# Patient Record
Sex: MALE | Race: WHITE | NOT HISPANIC OR LATINO | Employment: FULL TIME | URBAN - METROPOLITAN AREA
[De-identification: names, ages, dates, MRNs, and addresses within clinical notes are randomized per-mention and may not be internally consistent; named-entity substitution may affect disease eponyms.]

---

## 2017-01-08 ENCOUNTER — OFFICE VISIT (OUTPATIENT)
Dept: URGENT CARE | Facility: URGENT CARE | Age: 52
End: 2017-01-08
Payer: COMMERCIAL

## 2017-01-08 VITALS
DIASTOLIC BLOOD PRESSURE: 70 MMHG | RESPIRATION RATE: 16 BRPM | HEART RATE: 99 BPM | OXYGEN SATURATION: 98 % | TEMPERATURE: 98.2 F | BODY MASS INDEX: 35.43 KG/M2 | WEIGHT: 240 LBS | SYSTOLIC BLOOD PRESSURE: 110 MMHG

## 2017-01-08 DIAGNOSIS — J20.9 ACUTE BRONCHITIS, UNSPECIFIED ORGANISM: Primary | ICD-10-CM

## 2017-01-08 DIAGNOSIS — R05.9 COUGH: ICD-10-CM

## 2017-01-08 PROCEDURE — 99213 OFFICE O/P EST LOW 20 MIN: CPT | Performed by: FAMILY MEDICINE

## 2017-01-08 RX ORDER — BENZONATATE 100 MG/1
200 CAPSULE ORAL 3 TIMES DAILY PRN
Qty: 42 CAPSULE | Refills: 3 | Status: SHIPPED | OUTPATIENT
Start: 2017-01-08 | End: 2017-05-26

## 2017-01-08 RX ORDER — CODEINE PHOSPHATE AND GUAIFENESIN 10; 100 MG/5ML; MG/5ML
1-2 SOLUTION ORAL EVERY 6 HOURS PRN
Qty: 120 ML | Refills: 0 | Status: SHIPPED | OUTPATIENT
Start: 2017-01-08 | End: 2017-08-25

## 2017-01-09 NOTE — NURSING NOTE
"Everton Linn is a 51 year old male.      Chief Complaint   Patient presents with     Urgent Care     Cough     pt is here for a cough and congestion that started on Friday - also a fever started today and cough is getting worse       Initial /70 mmHg  Pulse 99  Temp(Src) 98.2  F (36.8  C) (Oral)  Resp 16  Wt 240 lb (108.863 kg)  SpO2 98% Estimated body mass index is 35.43 kg/(m^2) as calculated from the following:    Height as of 10/28/16: 5' 9\" (1.753 m).    Weight as of this encounter: 240 lb (108.863 kg).    BP completed using cuff size: large    Questioned patient about current smoking habits.  Pt. has never smoked.      Medina Davies CMA        "

## 2017-01-09 NOTE — PROGRESS NOTES
SUBJECTIVE:   Everton Linn is a 51 year old male presenting with a chief complaint of worsening cough (productive of yellowish-green phlegm, cough attacks have been mild-moderate), chest congestion for the past three days and fever (up to 100.2 F) since last night.  There has been sharp in the midline chest with coughing. .  Course of illness is worsening. .      Current and Associated symptoms: none.   Treatment measures tried include Ibuprofen (last taken one hour ago).  .  Predisposing factors include Patient has a h/o Diabetes Mellitus Type 2.    Past medical history:    Diabetes Mellitus Type 2.     Current Outpatient Prescriptions   Medication Sig Dispense Refill     simvastatin (ZOCOR) 20 MG tablet Take 1 tablet (20 mg) by mouth At Bedtime 90 tablet 3     escitalopram (LEXAPRO) 20 MG tablet Take 1 tablet (20 mg) by mouth daily 90 tablet 1     glipiZIDE (GLIPIZIDE XL) 10 MG 24 hr tablet Take 2 tablets (20 mg) by mouth daily 180 tablet 0     metFORMIN (GLUCOPHAGE-XR) 500 MG 24 hr tablet Take 2 tablets (1,000 mg) by mouth 2 times daily (with meals) 360 tablet 1     oxyCODONE (ROXICODONE) 5 MG immediate release tablet Take 1-2 tablets (5-10 mg) by mouth every 3 hours as needed for moderate to severe pain 10 tablet 0     ALPRAZolam (XANAX) 0.25 MG tablet Take 1 tablet (0.25 mg) by mouth 3 times daily as needed for anxiety 5 tablet 0     ASPIRIN NOT PRESCRIBED, INTENTIONAL, Antiplatelet medication not prescribed intentionally due to Not indicated based on age       glucose blood VI test strips strip 3 Box by In Vitro route daily. contour 1 Box 12     Social History   Substance Use Topics     Smoking status: Never Smoker      Smokeless tobacco: Never Used     Alcohol Use: Yes      Comment: 2 -3 beers every few weeks       ROS:  Review of systems negative except as stated above.    OBJECTIVE  :/70 mmHg  Pulse 99  Temp(Src) 98.2  F (36.8  C) (Oral)  Resp 16  Wt 240 lb (108.863 kg)  SpO2 98%  GENERAL  APPEARANCE: healthy, alert and no acute respiratory distress. Patient has frequent coughing.   HENT: ear canals and TM's normal. mouth without ulcers, erythema or lesions  NECK: supple, nontender, no lymphadenopathy  RESP: lungs clear to auscultation - no rales, rhonchi or wheezes  CV: regular rates and rhythm, normal S1 S2, no murmur noted    ASSESSMENT:  Acute Bronchitis  Cough    PLAN:  Rx:  Cheratussin AC, and Tessalon Perles.   Humidifier  follow up with the primary care provider if not better in 7-10 days.   See orders in Epic    Jimmie Byrne MD

## 2017-01-12 ENCOUNTER — TELEPHONE (OUTPATIENT)
Dept: FAMILY MEDICINE | Facility: CLINIC | Age: 52
End: 2017-01-12

## 2017-01-12 NOTE — TELEPHONE ENCOUNTER
Panel Management Review      Patient has the following on his problem list:     Diabetes    ASA:     Last A1C  A1C      8.7   10/21/2016  A1C      7.9   5/9/2016  A1C      9.3   1/8/2016  A1C      9.0   7/17/2015  A1C     10.3   1/9/2015  A1C tested: Failed    Last LDL:    CHOL      181   10/21/2016  HDL       32   10/21/2016  LDL   Cannot estimate LDL when triglyceride exceeds 400 mg/dL   10/21/2016  LDL      107   10/21/2016  TRIG      424   10/21/2016  CHOLHDLRATIO      4.6   7/17/2015  NHDL      149   10/21/2016    Is the patient on a Statin? YES             Is the patient on Aspirin? NO    Medications     HMG CoA Reductase Inhibitors    simvastatin (ZOCOR) 20 MG tablet          Last three blood pressure readings:  BP Readings from Last 3 Encounters:   01/08/17 110/70   10/28/16 122/72   05/28/16 125/71       Date of last diabetes office visit: 10/28/16     Tobacco History:     History   Smoking status     Never Smoker    Smokeless tobacco     Never Used             Composite cancer screening  Chart review shows that this patient is due/due soon for the following None  Summary:    Patient is due/failing the following:   Diabetic visit and A1C    Action needed:   Patient needs office visit for diabetic visit and A1C.    Type of outreach:    Sent Yasmo message.    Questions for provider review:    None                                                                                                                                    Pippa Uriostegui MA

## 2017-01-19 ENCOUNTER — DOCUMENTATION ONLY (OUTPATIENT)
Dept: FAMILY MEDICINE | Facility: CLINIC | Age: 52
End: 2017-01-19

## 2017-01-19 NOTE — PROGRESS NOTES
Panel Management Review      Patient has the following on his problem list:     Diabetes    ASA:     Last A1C  A1C      8.7   10/21/2016  A1C      7.9   5/9/2016  A1C      9.3   1/8/2016  A1C      9.0   7/17/2015  A1C     10.3   1/9/2015  A1C tested: Failed    Last LDL:    CHOL      181   10/21/2016  HDL       32   10/21/2016  LDL   Cannot estimate LDL when triglyceride exceeds 400 mg/dL   10/21/2016  LDL      107   10/21/2016  TRIG      424   10/21/2016  CHOLHDLRATIO      4.6   7/17/2015  NHDL      149   10/21/2016    Is the patient on a Statin? YES             Is the patient on Aspirin? NO    Medications     HMG CoA Reductase Inhibitors    simvastatin (ZOCOR) 20 MG tablet          Last three blood pressure readings:  BP Readings from Last 3 Encounters:   01/08/17 110/70   10/28/16 122/72   05/28/16 125/71       Date of last diabetes office visit: 10/28/16     Tobacco History:     History   Smoking status     Never Smoker    Smokeless tobacco     Never Used             Composite cancer screening  Chart review shows that this patient is due/due soon for the following None  Summary:    Patient is due/failing the following:   Diabetic visit and A1C    Action needed:   Patient needs office visit for diabetic visit and A1C.    Type of outreach:    Phone, left message for patient to call back.     Questions for provider review:    None                                                                                                                                    Pippa Uriostegui MA

## 2017-01-26 ENCOUNTER — DOCUMENTATION ONLY (OUTPATIENT)
Dept: FAMILY MEDICINE | Facility: CLINIC | Age: 52
End: 2017-01-26

## 2017-01-26 NOTE — PROGRESS NOTES
Panel Management Review      Patient has the following on his problem list:     Diabetes    ASA:     Last A1C  A1C      8.7   10/21/2016  A1C      7.9   5/9/2016  A1C      9.3   1/8/2016  A1C      9.0   7/17/2015  A1C     10.3   1/9/2015  A1C tested: Failed    Last LDL:    CHOL      181   10/21/2016  HDL       32   10/21/2016  LDL   Cannot estimate LDL when triglyceride exceeds 400 mg/dL   10/21/2016  LDL      107   10/21/2016  TRIG      424   10/21/2016  CHOLHDLRATIO      4.6   7/17/2015  NHDL      149   10/21/2016    Is the patient on a Statin? YES             Is the patient on Aspirin? NO    Medications     HMG CoA Reductase Inhibitors    simvastatin (ZOCOR) 20 MG tablet          Last three blood pressure readings:  BP Readings from Last 3 Encounters:   01/08/17 110/70   10/28/16 122/72   05/28/16 125/71       Date of last diabetes office visit: 10/28/16     Tobacco History:     History   Smoking status     Never Smoker    Smokeless tobacco     Never Used             Composite cancer screening  Chart review shows that this patient is due/due soon for the following None  Summary:    Patient is due/failing the following:   Diabetic visit and A1C    Action needed:   Patient needs office visit for diabetic visit.    Type of outreach:    Sent letter.    Questions for provider review:    None                                                                                                                                    Pippa Uriostegui MA       Chart closed.

## 2017-01-26 NOTE — Clinical Note
January 26, 2017      Everton Linn  [id number]  [facility] [address]        Dear Juan J,    According to our records, you are due for a diabetic follow up visit.    Please contact the clinic at [phone] at your earliest convenience to schedule this appointment.     Thank you for choosing [facility] as your preferred healthcare.     Sincerely,     Lindsay Arroyo, CNP, RN

## 2017-02-21 DIAGNOSIS — E11.628 TYPE 2 DIABETES MELLITUS WITH OTHER SKIN COMPLICATIONS (H): ICD-10-CM

## 2017-02-21 NOTE — TELEPHONE ENCOUNTER
glipiZIDE (GLIPIZIDE XL) 10 MG         Last Written Prescription Date: 11/22/16  Last Fill Quantity: 180, # refills: 0  Last Office Visit with G, P or [facility] prescribing provider:  10/28/16        BP Readings from Last 3 Encounters:   01/08/17 110/70   10/28/16 122/72   05/28/16 125/71     Lab Results   Component Value Date    MICROL 10 10/21/2016     No results found for: MICROALBUMIN  Creatinine   Date Value Ref Range Status   05/28/2016 0.98 0.66 - 1.25 mg/dL Final   ]  GFR Estimate   Date Value Ref Range Status   05/28/2016 81 >60 mL/min/1.7m2 Final     Comment:     Non  GFR Calc   05/27/2016 >90  Non  GFR Calc   >60 mL/min/1.7m2 Final   05/09/2016 81 >60 mL/min/1.7m2 Final     Comment:     Non  GFR Calc     GFR Estimate If Black   Date Value Ref Range Status   05/28/2016 >90   GFR Calc   >60 mL/min/1.7m2 Final   05/27/2016 >90   GFR Calc   >60 mL/min/1.7m2 Final   05/09/2016 >90   GFR Calc   >60 mL/min/1.7m2 Final     Lab Results   Component Value Date    CHOL 181 10/21/2016     Lab Results   Component Value Date    HDL 32 10/21/2016     Lab Results   Component Value Date    LDL  10/21/2016     Cannot estimate LDL when triglyceride exceeds 400 mg/dL     10/21/2016     Lab Results   Component Value Date    TRIG 424 10/21/2016     Lab Results   Component Value Date    CHOLHDLRATIO 4.6 07/17/2015     Lab Results   Component Value Date    AST 17 05/28/2016     Lab Results   Component Value Date    ALT 28 05/28/2016     Lab Results   Component Value Date    A1C 8.7 10/21/2016    A1C 7.9 05/09/2016    A1C 9.3 01/08/2016    A1C 9.0 07/17/2015    A1C 10.3 01/09/2015     Potassium   Date Value Ref Range Status   05/28/2016 3.7 3.4 - 5.3 mmol/L Final

## 2017-02-22 RX ORDER — GLIPIZIDE 10 MG/1
20 TABLET, FILM COATED, EXTENDED RELEASE ORAL DAILY
Qty: 180 TABLET | Refills: 0 | Status: SHIPPED | OUTPATIENT
Start: 2017-02-22 | End: 2017-05-22

## 2017-02-22 NOTE — TELEPHONE ENCOUNTER
Prescription approved per [facility] Refill Protocol.  Due for 6 month diabetes check in 4/17.  Oapl Rodas, RN  Triage Nurse

## 2017-05-04 ENCOUNTER — DOCUMENTATION ONLY (OUTPATIENT)
Dept: LAB | Facility: CLINIC | Age: 52
End: 2017-05-04

## 2017-05-04 DIAGNOSIS — E11.9 TYPE 2 DIABETES MELLITUS WITHOUT COMPLICATION (H): ICD-10-CM

## 2017-05-04 DIAGNOSIS — E78.5 HYPERLIPIDEMIA LDL GOAL <100: Primary | ICD-10-CM

## 2017-05-04 NOTE — PROGRESS NOTES
This patient is coming to lab May19th.  orders have been placed for you to associate and sign. Please future any further labs you may want, thanks [facility] lab staff.

## 2017-05-11 DIAGNOSIS — E11.628 TYPE 2 DIABETES MELLITUS WITH OTHER SKIN COMPLICATIONS (H): ICD-10-CM

## 2017-05-11 RX ORDER — METFORMIN HCL 500 MG
TABLET, EXTENDED RELEASE 24 HR ORAL
Qty: 120 TABLET | Refills: 0 | Status: SHIPPED | OUTPATIENT
Start: 2017-05-11 | End: 2017-06-09

## 2017-05-11 NOTE — TELEPHONE ENCOUNTER
metFORMIN (GLUCOPHAGE-XR) 500 MG         Last Written Prescription Date: 11/11/16  Last Fill Quantity: 360, # refills: 1  Last Office Visit with [facility], [facility] or  [facility] prescribing provider:  10/28/16   Next 5 appointments (look out 90 days)     May 19, 2017  9:00 AM CDT   [facility]  [facility]    [address] [address] [facility] [address] [phone]            May 26, 2017  4:20 PM CDT   MyChart Long with VALDEZ Cowart CNP   [facility]    [address] [address] [phone]                   BP Readings from Last 3 Encounters:   01/08/17 110/70   10/28/16 122/72   05/28/16 125/71     Lab Results   Component Value Date    MICROL 10 10/21/2016     Lab Results   Component Value Date    UMALCR 4.67 10/21/2016     Creatinine   Date Value Ref Range Status   05/28/2016 0.98 0.66 - 1.25 mg/dL Final   ]  GFR Estimate   Date Value Ref Range Status   05/28/2016 81 >60 mL/min/1.7m2 Final     Comment:     Non  GFR Calc   05/27/2016 >90  Non  GFR Calc   >60 mL/min/1.7m2 Final   05/09/2016 81 >60 mL/min/1.7m2 Final     Comment:     Non  GFR Calc     GFR Estimate If Black   Date Value Ref Range Status   05/28/2016 >90   GFR Calc   >60 mL/min/1.7m2 Final   05/27/2016 >90   GFR Calc   >60 mL/min/1.7m2 Final   05/09/2016 >90   GFR Calc   >60 mL/min/1.7m2 Final     Lab Results   Component Value Date    CHOL 181 10/21/2016     Lab Results   Component Value Date    HDL 32 10/21/2016     Lab Results   Component Value Date    LDL  10/21/2016     Cannot estimate LDL when triglyceride exceeds 400 mg/dL     10/21/2016     Lab Results   Component Value Date    TRIG 424 10/21/2016     Lab Results   Component Value Date    CHOLHDLRATIO 4.6 07/17/2015     Lab Results   Component Value Date    AST 17  05/28/2016     Lab Results   Component Value Date    ALT 28 05/28/2016     Lab Results   Component Value Date    A1C 8.7 10/21/2016    A1C 7.9 05/09/2016    A1C 9.3 01/08/2016    A1C 9.0 07/17/2015    A1C 10.3 01/09/2015     Potassium   Date Value Ref Range Status   05/28/2016 3.7 3.4 - 5.3 mmol/L Final

## 2017-05-11 NOTE — TELEPHONE ENCOUNTER
Medication is being filled for 1 time refill only due to:  Patient needs to be seen because due for diabetic visit.. Fasting labs due.    Patient has appt . 5/26 with pcp.    Doreen Villegas RN

## 2017-05-19 DIAGNOSIS — E78.5 HYPERLIPIDEMIA LDL GOAL <100: ICD-10-CM

## 2017-05-19 DIAGNOSIS — E11.9 TYPE 2 DIABETES MELLITUS WITHOUT COMPLICATION (H): ICD-10-CM

## 2017-05-19 LAB
CHOLEST SERPL-MCNC: 179 MG/DL
CREAT SERPL-MCNC: 0.91 MG/DL (ref 0.66–1.25)
GFR SERPL CREATININE-BSD FRML MDRD: 88 ML/MIN/1.7M2
HBA1C MFR BLD: 9.4 % (ref 4.3–6)
HDLC SERPL-MCNC: 33 MG/DL
LDLC SERPL CALC-MCNC: ABNORMAL MG/DL
LDLC SERPL DIRECT ASSAY-MCNC: 94 MG/DL
NONHDLC SERPL-MCNC: 146 MG/DL
TRIGL SERPL-MCNC: 556 MG/DL
TSH SERPL DL<=0.005 MIU/L-ACNC: 2.09 MU/L (ref 0.4–4)

## 2017-05-19 PROCEDURE — 83036 HEMOGLOBIN GLYCOSYLATED A1C: CPT | Performed by: NURSE PRACTITIONER

## 2017-05-19 PROCEDURE — 80061 LIPID PANEL: CPT | Performed by: NURSE PRACTITIONER

## 2017-05-19 PROCEDURE — 84443 ASSAY THYROID STIM HORMONE: CPT | Performed by: NURSE PRACTITIONER

## 2017-05-19 PROCEDURE — 36415 COLL VENOUS BLD VENIPUNCTURE: CPT | Performed by: NURSE PRACTITIONER

## 2017-05-19 PROCEDURE — 83721 ASSAY OF BLOOD LIPOPROTEIN: CPT | Mod: 59 | Performed by: NURSE PRACTITIONER

## 2017-05-19 PROCEDURE — 82565 ASSAY OF CREATININE: CPT | Performed by: NURSE PRACTITIONER

## 2017-05-22 DIAGNOSIS — E11.628 TYPE 2 DIABETES MELLITUS WITH OTHER SKIN COMPLICATIONS (H): ICD-10-CM

## 2017-05-22 NOTE — TELEPHONE ENCOUNTER
glipiZIDE (GLIPIZIDE XL) 10 MG         Last Written Prescription Date: 2/22/17  Last Fill Quantity: 180, # refills: 0  Last Office Visit with G, [facility] or [facility] prescribing provider:  10/28/16   Next 5 appointments (look out 90 days)     May 26, 2017  4:20 PM CDT   Stephanie Cavanaugh with VALDEZ Cowart CNP   [facility]    [address] [facility] [address] [phone]                   BP Readings from Last 3 Encounters:   01/08/17 110/70   10/28/16 122/72   05/28/16 125/71     Lab Results   Component Value Date    MICROL 10 10/21/2016     Lab Results   Component Value Date    UMALCR 4.67 10/21/2016     Creatinine   Date Value Ref Range Status   05/19/2017 0.91 0.66 - 1.25 mg/dL Final   ]  GFR Estimate   Date Value Ref Range Status   05/19/2017 88 >60 mL/min/1.7m2 Final     Comment:     Non  GFR Calc   05/28/2016 81 >60 mL/min/1.7m2 Final     Comment:     Non  GFR Calc   05/27/2016 >90  Non  GFR Calc   >60 mL/min/1.7m2 Final     GFR Estimate If Black   Date Value Ref Range Status   05/19/2017 >90   GFR Calc   >60 mL/min/1.7m2 Final   05/28/2016 >90   GFR Calc   >60 mL/min/1.7m2 Final   05/27/2016 >90   GFR Calc   >60 mL/min/1.7m2 Final     Lab Results   Component Value Date    CHOL 179 05/19/2017     Lab Results   Component Value Date    HDL 33 05/19/2017     Lab Results   Component Value Date    LDL  05/19/2017     Cannot estimate LDL when triglyceride exceeds 400 mg/dL    LDL 94 05/19/2017     Lab Results   Component Value Date    TRIG 556 05/19/2017     Lab Results   Component Value Date    CHOLHDLRATIO 4.6 07/17/2015     Lab Results   Component Value Date    AST 17 05/28/2016     Lab Results   Component Value Date    ALT 28 05/28/2016     Lab Results   Component Value Date    A1C 9.4 05/19/2017    A1C 8.7 10/21/2016    A1C 7.9 05/09/2016    A1C 9.3  01/08/2016    A1C 9.0 07/17/2015     Potassium   Date Value Ref Range Status   05/28/2016 3.7 3.4 - 5.3 mmol/L Final

## 2017-05-23 RX ORDER — GLIPIZIDE 10 MG/1
TABLET, FILM COATED, EXTENDED RELEASE ORAL
Qty: 60 TABLET | Refills: 0 | Status: SHIPPED | OUTPATIENT
Start: 2017-05-23 | End: 2017-06-30

## 2017-05-23 NOTE — TELEPHONE ENCOUNTER
Medication is being filled for 1 time refill only due to:  Patient needs to be seen because due for diabetic office visit..     Doreen Villegas RN

## 2017-05-26 ENCOUNTER — OFFICE VISIT (OUTPATIENT)
Dept: FAMILY MEDICINE | Facility: CLINIC | Age: 52
End: 2017-05-26
Payer: COMMERCIAL

## 2017-05-26 VITALS
HEIGHT: 69 IN | SYSTOLIC BLOOD PRESSURE: 112 MMHG | OXYGEN SATURATION: 95 % | HEART RATE: 95 BPM | RESPIRATION RATE: 16 BRPM | TEMPERATURE: 98.8 F | WEIGHT: 245.9 LBS | DIASTOLIC BLOOD PRESSURE: 62 MMHG | BODY MASS INDEX: 36.42 KG/M2

## 2017-05-26 DIAGNOSIS — E78.5 HYPERLIPIDEMIA LDL GOAL <100: ICD-10-CM

## 2017-05-26 DIAGNOSIS — Z12.11 ENCOUNTER FOR SCREENING COLONOSCOPY: ICD-10-CM

## 2017-05-26 DIAGNOSIS — F33.2 SEVERE EPISODE OF RECURRENT MAJOR DEPRESSIVE DISORDER, WITHOUT PSYCHOTIC FEATURES (H): Primary | ICD-10-CM

## 2017-05-26 DIAGNOSIS — E11.9 TYPE 2 DIABETES MELLITUS WITHOUT COMPLICATION, WITHOUT LONG-TERM CURRENT USE OF INSULIN (H): ICD-10-CM

## 2017-05-26 DIAGNOSIS — H00.14 CHALAZION LEFT UPPER EYELID: ICD-10-CM

## 2017-05-26 PROCEDURE — 99214 OFFICE O/P EST MOD 30 MIN: CPT | Performed by: NURSE PRACTITIONER

## 2017-05-26 RX ORDER — SIMVASTATIN 40 MG
40 TABLET ORAL AT BEDTIME
Qty: 90 TABLET | Refills: 3 | Status: SHIPPED | OUTPATIENT
Start: 2017-05-26 | End: 2017-08-25

## 2017-05-26 ASSESSMENT — PATIENT HEALTH QUESTIONNAIRE - PHQ9: 5. POOR APPETITE OR OVEREATING: SEVERAL DAYS

## 2017-05-26 ASSESSMENT — ANXIETY QUESTIONNAIRES
5. BEING SO RESTLESS THAT IT IS HARD TO SIT STILL: NOT AT ALL
6. BECOMING EASILY ANNOYED OR IRRITABLE: SEVERAL DAYS
7. FEELING AFRAID AS IF SOMETHING AWFUL MIGHT HAPPEN: NOT AT ALL
3. WORRYING TOO MUCH ABOUT DIFFERENT THINGS: SEVERAL DAYS
GAD7 TOTAL SCORE: 4
2. NOT BEING ABLE TO STOP OR CONTROL WORRYING: NOT AT ALL
1. FEELING NERVOUS, ANXIOUS, OR ON EDGE: SEVERAL DAYS
IF YOU CHECKED OFF ANY PROBLEMS ON THIS QUESTIONNAIRE, HOW DIFFICULT HAVE THESE PROBLEMS MADE IT FOR YOU TO DO YOUR WORK, TAKE CARE OF THINGS AT HOME, OR GET ALONG WITH OTHER PEOPLE: SOMEWHAT DIFFICULT

## 2017-05-26 NOTE — PATIENT INSTRUCTIONS
I will contact the psychiatrist and get back in touch with you next week.       Follow with an opthomologist. in the next few weeks to have her examine the lump on your eye.    Make necessary changes in your diet in order to attempt to lower triglyceride levels into the recommended range.  Additionally, begin increased dosage of simvastatin (increase form 20 MG to 40 MG).    Making these dietary changes will also help to lower your glucose levels, helping to level out your A1c readings.    Schedule a colonoscopy in 4-5 months.    Follow with a therapist in the next few weeks

## 2017-05-26 NOTE — PROGRESS NOTES
SUBJECTIVE:                                                    Everton Linn is a 52 year old male who presents to clinic today for the following health issues:    Diabetes Follow-up      Patient is checking blood sugars: not at all    Diabetic concerns: None     Symptoms of hypoglycemia (low blood sugar): none     Paresthesias (numbness or burning in feet) or sores: No     Date of last diabetic eye exam: July 2016  Hemoglobin A1C (%)   Date Value   05/19/2017 9.4 (H)   10/21/2016 8.7 (H)   05/09/2016 7.9 (H)   01/08/2016 9.3 (H)   07/17/2015 9.0 (H)   01/09/2015 10.3 (H)     Creatinine (mg/dL)   Date Value   05/19/2017 0.91   05/28/2016 0.98   05/27/2016 0.88   05/09/2016 0.98   01/09/2015 0.83   08/17/2013 0.88     Glucose (mg/dL)   Date Value   05/28/2016 109 (H)   05/27/2016 220 (H)   01/09/2015 272 (H)   08/17/2013 198 (H)   12/01/2009 249 (H)   01/11/2008 144 (H)     Potassium (mmol/L)   Date Value   05/28/2016 3.7   05/27/2016 3.5   01/09/2015 4.1   08/17/2013 4.3   12/01/2009 4.4   01/11/2008 4.3     Glucose levels have not been optimally maintained.  The pt acknowledges his sugars have been high, but attributes this to the stressors in his life and his anxiety.  Pt claims he always takes his meds and could make changes in his eating habits to help lower his glucose levels.     Hyperlipidemia Follow-Up      Rate your low fat/cholesterol diet?: good    Taking statin?  Yes, no muscle aches from statin    Other lipid medications/supplements?:  none  Recent Labs   Lab Test  05/19/17   0900  10/21/16   0827   07/17/15   0813  01/09/15   0807   CHOL  179  181   --   143  208*   HDL  33*  32*   --   31*  39*   LDL  Cannot estimate LDL when triglyceride exceeds 400 mg/dL  94  Cannot estimate LDL when triglyceride exceeds 400 mg/dL  107*   < >  67  110   TRIG  556*  424*   --   223*  293*   CHOLHDLRATIO   --    --    --   4.6  5.3*    < > = values in this interval not displayed.      Labs reviewed.  Pt was  "informed of the need to change his diet in order to lower triglycerides to a healthy level.  When questioned, he admits to not maintaining a healthy diet because of all of the stress in his life.    Depression and Anxiety Follow-Up    Status since last visit: No change    Other associated symptoms: Pt states that he has been having some low times, and also is having nightmares.     Complicating factors:     Significant life event: Yes - getting , and son is graduating next week.      Current substance abuse: None    PHQ-9 SCORE 5/13/2016 10/28/2016 5/26/2017   Total Score - - -   Total Score MyChart - - -   Total Score 3 13 11     CHINMAY-7 SCORE 5/13/2016 10/28/2016 5/26/2017   Total Score - - -   Total Score 2 8 4     Pt has a history of anxiety and depression, and is currently taking escitalopram.  He says it has helped a bit, but most days are just \"okay\" and there are a lot of down moments.  He also claims to have very vivid dreams in which he often kicks or punches out, occasionally waking himself up after hitting something.  He is currently going through a divorce, and feels sad throughout the week.  He will follow with a psychiatrist and see if a medication change is necessary.  He will also start Wellbutrin, but will have to monitor his drinking as he drinks over 10 beers once a month.     PHQ-9  English    PHQ-9   Any Language   GAD7       Amount of exercise or physical activity: 2-3 days/week for an average of 30-45 minutes    Problems taking medications regularly: No    Medication side effects: None other than nightmares, but not sure if that is a side effect or not.     Diet: regular (no restrictions)      Lump on Eye:  Pt reports a lump on his eye that has been there over the past few months.  He will follow with an eye doctor in the future.     Sleep Apnea:  Pt uses a CPAP machine for sleep, but is still waking up feeling tired every morning.  He claims he does not sleep enough.  Additionally, " once a week he says he will punch or kick out in his sleep and will often wake himself up because he hits his nightstand.  This has only been occurring for the last month.  Pt will consider following with a sleep clinic.      Problem list and histories reviewed & adjusted, as indicated.  Additional history: as documented    Labs reviewed in EPIC    Reviewed and updated as needed this visit by clinical staff  Tobacco  Allergies  Meds  Med Hx  Surg Hx  Fam Hx  Soc Hx      Reviewed and updated as needed this visit by Provider       Patient Active Problem List   Diagnosis     Rosacea     Dermatophytosis of nail     Anxiety state     Inguinal hernia     HYPERLIPIDEMIA LDL GOAL <100     Ventral hernia     SANTOS (obstructive sleep apnea)     Type 2 diabetes mellitus without complication (H)     Recurrent major depression-severe (H)     Biliary colic     Past Surgical History:   Procedure Laterality Date     ARTHROSCOPY KNEE RT/LT  2007    left knee     ARTHROSCOPY KNEE RT/LT  2005    right     LAPAROSCOPIC CHOLECYSTECTOMY WITH CHOLANGIOGRAMS N/A 5/28/2016    Procedure: LAPAROSCOPIC CHOLECYSTECTOMY WITH CHOLANGIOGRAMS;  Surgeon: Jerry Bello MD;  Location:  OR       Social History   Substance Use Topics     Smoking status: Never Smoker     Smokeless tobacco: Never Used     Alcohol use Yes      Comment: 2 -3 beers every few weeks     Family History   Problem Relation Age of Onset     C.A.D. Mother      CHF     DIABETES Mother      Hypertension Father      CANCER Father      Multiple Myloma     Musculoskeletal Disorder Paternal Grandmother      Paige Gehrig's Disease           REVIEW OF SYSTEMS:  C: NEGATIVE for fever, chills, or change in weight  I: Positive for a lump on the left eyelid; NEGATIVE for worrisome rashes, moles or lesions  R: Hx of Obstructive Sleep Apnea - use of CPAP; NEGATIVE for significant cough or SOB  CV: NEGATIVE for chest pain, palpitations or peripheral edema  N: NEGATIVE for weakness,  "dizziness or paresthesias  E: Hx of type II Diabetes Mellitus - use of Metformin and Glucotrol; Hx of Hyperlipidemia - use of Simvastatin;NEGATIVE for temperature intolerance, or skin/hair changes  P: Hx of depression and anxiety - use of Escitalopram; NEGATIVE for changes in mood or affect    This document serves as a record of the services and decisions personally performed and made by Lindsay Arroyo NP. It was created on her behalf by Tonya Shelley, a trained medical scribe. The creation of this document is based on the provider's statements to the medical scribe.  Tonya Shelley, March 24, 2017, 5:12 PM    OBJECTIVE:                                                    /62 (BP Location: Right arm, Patient Position: Chair, Cuff Size: Adult Large)  Pulse 95  Temp 98.8  F (37.1  C) (Oral)  Resp 16  Ht 5' 9\" (1.753 m)  Wt 245 lb 14.4 oz (111.5 kg)  SpO2 95%  BMI 36.31 kg/m2  Body mass index is 36.31 kg/(m^2).    EXAM:  GENERAL: Patient appears healthy, alert and not distressed.  RESP: Lungs are clear to auscultation - no rales, rhonchi or wheezes present  CV: Regular rate and rhythm, normal S1 S2 heart sounds, no ectopy, no peripheral edema present, peripheral pulses normal, no carotid bruit.  SKIN: Chalazion on left upper eyelid; No suspicious rashes, lesions, or moles  NEURO: Patient exhibits normal strength and tone, normal speech and mentation  PSYCH: Speech and grooming are within normal limits, affect is sad; does not appear as sad as he has in previous visits.    Diagnostic Test Results:  No results found for this or any previous visit (from the past 24 hour(s)).      ASSESSMENT/PLAN:                                                    A/P:    ICD-10-CM    1. Severe episode of recurrent major depressive disorder, without psychotic features (H) F33.2    2. Chalazion left upper eyelid H00.14 OPHTHALMOLOGY ADULT REFERRAL   3. Encounter for screening colonoscopy Z12.11 GASTROENTEROLOGY ADULT REF " PROCEDURE ONLY   4. Hyperlipidemia LDL goal <100 E78.5 simvastatin (ZOCOR) 40 MG tablet   5. Type 2 diabetes mellitus without complication, without long-term current use of insulin (H) E11.9      Discussed management of depression and diabetes.  Would like to consult with psychiatry given his nighttime sleep movements that he attributes to his SSRI    Did also discuss use of wellbutrin as an add on medication for depression management.  However he admits to drinking 10 drinks at a session at least every other weekend.  No history of seizure disorder but with binge drinking pattern wellbutrin may not be a good choice for this pateint.    Agrees to see therapist again.    Discussed triglycerides--will increase statin and pt aware of need to reduce ETOH and limit carbs.      Total visit time 30; over one half spent discussing current issues.      Patient Instructions   I will contact the psychiatrist and get back in touch with you next week.       Follow with an opthomologist. in the next few weeks to have her examine the lump on your eye.    Make necessary changes in your diet in order to attempt to lower triglyceride levels into the recommended range.  Additionally, begin increased dosage of simvastatin (increase form 20 MG to 40 MG).    Making these dietary changes will also help to lower your glucose levels, helping to level out your A1c readings.    Schedule a colonoscopy in 4-5 months.    Follow with a therapist in the next few weeks      The information in this document, created by a medical scribe for me, accurately reflects the services I personally performed and the decisions made by me. I have reviewed and approved this document for accuracy.  Lindsay Arroyo, May 26, 2017, 5:40 PM    VALDEZ Cowart [facility]

## 2017-05-26 NOTE — NURSING NOTE
"Chief Complaint   Patient presents with     Diabetes     Lipids     Depression     Anxiety       Initial /62 (BP Location: Right arm, Patient Position: Chair, Cuff Size: Adult Large)  Pulse 95  Temp 98.8  F (37.1  C) (Oral)  Resp 16  Ht 5' 9\" (1.753 m)  Wt 245 lb 14.4 oz (111.5 kg)  SpO2 95%  BMI 36.31 kg/m2 Estimated body mass index is 36.31 kg/(m^2) as calculated from the following:    Height as of this encounter: 5' 9\" (1.753 m).    Weight as of this encounter: 245 lb 14.4 oz (111.5 kg).  Medication Reconciliation: complete   Pippa Uriostegui MA       "

## 2017-05-26 NOTE — MR AVS SNAPSHOT
After Visit Summary   5/26/2017    Everton Linn    MRN: [id number]           Patient Information     Date Of Birth          1965        Visit Information        Provider Department      5/26/2017 4:20 PM Lindsay Arroyo APRN [facility]        Today's Diagnoses     Chalazion left upper eyelid    -  1    Encounter for screening colonoscopy        Hyperlipidemia LDL goal <100          Care Instructions    I will contact the psychiatrist and get back in touch with you next week.       Follow with an opthomologist. in the next few weeks to have her examine the lump on your eye.    Make necessary changes in your diet in order to attempt to lower triglyceride levels into the recommended range.  Additionally, begin increased dosage of simvastatin (increase form 20 MG to 40 MG).    Making these dietary changes will also help to lower your glucose levels, helping to level out your A1c readings.    Schedule a colonoscopy in 4-5 months.    Follow with a therapist in the next few weeks          Follow-ups after your visit        Additional Services     GASTROENTEROLOGY ADULT REF PROCEDURE ONLY       Last Lab Result: Creatinine (mg/dL)       Date                     Value                 05/19/2017               0.91             ----------  Body mass index is 36.31 kg/(m^2).      Patient will be contacted to schedule procedure.     Please be aware that coverage of these services is subject to the terms and limitations of your health insurance plan.  Call member services at your health plan with any benefit or coverage questions.  Any procedures must be performed at a [facility] facility OR coordinated by your clinic's referral office.    Please bring the following with you to your appointment:    (1) Any X-Rays, CTs or MRIs which have been performed.  Contact the facility where they were done to arrange for  prior to your scheduled appointment.    (2) List of current medications    (3) This referral request   (4) Any documents/labs given to you for this referral            OPHTHALMOLOGY ADULT REFERRAL       Your provider has referred you to: JONNIE: [facility]  [phone]  [email or web address]    Please be aware that coverage of these services is subject to the terms and limitations of your health insurance plan.  Call member services at your health plan with any benefit or coverage questions.      Please bring the following with you to your appointment:    (1) Any X-Rays, CTs or MRIs which have been performed.  Contact the facility where they were done to arrange for  prior to your scheduled appointment.    (2) List of current medications  (3) This referral request   (4) Any documents/labs given to you for this referral                  Who to contact     If you have questions or need follow up information about today's clinic visit or your schedule please contact [facility] directly at [phone].  Normal or non-critical lab and imaging results will be communicated to you by [employer], letter or phone within 4 business days after the clinic has received the results. If you do not hear from us within 7 days, please contact the clinic through [employer] or phone. If you have a critical or abnormal lab result, we will notify you by phone as soon as possible.  Submit refill requests through [employer] or call your pharmacy and they will forward the refill request to us. Please allow 3 business days for your refill to be completed.          Additional Information About Your Visit        [employer] Information     [employer] gives you secure access to your electronic health record. If you see a primary care provider, you can also send messages to your care team and make appointments. If you have questions, please call your primary care clinic.  If you do not have a primary care provider, please call [phone] and they will assist  "you.        Care EveryWhere ID     This is your Care EveryWhere ID. This could be used by other organizations to access your [facility] medical records  [id number]        Your Vitals Were     Pulse Temperature Respirations Height Pulse Oximetry BMI (Body Mass Index)    95 98.8  F (37.1  C) (Oral) 16 5' 9\" (1.753 m) 95% 36.31 kg/m2       Blood Pressure from Last 3 Encounters:   05/26/17 112/62   01/08/17 110/70   10/28/16 122/72    Weight from Last 3 Encounters:   05/26/17 245 lb 14.4 oz (111.5 kg)   01/08/17 240 lb (108.9 kg)   10/28/16 247 lb 8 oz (112.3 kg)              We Performed the Following     GASTROENTEROLOGY ADULT REF PROCEDURE ONLY     OPHTHALMOLOGY ADULT REFERRAL          Today's Medication Changes          These changes are accurate as of: 5/26/17  5:38 PM.  If you have any questions, ask your nurse or doctor.               These medicines have changed or have updated prescriptions.        Dose/Directions    simvastatin 40 MG tablet   Commonly known as:  ZOCOR   This may have changed:    - medication strength  - how much to take   Used for:  Hyperlipidemia LDL goal <100   Changed by:  Lindsay Arroyo APRN CNP        Dose:  40 mg   Take 1 tablet (40 mg) by mouth At Bedtime   Quantity:  90 tablet   Refills:  3            Where to get your medicines      These medications were sent to [facility]/pharmacy #[id number] - [facility] [address] [facility]  [address] [facility] [address]     Phone:  [phone]     simvastatin 40 MG tablet                Primary Care Provider Office Phone # Fax #    VALDEZ Cowart [phone]       [facility] [address]  [facility] [address]        Thank you!     Thank you for choosing [facility]  for your care. Our goal is always to provide you with excellent care. Hearing back from our patients is one way we can continue to improve our services. Please take a few minutes to complete the written survey that you " may receive in the mail after your visit with us. Thank you!             Your Updated Medication List - Protect others around you: Learn how to safely use, store and throw away your medicines at www.disposemymeds.org.          This list is accurate as of: 5/26/17  5:38 PM.  Always use your most recent med list.                   Brand Name Dispense Instructions for use    ALPRAZolam 0.25 MG tablet    XANAX    5 tablet    Take 1 tablet (0.25 mg) by mouth 3 times daily as needed for anxiety       ASPIRIN NOT PRESCRIBED    INTENTIONAL     Antiplatelet medication not prescribed intentionally due to Not indicated based on age       blood glucose monitoring test strip    no brand specified    1 Box    3 Box by In Vitro route daily. contour       escitalopram 20 MG tablet    LEXAPRO    90 tablet    Take 1 tablet (20 mg) by mouth daily       glipiZIDE 10 MG 24 hr tablet    GLUCOTROL XL    60 tablet    TAKE 2 TABLETS (20 MG) BY MOUTH DAILY       guaiFENesin-codeine 100-10 MG/5ML Soln solution    ROBITUSSIN AC    120 mL    Take 5-10 mLs by mouth every 6 hours as needed for cough       metFORMIN 500 MG 24 hr tablet    GLUCOPHAGE-XR    120 tablet    TAKE 2 TABLETS (1,000 MG) BY MOUTH 2 TIMES DAILY (WITH MEALS)       oxyCODONE 5 MG IR tablet    ROXICODONE    10 tablet    Take 1-2 tablets (5-10 mg) by mouth every 3 hours as needed for moderate to severe pain       simvastatin 40 MG tablet    ZOCOR    90 tablet    Take 1 tablet (40 mg) by mouth At Bedtime

## 2017-05-27 ASSESSMENT — PATIENT HEALTH QUESTIONNAIRE - PHQ9: SUM OF ALL RESPONSES TO PHQ QUESTIONS 1-9: 11

## 2017-05-27 ASSESSMENT — ANXIETY QUESTIONNAIRES: GAD7 TOTAL SCORE: 4

## 2017-05-31 ENCOUNTER — TELEPHONE (OUTPATIENT)
Dept: FAMILY MEDICINE | Facility: CLINIC | Age: 52
End: 2017-05-31

## 2017-06-01 NOTE — TELEPHONE ENCOUNTER
Please call to see if Dr Ramirez (psychiatry) might be able to review Everton's chart and provide some advice on a possible med change for this patient.  The patient is not well controlled on his current meds and is having some restlessness in bed that is disturbing his sleep.  Finds he thrashes his arms around in bed and at times injures arms.  Attributes this to his SSRI.  Was worse on fluoxetine, and is better on escitalopram.      Does Dr. Ramirez have suggestion for next med change to improve depression control as well as mitigate his nightime sleep disturbance.  We are also happy to have the patient make an appointment if Dr Ramirez thinks this would be more helpful.      I am also happy to schedule a call with Dr Ramirez early next week if that would be helpful for a telephone consult.    Thanks so much for help facilitating this connection.    Ashley

## 2017-06-01 NOTE — TELEPHONE ENCOUNTER
Informed Dr. Ramirez's office of request to review patient's chart for medication recommendations. Chart routed to Dr. Ramirez to review.    Doreen Villegas RN

## 2017-06-01 NOTE — TELEPHONE ENCOUNTER
SSRIs can cause akathisia or RLS; would first try reducing dose of Lexapro, then maybe augment with Wellbutrin   Theoretically, Zoloft would have less chance of doing this (boosts dopamine a bit)     Can try gabapentin or Inderal for RLS, or Klonopin, most of dose HS, rather than Xanax     I can see him; just make MH referral

## 2017-06-09 DIAGNOSIS — E11.628 TYPE 2 DIABETES MELLITUS WITH OTHER SKIN COMPLICATIONS (H): ICD-10-CM

## 2017-06-09 NOTE — TELEPHONE ENCOUNTER
metFORMIN (GLUCOPHAGE-XR) 500 MG         Last Written Prescription Date: 5/11/17  Last Fill Quantity: 120, # refills: 0  Last Office Visit with [facility], [facility] or [facility] prescribing provider:  5/26/17        BP Readings from Last 3 Encounters:   05/26/17 112/62   01/08/17 110/70   10/28/16 122/72     Lab Results   Component Value Date    MICROL 10 10/21/2016     Lab Results   Component Value Date    UMALCR 4.67 10/21/2016     Creatinine   Date Value Ref Range Status   05/19/2017 0.91 0.66 - 1.25 mg/dL Final   ]  GFR Estimate   Date Value Ref Range Status   05/19/2017 88 >60 mL/min/1.7m2 Final     Comment:     Non  GFR Calc   05/28/2016 81 >60 mL/min/1.7m2 Final     Comment:     Non  GFR Calc   05/27/2016 >90  Non  GFR Calc   >60 mL/min/1.7m2 Final     GFR Estimate If Black   Date Value Ref Range Status   05/19/2017 >90   GFR Calc   >60 mL/min/1.7m2 Final   05/28/2016 >90   GFR Calc   >60 mL/min/1.7m2 Final   05/27/2016 >90   GFR Calc   >60 mL/min/1.7m2 Final     Lab Results   Component Value Date    CHOL 179 05/19/2017     Lab Results   Component Value Date    HDL 33 05/19/2017     Lab Results   Component Value Date    LDL  05/19/2017     Cannot estimate LDL when triglyceride exceeds 400 mg/dL    LDL 94 05/19/2017     Lab Results   Component Value Date    TRIG 556 05/19/2017     Lab Results   Component Value Date    CHOLHDLRATIO 4.6 07/17/2015     Lab Results   Component Value Date    AST 17 05/28/2016     Lab Results   Component Value Date    ALT 28 05/28/2016     Lab Results   Component Value Date    A1C 9.4 05/19/2017    A1C 8.7 10/21/2016    A1C 7.9 05/09/2016    A1C 9.3 01/08/2016    A1C 9.0 07/17/2015     Potassium   Date Value Ref Range Status   05/28/2016 3.7 3.4 - 5.3 mmol/L Final

## 2017-06-10 DIAGNOSIS — F32.4 MAJOR DEPRESSIVE DISORDER WITH SINGLE EPISODE, IN PARTIAL REMISSION (H): ICD-10-CM

## 2017-06-10 DIAGNOSIS — F41.1 ANXIETY STATE: ICD-10-CM

## 2017-06-12 NOTE — TELEPHONE ENCOUNTER
escitalopram (LEXAPRO) 20 MG     Last Written Prescription Date: 12/2/16  Last Fill Quantity: 90, # refills: 1  Last Office Visit with G primary care provider:  5/26/17        Last PHQ-9 score on record=   PHQ-9 SCORE 5/26/2017   Total Score MyChart -   Total Score 11

## 2017-06-14 RX ORDER — METFORMIN HCL 500 MG
1000 TABLET, EXTENDED RELEASE 24 HR ORAL 2 TIMES DAILY WITH MEALS
Qty: 120 TABLET | Refills: 2 | Status: SHIPPED | OUTPATIENT
Start: 2017-06-14 | End: 2017-08-25

## 2017-06-14 NOTE — TELEPHONE ENCOUNTER
Medication is being filled for 1 time refill only due to:  due for A1C in August.   This lab is ordered. Note sent to pharmacy.   Opal Rodas, RN  Triage Nurse

## 2017-06-14 NOTE — TELEPHONE ENCOUNTER
Routing refill request to provider for review/approval because:  PHQ 9 >4, please sign if ok. Not sure if he has followed up with MH, do you want to see him again?  Opal Rodas, RN  Triage Nurse

## 2017-06-23 ENCOUNTER — MYC MEDICAL ADVICE (OUTPATIENT)
Dept: FAMILY MEDICINE | Facility: CLINIC | Age: 52
End: 2017-06-23

## 2017-06-23 RX ORDER — ESCITALOPRAM OXALATE 20 MG/1
TABLET ORAL
Qty: 90 TABLET | Refills: 1 | Status: SHIPPED | OUTPATIENT
Start: 2017-06-23 | End: 2017-08-25

## 2017-06-30 ENCOUNTER — VIRTUAL VISIT (OUTPATIENT)
Dept: FAMILY MEDICINE | Facility: CLINIC | Age: 52
End: 2017-06-30
Payer: COMMERCIAL

## 2017-06-30 DIAGNOSIS — F33.2 SEVERE EPISODE OF RECURRENT MAJOR DEPRESSIVE DISORDER, WITHOUT PSYCHOTIC FEATURES (H): Primary | ICD-10-CM

## 2017-06-30 DIAGNOSIS — E11.628 TYPE 2 DIABETES MELLITUS WITH OTHER SKIN COMPLICATIONS (H): ICD-10-CM

## 2017-06-30 DIAGNOSIS — E11.9 TYPE 2 DIABETES MELLITUS WITHOUT COMPLICATION, WITHOUT LONG-TERM CURRENT USE OF INSULIN (H): ICD-10-CM

## 2017-06-30 PROCEDURE — 98966 PH1 ASSMT&MGMT NQHP 5-10: CPT | Performed by: NURSE PRACTITIONER

## 2017-06-30 RX ORDER — ESCITALOPRAM OXALATE 10 MG/1
10 TABLET ORAL DAILY
Qty: 30 TABLET | Refills: 1 | Status: SHIPPED | OUTPATIENT
Start: 2017-06-30 | End: 2017-08-25

## 2017-06-30 RX ORDER — BUPROPION HYDROCHLORIDE 150 MG/1
150 TABLET ORAL EVERY MORNING
Qty: 30 TABLET | Refills: 1 | Status: SHIPPED | OUTPATIENT
Start: 2017-06-30 | End: 2017-08-25

## 2017-06-30 ASSESSMENT — ANXIETY QUESTIONNAIRES
5. BEING SO RESTLESS THAT IT IS HARD TO SIT STILL: NOT AT ALL
IF YOU CHECKED OFF ANY PROBLEMS ON THIS QUESTIONNAIRE, HOW DIFFICULT HAVE THESE PROBLEMS MADE IT FOR YOU TO DO YOUR WORK, TAKE CARE OF THINGS AT HOME, OR GET ALONG WITH OTHER PEOPLE: NOT DIFFICULT AT ALL
6. BECOMING EASILY ANNOYED OR IRRITABLE: SEVERAL DAYS
7. FEELING AFRAID AS IF SOMETHING AWFUL MIGHT HAPPEN: NOT AT ALL
GAD7 TOTAL SCORE: 3
3. WORRYING TOO MUCH ABOUT DIFFERENT THINGS: SEVERAL DAYS
2. NOT BEING ABLE TO STOP OR CONTROL WORRYING: NOT AT ALL
1. FEELING NERVOUS, ANXIOUS, OR ON EDGE: NOT AT ALL

## 2017-06-30 ASSESSMENT — PATIENT HEALTH QUESTIONNAIRE - PHQ9: 5. POOR APPETITE OR OVEREATING: SEVERAL DAYS

## 2017-06-30 NOTE — MR AVS SNAPSHOT
After Visit Summary   6/30/2017    Everton Linn    MRN: [id number]           Patient Information     Date Of Birth          1965        Visit Information        Provider Department      6/30/2017 4:20 PM Lindsay Arroyo APRN CNP [facility]        Today's Diagnoses     Severe episode of recurrent major depressive disorder, without psychotic features (H)    -  1    Type 2 diabetes mellitus without complication, without long-term current use of insulin (H)           Follow-ups after your visit        Future tests that were ordered for you today     Open Future Orders        Priority Expected Expires Ordered    **A1C FUTURE anytime Routine 6/30/2017 6/30/2018 6/30/2017            Who to contact     If you have questions or need follow up information about today's clinic visit or your schedule please contact [facility] directly at [phone].  Normal or non-critical lab and imaging results will be communicated to you by [employer], letter or phone within 4 business days after the clinic has received the results. If you do not hear from us within 7 days, please contact the clinic through [employer] or phone. If you have a critical or abnormal lab result, we will notify you by phone as soon as possible.  Submit refill requests through [employer] or call your pharmacy and they will forward the refill request to us. Please allow 3 business days for your refill to be completed.          Additional Information About Your Visit        MyChart Information     [employer] gives you secure access to your electronic health record. If you see a primary care provider, you can also send messages to your care team and make appointments. If you have questions, please call your primary care clinic.  If you do not have a primary care provider, please call [phone] and they will assist you.        Care EveryWhere ID     This is your Care EveryWhere ID. This could be used by other  organizations to access your [facility] medical records  [id number]         Blood Pressure from Last 3 Encounters:   05/26/17 112/62   01/08/17 110/70   10/28/16 122/72    Weight from Last 3 Encounters:   05/26/17 245 lb 14.4 oz (111.5 kg)   01/08/17 240 lb (108.9 kg)   10/28/16 247 lb 8 oz (112.3 kg)                 Today's Medication Changes          These changes are accurate as of: 6/30/17  5:06 PM.  If you have any questions, ask your nurse or doctor.               Start taking these medicines.        Dose/Directions    buPROPion 150 MG 24 hr tablet   Commonly known as:  WELLBUTRIN XL   Used for:  Severe episode of recurrent major depressive disorder, without psychotic features (H)   Started by:  Lindsay Arroyo APRN CNP        Dose:  150 mg   Take 1 tablet (150 mg) by mouth every morning   Quantity:  30 tablet   Refills:  1         These medicines have changed or have updated prescriptions.        Dose/Directions    * escitalopram 20 MG tablet   Commonly known as:  LEXAPRO   This may have changed:  Another medication with the same name was added. Make sure you understand how and when to take each.   Used for:  Anxiety state, Major depressive disorder with single episode, in partial remission (H)   Changed by:  Lindsay Arroyo APRN CNP        TAKE 1 TABLET (20 MG) BY MOUTH DAILY   Quantity:  90 tablet   Refills:  1       * escitalopram 10 MG tablet   Commonly known as:  LEXAPRO   This may have changed:  You were already taking a medication with the same name, and this prescription was added. Make sure you understand how and when to take each.   Used for:  Severe episode of recurrent major depressive disorder, without psychotic features (H)   Changed by:  Lindsay Arroyo APRN CNP        Dose:  10 mg   Take 1 tablet (10 mg) by mouth daily   Quantity:  30 tablet   Refills:  1       * Notice:  This list has 2 medication(s) that are the same as other medications prescribed for you. Read the directions  carefully, and ask your doctor or other care provider to review them with you.         Where to get your medicines      These medications were sent to [facility]/pharmacy #[id number] - [place], [place] - [address] [place]  [address] [address] [address]     Phone:  [phone]     buPROPion 150 MG 24 hr tablet    escitalopram 10 MG tablet                Primary Care Provider Office Phone # Fax #    Lindsay Arroyo, APRN [phone]       [facility] [address]  [facility] [address]        Equal Access to Services     [facility]: Hadii aad ku hadasho Soomaali, waaxda luqadaha, qaybta kaalmada adeegyada, waxay idiin hayaan adestacey godwin . So [facility] [phone].    ATENCIÓN: Si habla español, tiene a brandon disposición servicios gratuitos de asistencia lingüística. [facility] [phone].    We comply with applicable federal civil rights laws and [place] laws. We do not discriminate on the basis of race, color, national origin, age, disability sex, sexual orientation or gender identity.            Thank you!     Thank you for choosing [facility]  for your care. Our goal is always to provide you with excellent care. Hearing back from our patients is one way we can continue to improve our services. Please take a few minutes to complete the written survey that you may receive in the mail after your visit with us. Thank you!             Your Updated Medication List - Protect others around you: Learn how to safely use, store and throw away your medicines at www.disposemymeds.org.          This list is accurate as of: 6/30/17  5:06 PM.  Always use your most recent med list.                   Brand Name Dispense Instructions for use Diagnosis    ALPRAZolam 0.25 MG tablet    XANAX    5 tablet    Take 1 tablet (0.25 mg) by mouth 3 times daily as needed for anxiety    Panic attack       ASPIRIN NOT PRESCRIBED    INTENTIONAL     Antiplatelet medication not prescribed  intentionally due to Not indicated based on age    Type 2 diabetes, HbA1c goal < 7% (H)       blood glucose monitoring test strip    no brand specified    1 Box    3 Box by In Vitro route daily. contour    Type II or unspecified type diabetes mellitus without mention of complication, uncontrolled       buPROPion 150 MG 24 hr tablet    WELLBUTRIN XL    30 tablet    Take 1 tablet (150 mg) by mouth every morning    Severe episode of recurrent major depressive disorder, without psychotic features (H)       * escitalopram 20 MG tablet    LEXAPRO    90 tablet    TAKE 1 TABLET (20 MG) BY MOUTH DAILY    Anxiety state, Major depressive disorder with single episode, in partial remission (H)       * escitalopram 10 MG tablet    LEXAPRO    30 tablet    Take 1 tablet (10 mg) by mouth daily    Severe episode of recurrent major depressive disorder, without psychotic features (H)       glipiZIDE 10 MG 24 hr tablet    GLUCOTROL XL    60 tablet    TAKE 2 TABLETS (20 MG) BY MOUTH DAILY    Type 2 diabetes mellitus with other skin complications (H)       guaiFENesin-codeine 100-10 MG/5ML Soln solution    ROBITUSSIN AC    120 mL    Take 5-10 mLs by mouth every 6 hours as needed for cough    Cough       metFORMIN 500 MG 24 hr tablet    GLUCOPHAGE-XR    120 tablet    Take 2 tablets (1,000 mg) by mouth 2 times daily (with meals) Due for follow up and labs in August. Please call to schedule this.    Type 2 diabetes mellitus with other skin complications (H)       oxyCODONE 5 MG IR tablet    ROXICODONE    10 tablet    Take 1-2 tablets (5-10 mg) by mouth every 3 hours as needed for moderate to severe pain    Calculus of gallbladder without cholecystitis without obstruction       simvastatin 40 MG tablet    ZOCOR    90 tablet    Take 1 tablet (40 mg) by mouth At Bedtime    Hyperlipidemia LDL goal <100       * Notice:  This list has 2 medication(s) that are the same as other medications prescribed for you. Read the directions carefully, and ask  your doctor or other care provider to review them with you.

## 2017-06-30 NOTE — PROGRESS NOTES
S:  Everton Linn is a 52 year old male who presents for depression follow up  Better since divorce on the 5th    Lexapro

## 2017-06-30 NOTE — TELEPHONE ENCOUNTER
Medication Detail      Disp Refills Start End GIBRAN   glipiZIDE (GLUCOTROL XL) 10 MG 24 hr tablet 60 tablet 0 5/23/2017  No   Sig: TAKE 2 TABLETS (20 MG) BY MOUTH DAILY       Last Office Visit with G, P or [facility] prescribing provider:  5/26/17   Next 5 appointments (look out 90 days)     Jun 30, 2017  4:20 PM CDT   [facility]phone [facility] VALDEZ [facility]art CNP   [facility]    [address] [facility] [address] [phone]                   BP Readings from Last 3 Encounters:   05/26/17 112/62   01/08/17 110/70   10/28/16 122/72     Lab Results   Component Value Date    MICROL 10 10/21/2016     Lab Results   Component Value Date    UMALCR 4.67 10/21/2016     Creatinine   Date Value Ref Range Status   05/19/2017 0.91 0.66 - 1.25 mg/dL Final   ]  GFR Estimate   Date Value Ref Range Status   05/19/2017 88 >60 mL/min/1.7m2 Final     Comment:     Non  GFR Calc   05/28/2016 81 >60 mL/min/1.7m2 Final     Comment:     Non  GFR Calc   05/27/2016 >90  Non  GFR Calc   >60 mL/min/1.7m2 Final     GFR Estimate If Black   Date Value Ref Range Status   05/19/2017 >90   GFR Calc   >60 mL/min/1.7m2 Final   05/28/2016 >90   GFR Calc   >60 mL/min/1.7m2 Final   05/27/2016 >90   GFR Calc   >60 mL/min/1.7m2 Final     Lab Results   Component Value Date    CHOL 179 05/19/2017     Lab Results   Component Value Date    HDL 33 05/19/2017     Lab Results   Component Value Date    LDL  05/19/2017     Cannot estimate LDL when triglyceride exceeds 400 mg/dL    LDL 94 05/19/2017     Lab Results   Component Value Date    TRIG 556 05/19/2017     Lab Results   Component Value Date    CHOLHDLRATIO 4.6 07/17/2015     Lab Results   Component Value Date    AST 17 05/28/2016     Lab Results   Component Value Date    ALT 28 05/28/2016     Lab Results   Component Value Date    A1C 9.4 05/19/2017     A1C 8.7 10/21/2016    A1C 7.9 05/09/2016    A1C 9.3 01/08/2016    A1C 9.0 07/17/2015     Potassium   Date Value Ref Range Status   05/28/2016 3.7 3.4 - 5.3 mmol/L Final

## 2017-06-30 NOTE — PROGRESS NOTES
"Everton Linn is a 52 year old male who is being evaluated via a telephone visit.      The patient has been notified of following (by JUDY Uriostegui MA      \"We have found that certain health care needs can be provided without the need for a physical exam.  This service lets us provide the care you need with a short phone conversation.  If a prescription is necessary we can send it directly to your pharmacy.  If lab work is needed we can place an order for that and you can then stop by our lab to have the test done at a later time.    This telephone visit will be conducted via 3 way call with the you (the patient) , the physician/provider, and a me all on the line at the same time.  This allows your physician/provider to have the phone conversation with you while I will be taking notes for your medical record.  We will have full access to your [facility] medical record during this entire phone call.    Since this is like an office visit,  will bill your insurance company for this service.  Please check with your medical insurance if this type of telephone/virtual is covered . You may be responsible for the cost of this service if insurance coverage is denied.  The typical cost is $30 (10min), $59(11-20min) and $85 (21-30min)     If during the course of the call the physician/provider feels a telephone visit is not appropriate, you will not be charged for this service\"    Consent has been obtained for this service by 2 care team members: yes. See the scanned image in the medical record.      ===================================================    I have reviewed the note as documented above.  This accurately captures the substance of my conversation with the patient,    Additional provider notes:  SUBJECTIVE:                                                    Everton Linn is a 52 year old male who presents to clinic today for the following health issues:    Diabetes Follow-up      Patient is checking " blood sugars: not at all    Diabetic concerns: None     Symptoms of hypoglycemia (low blood sugar): none     Paresthesias (numbness or burning in feet) or sores: No     Date of last diabetic eye exam: Last July, has an appt scheduled for this July.     Would like to repeat A1c in a month and consider med changes.    Hyperlipidemia Follow-Up      Rate your low fat/cholesterol diet?: fair    Taking statin?  Yes, no muscle aches from statin    Other lipid medications/supplements?:  none    Depression and Anxiety Follow-Up    Status since last visit: Improved--feels his mood is improved since divorce final on June 5.  Has restarted counseling.    Other associated symptoms:None    Complicating factors:     Significant life event: Yes-  Divorce was finalized      Current substance abuse: None    Discussed med changes per Dr Ramirez's recommendations.  Recommended reduce lexapro to 10 and add wellbutrin to reduce nighttime akisthesia symptoms.  Discuss moderate ETOH intake as well.  Pt agrees.    PHQ-9 SCORE 10/28/2016 5/26/2017 6/30/2017   Total Score - - -   Total Score MyChart - - -   Total Score 13 11 9     CHINMAY-7 SCORE 10/28/2016 5/26/2017 6/30/2017   Total Score - - -   Total Score 8 4 3       PHQ-9  English  PHQ-9   Any Language  GAD7    Amount of exercise or physical activity: 2-3 days/week for an average of 30-45 minutes    Problems taking medications regularly: No    Medication side effects: none    Diet: regular (no restrictions)    O:  There were no vitals taken for this visit.  Phone visit.  Normal rate tone and content of speech.  Verbalizes understanding of instructions.    A/P:    ICD-10-CM    1. Severe episode of recurrent major depressive disorder, without psychotic features (H) F33.2 escitalopram (LEXAPRO) 10 MG tablet     buPROPion (WELLBUTRIN XL) 150 MG 24 hr tablet      [place] DNP, FNP-BC  Follow up in one month for recheck    Phone time 10 min

## 2017-07-01 ASSESSMENT — PATIENT HEALTH QUESTIONNAIRE - PHQ9: SUM OF ALL RESPONSES TO PHQ QUESTIONS 1-9: 9

## 2017-07-01 ASSESSMENT — ANXIETY QUESTIONNAIRES: GAD7 TOTAL SCORE: 3

## 2017-07-03 RX ORDER — GLIPIZIDE 10 MG/1
TABLET, FILM COATED, EXTENDED RELEASE ORAL
Qty: 60 TABLET | Refills: 0 | Status: SHIPPED | OUTPATIENT
Start: 2017-07-03 | End: 2017-08-26

## 2017-07-03 NOTE — TELEPHONE ENCOUNTER
Medication is being filled for 1 time refill only due to:  Patient needs labs Due for A1C 8/2017. Last 9.4 - required every 3 months for nurse only protocol. Future order already placed.    Ludmila Carvalho, MSN, RN-BC  Care Coordinator  [facility]

## 2017-07-13 ENCOUNTER — TELEPHONE (OUTPATIENT)
Dept: FAMILY MEDICINE | Facility: CLINIC | Age: 52
End: 2017-07-13

## 2017-07-13 NOTE — TELEPHONE ENCOUNTER
Panel Management Review      Patient has the following on his problem list:     Diabetes    ASA:     Last A1C  Lab Results   Component Value Date    A1C 9.4 05/19/2017    A1C 8.7 10/21/2016    A1C 7.9 05/09/2016    A1C 9.3 01/08/2016    A1C 9.0 07/17/2015     A1C tested: FAILED    Last LDL:    Lab Results   Component Value Date    CHOL 179 05/19/2017     Lab Results   Component Value Date    HDL 33 05/19/2017     Lab Results   Component Value Date    LDL  05/19/2017     Cannot estimate LDL when triglyceride exceeds 400 mg/dL    LDL 94 05/19/2017     Lab Results   Component Value Date    TRIG 556 05/19/2017     Lab Results   Component Value Date    CHOLHDLRATIO 4.6 07/17/2015     Lab Results   Component Value Date    NHDL 146 05/19/2017       Is the patient on a Statin? YES             Is the patient on Aspirin? NO    Medications     HMG CoA Reductase Inhibitors    simvastatin (ZOCOR) 40 MG tablet          Last three blood pressure readings:  BP Readings from Last 3 Encounters:   05/26/17 112/62   01/08/17 110/70   10/28/16 122/72       Date of last diabetes office visit: 5/19/17     Tobacco History:     History   Smoking Status     Never Smoker   Smokeless Tobacco     Never Used           Composite cancer screening  Chart review shows that this patient is due/due soon for the following None  Summary:    Patient is due/failing the following:   A1C    Action needed:   Patient needs office visit for diabetic visit.    Type of outreach:    Sent [employer] message.    Questions for provider review:    None                                                                                                                                    Pippa Uriostegui MA

## 2017-07-28 ENCOUNTER — TRANSFERRED RECORDS (OUTPATIENT)
Dept: HEALTH INFORMATION MANAGEMENT | Facility: CLINIC | Age: 52
End: 2017-07-28

## 2017-08-21 DIAGNOSIS — E11.9 TYPE 2 DIABETES MELLITUS WITHOUT COMPLICATION, WITHOUT LONG-TERM CURRENT USE OF INSULIN (H): ICD-10-CM

## 2017-08-21 LAB — HBA1C MFR BLD: 9.1 % (ref 4.3–6)

## 2017-08-21 PROCEDURE — 83036 HEMOGLOBIN GLYCOSYLATED A1C: CPT | Performed by: NURSE PRACTITIONER

## 2017-08-21 PROCEDURE — 36415 COLL VENOUS BLD VENIPUNCTURE: CPT | Performed by: NURSE PRACTITIONER

## 2017-08-25 ENCOUNTER — OFFICE VISIT (OUTPATIENT)
Dept: FAMILY MEDICINE | Facility: CLINIC | Age: 52
End: 2017-08-25
Payer: COMMERCIAL

## 2017-08-25 VITALS
DIASTOLIC BLOOD PRESSURE: 76 MMHG | BODY MASS INDEX: 35.59 KG/M2 | SYSTOLIC BLOOD PRESSURE: 122 MMHG | WEIGHT: 241 LBS | TEMPERATURE: 99.2 F | OXYGEN SATURATION: 97 % | HEART RATE: 89 BPM

## 2017-08-25 DIAGNOSIS — F33.2 SEVERE EPISODE OF RECURRENT MAJOR DEPRESSIVE DISORDER, WITHOUT PSYCHOTIC FEATURES (H): ICD-10-CM

## 2017-08-25 DIAGNOSIS — E78.5 HYPERLIPIDEMIA LDL GOAL <100: ICD-10-CM

## 2017-08-25 DIAGNOSIS — Z12.11 SCREEN FOR COLON CANCER: ICD-10-CM

## 2017-08-25 DIAGNOSIS — Z23 NEED FOR PROPHYLACTIC VACCINATION WITH TETANUS-DIPHTHERIA (TD): ICD-10-CM

## 2017-08-25 DIAGNOSIS — Z23 NEED FOR PROPHYLACTIC VACCINATION AND INOCULATION AGAINST INFLUENZA: ICD-10-CM

## 2017-08-25 DIAGNOSIS — E11.628 TYPE 2 DIABETES MELLITUS WITH OTHER SKIN COMPLICATIONS (H): ICD-10-CM

## 2017-08-25 DIAGNOSIS — M54.2 CERVICALGIA: ICD-10-CM

## 2017-08-25 DIAGNOSIS — Z13.89 SCREENING FOR DIABETIC PERIPHERAL NEUROPATHY: ICD-10-CM

## 2017-08-25 DIAGNOSIS — E11.9 TYPE 2 DIABETES MELLITUS WITHOUT COMPLICATION, WITHOUT LONG-TERM CURRENT USE OF INSULIN (H): ICD-10-CM

## 2017-08-25 DIAGNOSIS — Z23 NEED FOR TDAP VACCINATION: Primary | ICD-10-CM

## 2017-08-25 PROCEDURE — 90471 IMMUNIZATION ADMIN: CPT | Performed by: NURSE PRACTITIONER

## 2017-08-25 PROCEDURE — 99207 C FOOT EXAM  NO CHARGE: CPT | Performed by: NURSE PRACTITIONER

## 2017-08-25 PROCEDURE — 99214 OFFICE O/P EST MOD 30 MIN: CPT | Mod: 25 | Performed by: NURSE PRACTITIONER

## 2017-08-25 PROCEDURE — 90715 TDAP VACCINE 7 YRS/> IM: CPT | Performed by: NURSE PRACTITIONER

## 2017-08-25 RX ORDER — BUPROPION HYDROCHLORIDE 150 MG/1
TABLET ORAL
Qty: 30 TABLET | Refills: 1 | OUTPATIENT
Start: 2017-08-25

## 2017-08-25 RX ORDER — ESCITALOPRAM OXALATE 10 MG/1
TABLET ORAL
Qty: 30 TABLET | Refills: 1 | OUTPATIENT
Start: 2017-08-25

## 2017-08-25 RX ORDER — SIMVASTATIN 40 MG
40 TABLET ORAL AT BEDTIME
Qty: 90 TABLET | Refills: 3 | Status: SHIPPED | OUTPATIENT
Start: 2017-08-25 | End: 2018-08-15

## 2017-08-25 RX ORDER — BUPROPION HYDROCHLORIDE 150 MG/1
150 TABLET ORAL EVERY MORNING
Qty: 30 TABLET | Refills: 1 | Status: SHIPPED | OUTPATIENT
Start: 2017-08-25 | End: 2017-10-20

## 2017-08-25 RX ORDER — METFORMIN HCL 500 MG
1000 TABLET, EXTENDED RELEASE 24 HR ORAL 2 TIMES DAILY WITH MEALS
Qty: 120 TABLET | Refills: 2 | Status: SHIPPED | OUTPATIENT
Start: 2017-08-25 | End: 2017-11-25

## 2017-08-25 RX ORDER — ESCITALOPRAM OXALATE 10 MG/1
10 TABLET ORAL DAILY
Qty: 30 TABLET | Refills: 1 | Status: SHIPPED | OUTPATIENT
Start: 2017-08-25 | End: 2017-10-20

## 2017-08-25 NOTE — NURSING NOTE
"Chief Complaint   Patient presents with     Diabetes     Otalgia     mild temperature, left ear has been bugging him       Initial /76  Pulse 89  Temp 99.2  F (37.3  C) (Oral)  Wt 245 lb (111.1 kg)  SpO2 97%  BMI 36.18 kg/m2 Estimated body mass index is 36.18 kg/(m^2) as calculated from the following:    Height as of 5/26/17: 5' 9\" (1.753 m).    Weight as of this encounter: 245 lb (111.1 kg).  Medication Reconciliation: complete   Mely Mcadams CMA (AAMA)      "

## 2017-08-25 NOTE — MR AVS SNAPSHOT
After Visit Summary   8/25/2017    Everton Linn    MRN: [id number]           Patient Information     Date Of Birth          1965        Visit Information        Provider Department      8/25/2017 4:20 PM Lindsay Arroyo APRN [facility]        Today's Diagnoses     Need for Tdap vaccination    -  1    Screen for colon cancer        Screening for diabetic peripheral neuropathy        Need for prophylactic vaccination and inoculation against influenza        Need for prophylactic vaccination with tetanus-diphtheria (TD)        Type 2 diabetes mellitus without complication, without long-term current use of insulin (H)        Cervicalgia        Hyperlipidemia LDL goal <100        Type 2 diabetes mellitus with other skin complications (H)        Severe episode of recurrent major depressive disorder, without psychotic features (H)          Care Instructions    Consider speaking to your therapist about whether you would like to increase Wellbutrin dose in the future and let me know    We will add Januvia 100 mg daily to current medications-Metformin and Glipizide     Call to schedule appointment at [facility] [phone]    Follow up in 2 months, we will do Foot Exam at this time           Follow-ups after your visit        Additional Services     [facility] PT, HAND, AND CHIROPRACTIC REFERRAL       **This order will print in the [facility] Scheduling Office**    Physical Therapy, Hand Therapy and Chiropractic Care are available through:    *[facility]  *[facility]  *[facility]    Call one number to schedule at any of the above locations: [phone].    Your provider has referred you to: [facility]    Indication/Reason for Referral: neck pain  Onset of Illness: ongoing and post cervical surgery  Therapy Orders: Evaluate and Treat  Special Programs: None and as indicated      Thai Andre       Additional Comments for the Therapist or Chiropractor:     Please be aware that coverage of these services is subject to the terms and limitations of your health insurance plan.  Call member services at your health plan with any benefit or coverage questions.      Please bring the following to your appointment:    *Your personal calendar for scheduling future appointments  *Comfortable clothing                  Follow-up notes from your care team     Return in about 2 months (around 10/25/2017) for Routine Visit.      Who to contact     If you have questions or need follow up information about today's clinic visit or your schedule please contact [facility] directly at [phone].  Normal or non-critical lab and imaging results will be communicated to you by iSTARhart, letter or phone within 4 business days after the clinic has received the results. If you do not hear from us within 7 days, please contact the clinic through [facility] or phone. If you have a critical or abnormal lab result, we will notify you by phone as soon as possible.  Submit refill requests through [employer] or call your pharmacy and they will forward the refill request to us. Please allow 3 business days for your refill to be completed.          Additional Information About Your Visit        MyChart Information     [employer] gives you secure access to your electronic health record. If you see a primary care provider, you can also send messages to your care team and make appointments. If you have questions, please call your primary care clinic.  If you do not have a primary care provider, please call [phone] and they will assist you.        Care EveryWhere ID     This is your Care EveryWhere ID. This could be used by other organizations to access your [facility] medical records  [id number]        Your Vitals Were     Pulse Temperature Pulse Oximetry BMI (Body Mass Index)          89 99.2  F (37.3  C) (Oral) 97% 35.59 kg/m2          Blood Pressure from Last 3 Encounters:   08/25/17 122/76   05/26/17 112/62   01/08/17 110/70    Weight from Last 3 Encounters:   08/25/17 241 lb (109.3 kg)   05/26/17 245 lb 14.4 oz (111.5 kg)   01/08/17 240 lb (108.9 kg)              We Performed the Following     FOOT EXAM  NO CHARGE [62433.114]     [facility] PT, HAND, AND CHIROPRACTIC REFERRAL     TDAP VACCINE (ADACEL)          Today's Medication Changes          These changes are accurate as of: 8/25/17  5:14 PM.  If you have any questions, ask your nurse or doctor.               Start taking these medicines.        Dose/Directions    sitagliptin 100 MG tablet   Commonly known as:  JANUVIA   Used for:  Type 2 diabetes mellitus without complication, without long-term current use of insulin (H)   Started by:  Lindsay Arroyo APRN CNP        Dose:  100 mg   Take 1 tablet (100 mg) by mouth daily   Quantity:  30 tablet   Refills:  3         These medicines have changed or have updated prescriptions.        Dose/Directions    escitalopram 10 MG tablet   Commonly known as:  LEXAPRO   This may have changed:  Another medication with the same name was removed. Continue taking this medication, and follow the directions you see here.   Used for:  Severe episode of recurrent major depressive disorder, without psychotic features (H)   Changed by:  Lindsay Arroyo APRN CNP        Dose:  10 mg   Take 1 tablet (10 mg) by mouth daily   Quantity:  30 tablet   Refills:  1            Where to get your medicines      These medications were sent to [facility]/pharmacy #[id number] - [place], [place] - [address] [address]  [address] [address]     Phone:  [phone]     buPROPion 150 MG 24 hr tablet    escitalopram 10 MG tablet    metFORMIN 500 MG 24 hr tablet    simvastatin 40 MG tablet    sitagliptin 100 MG tablet                Primary Care Provider Office Phone # Fax #    VALDEZ Cowart [phone]       [facility] [address]  [facility] [address]        Equal Access to Services     [facility] : Hadii derian ku hadagatao Soadeleali, waaxda luqadaha, qaybta kaalmada danialzeferinodouglas, marlyn poptomasairais dixon. So [facility] [phone].    ATENCIÓN: Si habla español, tiene a brandon disposición servicios gratuitos de asistencia lingüística. Hollieame al [phone].    We comply with applicable federal civil rights laws and [place] laws. We do not discriminate on the basis of race, color, national origin, age, disability sex, sexual orientation or gender identity.            Thank you!     Thank you for choosing [facility]  for your care. Our goal is always to provide you with excellent care. Hearing back from our patients is one way we can continue to improve our services. Please take a few minutes to complete the written survey that you may receive in the mail after your visit with us. Thank you!             Your Updated Medication List - Protect others around you: Learn how to safely use, store and throw away your medicines at www.disposemymeds.org.          This list is accurate as of: 8/25/17  5:14 PM.  Always use your most recent med list.                   Brand Name Dispense Instructions for use Diagnosis    ASPIRIN NOT PRESCRIBED    INTENTIONAL     Antiplatelet medication not prescribed intentionally due to Not indicated based on age    Type 2 diabetes, HbA1c goal < 7% (H)       blood glucose monitoring test strip    no brand specified    1 Box    3 Box by In Vitro route daily. contour    Type II or unspecified type diabetes mellitus without mention of complication, uncontrolled       buPROPion 150 MG 24 hr tablet    WELLBUTRIN XL    30 tablet    Take 1 tablet (150 mg) by mouth every morning    Severe episode of recurrent major depressive disorder, without psychotic features (H)       escitalopram 10 MG tablet    LEXAPRO    30 tablet    Take 1 tablet (10 mg) by mouth daily    Severe episode of recurrent major  depressive disorder, without psychotic features (H)       glipiZIDE 10 MG 24 hr tablet    GLUCOTROL XL    60 tablet    TAKE 2 TABLETS (20 MG) BY MOUTH DAILY    Type 2 diabetes mellitus with other skin complications (H)       metFORMIN 500 MG 24 hr tablet    GLUCOPHAGE-XR    120 tablet    Take 2 tablets (1,000 mg) by mouth 2 times daily (with meals) Due for follow up and labs in August. Please call to schedule this.    Type 2 diabetes mellitus with other skin complications (H)       simvastatin 40 MG tablet    ZOCOR    90 tablet    Take 1 tablet (40 mg) by mouth At Bedtime    Hyperlipidemia LDL goal <100       sitagliptin 100 MG tablet    JANUVIA    30 tablet    Take 1 tablet (100 mg) by mouth daily    Type 2 diabetes mellitus without complication, without long-term current use of insulin (H)

## 2017-08-25 NOTE — PROGRESS NOTES
SUBJECTIVE:   Everton Linn is a 52 year old male who presents to clinic today for the following health issues:    Diabetes Follow-up      Patient is checking blood sugars: not at all    Diabetic concerns: None     Symptoms of hypoglycemia (low blood sugar): none     Paresthesias (numbness or burning in feet) or sores: No     Date of last diabetic eye exam: Last week    Currently taking metformin 1000 mg BID, glipizide 20 mg qd     Patient relates he works more during the fall, and thus has less time to cook healthy meals. He mentions that he could eat better. He does not feel that he would ever be able to inject himself.     Lab Results   Component Value Date    A1C 9.1 08/21/2017    A1C 9.4 05/19/2017    A1C 8.7 10/21/2016    A1C 7.9 05/09/2016    A1C 9.3 01/08/2016     Wt Readings from Last 3 Encounters:   08/25/17 241 lb (109.3 kg)   05/26/17 245 lb 14.4 oz (111.5 kg)   01/08/17 240 lb (108.9 kg)       Hyperlipidemia Follow-Up      Rate your low fat/cholesterol diet?: not monitoring fat    Taking statin?  Yes, no muscle aches from statin    Other lipid medications/supplements?:  None    Currently taking simvastatin 40 mg qd       Recent Labs   Lab Test  05/19/17   0900  10/21/16   0827   07/17/15   0813  01/09/15   0807   CHOL  179  181   --   143  208*   HDL  33*  32*   --   31*  39*   LDL  Cannot estimate LDL when triglyceride exceeds 400 mg/dL  94  Cannot estimate LDL when triglyceride exceeds 400 mg/dL  107*   < >  67  110   TRIG  556*  424*   --   223*  293*   CHOLHDLRATIO   --    --    --   4.6  5.3*    < > = values in this interval not displayed.       Depression and Anxiety Follow-Up    Status since last visit: Improved    Other associated symptoms:None    Complicating factors:     Significant life event: Yes-  Son just started college this past weekend, divorce finalized recently      Current substance abuse: None    Patient relates he has been seeing therapist regularly and taking Wellbutrin  150 mg and escitalopram 10 mg daily. He feels he has slightly improved, and has days were he is able to tell that it is not a bad day. He notes previously he felt that he had flat affect. He continues to have vivid dreams, however has not been kicking our punching out lately. Patient reports his PHQ 9 score was 10 yesterday at his therapists office.     PHQ-9 SCORE 10/28/2016 5/26/2017 6/30/2017   Total Score - - -   Total Score MyChart - - -   Total Score 13 11 9     CHINMAY-7 SCORE 10/28/2016 5/26/2017 6/30/2017   Total Score - - -   Total Score 8 4 3     PHQ-9  English  PHQ-9   Any Language  GAD7        Amount of exercise or physical activity: 2-3 days/week for an average of 45-60 minutes    Problems taking medications regularly: No    Medication side effects: none    Diet: low salt and diabetic      Neck pain  Everton reports chronic neck pain. He has associated numbness in right hand. He notes the pain limits his ability to exercise regularly. He would like to get neck pain figured out as he would like to be able to exercise more to improve depressive symptoms further.     Problem list and histories reviewed & adjusted, as indicated.  Additional history: as documented    Labs reviewed in EPIC    Reviewed and updated as needed this visit by clinical staffTobacco  Allergies  Meds  Problems  Med Hx  Surg Hx  Fam Hx  Soc Hx        Reviewed and updated as needed this visit by Provider  Allergies  Meds  Problems             Patient Active Problem List   Diagnosis     Rosacea     Dermatophytosis of nail     Anxiety state     Inguinal hernia     HYPERLIPIDEMIA LDL GOAL <100     Ventral hernia     SANTOS (obstructive sleep apnea)     Type 2 diabetes mellitus without complication (H)     Recurrent major depression-severe (H)     Biliary colic     Past Surgical History:   Procedure Laterality Date     ARTHROSCOPY KNEE RT/LT  2007    left knee     ARTHROSCOPY KNEE RT/LT  2005    right     LAPAROSCOPIC CHOLECYSTECTOMY  WITH CHOLANGIOGRAMS N/A 5/28/2016    Procedure: LAPAROSCOPIC CHOLECYSTECTOMY WITH CHOLANGIOGRAMS;  Surgeon: Jerry Bello MD;  Location:  OR       Social History   Substance Use Topics     Smoking status: Never Smoker     Smokeless tobacco: Never Used     Alcohol use Yes      Comment: 2 -3 beers every few weeks     Family History   Problem Relation Age of Onset     C.A.D. Mother      CHF     DIABETES Mother      Hypertension Father      CANCER Father      Multiple Myloma     Musculoskeletal Disorder Paternal Grandmother      Paige Gehrig's Disease           ROS:  C: NEGATIVE for fever, chills, change in weight  E/M: NEGATIVE for ear, mouth and throat problems  R: NEGATIVE for significant cough or SOB  CV: NEGATIVE for chest pain, palpitations or peripheral edema  MUSCULOSKELETAL: POSITIVE for neck pain NEGATIVE for significant arthralgias or myalgia  NEURO: POSITIVE for numbness in right hand NEGATIVE for weakness, dizziness or paresthesias  ENDOCRINE: Hx of hyperlipidemia - use of simvastatin, hx of diabetes - use of metformin, glipizide, Januvia(new) NEGATIVE for temperature intolerance, skin/hair changes  PSYCHIATRIC: Hx of depression and anxiety - use of bupropion and escitalopram NEGATIVE for changes in mood or affect    This document serves as a record of the services and decisions personally performed and made by LACY Cowart CNP. It was created on her behalf by Kasandra Jones, a trained medical scribe. The creation of this document is based on the provider's statements to the medical scribe.  Kasandra Jones 4:59 PM August 25, 2017    OBJECTIVE:                                                    /76  Pulse 89  Temp 99.2  F (37.3  C) (Oral)  Wt 241 lb (109.3 kg)  SpO2 97%  BMI 35.59 kg/m2  Body mass index is 35.59 kg/(m^2).  GENERAL: healthy, alert and no distress  RESP: lungs clear to auscultation - no rales, rhonchi or wheezes  CV: regular rate and rhythm, normal S1 S2,  no S3 or S4, no murmur, click or rub, no peripheral edema and peripheral pulses strong  MS: no gross musculoskeletal defects noted, no edema  NEURO: Normal strength and tone, mentation intact and speech normal  PSYCH: mentation appears normal, affect normal/bright    Diagnostic Test Results:  none      ASSESSMENT/PLAN:                                                        ICD-10-CM    1. Need for Tdap vaccination Z23 TDAP VACCINE (ADACEL)   2. Screen for colon cancer Z12.11    3. Screening for diabetic peripheral neuropathy Z13.89 FOOT EXAM  NO CHARGE [54714.114]   4. Need for prophylactic vaccination and inoculation against influenza Z23    5. Need for prophylactic vaccination with tetanus-diphtheria (TD) Z23    6. Type 2 diabetes mellitus without complication, without long-term current use of insulin (H) E11.9 sitagliptin (JANUVIA) 100 MG tablet   7. Cervicalgia M54.2 [facility], HAND, AND CHIROPRACTIC REFERRAL   8. Hyperlipidemia LDL goal <100 E78.5 simvastatin (ZOCOR) 40 MG tablet   9. Type 2 diabetes mellitus with other skin complications (H) E11.628 metFORMIN (GLUCOPHAGE-XR) 500 MG 24 hr tablet   10. Severe episode of recurrent major depressive disorder, without psychotic features (H) F33.2 escitalopram (LEXAPRO) 10 MG tablet     buPROPion (WELLBUTRIN XL) 150 MG 24 hr tablet       Patient Instructions   Consider speaking to your therapist about whether you would like to increase Wellbutrin dose in the future and let me know    We will add Januvia 100 mg daily to current medications-Metformin and Glipizide     Call to schedule appointment at [facility] [phone]    Follow up in 2 months, we will do Foot Exam at this time       The information in this document, created by the medical scribe for me, accurately reflects the services I personally performed and the decisions made by me. I have reviewed and approved this document for accuracy prior to leaving the patient care area.  August 25,  2017 3:10 PM    VALDEZ Cowart [facility]

## 2017-08-25 NOTE — PATIENT INSTRUCTIONS
Consider speaking to your therapist about whether you would like to increase Wellbutrin dose in the future and let me know    We will add Januvia 100 mg daily to current medications-Metformin and Glipizide     Call to schedule appointment at [facility] [phone]    Follow up in 2 months, we will do Foot Exam at this time

## 2017-08-26 DIAGNOSIS — E11.628 TYPE 2 DIABETES MELLITUS WITH OTHER SKIN COMPLICATIONS (H): ICD-10-CM

## 2017-08-27 NOTE — TELEPHONE ENCOUNTER
glipiZIDE (GLUCOTROL XL) 10 MG 24 hr tablet         Last Written Prescription Date: 7/3/2017  Last Fill Quantity: 60, # refills: 0  Last Office Visit with G, P or [facility] prescribing provider:  8/25/2017        BP Readings from Last 3 Encounters:   08/25/17 122/76   05/26/17 112/62   01/08/17 110/70     Lab Results   Component Value Date    MICROL 10 10/21/2016     Lab Results   Component Value Date    UMALCR 4.67 10/21/2016     Creatinine   Date Value Ref Range Status   05/19/2017 0.91 0.66 - 1.25 mg/dL Final   ]  GFR Estimate   Date Value Ref Range Status   05/19/2017 88 >60 mL/min/1.7m2 Final     Comment:     Non  GFR Calc   05/28/2016 81 >60 mL/min/1.7m2 Final     Comment:     Non  GFR Calc   05/27/2016 >90  Non  GFR Calc   >60 mL/min/1.7m2 Final     GFR Estimate If Black   Date Value Ref Range Status   05/19/2017 >90   GFR Calc   >60 mL/min/1.7m2 Final   05/28/2016 >90   GFR Calc   >60 mL/min/1.7m2 Final   05/27/2016 >90   GFR Calc   >60 mL/min/1.7m2 Final     Lab Results   Component Value Date    CHOL 179 05/19/2017     Lab Results   Component Value Date    HDL 33 05/19/2017     Lab Results   Component Value Date    LDL  05/19/2017     Cannot estimate LDL when triglyceride exceeds 400 mg/dL    LDL 94 05/19/2017     Lab Results   Component Value Date    TRIG 556 05/19/2017     Lab Results   Component Value Date    CHOLHDLRATIO 4.6 07/17/2015     Lab Results   Component Value Date    AST 17 05/28/2016     Lab Results   Component Value Date    ALT 28 05/28/2016     Lab Results   Component Value Date    A1C 9.1 08/21/2017    A1C 9.4 05/19/2017    A1C 8.7 10/21/2016    A1C 7.9 05/09/2016    A1C 9.3 01/08/2016     Potassium   Date Value Ref Range Status   05/28/2016 3.7 3.4 - 5.3 mmol/L Final

## 2017-08-28 RX ORDER — GLIPIZIDE 10 MG/1
TABLET, FILM COATED, EXTENDED RELEASE ORAL
Qty: 60 TABLET | Refills: 3 | Status: SHIPPED | OUTPATIENT
Start: 2017-08-28 | End: 2017-12-16

## 2017-08-28 NOTE — TELEPHONE ENCOUNTER
Patient is due for a follow up lab appointment in 2 months, refills of other medications  Done last week. These are refilled today.  Opal Rodas RN  Triage Nurse

## 2017-09-06 ENCOUNTER — TELEPHONE (OUTPATIENT)
Dept: FAMILY MEDICINE | Facility: CLINIC | Age: 52
End: 2017-09-06

## 2017-09-06 NOTE — TELEPHONE ENCOUNTER
Received a call from Violetta of [facility] [phone].  She said they have not received a referral for him to be seen for his neck pain.  Will forward to clarify with provider as it talks about [facility] in the referral and then talks about [facility] later.  Where do you want him to be seen?      She was also going to have him check with his insurance also.    Will forward to Ashley Arroyo.

## 2017-09-13 ENCOUNTER — TRANSFERRED RECORDS (OUTPATIENT)
Dept: HEALTH INFORMATION MANAGEMENT | Facility: CLINIC | Age: 52
End: 2017-09-13

## 2017-09-13 ENCOUNTER — TELEPHONE (OUTPATIENT)
Dept: FAMILY MEDICINE | Facility: CLINIC | Age: 52
End: 2017-09-13

## 2017-09-13 NOTE — TELEPHONE ENCOUNTER
Please advise where you would like the patient to be seen.  [facility] vs [facility].      Claudia BACON    [facility]

## 2017-09-13 NOTE — TELEPHONE ENCOUNTER
Third attempt. Not Scheduled at [facility]. Patient checking schedule and will call us back when they are ready to schedule. Gave the patient [facility]  phone number.

## 2017-09-15 ENCOUNTER — MYC MEDICAL ADVICE (OUTPATIENT)
Dept: FAMILY MEDICINE | Facility: CLINIC | Age: 52
End: 2017-09-15

## 2017-09-15 NOTE — TELEPHONE ENCOUNTER
Sounds good.  Can you change his referral than?  His referral from his office visit is for [facility].    Claudia

## 2017-09-15 NOTE — TELEPHONE ENCOUNTER
The referral actually says P[facility].  We do not have a PDR referral specifically at [facility].  Can you please facilitate this for the patient.

## 2017-09-18 NOTE — TELEPHONE ENCOUNTER
Pharmacy is not open yet this morning.   Checked with Dr. Maloney, and patient should bring  or  Not  medication to the police station in [facility]. They have a drop off area   In the front of the building.      Opal Rodas, AYAAN  Triage Nurse

## 2017-10-11 ENCOUNTER — TRANSFERRED RECORDS (OUTPATIENT)
Dept: HEALTH INFORMATION MANAGEMENT | Facility: CLINIC | Age: 52
End: 2017-10-11

## 2017-10-20 DIAGNOSIS — F33.2 SEVERE EPISODE OF RECURRENT MAJOR DEPRESSIVE DISORDER, WITHOUT PSYCHOTIC FEATURES (H): ICD-10-CM

## 2017-10-23 RX ORDER — BUPROPION HYDROCHLORIDE 150 MG/1
TABLET ORAL
Qty: 30 TABLET | Refills: 1 | Status: SHIPPED | OUTPATIENT
Start: 2017-10-23 | End: 2017-12-16

## 2017-10-23 RX ORDER — ESCITALOPRAM OXALATE 10 MG/1
TABLET ORAL
Qty: 30 TABLET | Refills: 1 | Status: SHIPPED | OUTPATIENT
Start: 2017-10-23 | End: 2017-12-01

## 2017-10-23 NOTE — TELEPHONE ENCOUNTER
Prescription approved per [facility] Ref[facility].    Has follow up scheduled 12/1/17 with PCP. He did report at last OV his PHQ9 had been 10 at therapist the day before and this was reviewed with provider.

## 2017-11-03 ENCOUNTER — DOCUMENTATION ONLY (OUTPATIENT)
Dept: LAB | Facility: CLINIC | Age: 52
End: 2017-11-03

## 2017-11-03 DIAGNOSIS — E11.9 TYPE 2 DIABETES MELLITUS WITHOUT COMPLICATION, WITHOUT LONG-TERM CURRENT USE OF INSULIN (H): Primary | ICD-10-CM

## 2017-11-03 NOTE — PROGRESS NOTES
This patient is coming to lab November 20th.  orders have been placed for you to associate and sign. Please future any further labs you may want, thanks [facility] lab staff.

## 2017-11-20 ENCOUNTER — TELEPHONE (OUTPATIENT)
Dept: FAMILY MEDICINE | Facility: CLINIC | Age: 52
End: 2017-11-20

## 2017-11-20 DIAGNOSIS — E11.9 TYPE 2 DIABETES MELLITUS WITHOUT COMPLICATION, WITHOUT LONG-TERM CURRENT USE OF INSULIN (H): ICD-10-CM

## 2017-11-20 LAB — HBA1C MFR BLD: 9.1 % (ref 4.3–6)

## 2017-11-20 PROCEDURE — 82043 UR ALBUMIN QUANTITATIVE: CPT | Performed by: NURSE PRACTITIONER

## 2017-11-20 PROCEDURE — 36415 COLL VENOUS BLD VENIPUNCTURE: CPT | Performed by: NURSE PRACTITIONER

## 2017-11-20 PROCEDURE — 83036 HEMOGLOBIN GLYCOSYLATED A1C: CPT | Performed by: NURSE PRACTITIONER

## 2017-11-20 NOTE — TELEPHONE ENCOUNTER
"11/20/2017      Patient Communication Preferences indicate  Do not contact  and/or communication by \"Phone\" is not preferred. Call not required per Outreach team.          Outreach ,  Marifer Ames    "

## 2017-11-21 LAB
CREAT UR-MCNC: 390 MG/DL
MICROALBUMIN UR-MCNC: 18 MG/L
MICROALBUMIN/CREAT UR: 4.56 MG/G CR (ref 0–17)

## 2017-11-25 DIAGNOSIS — E11.9 TYPE 2 DIABETES MELLITUS WITHOUT COMPLICATION (H): Primary | ICD-10-CM

## 2017-11-28 RX ORDER — METFORMIN HCL 500 MG
TABLET, EXTENDED RELEASE 24 HR ORAL
Qty: 120 TABLET | Refills: 2 | Status: SHIPPED | OUTPATIENT
Start: 2017-11-28 | End: 2018-02-27

## 2017-11-28 NOTE — TELEPHONE ENCOUNTER
Requested Prescriptions   Pending Prescriptions Disp Refills     metFORMIN (GLUCOPHAGE-XR) 500 MG 24 hr tablet [Pharmacy Med Name: METFORMIN  MG TABLET] 120 tablet 2     Sig: TAKE 2 TABS BY MOUTH TWICE DAILY WITH MEALS. DUE FOR FOLLOW UP/LABS IN AUG. PLEASE CALL TO SCHEDULE    Biguanide Agents Passed    11/27/2017  9:16 AM       Passed - Patient's BP is less than 140/90    BP Readings from Last 3 Encounters:   08/25/17 122/76   05/26/17 112/62   01/08/17 110/70                Passed - Patient has documented LDL within the past 12 mos.    Recent Labs   Lab Test  05/19/17 0900   LDL  Cannot estimate LDL when triglyceride exceeds 400 mg/dL  94            Passed - Patient has had a Microalbumin in the past 12 mos.    Recent Labs   Lab Test  11/20/17 0902   MICROL  18   UMALCR  4.56            Passed - Patient is age 10 or older       Passed - Patient has documented A1c within the specified period of time.    Recent Labs   Lab Test  11/20/17 0902   A1C  9.1*            Passed - Patient's CR is NOT>1.4 OR Patient's EGFR is NOT<45 within past 12 mos.    Recent Labs   Lab Test  05/19/17 0900   GFRESTIMATED  88   GFRESTBLACK  >90   GFR Calc         Recent Labs   Lab Test  05/19/17 0900   CR  0.91            Passed - Patient does NOT have a diagnosis of CHF.       Passed - Recent (6 mos) or future visit with authorizing provider's specialty    Patient had office visit in the last 6 months or has a visit in the next 30 days with authorizing provider.  See chart review.             Prescription approved per [facility] Refill Protocol.    Tran Jordan RN -- [facility]

## 2017-12-01 ENCOUNTER — OFFICE VISIT (OUTPATIENT)
Dept: FAMILY MEDICINE | Facility: CLINIC | Age: 52
End: 2017-12-01
Payer: COMMERCIAL

## 2017-12-01 VITALS
HEIGHT: 69 IN | OXYGEN SATURATION: 96 % | WEIGHT: 246.3 LBS | HEART RATE: 97 BPM | RESPIRATION RATE: 16 BRPM | TEMPERATURE: 98.7 F | SYSTOLIC BLOOD PRESSURE: 117 MMHG | BODY MASS INDEX: 36.48 KG/M2 | DIASTOLIC BLOOD PRESSURE: 78 MMHG

## 2017-12-01 DIAGNOSIS — F33.2 SEVERE EPISODE OF RECURRENT MAJOR DEPRESSIVE DISORDER, WITHOUT PSYCHOTIC FEATURES (H): ICD-10-CM

## 2017-12-01 DIAGNOSIS — E11.9 TYPE 2 DIABETES MELLITUS WITHOUT COMPLICATION, WITHOUT LONG-TERM CURRENT USE OF INSULIN (H): Primary | ICD-10-CM

## 2017-12-01 PROCEDURE — 99214 OFFICE O/P EST MOD 30 MIN: CPT | Performed by: NURSE PRACTITIONER

## 2017-12-01 RX ORDER — ESCITALOPRAM OXALATE 10 MG/1
20 TABLET ORAL DAILY
Qty: 60 TABLET | Refills: 3 | Status: SHIPPED | OUTPATIENT
Start: 2017-12-01 | End: 2018-02-16 | Stop reason: DRUGHIGH

## 2017-12-01 ASSESSMENT — ANXIETY QUESTIONNAIRES
IF YOU CHECKED OFF ANY PROBLEMS ON THIS QUESTIONNAIRE, HOW DIFFICULT HAVE THESE PROBLEMS MADE IT FOR YOU TO DO YOUR WORK, TAKE CARE OF THINGS AT HOME, OR GET ALONG WITH OTHER PEOPLE: NOT DIFFICULT AT ALL
GAD7 TOTAL SCORE: 5
1. FEELING NERVOUS, ANXIOUS, OR ON EDGE: SEVERAL DAYS
7. FEELING AFRAID AS IF SOMETHING AWFUL MIGHT HAPPEN: SEVERAL DAYS
3. WORRYING TOO MUCH ABOUT DIFFERENT THINGS: NOT AT ALL
2. NOT BEING ABLE TO STOP OR CONTROL WORRYING: NOT AT ALL
6. BECOMING EASILY ANNOYED OR IRRITABLE: SEVERAL DAYS
5. BEING SO RESTLESS THAT IT IS HARD TO SIT STILL: NOT AT ALL

## 2017-12-01 ASSESSMENT — PATIENT HEALTH QUESTIONNAIRE - PHQ9
SUM OF ALL RESPONSES TO PHQ QUESTIONS 1-9: 11
5. POOR APPETITE OR OVEREATING: MORE THAN HALF THE DAYS

## 2017-12-01 NOTE — PATIENT INSTRUCTIONS
Increase your Lexapro prescription to 2 10 mg tabs daily.     Return to see me the second week of January.

## 2017-12-01 NOTE — MR AVS SNAPSHOT
After Visit Summary   12/1/2017    Everton Linn    MRN: [id number]           Patient Information     Date Of Birth          1965        Visit Information        Provider Department      12/1/2017 4:20 PM Lindsay Arroyo APRN CNP [facility]        Today's Diagnoses     Recurrent major depression-severe (H)    -  1    Severe episode of recurrent major depressive disorder, without psychotic features (H)          Care Instructions    Increase your Lexapro prescription to 2 10 mg tabs daily.     Return to see me the second week of January.               Follow-ups after your visit        Follow-up notes from your care team     Return in about 6 weeks (around 1/12/2018).      Who to contact     If you have questions or need follow up information about today's clinic visit or your schedule please contact [facility] directly at [phone].  Normal or non-critical lab and imaging results will be communicated to you by [employer], letter or phone within 4 business days after the clinic has received the results. If you do not hear from us within 7 days, please contact the clinic through [employer] or phone. If you have a critical or abnormal lab result, we will notify you by phone as soon as possible.  Submit refill requests through [email or web address] or call your pharmacy and they will forward the refill request to us. Please allow 3 business days for your refill to be completed.          Additional Information About Your Visit        MyChart Information     [email or web address] gives you secure access to your electronic health record. If you see a primary care provider, you can also send messages to your care team and make appointments. If you have questions, please call your primary care clinic.  If you do not have a primary care provider, please call [phone] and they will assist you.        Care EveryWhere ID     This is your Care EveryWhere ID. This could be used by other organizations to  "access your [place] medical records  [id number]        Your Vitals Were     Pulse Temperature Respirations Height Pulse Oximetry BMI (Body Mass Index)    97 98.7  F (37.1  C) (Oral) 16 5' 9\" (1.753 m) 96% 36.37 kg/m2       Blood Pressure from Last 3 Encounters:   12/01/17 117/78   08/25/17 122/76   05/26/17 112/62    Weight from Last 3 Encounters:   12/01/17 246 lb 4.8 oz (111.7 kg)   08/25/17 241 lb (109.3 kg)   05/26/17 245 lb 14.4 oz (111.5 kg)              We Performed the Following     DEPRESSION ACTION PLAN (DAP)          Today's Medication Changes          These changes are accurate as of: 12/1/17  5:04 PM.  If you have any questions, ask your nurse or doctor.               These medicines have changed or have updated prescriptions.        Dose/Directions    escitalopram 10 MG tablet   Commonly known as:  LEXAPRO   This may have changed:  See the new instructions.   Used for:  Severe episode of recurrent major depressive disorder, without psychotic features (H)   Changed by:  Lindsay Arroyo APRN CNP        Dose:  20 mg   Take 2 tablets (20 mg) by mouth daily   Quantity:  60 tablet   Refills:  3            Where to get your medicines      These medications were sent to [facility]/pharmacy #[id number] - [facility] [address] [address] [facility]  [address] [facility] [address]     Phone:  [phone]     escitalopram 10 MG tablet                Primary Care Provider Office Phone # Fax #    VALDEZ Cowart [phone]       [facility] [address] [facility] [address]        Equal Access to Services     [facility] JOE AH: Hadii aad ku hadashjo Soshola, waaxda luqadaha, qaybta kaalmada adestaceyyadouglas, marlyn dixon. So [facility] [phone].    ATENCIÓN: Si habla español, tiene a brandon disposición servicios gratuitos de asistencia lingüística. Saulo al [phone].    We comply with applicable federal civil rights laws and [place] laws. We do not discriminate " on the basis of race, color, national origin, age, disability, sex, sexual orientation, or gender identity.            Thank you!     Thank you for choosing [facility]  for your care. Our goal is always to provide you with excellent care. Hearing back from our patients is one way we can continue to improve our services. Please take a few minutes to complete the written survey that you may receive in the mail after your visit with us. Thank you!             Your Updated Medication List - Protect others around you: Learn how to safely use, store and throw away your medicines at www.disposemymeds.org.          This list is accurate as of: 12/1/17  5:04 PM.  Always use your most recent med list.                   Brand Name Dispense Instructions for use Diagnosis    ASPIRIN NOT PRESCRIBED    INTENTIONAL     Antiplatelet medication not prescribed intentionally due to Not indicated based on age    Type 2 diabetes, HbA1c goal < 7% (H)       blood glucose monitoring test strip    no brand specified    1 Box    3 Box by In Vitro route daily. contour    Type II or unspecified type diabetes mellitus without mention of complication, uncontrolled       buPROPion 150 MG 24 hr tablet    WELLBUTRIN XL    30 tablet    TAKE 1 TABLET (150 MG) BY MOUTH EVERY MORNING    Severe episode of recurrent major depressive disorder, without psychotic features (H)       escitalopram 10 MG tablet    LEXAPRO    60 tablet    Take 2 tablets (20 mg) by mouth daily    Severe episode of recurrent major depressive disorder, without psychotic features (H)       glipiZIDE 10 MG 24 hr tablet    GLUCOTROL XL    60 tablet    TAKE 2 TABLETS (20 MG) BY MOUTH DAILY    Type 2 diabetes mellitus with other skin complications       metFORMIN 500 MG 24 hr tablet    GLUCOPHAGE-XR    120 tablet    TAKE 2 TABS BY MOUTH TWICE DAILY WITH MEALS. DUE FOR FOLLOW UP/LABS IN AUG. PLEASE CALL TO SCHEDULE    Type 2 diabetes mellitus without complication (H)        simvastatin 40 MG tablet    ZOCOR    90 tablet    Take 1 tablet (40 mg) by mouth At Bedtime    Hyperlipidemia LDL goal <100       sitagliptin 100 MG tablet    JANUVIA    30 tablet    Take 1 tablet (100 mg) by mouth daily    Type 2 diabetes mellitus without complication, without long-term current use of insulin (H)

## 2017-12-01 NOTE — NURSING NOTE
"Chief Complaint   Patient presents with     Diabetes     Lipids     Depression     Anxiety       Initial /78 (BP Location: Right arm, Patient Position: Chair, Cuff Size: Adult Large)  Pulse 97  Temp 98.7  F (37.1  C) (Oral)  Resp 16  Ht 5' 9\" (1.753 m)  Wt 246 lb 4.8 oz (111.7 kg)  SpO2 96%  BMI 36.37 kg/m2 Estimated body mass index is 36.37 kg/(m^2) as calculated from the following:    Height as of this encounter: 5' 9\" (1.753 m).    Weight as of this encounter: 246 lb 4.8 oz (111.7 kg).  Medication Reconciliation: complete   Pippa Uriostegui MA       "

## 2017-12-01 NOTE — PROGRESS NOTES
SUBJECTIVE:   Everton Linn is a 52 year old male who presents to clinic today for the following health issues:    Diabetes Follow-up      Patient is checking blood sugars: not at all    Diabetic concerns: None     Symptoms of hypoglycemia (low blood sugar): none     Paresthesias (numbness or burning in feet) or sores: No     Date of last diabetic eye exam: June/July of 2017    Patient states he is very concerned about his numbers. Reports he has not been focused on his diabetes lately because he has been coping with loss of marriage, son going to college, PT for neck, and stressors at work. He admits to using 4-6 cans per day of Pepsi as his crutch.     Reports he is having great difficulty remembering if he has taken his medication or not. States he has been using weekly pill boxes, but is still missing several times per week.     Juan J says he does not want to add a medicine to his current regimen because he knows that decreasing his Pepsi intake and taking his medications will help. With work slowing down and his ability to exercise again after improvements in neck pain, he is hopeful that he can turn things around.    Lab Results   Component Value Date    A1C 9.1 11/20/2017    A1C 9.1 08/21/2017    A1C 9.4 05/19/2017    A1C 8.7 10/21/2016    A1C 7.9 05/09/2016     Hyperlipidemia Follow-Up      Rate your low fat/cholesterol diet?: not monitoring fat    Taking statin?  Yes, no muscle aches from statin    Other lipid medications/supplements?:  none    Depression and Anxiety Follow-Up    Status since last visit: No change    Other associated symptoms:None    Complicating factors:     Significant life event: No     Current substance abuse: None    Juan J states he is concerned about the holidays being difficult as he is coping with the loss of his marriage and his son in college. Has been practicing mindfulness to help with depression.     PHQ-9 Score and MyChart F/U Questions 5/26/2017 6/30/2017 12/1/2017   Total  Score 11 9 11   Q9: Suicide Ideation Not at all Not at all Not at all   F/U: Thoughts of suicide or self harm? - - -   F/U: Plan or intention for self harm? - - -   F/U: Acted on thoughts? - - -   F/U: Safety concerns for self or others? - - -     CHINMAY-7 SCORE 5/26/2017 6/30/2017 12/1/2017   Total Score - - -   Total Score 4 3 5     PHQ-9  English  PHQ-9   Any Language  GAD7  Suicide Assessment Five-step Evaluation and Treatment (SAFE-T)      Amount of exercise or physical activity: 2-3 days/week for an average of 45-60 minutes    Problems taking medications regularly: No    Medication side effects: none  Diet: low salt and diabetic    Tobacco  Allergies  Meds  Problems  Med Hx  Surg Hx  Fam Hx  Soc Hx        Allergies  Problems       Patient Active Problem List   Diagnosis     Rosacea     Dermatophytosis of nail     Anxiety state     Inguinal hernia     HYPERLIPIDEMIA LDL GOAL <100     Ventral hernia     SANTOS (obstructive sleep apnea)     Type 2 diabetes mellitus without complication (H)     Recurrent major depression-severe (H)     Biliary colic     Cervical stenosis of spine     Past Surgical History:   Procedure Laterality Date     ARTHROSCOPY KNEE RT/LT  2007    left knee     ARTHROSCOPY KNEE RT/LT  2005    right     LAPAROSCOPIC CHOLECYSTECTOMY WITH CHOLANGIOGRAMS N/A 5/28/2016    Procedure: LAPAROSCOPIC CHOLECYSTECTOMY WITH CHOLANGIOGRAMS;  Surgeon: Jerry Bello MD;  Location:  OR       Social History   Substance Use Topics     Smoking status: Never Smoker     Smokeless tobacco: Never Used     Alcohol use Yes      Comment: 2 -3 beers every few weeks     Family History   Problem Relation Age of Onset     C.A.D. Mother      CHF     DIABETES Mother      Hypertension Father      CANCER Father      Multiple Myloma     Musculoskeletal Disorder Paternal Grandmother      Paige Gehrig's Disease         Pt states he got his flu shot at work.     ROS:  C: NEGATIVE for fever, chills, change in  "weight  E/M: NEGATIVE for ear, mouth and throat problems  R: Hx of sleep apnea; NEGATIVE for significant cough or SOB  CV: NEGATIVE for chest pain, palpitations or peripheral edema  MUSCULOSKELETAL: NEGATIVE for significant arthralgias or myalgia  NEURO: NEGATIVE for weakness, dizziness or paresthesias  ENDOCRINE: Hx of hyperlipidemia - use of simvastatin; Hx of diabetes - use of metformin, glipizide, Januvia; NEGATIVE for temperature intolerance, skin/hair changes  PSYCHIATRIC: Hx of depression and anxiety - use of Lexapro and Wellbutrin; NEGATIVE for changes in mood or affect    This document serves as a record of the services and decisions personally performed and made by VALDEZ Cowart CNP. It was created on his/her behalf by Amina Odell, a trained medical scribe. The creation of this document is based the provider's statements to the medical scribe.  Scribe Amina Odell 4:46 PM, December 1, 2017    OBJECTIVE:                                                    /78 (BP Location: Right arm, Patient Position: Chair, Cuff Size: Adult Large)  Pulse 97  Temp 98.7  F (37.1  C) (Oral)  Resp 16  Ht 5' 9\" (1.753 m)  Wt 246 lb 4.8 oz (111.7 kg)  SpO2 96%  BMI 36.37 kg/m2  Body mass index is 36.37 kg/(m^2).  GENERAL: healthy, alert and no distress  NEURO: Normal strength and tone, mentation intact and speech normal  PSYCH: mentation appears normal, affect normal/bright    Diagnostic Test Results:  No results found for this or any previous visit (from the past 24 hour(s)).     ASSESSMENT/PLAN:                                                    A/P:    ICD-10-CM    1. Type 2 diabetes mellitus without complication, without long-term current use of insulin (H) E11.9    2. Severe episode of recurrent major depressive disorder, without psychotic features (H) F33.2 escitalopram (LEXAPRO) 10 MG tablet       Patient admits to not taking current medications, and feels he has a method to remember pills " currently.  Is currently taking lexapro 10 mg daily and will increase to 20 mg lexapro daily.  Short term follow up and pt understands med change is A1c is not in control    Patient Instructions   Increase your Lexapro prescription to 2 10 mg tabs daily.     Return to see me the second week of January.       The information in this document, created by the medical scribe for me, accurately reflects the services I personally performed and the decisions made by me. I have reviewed and approved this document for accuracy prior to leaving the patient care area.  5:05 PM, 12/01/17    VALDEZ Cowart [facility]

## 2017-12-01 NOTE — LETTER
My Depression Action Plan  Name: Everton Linn   Date of Birth 1965  Date: 12/1/2017    My doctor: Lindsay Arroyo   My clinic: [address] [address] [phone]          GREEN    ZONE   Good Control    What it looks like:     Things are going generally well. You have normal up s and down s. You may even feel depressed from time to time, but bad moods usually last less than a day.   What you need to do:  1. Continue to care for yourself (see self care plan)  2. Check your depression survival kit and update it as needed  3. Follow your physician s recommendations including any medication.  4. Do not stop taking medication unless you consult with your physician first.           YELLOW         ZONE Getting Worse    What it looks like:     Depression is starting to interfere with your life.     It may be hard to get out of bed; you may be starting to isolate yourself from others.    Symptoms of depression are starting to last most all day and this has happened for several days.     You may have suicidal thoughts but they are not constant.   What you need to do:     1. Call your care team, your response to treatment will improve if you keep your care team informed of your progress. Yellow periods are signs an adjustment may need to be made.     2. Continue your self-care, even if you have to fake it!    3. Talk to someone in your support network    4. Open up your depression survival kit           RED    ZONE Medical Alert - Get Help    What it looks like:     Depression is seriously interfering with your life.     You may experience these or other symptoms: You can t get out of bed most days, can t work or engage in other necessary activities, you have trouble taking care of basic hygiene, or basic responsibilities, thoughts of suicide or death that will not go away, self-injurious behavior.     What you need to do:  1. Call your care team and  request a same-day appointment. If they are not available (weekends or after hours) call your local crisis line, emergency room or 911.      Electronically signed by: Pippa Uriostegui, December 1, 2017    Depression Self Care Plan / Survival Kit    Self-Care for Depression  Here s the deal. Your body and mind are really not as separate as most people think.  What you do and think affects how you feel and how you feel influences what you do and think. This means if you do things that people who feel good do, it will help you feel better.  Sometimes this is all it takes.  There is also a place for medication and therapy depending on how severe your depression is, so be sure to consult with your medical provider and/ or Behavioral Health Consultant if your symptoms are worsening or not improving.     In order to better manage my stress, I will:    Exercise  Get some form of exercise, every day. This will help reduce pain and release endorphins, the  feel good  chemicals in your brain. This is almost as good as taking antidepressants!  This is not the same as joining a gym and then never going! (they count on that by the way ) It can be as simple as just going for a walk or doing some gardening, anything that will get you moving.      Hygiene   Maintain good hygiene (Get out of bed in the morning, Make your bed, Brush your teeth, Take a shower, and Get dressed like you were going to work, even if you are unemployed).  If your clothes don't fit try to get ones that do.    Diet  I will strive to eat foods that are good for me, drink plenty of water, and avoid excessive sugar, caffeine, alcohol, and other mood-altering substances.  Some foods that are helpful in depression are: complex carbohydrates, B vitamins, flaxseed, fish or fish oil, fresh fruits and vegetables.    Psychotherapy  I agree to participate in Individual Therapy (if recommended).    Medication  If prescribed medications, I agree to take them.  Missing  doses can result in serious side effects.  I understand that drinking alcohol, or other illicit drug use, may cause potential side effects.  I will not stop my medication abruptly without first discussing it with my provider.    Staying Connected With Others  I will stay in touch with my friends, family members, and my primary care provider/team.    Use your imagination  Be creative.  We all have a creative side; it doesn t matter if it s oil painting, sand castles, or mud pies! This will also kick up the endorphins.    Witness Beauty  (AKA stop and smell the roses) Take a look outside, even in mid-winter. Notice colors, textures. Watch the squirrels and birds.     Service to others  Be of service to others.  There is always someone else in need.  By helping others we can  get out of ourselves  and remember the really important things.  This also provides opportunities for practicing all the other parts of the program.    Humor  Laugh and be silly!  Adjust your TV habits for less news and crime-drama and more comedy.    Control your stress  Try breathing deep, massage therapy, biofeedback, and meditation. Find time to relax each day.     My support system    Clinic Contact:  Phone number:    Contact 1:  Phone number:    Contact 2:  Phone number:    Yazidi/:  Phone number:    Therapist:  Phone number:    Local crisis center:    Phone number:    Other community support:  Phone number:

## 2017-12-02 ASSESSMENT — ANXIETY QUESTIONNAIRES: GAD7 TOTAL SCORE: 5

## 2017-12-16 DIAGNOSIS — E11.9 TYPE 2 DIABETES MELLITUS WITHOUT COMPLICATION, WITHOUT LONG-TERM CURRENT USE OF INSULIN (H): ICD-10-CM

## 2017-12-16 DIAGNOSIS — F33.2 SEVERE EPISODE OF RECURRENT MAJOR DEPRESSIVE DISORDER, WITHOUT PSYCHOTIC FEATURES (H): ICD-10-CM

## 2017-12-18 NOTE — TELEPHONE ENCOUNTER
Requested Prescriptions   Pending Prescriptions Disp Refills     glipiZIDE (GLUCOTROL XL) 10 MG 24 hr tablet [Pharmacy Med Name: GLIPIZIDE ER 10 MG TABLET]  Last Written Prescription Date:  8/28/17  Last Fill Quantity: 60,  # refills: 3   Last Office Visit with [facility], [facility] or [facility] prescribing provider:  12/1/17   Future Office Visit:      60 tablet 3     Sig: TAKE 2 TABLETS (20 MG) BY MOUTH DAILY    Sulfonylurea Agents Passed    12/16/2017  1:27 AM       Passed - Patient's BP is less than 140/90    BP Readings from Last 3 Encounters:   12/01/17 117/78   08/25/17 122/76   05/26/17 112/62                Passed - Patient has documented LDL within the past 12 mos.    Recent Labs   Lab Test  05/19/17   0900   LDL  Cannot estimate LDL when triglyceride exceeds 400 mg/dL  94            Passed - Patient has had a Microalbumin in the past 12 mos.    Recent Labs   Lab Test  11/20/17   0902   MICROL  18   UMALCR  4.56            Passed - Patient has documented A1c within the specified period of time.    Recent Labs   Lab Test  11/20/17   0902   A1C  9.1*            Passed - Patient is age 18 or older       Passed - Patient has a recent creatinine (normal) within the past 12 mos.    Recent Labs   Lab Test  05/19/17   0900   CR  0.91            Passed - Patient has had an appointment with authorizing provider within the past 6 mos. or  within next 30 days    Patient had office visit in the last 6 months or has a visit in the next 30 days with authorizing provider.  See chart review.                   JANUVIA 100 MG tablet [Pharmacy Med Name: JANUVIA 100 MG TABLET]  Last Written Prescription Date:  8/25/17  Last Fill Quantity: 30,  # refills: 3   Last Office Visit with [facility] or [facility] prescribing provider:  12/1/17   Future Office Visit:      30 tablet 3     Sig: TAKE 1 TABLET (100 MG) BY MOUTH DAILY    DPP4 Inhibitors Protocol Passed    12/16/2017  1:27 AM       Passed - Blood pressure less than 140/90 in past 6 months     BP Readings from Last 3 Encounters:   12/01/17 117/78   08/25/17 122/76   05/26/17 112/62                Passed - LDL on file in past 12 months    Recent Labs   Lab Test  05/19/17   0900   LDL  Cannot estimate LDL when triglyceride exceeds 400 mg/dL  94            Passed - Microalbumin on file in past 12 months    Recent Labs   Lab Test  11/20/17   0902   MICROL  18   UMALCR  4.56            Passed - HgbA1C in past 3 or 6 months    Recent Labs   Lab Test  11/20/17   0902   A1C  9.1*            Passed - Patient is age 18 or older       Passed - Normal serum creatinine in past 12 months    Recent Labs   Lab Test  05/19/17   0900   CR  0.91            Passed - Recent visit with authorizing provider's specialty in past 6 months    IV to PO - Antibiotics     None                          escitalopram (LEXAPRO) 10 MG tablet [Pharmacy Med Name: ESCITALOPRAM 10 MG TABLET]  Last Written Prescription Date:  12/1/17  Last Fill Quantity: 60,  # refills: 3   Last Office Visit with [facility], [facility] or [facility] prescribing provider:  12/1/17   Future Office Visit:      30 tablet 1     Sig: TAKE 1 TABLET (10 MG) BY MOUTH DAILY    SSRIs Protocol Failed    12/16/2017  1:27 AM       Failed - PHQ-9 score less than 5 in past 6 months    Please review PHQ-9 score.   PHQ-9 SCORE 5/26/2017 6/30/2017 12/1/2017   Total Score - - -   Total Score MyChart - - -   Total Score 11 9 11     CHINMAY-7 SCORE 5/26/2017 6/30/2017 12/1/2017   Total Score - - -   Total Score 4 3 5              Failed - Medication is NOT Bupropion    If the medication is Bupropion (Wellbutrin), and the patient is taking for smoking cessation; OK to refill.         Passed - Patient is age 18 or older       Passed - Recent (6 mo) or future visit with authorizing provider's specialty    Patient had office visit in the last 6 months or has a visit in the next 30 days with authorizing provider.  See chart review.                 buPROPion (WELLBUTRIN XL) 150 MG 24 hr tablet [Pharmacy Med  Name: BUPROPION HCL  MG TABLET]  Last Written Prescription Date:  10/23/17  Last Fill Quantity: 30,  # refills: 1   Last Office Visit with [facility] or [facility] prescribing provider:  12/1/17   Future Office Visit:      30 tablet 1     Sig: TAKE 1 TABLET (150 MG) BY MOUTH EVERY MORNING    SSRIs Protocol Failed    12/16/2017  1:27 AM       Failed - PHQ-9 score less than 5 in past 6 months    Please review PHQ-9 score.   PHQ-9 SCORE 5/26/2017 6/30/2017 12/1/2017   Total Score - - -   Total Score MyChart - - -   Total Score 11 9 11     CHINMAY-7 SCORE 5/26/2017 6/30/2017 12/1/2017   Total Score - - -   Total Score 4 3 5              Failed - Medication is NOT Bupropion    If the medication is Bupropion (Wellbutrin), and the patient is taking for smoking cessation; OK to refill.         Passed - Patient is age 18 or older       Passed - Recent (6 mo) or future visit with authorizing provider's specialty    Patient had office visit in the last 6 months or has a visit in the next 30 days with authorizing provider.  See chart review.

## 2017-12-20 RX ORDER — GLIPIZIDE 10 MG/1
TABLET, FILM COATED, EXTENDED RELEASE ORAL
Qty: 60 TABLET | Refills: 0 | Status: SHIPPED | OUTPATIENT
Start: 2017-12-20 | End: 2018-01-16

## 2017-12-20 RX ORDER — SITAGLIPTIN 100 MG/1
TABLET, FILM COATED ORAL
Qty: 30 TABLET | Refills: 0 | Status: SHIPPED | OUTPATIENT
Start: 2017-12-20 | End: 2018-01-16

## 2017-12-20 RX ORDER — ESCITALOPRAM OXALATE 10 MG/1
TABLET ORAL
Qty: 30 TABLET | Refills: 0 | OUTPATIENT
Start: 2017-12-20

## 2017-12-20 RX ORDER — BUPROPION HYDROCHLORIDE 150 MG/1
TABLET ORAL
Qty: 30 TABLET | Refills: 0 | Status: SHIPPED | OUTPATIENT
Start: 2017-12-20 | End: 2018-01-16

## 2017-12-20 NOTE — TELEPHONE ENCOUNTER
Patient seen 12/1, is going to follow up mid January.  Refilled for one month. Please call him to schedule follow up medication  Check with Ashley.   Opal Rodas, RN  Triage Nurse

## 2017-12-21 ENCOUNTER — TRANSFERRED RECORDS (OUTPATIENT)
Dept: HEALTH INFORMATION MANAGEMENT | Facility: CLINIC | Age: 52
End: 2017-12-21

## 2018-01-16 DIAGNOSIS — F33.2 SEVERE EPISODE OF RECURRENT MAJOR DEPRESSIVE DISORDER, WITHOUT PSYCHOTIC FEATURES (H): ICD-10-CM

## 2018-01-16 DIAGNOSIS — E11.9 TYPE 2 DIABETES MELLITUS WITHOUT COMPLICATION, WITHOUT LONG-TERM CURRENT USE OF INSULIN (H): ICD-10-CM

## 2018-01-16 NOTE — TELEPHONE ENCOUNTER
Requested Prescriptions   Pending Prescriptions Disp Refills     JANUVIA 100 MG tablet [Pharmacy Med Name: JANUVIA 100 MG TABLET]  Last Written Prescription Date:  12/20/17  Last Fill Quantity: 30,  # refills: 0   Last Office Visit with [facility], [facility] or [facility] prescribing provider:  12/1/2017     Future Office Visit:      30 tablet 0     Sig: TAKE 1 TABLET (100 MG) BY MOUTH DAILY    DPP4 Inhibitors Protocol Passed    1/16/2018 11:53 AM       Passed - Blood pressure less than 140/90 in past 6 months    BP Readings from Last 3 Encounters:   12/01/17 117/78   08/25/17 122/76   05/26/17 112/62          Passed - LDL on file in past 12 months    Recent Labs   Lab Test  05/19/17   0900   LDL  Cannot estimate LDL when triglyceride exceeds 400 mg/dL  94          Passed - Microalbumin on file in past 12 months    Recent Labs   Lab Test  11/20/17   0902   MICROL  18   UMALCR  4.56          Passed - HgbA1C in past 3 or 6 months    Recent Labs   Lab Test  11/20/17   0902   A1C  9.1*          Passed - Patient is age 18 or older       Passed - Normal serum creatinine in past 12 months    Recent Labs   Lab Test  05/19/17   0900   CR  0.91          Passed - Recent visit with authorizing provider's specialty in past 6 months    IV to PO - Antibiotics     None                      glipiZIDE (GLUCOTROL XL) 10 MG 24 hr tablet [Pharmacy Med Name: GLIPIZIDE ER 10 MG TABLET]  Last Written Prescription Date:  12/20/17  Last Fill Quantity: 60,  # refills: 0   Last Office Visit with [facility], [facility] or [facility] prescribing provider:  12/1/2017     Future Office Visit:      60 tablet 0     Sig: TAKE 2 TABLETS (20 MG) BY MOUTH DAILY    Sulfonylurea Agents Passed    1/16/2018 11:53 AM       Passed - Patient's BP is less than 140/90    BP Readings from Last 3 Encounters:   12/01/17 117/78   08/25/17 122/76   05/26/17 112/62          Passed - Patient has documented LDL within the past 12 mos.    Recent Labs   Lab Test  05/19/17   0900   LDL  Cannot estimate  "LDL when triglyceride exceeds 400 mg/dL  94          Passed - Patient has had a Microalbumin in the past 12 mos.    Recent Labs   Lab Test  11/20/17   0902   MICROL  18   UMALCR  4.56          Passed - Patient has documented A1c within the specified period of time.    Recent Labs   Lab Test  11/20/17   0902   A1C  9.1*          Passed - Patient is age 18 or older       Passed - Patient has a recent creatinine (normal) within the past 12 mos.    Recent Labs   Lab Test  05/19/17   0900   CR  0.91          Passed - Patient has had an appointment with authorizing provider within the past 6 mos. or  within next 30 days    Patient had office visit in the last 6 months or has a visit in the next 30 days with authorizing provider.  See \"Patient Info\" tab in inbasket, or \"Choose Columns\" in Meds & Orders section of the refill encounter.                 buPROPion (WELLBUTRIN XL) 150 MG 24 hr tablet [Pharmacy Med Name: BUPROPION HCL  MG TABLET]  Last Written Prescription Date:  12/20/17  Last Fill Quantity: 30,  # refills: 0   Last Office Visit with [facility] or [facility] prescribing provider:  12/1/2017     Future Office Visit:      30 tablet 0     Sig: TAKE 1 TABLET (150 MG) BY MOUTH EVERY MORNING    SSRIs Protocol Failed    1/16/2018 11:53 AM       Failed - PHQ-9 score less than 5 in past 6 months    The PHQ-9 criteria is meant to fail. It requires a PHQ-9 score review  PHQ-9 SCORE 5/26/2017 6/30/2017 12/1/2017   Total Score - - -   Total Score MyChart - - -   Total Score 11 9 11     CHINMAY-7 SCORE 5/26/2017 6/30/2017 12/1/2017   Total Score - - -   Total Score 4 3 5              Passed - Medication is Bupropion    If the medication is Bupropion (Wellbutrin), and the patient is taking for smoking cessation; OK to refill.         Passed - Patient is age 18 or older       Passed - Recent (6 mo) or future visit with authorizing provider's specialty    Patient had office visit in the last 6 months or has a visit in the next " "30 days with authorizing provider.  See \"Patient Info\" tab in inbasket, or \"Choose Columns\" in Meds & Orders section of the refill encounter.             "

## 2018-01-18 RX ORDER — BUPROPION HYDROCHLORIDE 150 MG/1
TABLET ORAL
Qty: 30 TABLET | Refills: 0 | Status: SHIPPED | OUTPATIENT
Start: 2018-01-18 | End: 2018-02-14

## 2018-01-18 RX ORDER — SITAGLIPTIN 100 MG/1
TABLET, FILM COATED ORAL
Qty: 30 TABLET | Refills: 0 | Status: SHIPPED | OUTPATIENT
Start: 2018-01-18 | End: 2018-02-14

## 2018-01-18 RX ORDER — GLIPIZIDE 10 MG/1
TABLET, FILM COATED, EXTENDED RELEASE ORAL
Qty: 60 TABLET | Refills: 0 | Status: SHIPPED | OUTPATIENT
Start: 2018-01-18 | End: 2018-02-14

## 2018-01-18 NOTE — TELEPHONE ENCOUNTER
Patient is due for an appointment this month.   Refilled these x 1, please call him to schedule with Renate Rodas RN  Triage Nurse

## 2018-01-26 ENCOUNTER — RADIANT APPOINTMENT (OUTPATIENT)
Dept: GENERAL RADIOLOGY | Facility: CLINIC | Age: 53
End: 2018-01-26
Attending: PHYSICIAN ASSISTANT
Payer: COMMERCIAL

## 2018-01-26 ENCOUNTER — OFFICE VISIT (OUTPATIENT)
Dept: URGENT CARE | Facility: URGENT CARE | Age: 53
End: 2018-01-26
Payer: COMMERCIAL

## 2018-01-26 VITALS
BODY MASS INDEX: 36.33 KG/M2 | OXYGEN SATURATION: 98 % | SYSTOLIC BLOOD PRESSURE: 108 MMHG | DIASTOLIC BLOOD PRESSURE: 62 MMHG | HEART RATE: 87 BPM | TEMPERATURE: 98.6 F | WEIGHT: 246 LBS

## 2018-01-26 DIAGNOSIS — M54.6 ACUTE LEFT-SIDED THORACIC BACK PAIN: ICD-10-CM

## 2018-01-26 DIAGNOSIS — M54.6 ACUTE LEFT-SIDED THORACIC BACK PAIN: Primary | ICD-10-CM

## 2018-01-26 PROCEDURE — 99213 OFFICE O/P EST LOW 20 MIN: CPT | Performed by: PHYSICIAN ASSISTANT

## 2018-01-26 PROCEDURE — 72100 X-RAY EXAM L-S SPINE 2/3 VWS: CPT

## 2018-01-26 NOTE — MR AVS SNAPSHOT
After Visit Summary   1/26/2018    Everton Linn    MRN: [id number]           Patient Information     Date Of Birth          1965        Visit Information        Provider Department      1/26/2018 6:40 PM Claudia Bell PA-C [facility]        Today's Diagnoses     Acute left-sided thoracic back pain    -  1       Follow-ups after your visit        Who to contact     If you have questions or need follow up information about today's clinic visit or your schedule please contact [facility] directly at [phone].  Normal or non-critical lab and imaging results will be communicated to you by [employer], letter or phone within 4 business days after the clinic has received the results. If you do not hear from us within 7 days, please contact the clinic through [employer] or phone. If you have a critical or abnormal lab result, we will notify you by phone as soon as possible.  Submit refill requests through [employer] or call your pharmacy and they will forward the refill request to us. Please allow 3 business days for your refill to be completed.          Additional Information About Your Visit        MyChart Information     [employer] gives you secure access to your electronic health record. If you see a primary care provider, you can also send messages to your care team and make appointments. If you have questions, please call your primary care clinic.  If you do not have a primary care provider, please call [phone] and they will assist you.        Care EveryWhere ID     This is your Care EveryWhere ID. This could be used by other organizations to access your [place] medical records  [id number]        Your Vitals Were     Pulse Temperature Pulse Oximetry BMI (Body Mass Index)          87 98.6  F (37  C) (Oral) 98% 36.33 kg/m2         Blood Pressure from Last 3 Encounters:   01/26/18 108/62   12/01/17 117/78   08/25/17 122/76    Weight from Last 3 Encounters:    01/26/18 246 lb (111.6 kg)   12/01/17 246 lb 4.8 oz (111.7 kg)   08/25/17 241 lb (109.3 kg)              Today, you had the following     No orders found for display       Primary Care Provider Office Phone # Fax VALDEZ Gandara [phone]       [facility] [address] [facility] [address]        Equal Access to Services     MITALI DIAZ : Hadii aad ku hadasho Soomaali, waaxda luqadaha, qaybta kaalmada adeegyada, waxay idiin hayaan adeeg kharash la'aan . So [facility] [phone].    ATENCIÓN: Si habla español, tiene a brandon disposición servicios gratuitos de asistencia lingüística. Llame al [phone].    We comply with applicable federal civil rights laws and [place] laws. We do not discriminate on the basis of race, color, national origin, age, disability, sex, sexual orientation, or gender identity.            Thank you!     Thank you for choosing [facility]  for your care. Our goal is always to provide you with excellent care. Hearing back from our patients is one way we can continue to improve our services. Please take a few minutes to complete the written survey that you may receive in the mail after your visit with us. Thank you!             Your Updated Medication List - Protect others around you: Learn how to safely use, store and throw away your medicines at www.disposemymeds.org.          This list is accurate as of 1/26/18  8:47 PM.  Always use your most recent med list.                   Brand Name Dispense Instructions for use Diagnosis    ASPIRIN NOT PRESCRIBED    INTENTIONAL     Antiplatelet medication not prescribed intentionally due to Not indicated based on age    Type 2 diabetes, HbA1c goal < 7% (H)       blood glucose monitoring test strip    no brand specified    1 Box    3 Box by In Vitro route daily. contour    Type II or unspecified type diabetes mellitus without mention of complication, uncontrolled       buPROPion 150 MG 24  hr tablet    WELLBUTRIN XL    30 tablet    TAKE 1 TABLET (150 MG) BY MOUTH EVERY MORNING    Severe episode of recurrent major depressive disorder, without psychotic features (H)       escitalopram 10 MG tablet    LEXAPRO    60 tablet    Take 2 tablets (20 mg) by mouth daily    Severe episode of recurrent major depressive disorder, without psychotic features (H)       glipiZIDE 10 MG 24 hr tablet    GLUCOTROL XL    60 tablet    TAKE 2 TABLETS (20 MG) BY MOUTH DAILY    Type 2 diabetes mellitus without complication, without long-term current use of insulin (H)       JANUVIA 100 MG tablet   Generic drug:  sitagliptin     30 tablet    TAKE 1 TABLET (100 MG) BY MOUTH DAILY    Type 2 diabetes mellitus without complication, without long-term current use of insulin (H)       metFORMIN 500 MG 24 hr tablet    GLUCOPHAGE-XR    120 tablet    TAKE 2 TABS BY MOUTH TWICE DAILY WITH MEALS. DUE FOR FOLLOW UP/LABS IN AUG. PLEASE CALL TO SCHEDULE    Type 2 diabetes mellitus without complication (H)       simvastatin 40 MG tablet    ZOCOR    90 tablet    Take 1 tablet (40 mg) by mouth At Bedtime    Hyperlipidemia LDL goal <100

## 2018-01-27 NOTE — NURSING NOTE
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    Weight as of this encounter: 246 lb (111.6 kg).  Medication Reconciliation: complete    "

## 2018-01-27 NOTE — PROGRESS NOTES
SUBJECTIVE  HPI: Everton Linn is a 52 year old male who presents for evaluation of back pain  Symptoms began 3 day(s) ago, have been onset acute and are stable.  Pain is located in the left side mid back region, with radiation to does not radiate. 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   Personal hx of back pain is recurrent self limited episodes of low back pain in the past.  Pain is exacerbated by: bending.  Pain is relieved by: none  ASSOCIATED sx include: denies fecal incontinence, lower extremity numbness, tingling, urinary incontinence and radicular lower extremity symptoms.  Red flag symptoms: negative for, fever, chills.    No past medical history on file.  Current Outpatient Prescriptions   Medication Sig Dispense Refill     JANUVIA 100 MG tablet TAKE 1 TABLET (100 MG) BY MOUTH DAILY 30 tablet 0     glipiZIDE (GLUCOTROL XL) 10 MG 24 hr tablet TAKE 2 TABLETS (20 MG) BY MOUTH DAILY 60 tablet 0     buPROPion (WELLBUTRIN XL) 150 MG 24 hr tablet TAKE 1 TABLET (150 MG) BY MOUTH EVERY MORNING 30 tablet 0     escitalopram (LEXAPRO) 10 MG tablet Take 2 tablets (20 mg) by mouth daily 60 tablet 3     metFORMIN (GLUCOPHAGE-XR) 500 MG 24 hr tablet TAKE 2 TABS BY MOUTH TWICE DAILY WITH MEALS. DUE FOR FOLLOW UP/LABS IN AUG. PLEASE CALL TO SCHEDULE 120 tablet 2     simvastatin (ZOCOR) 40 MG tablet Take 1 tablet (40 mg) by mouth At Bedtime 90 tablet 3     glucose blood VI test strips strip 3 Box by In Vitro route daily. contour 1 Box 12     ASPIRIN NOT PRESCRIBED, INTENTIONAL, Antiplatelet medication not prescribed intentionally due to Not indicated based on age       Social History   Substance Use Topics     Smoking status: Never Smoker     Smokeless tobacco: Never Used     Alcohol use Yes      Comment: 2 -3 beers every few weeks       ROS:  Review of systems negative except as stated above.    OBJECTIVE:  /62  (Cuff Size: Adult Large)  Pulse 87  Temp 98.6  F (37  C) (Oral)  Wt 246 lb (111.6 kg)  SpO2 98%  BMI 36.33 kg/m2  Back examination: Back symmetric, no curvature. ROM normal. No CVA tenderness.  [unfilled] leg raise test: negative  GENERAL APPEARANCE: healthy, alert and no distress  RESP: lungs clear to auscultation - no rales, rhonchi or wheezes  CV: regular rates and rhythm, normal S1 S2, no murmur noted  ABDOMEN:  soft, nontender, no HSM or masses and bowel sounds normal  NEURO: Normal strength and tone with no weakness or sensory deficit noted, reflexes normal   SKIN: no suspicious lesions or rashes    ASSESSMENT/IMPRESSION/PLAN:  1. Acute left-sided thoracic back pain  Tylenol and IBU as needed.         1.  Continue stretching and strengthening exercises.       2.  Continue prn heat or ice application.    Claudia Bell PA-C

## 2018-02-09 DIAGNOSIS — E11.9 TYPE 2 DIABETES MELLITUS WITHOUT COMPLICATION, WITHOUT LONG-TERM CURRENT USE OF INSULIN (H): ICD-10-CM

## 2018-02-09 LAB
ALBUMIN SERPL-MCNC: 4 G/DL (ref 3.4–5)
ALP SERPL-CCNC: 57 U/L (ref 40–150)
ALT SERPL W P-5'-P-CCNC: 58 U/L (ref 0–70)
ANION GAP SERPL CALCULATED.3IONS-SCNC: 9 MMOL/L (ref 3–14)
AST SERPL W P-5'-P-CCNC: 43 U/L (ref 0–45)
BILIRUB SERPL-MCNC: 0.6 MG/DL (ref 0.2–1.3)
BUN SERPL-MCNC: 10 MG/DL (ref 7–30)
CALCIUM SERPL-MCNC: 9.2 MG/DL (ref 8.5–10.1)
CHLORIDE SERPL-SCNC: 98 MMOL/L (ref 94–109)
CHOLEST SERPL-MCNC: 153 MG/DL
CO2 SERPL-SCNC: 28 MMOL/L (ref 20–32)
CREAT SERPL-MCNC: 0.94 MG/DL (ref 0.66–1.25)
GFR SERPL CREATININE-BSD FRML MDRD: 84 ML/MIN/1.7M2
GLUCOSE SERPL-MCNC: 236 MG/DL (ref 70–99)
HBA1C MFR BLD: 9.1 % (ref 4.3–6)
HDLC SERPL-MCNC: 34 MG/DL
LDLC SERPL CALC-MCNC: 55 MG/DL
NONHDLC SERPL-MCNC: 119 MG/DL
POTASSIUM SERPL-SCNC: 4.2 MMOL/L (ref 3.4–5.3)
PROT SERPL-MCNC: 7.4 G/DL (ref 6.8–8.8)
SODIUM SERPL-SCNC: 135 MMOL/L (ref 133–144)
TRIGL SERPL-MCNC: 321 MG/DL

## 2018-02-09 PROCEDURE — 83036 HEMOGLOBIN GLYCOSYLATED A1C: CPT | Performed by: NURSE PRACTITIONER

## 2018-02-09 PROCEDURE — 36415 COLL VENOUS BLD VENIPUNCTURE: CPT | Performed by: NURSE PRACTITIONER

## 2018-02-09 PROCEDURE — 80053 COMPREHEN METABOLIC PANEL: CPT | Performed by: NURSE PRACTITIONER

## 2018-02-09 PROCEDURE — 80061 LIPID PANEL: CPT | Performed by: NURSE PRACTITIONER

## 2018-02-12 ENCOUNTER — TELEPHONE (OUTPATIENT)
Dept: FAMILY MEDICINE | Facility: CLINIC | Age: 53
End: 2018-02-12

## 2018-02-12 NOTE — TELEPHONE ENCOUNTER
Panel Management Review      Patient has the following on his problem list:     Diabetes    ASA: Passed    Last A1C  Lab Results   Component Value Date    A1C 9.1 02/09/2018    A1C 9.1 11/20/2017    A1C 9.1 08/21/2017    A1C 9.4 05/19/2017    A1C 8.7 10/21/2016     A1C tested: FAILED    Last LDL:    Lab Results   Component Value Date    CHOL 153 02/09/2018     Lab Results   Component Value Date    HDL 34 02/09/2018     Lab Results   Component Value Date    LDL 55 02/09/2018     Lab Results   Component Value Date    TRIG 321 02/09/2018     Lab Results   Component Value Date    CHOLHDLRATIO 4.6 07/17/2015     Lab Results   Component Value Date    NHDL 119 02/09/2018       Is the patient on a Statin? YES             Is the patient on Aspirin? NO    Medications     HMG CoA Reductase Inhibitors    simvastatin (ZOCOR) 40 MG tablet          Last three blood pressure readings:  BP Readings from Last 3 Encounters:   01/26/18 108/62   12/01/17 117/78   08/25/17 122/76       Date of last diabetes office visit: 12/01/2017     Tobacco History:     History   Smoking Status     Never Smoker   Smokeless Tobacco     Never Used           Composite cancer screening  Chart review shows that this patient is due/due soon for the following Colonoscopy  Summary:    Patient is due/failing the following:   A1C and COLONOSCOPY    Action needed:   Patient needs office visit for diabetes follow up.    Type of outreach:    Patient has apt on 2/16/2018    Questions for provider review:    None                                                                                                                                    Gertrude Tenorio CMA (AAMA)       Chart routed to Close encounter .

## 2018-02-14 DIAGNOSIS — E11.9 TYPE 2 DIABETES MELLITUS WITHOUT COMPLICATION, WITHOUT LONG-TERM CURRENT USE OF INSULIN (H): ICD-10-CM

## 2018-02-14 DIAGNOSIS — F33.2 SEVERE EPISODE OF RECURRENT MAJOR DEPRESSIVE DISORDER, WITHOUT PSYCHOTIC FEATURES (H): ICD-10-CM

## 2018-02-14 NOTE — TELEPHONE ENCOUNTER
"Requested Prescriptions   Pending Prescriptions Disp Refills     JANUVIA 100 MG tablet [Pharmacy Med Name: JANUVIA 100 MG TABLET]  Last Written Prescription Date:  1/18/18  Last Fill Quantity: 30 TABLET,  # refills: 0   Last office visit: 12/1/2017 with prescribing provider:  12/1/17   Future Office Visit:   Next 5 appointments (look out 90 days)     Feb 16, 2018  4:20 PM CST   MyChart Long with VALDEZ Cowart CNP   [facility]    [address] [address] [phone]                  30 tablet 0     Sig: TAKE 1 TABLET (100 MG) BY MOUTH DAILY    DPP4 Inhibitors Protocol Passed    2/14/2018  2:15 AM       Passed - Blood pressure less than 140/90 in past 6 months    BP Readings from Last 3 Encounters:   01/26/18 108/62   12/01/17 117/78   08/25/17 122/76                Passed - LDL on file in past 12 months    Recent Labs   Lab Test  02/09/18   0834   LDL  55            Passed - Microalbumin on file in past 12 months    Recent Labs   Lab Test  11/20/17   0902   MICROL  18   UMALCR  4.56            Passed - HgbA1C in past 3 or 6 months    Recent Labs   Lab Test  02/09/18   0834   A1C  9.1*            Passed - Patient is age 18 or older       Passed - Normal serum creatinine in past 12 months    Recent Labs   Lab Test  02/09/18   0834   CR  0.94            Passed - Recent visit with authorizing provider's specialty in past 6 months    Patient had office visit in the last 6 months or has a visit in the next 30 days with authorizing provider.  See \"Patient Info\" tab in inbasket, or \"Choose Columns\" in Meds & Orders section of the refill encounter.            buPROPion (WELLBUTRIN XL) 150 MG 24 hr tablet [Pharmacy Med Name: BUPROPION HCL  MG TABLET]  PATIENT IS DUE FOR AN OFFICE VISIT.  Last Written Prescription Date:  1/18/18  Last Fill Quantity: 30 TABLET,  # refills: 0   Last office visit: 12/1/2017 with prescribing provider:  12/1/17 " "  Future Office Visit:   Next 5 appointments (look out 90 days)     Feb 16, 2018  4:20 PM CST   [facility]Ckarment [facility] with VALDEZ Cowart CNP   [facility]    [address] [address] [phone]                  30 tablet 0     Sig: TAKE 1 TABLET (150 MG) BY MOUTH EVERY MORNING    SSRIs Protocol Failed    2/14/2018  2:15 AM       Failed - PHQ-9 score less than 5 in past 6 months    The PHQ-9 criteria is meant to fail. It requires a PHQ-9 score review  PHQ-9 SCORE 5/26/2017 6/30/2017 12/1/2017   Total Score - - -   Total Score MyChart - - -   Total Score 11 9 11     CHINMAY-7 SCORE 5/26/2017 6/30/2017 12/1/2017   Total Score - - -   Total Score 4 3 5                Passed - Medication is Bupropion    If the medication is Bupropion (Wellbutrin), and the patient is taking for smoking cessation; OK to refill.         Passed - Patient is age 18 or older       Passed - Recent (6 mo) or future visit with authorizing provider's specialty    Patient had office visit in the last 6 months or has a visit in the next 30 days with authorizing provider.  See \"Patient Info\" tab in inbasket, or \"Choose Columns\" in Meds & Orders section of the refill encounter.            glipiZIDE (GLUCOTROL XL) 10 MG 24 hr tablet [Pharmacy Med Name: GLIPIZIDE ER 10 MG TABLET]  Last Written Prescription Date:  1/18/18  Last Fill Quantity: 60 TABLET,  # refills: 0   Last office visit: 12/1/2017 with prescribing provider:  12/1/17   Future Office Visit:   Next 5 appointments (look out 90 days)     Feb 16, 2018  4:20 PM CST   MyChart Long with VALDEZ Cowart CNP   [facility]    [address] [address] [phone]                  60 tablet 0     Sig: TAKE 2 TABLETS (20 MG) BY MOUTH DAILY    Sulfonylurea Agents Passed    2/14/2018  2:15 AM       Passed - Blood pressure less than 140/90 in past 6 months    BP " "Readings from Last 3 Encounters:   01/26/18 108/62   12/01/17 117/78   08/25/17 122/76                Passed - Patient has documented LDL within the past 12 mos.    Recent Labs   Lab Test  02/09/18   0834   LDL  55            Passed - Patient has had a Microalbumin in the past 12 mos.    Recent Labs   Lab Test  11/20/17   0902   MICROL  18   UMALCR  4.56            Passed - Patient has documented A1c within the specified period of time.    Recent Labs   Lab Test  02/09/18   0834   A1C  9.1*            Passed - Patient is age 18 or older       Passed - Patient has a recent creatinine (normal) within the past 12 mos.    Recent Labs   Lab Test  02/09/18   0834   CR  0.94            Passed - Patient has had an appointment with authorizing provider within the past 6 mos. or  within next 30 days    Patient had office visit in the last 6 months or has a visit in the next 30 days with authorizing provider.  See \"Patient Info\" tab in inbasket, or \"Choose Columns\" in Meds & Orders section of the refill encounter.              "

## 2018-02-15 NOTE — TELEPHONE ENCOUNTER
Routing refill request to provider for review/approval because:  Patient has an appointment tomorrow, please refill at his visit.  Opal Rodas RN  Triage Nurse

## 2018-02-16 ENCOUNTER — OFFICE VISIT (OUTPATIENT)
Dept: FAMILY MEDICINE | Facility: CLINIC | Age: 53
End: 2018-02-16
Payer: COMMERCIAL

## 2018-02-16 VITALS
BODY MASS INDEX: 36.36 KG/M2 | OXYGEN SATURATION: 97 % | WEIGHT: 245.5 LBS | DIASTOLIC BLOOD PRESSURE: 70 MMHG | HEART RATE: 90 BPM | HEIGHT: 69 IN | SYSTOLIC BLOOD PRESSURE: 102 MMHG | TEMPERATURE: 98.1 F

## 2018-02-16 DIAGNOSIS — Z12.11 SCREEN FOR COLON CANCER: Primary | ICD-10-CM

## 2018-02-16 DIAGNOSIS — E11.9 TYPE 2 DIABETES MELLITUS WITHOUT COMPLICATION, WITHOUT LONG-TERM CURRENT USE OF INSULIN (H): ICD-10-CM

## 2018-02-16 DIAGNOSIS — E66.01 MORBID OBESITY (H): ICD-10-CM

## 2018-02-16 DIAGNOSIS — F33.2 SEVERE EPISODE OF RECURRENT MAJOR DEPRESSIVE DISORDER, WITHOUT PSYCHOTIC FEATURES (H): ICD-10-CM

## 2018-02-16 PROCEDURE — 99213 OFFICE O/P EST LOW 20 MIN: CPT | Performed by: NURSE PRACTITIONER

## 2018-02-16 RX ORDER — ESCITALOPRAM OXALATE 20 MG/1
20 TABLET ORAL DAILY
Qty: 90 TABLET | Refills: 1 | Status: SHIPPED | OUTPATIENT
Start: 2018-02-16 | End: 2018-09-14

## 2018-02-16 RX ORDER — ESCITALOPRAM OXALATE 20 MG/1
TABLET ORAL
COMMUNITY
Start: 2018-02-14 | End: 2018-02-16

## 2018-02-16 RX ORDER — BUPROPION HYDROCHLORIDE 150 MG/1
TABLET ORAL
Qty: 90 TABLET | Refills: 1 | Status: SHIPPED | OUTPATIENT
Start: 2018-02-16 | End: 2018-07-27 | Stop reason: DRUGHIGH

## 2018-02-16 RX ORDER — GLIPIZIDE 10 MG/1
TABLET, FILM COATED, EXTENDED RELEASE ORAL
Qty: 60 TABLET | Refills: 0 | Status: SHIPPED | OUTPATIENT
Start: 2018-02-16 | End: 2018-02-16

## 2018-02-16 RX ORDER — BUPROPION HYDROCHLORIDE 150 MG/1
TABLET ORAL
Qty: 30 TABLET | Refills: 0 | Status: SHIPPED | OUTPATIENT
Start: 2018-02-16 | End: 2018-02-16

## 2018-02-16 RX ORDER — SITAGLIPTIN 100 MG/1
TABLET, FILM COATED ORAL
Qty: 30 TABLET | Refills: 0 | Status: SHIPPED | OUTPATIENT
Start: 2018-02-16 | End: 2018-02-16

## 2018-02-16 RX ORDER — GLIPIZIDE 10 MG/1
TABLET, FILM COATED, EXTENDED RELEASE ORAL
Qty: 90 TABLET | Refills: 3 | Status: SHIPPED | OUTPATIENT
Start: 2018-02-16 | End: 2018-09-14

## 2018-02-16 NOTE — NURSING NOTE
"Chief Complaint   Patient presents with     Diabetes       Initial /70  Pulse 90  Temp 98.1  F (36.7  C) (Oral)  Ht 5' 9\" (1.753 m)  Wt 245 lb 8 oz (111.4 kg)  SpO2 97%  BMI 36.25 kg/m2 Estimated body mass index is 36.25 kg/(m^2) as calculated from the following:    Height as of this encounter: 5' 9\" (1.753 m).    Weight as of this encounter: 245 lb 8 oz (111.4 kg).  Medication Reconciliation: complete   Sara MONIQUE M.A.      "

## 2018-02-16 NOTE — PROGRESS NOTES
SUBJECTIVE:   Everton Linn is a 52 year old male who presents to clinic today for the following health issues:    Diabetes Follow-up    Patient is checking blood sugars: not at all    Diabetic concerns: None     Symptoms of hypoglycemia (low blood sugar): none     Paresthesias (numbness or burning in feet) or sores: No     Date of last diabetic eye exam: 7/2017    BP Readings from Last 2 Encounters:   02/16/18 102/70   01/26/18 108/62     Hemoglobin A1C (%)   Date Value   02/09/2018 9.1 (H)   11/20/2017 9.1 (H)     LDL Cholesterol Calculated (mg/dL)   Date Value   02/09/2018 55   05/19/2017     Cannot estimate LDL when triglyceride exceeds 400 mg/dL     LDL Cholesterol Direct (mg/dL)   Date Value   05/19/2017 94       Amount of exercise or physical activity: 4-5 days/week for an average of 30-45 minutes    Problems taking medications regularly: No    Medication side effects: none    Diet: regular (no restrictions)    Patient states his biggest problem at this time is a bad diet and consumption of soda.     Depression:   Juan J reports his depressive symptoms have much improved. Has improved his exercise and sleep habits and is meeting with a therapist. Of note, Juan J says he has been fighting depression since his mid-20's.     Tobacco  Allergies  Meds  Problems  Med Hx  Surg Hx  Fam Hx  Soc Hx        Allergies  Meds  Problems       Patient Active Problem List   Diagnosis     Rosacea     Dermatophytosis of nail     Anxiety state     Inguinal hernia     HYPERLIPIDEMIA LDL GOAL <100     Ventral hernia     SANTOS (obstructive sleep apnea)     Type 2 diabetes mellitus without complication, without long-term current use of insulin (H)     Recurrent major depression-severe (H)     Biliary colic     Cervical stenosis of spine     Morbid obesity (H)     Past Surgical History:   Procedure Laterality Date     ARTHROSCOPY KNEE RT/LT  2007    left knee     ARTHROSCOPY KNEE RT/LT  2005    right     LAPAROSCOPIC  "CHOLECYSTECTOMY WITH CHOLANGIOGRAMS N/A 5/28/2016    Procedure: LAPAROSCOPIC CHOLECYSTECTOMY WITH CHOLANGIOGRAMS;  Surgeon: Jerry Bello MD;  Location:  OR       Social History   Substance Use Topics     Smoking status: Never Smoker     Smokeless tobacco: Never Used     Alcohol use Yes      Comment: 2 -3 beers every few weeks     Family History   Problem Relation Age of Onset     C.A.D. Mother      CHF     DIABETES Mother      Hypertension Father      CANCER Father      Multiple Myloma     Musculoskeletal Disorder Paternal Grandmother      Paige Gehrig's Disease           ROS:  CONSTITUTIONAL: NEGATIVE for fever, chills, change in weight  RESP: NEGATIVE for significant cough or SOB  CV: NEGATIVE for chest pain, palpitations or peripheral edema  ENDOCRINE: NEGATIVE for temperature intolerance, skin/hair changes    This document serves as a record of the services and decisions personally performed and made by VALDEZ Cowart CNP. It was created on his/her behalf by Amina Odell, a trained medical scribe. The creation of this document is based the provider's statements to the medical scribe.  Scribe Amina Odell 4:44 PM, February 16, 2018    OBJECTIVE:                                                    /70  Pulse 90  Temp 98.1  F (36.7  C) (Oral)  Ht 5' 9\" (1.753 m)  Wt 245 lb 8 oz (111.4 kg)  SpO2 97%  BMI 36.25 kg/m2  Body mass index is 36.25 kg/(m^2).  GENERAL: healthy, alert and no distress  RESP: lungs clear to auscultation - no rales, rhonchi or wheezes  CV: regular rate and rhythm, normal S1 S2, no S3 or S4, no murmur, click or rub, no peripheral edema and peripheral pulses strong    Diagnostic Test Results:  none      ASSESSMENT/PLAN:                                                    A/P:    ICD-10-CM    1. Screen for colon cancer Z12.11 GASTROENTEROLOGY ADULT REF PROCEDURE ONLY [facility] [phone]   2. Type 2 diabetes mellitus without complication, without " long-term current use of insulin (H) E11.9 sitagliptin (JANUVIA) 100 MG tablet     glipiZIDE (GLUCOTROL XL) 10 MG 24 hr tablet   3. Severe episode of recurrent major depressive disorder, without psychotic features (H) F33.2 escitalopram (LEXAPRO) 20 MG tablet     buPROPion (WELLBUTRIN XL) 150 MG 24 hr tablet   4. Morbid obesity (H) E66.01      Depression well controlled at present.    Diabetes control continues to be suboptimized.  Will refer to [facility] pharmacy to discuss broad range of options for next agent to add.  Pt agrees.  Patient has begun to exercise again and depression under control.  It is a good time for moving forward with new treatment.     Patient Instructions   Meet with pharmacist to discuss medication regimen for diabetes.       The information in this document, created by the medical scribe for me, accurately reflects the services I personally performed and the decisions made by me. I have reviewed and approved this document for accuracy prior to leaving the patient care area.  5:06 PM, 02/16/18    VALDEZ Cowart [facility]

## 2018-02-18 PROBLEM — E66.01 MORBID OBESITY (H): Status: ACTIVE | Noted: 2018-02-18

## 2018-02-27 DIAGNOSIS — E11.9 TYPE 2 DIABETES MELLITUS WITHOUT COMPLICATION (H): ICD-10-CM

## 2018-02-27 NOTE — TELEPHONE ENCOUNTER
"Requested Prescriptions   Pending Prescriptions Disp Refills     metFORMIN (GLUCOPHAGE-XR) 500 MG 24 hr tablet [Pharmacy Med Name: METFORMIN  MG TABLET]  Last Written Prescription Date:  11/28/17  Last Fill Quantity: 120,  # refills: 2   Last office visit: 2/16/2018 with prescribing provider:  2/16/2018     Future Office Visit:   Next 5 appointments (look out 90 days)     Mar 20, 2018  3:30 PM CDT   Office Visit with Gertrude [facility]    [id number] [address] [phone]                  120 tablet 2     Sig: TAKE 2 TABS BY MOUTH TWICE DAILY WITH MEALS. DUE FOR FOLLOW UP/LABS IN AUG. PLEASE CALL TO SCHEDULE    Biguanide Agents Passed    2/27/2018  2:08 AM       Passed - Blood pressure less than 140/90 in past 6 months    BP Readings from Last 3 Encounters:   02/16/18 102/70   01/26/18 108/62   12/01/17 117/78                Passed - Patient has documented LDL within the past 12 mos.    Recent Labs   Lab Test  02/09/18   0834   LDL  55            Passed - Patient has had a Microalbumin in the past 12 mos.    Recent Labs   Lab Test  11/20/17   0902   MICROL  18   UMALCR  4.56            Passed - Patient is age 10 or older       Passed - Patient has documented A1c within the specified period of time.    Recent Labs   Lab Test  02/09/18   0834   A1C  9.1*            Passed - Patient's CR is NOT>1.4 OR Patient's EGFR is NOT<45 within past 12 mos.    Recent Labs   Lab Test  02/09/18   0834   GFRESTIMATED  84   GFRESTBLACK  >90       Recent Labs   Lab Test  02/09/18   0834   CR  0.94            Passed - Patient does NOT have a diagnosis of CHF.       Passed - Recent (6 mos) or future visit with authorizing provider's specialty    Patient had office visit in the last 6 months or has a visit in the next 30 days with authorizing provider.  See \"Patient Info\" tab in inbasket, or \"Choose Columns\" in Meds & Orders section of " the refill encounter.

## 2018-02-28 RX ORDER — METFORMIN HCL 500 MG
TABLET, EXTENDED RELEASE 24 HR ORAL
Qty: 120 TABLET | Refills: 2 | Status: SHIPPED | OUTPATIENT
Start: 2018-02-28 | End: 2018-03-20

## 2018-02-28 NOTE — TELEPHONE ENCOUNTER
Prescription approved per [facility] Refill Protocol.  Opal Rodas, RN  Triage Nurse

## 2018-03-20 ENCOUNTER — OFFICE VISIT (OUTPATIENT)
Dept: PHARMACY | Facility: CLINIC | Age: 53
End: 2018-03-20
Payer: COMMERCIAL

## 2018-03-20 VITALS
HEART RATE: 84 BPM | WEIGHT: 246.6 LBS | BODY MASS INDEX: 36.42 KG/M2 | SYSTOLIC BLOOD PRESSURE: 113 MMHG | DIASTOLIC BLOOD PRESSURE: 70 MMHG

## 2018-03-20 DIAGNOSIS — E78.5 HYPERLIPIDEMIA LDL GOAL <100: ICD-10-CM

## 2018-03-20 DIAGNOSIS — F33.2 SEVERE EPISODE OF RECURRENT MAJOR DEPRESSIVE DISORDER, WITHOUT PSYCHOTIC FEATURES (H): ICD-10-CM

## 2018-03-20 DIAGNOSIS — E11.9 TYPE 2 DIABETES MELLITUS WITHOUT COMPLICATION, WITHOUT LONG-TERM CURRENT USE OF INSULIN (H): Primary | ICD-10-CM

## 2018-03-20 PROCEDURE — 99607 MTMS BY PHARM ADDL 15 MIN: CPT | Performed by: PHARMACIST

## 2018-03-20 PROCEDURE — 99605 MTMS BY PHARM NP 15 MIN: CPT | Performed by: PHARMACIST

## 2018-03-20 RX ORDER — METFORMIN HCL 500 MG
TABLET, EXTENDED RELEASE 24 HR ORAL
Qty: 90 TABLET | Refills: 2
Start: 2018-03-20 | End: 2018-08-14 | Stop reason: DRUGHIGH

## 2018-03-20 NOTE — PROGRESS NOTES
SUBJECTIVE/OBJECTIVE:                           Everton Linn is a 52 year old male coming in for an initial visit for Medication Therapy Management.  He was referred to me from Lindsay Arroyo CNP.     Chief Complaint: Diabetes.  Personal Healthcare Goals: He does not like to take his blood sugars, he would like to change off regular pepsi    Allergies/ADRs: Reviewed in Epic  Tobacco: No tobacco use  Alcohol: 1-3 beverages / 2 week  Caffeine: 4 sodas regular/day  Activity: anytime Fitness, treadmill 1-6 days per week depending on motivation  Sports writter  PMH: Reviewed in Epic    Medication Adherence/Access:  Patient uses pill box(es).  Per patient, misses medication 1 times per week.   Medication barriers: none.   The patient fills medications at [place]: NO, fills medications at [facility] but willing to fill today at .    Diabetes:  Pt currently taking metformin ER 1000mg BID-AM breakfast and evening, Januvia 100mg QD, glipizide 2 tablets XL QD. Pt is experiencing the following side effects:  Diarrhea frequency is 2 times a day.  SMBG: rarely.    Symptoms of low blood sugar? none, shaky, sweaty. Frequency of hypoglycemia? never.  Recent symptoms of high blood sugar? none  Eye exam: up to date  Foot exam: up to date  Microalbumin is < 30 mg/g. Pt is not taking an ACEi/ARB.  Aspirin: Not taking  Diet/Exercise: See above for Activity.  He reports drinking 4, 12 ounces of pepsi per day,  He would like to work his way off soda    Wt Readings from Last 4 Encounters:   02/16/18 245 lb 8 oz (111.4 kg)   01/26/18 246 lb (111.6 kg)   12/01/17 246 lb 4.8 oz (111.7 kg)   08/25/17 241 lb (109.3 kg)       Hyperlipidemia: Current therapy includes simvastatin 40mg once daily.  Pt reports no significant myalgias or other side effects.   The 10-year ASCVD risk score (Erika INOCENCIO Jr, et al., 2013) is: 4.6%    Values used to calculate the score:      Age: 52 years      Sex: Male      Is Non- : No       Diabetic: Yes      Tobacco smoker: No      Systolic Blood Pressure: 102 mmHg      Is BP treated: No      HDL Cholesterol: 34 mg/dL      Total Cholesterol: 153 mg/dL      Depression:  Current medications include: Escitalopram 20mg once daily and Bupropion 150mg ER once daily. Patient reports the following stressors: none and seperation/ from significant other.  S/E:  Reports acting out in his sleep, stopped Prozac due to dreams with movement.  No other side effects reported.  Per provider note he meets with a therapist and symptoms have been improved.  Sleep apnea history dx 2005, no reports of movement related events when sleeping then  PHQ-9 SCORE 5/26/2017 6/30/2017 12/1/2017   Total Score - - -   Total Score MyChart - - -   Total Score 11 9 11     Current labs include:  BP Readings from Last 3 Encounters:   02/16/18 102/70   01/26/18 108/62   12/01/17 117/78     Today's Vitals: /70  Pulse 84  Wt 246 lb 9.6 oz (111.9 kg)  BMI 36.42 kg/m2  Lab Results   Component Value Date    A1C 9.1 02/09/2018   .  Lab Results   Component Value Date    CHOL 153 02/09/2018     Lab Results   Component Value Date    TRIG 321 02/09/2018     Lab Results   Component Value Date    HDL 34 02/09/2018     Lab Results   Component Value Date    LDL 55 02/09/2018       Liver Function Studies -   Recent Labs   Lab Test  02/09/18   0834   PROTTOTAL  7.4   ALBUMIN  4.0   BILITOTAL  0.6   ALKPHOS  57   AST  43   ALT  58       Lab Results   Component Value Date    UCRR 390 11/20/2017    MICROL 18 11/20/2017    UMALCR 4.56 11/20/2017       Last Basic Metabolic Panel:  Lab Results   Component Value Date     02/09/2018      Lab Results   Component Value Date    POTASSIUM 4.2 02/09/2018     Lab Results   Component Value Date    CHLORIDE 98 02/09/2018     Lab Results   Component Value Date    BUN 10 02/09/2018     Lab Results   Component Value Date    CR 0.94 02/09/2018     GFR Estimate   Date Value Ref Range Status   02/09/2018  84 >60 mL/min/1.7m2 Final     Comment:     Non  GFR Calc   05/19/2017 88 >60 mL/min/1.7m2 Final     Comment:     Non  GFR Calc   05/28/2016 81 >60 mL/min/1.7m2 Final     Comment:     Non  GFR Calc     GFR Estimate If Black   Date Value Ref Range Status   02/09/2018 >90 >60 mL/min/1.7m2 Final     Comment:      GFR Calc   05/19/2017 >90   GFR Calc   >60 mL/min/1.7m2 Final   05/28/2016 >90   GFR Calc   >60 mL/min/1.7m2 Final     TSH   Date Value Ref Range Status   05/19/2017 2.09 0.40 - 4.00 mU/L Final   ]    Most Recent Immunizations   Administered Date(s) Administered     Influenza (H1N1) 12/01/2009     Influenza (IIV3) PF 10/18/2013     Influenza (intradermal) 09/28/2012     Influenza Vaccine IM 3yrs+ 4 Valent IIV4 10/21/2016     Pneumococcal 23 valent 09/28/2012     TDAP Vaccine (Adacel) 08/25/2017       ASSESSMENT:                             Current medications were reviewed today.     Medication Adherence: good, no issues identified    Diabetes: Needs Improvement. Patient is not meeting A1c goal of < 7%. Pt would benefit from DPP-4 inhibitor (Januvia) :  discontinue  GLP-1 agonist (Trulicity) :  Start at dose : 0.75mg once weekly  Metformin: decrease dose to 1500mg once daily to see if helps diarrhea  Increased exercise  Weight loss recommended.  Patient is not willing to check blood sugars.  We discussed multiple options for diabetes, SGLT-2, pioglitazone, insulin, patient preferred the once weekly Trulicity where he does not see the needle.  We reviewed storage, side effects and administration and treatment of hypoglycemia.  Since discontinuing Januvia will continue glipizide.  He would benefit from discontinuing soda due to large number of carbohydrates he is consuming.  Aspirin therapy is indicated in this patient at dose of 81mg daily. Pt is not taking aspirin..  ADA 2018 guidelines:  Aspirin therapy (  mg/day)  may be considered as a primary prevention  strategy in thosewith type 1  or type 2 diabetes who are at increased  cardiovascular risk. This  includes most men and women with  diabetes aged >/=50 years who have  at least one additional major risk  factor (family history of premature  atherosclerotic cardiovascular disease,  hypertension, dyslipidemia,  smoking, or albuminuria) and are  not at increased risk of bleeding.    Hyperlipidemia: Stable. Pt is on moderate intensity statin which is indicated based on 2013 ACC/AHA guidelines for lipid management.      Depression:  Stable.  Reviewed Kanchan-comp and did not see movement related side effects at night listed with escitalopram or bupropion, pt would benefit from continuing to work with provider on this.    PLAN:                            1)  Reduce metformin to 1000mg in the morning and 500mg at night.  2)  Start aspirin 81mg once daily  3)  Change pepsi to pepsi zero  4)  Start Trulicity 0.75mg once weekly, covered at  pharmacy at $0 copay  5)  Stop Januvia    Next MTM/pharmacist visit: as needed to help with medications, otherwise 3 months with provider for HA1C check, pt did not want follow-up before then    I spent 50 minutes with this patient today, including time to see if medication is covered through his insurance/pharmacy. All changes were made via collaborative practice agreement with Lindsay Arroyo. A copy of the visit note was provided to the patient's primary care provider.    The patient was given a summary of these recommendations as an after visit summary.     Gertrude Carey, PharmD [facility]  Medication Therapy Management Practitioner   #[phone]

## 2018-03-20 NOTE — PATIENT INSTRUCTIONS
Recommendations from today's MTM visit:                                                    MTM (medication therapy management) is a service provided by a clinical pharmacist designed to help you get the most of out of your medicines.      Diabetes:    1)  Reduce metformin to 1000mg in the morning and 500mg at night.  2)  Start aspirin 81mg once daily  3)  Change pepsi to pepsi zero  4)  Start Trulicity 0.75mg once weekly  5) Stop Januvia    Next MTM/pharmacist visit: 3 months    To schedule another MTM appointment, please call the clinic directly or you may call the MTM scheduling line at [phone] or toll-free at [phone].     My Clinical Pharmacist's contact information:                                                      It was a pleasure seeing you today!  Please feel free to contact me with any questions or concerns you have.      Gertrude Carey, PharmD [facility]  Medication Therapy Management Practitioner   #[phone]    You may receive a survey about the MTM services you received.  I would appreciate your feedback to help me serve you better in the future. Please fill it out and return it when you can. Your comments will be anonymous.      My healthcare goals:                                                      Diabetes Goals:    Home Monitoring of Blood Sugars:Fasting  mg/dL and 2 hours after a meal less than 180 mg/dL.    Hemoglobin A1C: Less than 7%. Yours is   Lab Results   Component Value Date    A1C 9.1 02/09/2018   .    Blood Pressure: Less than 130/80mmHg. Yours is /70  Pulse 84  Wt 246 lb 9.6 oz (111.9 kg)  BMI 36.42 kg/m2.    Cholesterol: You are taking simvastatin to help decrease the risk of heart disease.    Things you can do to help lower your blood sugars:    Diet: 3 meals and 1-2 snacks per day with 60 grams of carbohydrates per meal and 15-30 grams of carbohydrates per snack. Try to fill your plate at least half-full with vegetables, fill one-quarter full with lean  meats or protein, and also make sure you get at least some carbohydrate with every meal.    Exercise: 30 minutes per day of anything that will increase your heart rate and make you break a sweat! Gardening, walking, cleaning the house, changing the oil in your car, etc. If you feel like 30 minutes per day is too much, start small. Even lifting canned foods or working your arms with a resistance band in front of the TV can help.          Step-by-Step:  Treating Low Blood Sugar (Hypoglycemia)    Date Last Reviewed: 12/1/2016 2000-2017 [employer]. [address] [place] [id number]. All rights reserved. This information is not intended as a substitute for professional medical care. Always follow your healthcare professional's instructions.

## 2018-03-20 NOTE — MR AVS SNAPSHOT
After Visit Summary   3/20/2018    Everton Linn    MRN: [id number]           Patient Information     Date Of Birth          1965        Visit Information        Provider Department      3/20/2018 3:30 PM Gertrude Carey, [facility] MTM        Today's Diagnoses     Type 2 diabetes mellitus without complication, without long-term current use of insulin (H)    -  1      Care Instructions    Recommendations from today's MTM visit:                                                    MTM (medication therapy management) is a service provided by a clinical pharmacist designed to help you get the most of out of your medicines.      Diabetes:    1)  Reduce metformin to 1000mg in the morning and 500mg at night.  2)  Start aspirin 81mg once daily  3)  Change pepsi to pepsi zero  4)  Start Trulicity 0.75mg once weekly    Next MTM/pharmacist visit: 3 months    To schedule another MTM appointment, please call the clinic directly or you may call the MTM scheduling line at [phone] or toll-free at [phone].     My Clinical Pharmacist's contact information:                                                      It was a pleasure seeing you today!  Please feel free to contact me with any questions or concerns you have.      Gertrude Carey, PharmD [facility]  Medication Therapy Management Practitioner   #[phone]    You may receive a survey about the MTM services you received.  I would appreciate your feedback to help me serve you better in the future. Please fill it out and return it when you can. Your comments will be anonymous.      My healthcare goals:                                                      Diabetes Goals:    Home Monitoring of Blood Sugars:Fasting  mg/dL and 2 hours after a meal less than 180 mg/dL.    Hemoglobin A1C: Less than 7%. Yours is   Lab Results   Component Value Date    A1C 9.1 02/09/2018   .    Blood Pressure: Less than 130/80mmHg. Yours is BP  113/70  Pulse 84  Wt 246 lb 9.6 oz (111.9 kg)  BMI 36.42 kg/m2.    Cholesterol: You are taking simvastatin to help decrease the risk of heart disease.    Things you can do to help lower your blood sugars:    Diet: 3 meals and 1-2 snacks per day with 60 grams of carbohydrates per meal and 15-30 grams of carbohydrates per snack. Try to fill your plate at least half-full with vegetables, fill one-quarter full with lean meats or protein, and also make sure you get at least some carbohydrate with every meal.    Exercise: 30 minutes per day of anything that will increase your heart rate and make you break a sweat! Gardening, walking, cleaning the house, changing the oil in your car, etc. If you feel like 30 minutes per day is too much, start small. Even lifting canned foods or working your arms with a resistance band in front of the TV can help.          Step-by-Step:  Treating Low Blood Sugar (Hypoglycemia)    Date Last Reviewed: 12/1/2016 2000-2017 [employer]. [address] [place] [address]. All rights reserved. This information is not intended as a substitute for professional medical care. Always follow your healthcare professional's instructions.                Follow-ups after your visit        Who to contact     If you have questions or need follow up information about today's clinic visit or your schedule please contact [facility] directly at [phone].  Normal or non-critical lab and imaging results will be communicated to you by MyChart, letter or phone within 4 business days after the clinic has received the results. If you do not hear from us within 7 days, please contact the clinic through MyChart or phone. If you have a critical or abnormal lab result, we will notify you by phone as soon as possible.  Submit refill requests through [employer] or call your pharmacy and they will forward the refill request to us. Please allow 3 business days for your refill to  be completed.          Additional Information About Your Visit        [employer] Information     [employer] gives you secure access to your electronic health record. If you see a primary care provider, you can also send messages to your care team and make appointments. If you have questions, please call your primary care clinic.  If you do not have a primary care provider, please call [phone] and they will assist you.        Care EveryWhere ID     This is your Care EveryWhere ID. This could be used by other organizations to access your [place] medical records  [id number]        Your Vitals Were     Pulse BMI (Body Mass Index)                84 36.42 kg/m2           Blood Pressure from Last 3 Encounters:   03/20/18 113/70   02/16/18 102/70   01/26/18 108/62    Weight from Last 3 Encounters:   03/20/18 246 lb 9.6 oz (111.9 kg)   02/16/18 245 lb 8 oz (111.4 kg)   01/26/18 246 lb (111.6 kg)              Today, you had the following     No orders found for display         Today's Medication Changes          These changes are accurate as of 3/20/18  4:23 PM.  If you have any questions, ask your nurse or doctor.               Start taking these medicines.        Dose/Directions    dulaglutide 0.75 MG/0.5ML pen   Commonly known as:  TRULICITY   Used for:  Type 2 diabetes mellitus without complication, without long-term current use of insulin (H)        Dose:  0.75 mg   Inject 0.75 mg Subcutaneous every 7 days   Quantity:  2 mL   Refills:  2         These medicines have changed or have updated prescriptions.        Dose/Directions    metFORMIN 500 MG 24 hr tablet   Commonly known as:  GLUCOPHAGE-XR   This may have changed:  See the new instructions.   Used for:  Type 2 diabetes mellitus without complication, without long-term current use of insulin (H)        Take 2 tablets in the morning and 1 tablet in the evening   Quantity:  90 tablet   Refills:  2            Where to get your medicines      These medications were sent  to [facility] [place], [place] - [address]  [address] [facility] [address]     Phone:  [phone]     dulaglutide 0.75 MG/0.5ML pen         Some of these will need a paper prescription and others can be bought over the counter.  Ask your nurse if you have questions.     You don't need a prescription for these medications     metFORMIN 500 MG 24 hr tablet                Primary Care Provider Office Phone # Fax #    Lindsay Arroyo, APRN [phone]       [facility] [address]  [facility] [address]        Equal Access to Services     MITALI DIAZ : Hadii aad ku hadasho Soomaali, waaxda luqadaha, qaybta kaalmada adeegyada, waxay yudyin kain angelito godwin . So [facility] [phone].    ATENCIÓN: Si habla español, tiene a brandon disposición servicios gratuitos de asistencia lingüística. Llame al [phone].    We comply with applicable federal civil rights laws and [place] laws. We do not discriminate on the basis of race, color, national origin, age, disability, sex, sexual orientation, or gender identity.            Thank you!     Thank you for choosing [facility]  for your care. Our goal is always to provide you with excellent care. Hearing back from our patients is one way we can continue to improve our services. Please take a few minutes to complete the written survey that you may receive in the mail after your visit with us. Thank you!             Your Updated Medication List - Protect others around you: Learn how to safely use, store and throw away your medicines at www.disposemymeds.org.          This list is accurate as of 3/20/18  4:23 PM.  Always use your most recent med list.                   Brand Name Dispense Instructions for use Diagnosis    ASPIRIN NOT PRESCRIBED    INTENTIONAL     Antiplatelet medication not prescribed intentionally due to Not indicated based on age    Type 2 diabetes, HbA1c goal < 7% (H)        blood glucose monitoring test strip    no brand specified    1 Box    3 Box by In Vitro route daily. contour    Type II or unspecified type diabetes mellitus without mention of complication, uncontrolled       buPROPion 150 MG 24 hr tablet    WELLBUTRIN XL    90 tablet    TAKE 1 TABLET (150 MG) BY MOUTH EVERY MORNING    Severe episode of recurrent major depressive disorder, without psychotic features (H)       dulaglutide 0.75 MG/0.5ML pen    TRULICITY    2 mL    Inject 0.75 mg Subcutaneous every 7 days    Type 2 diabetes mellitus without complication, without long-term current use of insulin (H)       escitalopram 20 MG tablet    LEXAPRO    90 tablet    Take 1 tablet (20 mg) by mouth daily    Severe episode of recurrent major depressive disorder, without psychotic features (H)       glipiZIDE 10 MG 24 hr tablet    GLUCOTROL XL    90 tablet    TAKE 2 TABLETS (20 MG) BY MOUTH DAILY    Type 2 diabetes mellitus without complication, without long-term current use of insulin (H)       metFORMIN 500 MG 24 hr tablet    GLUCOPHAGE-XR    90 tablet    Take 2 tablets in the morning and 1 tablet in the evening    Type 2 diabetes mellitus without complication, without long-term current use of insulin (H)       simvastatin 40 MG tablet    ZOCOR    90 tablet    Take 1 tablet (40 mg) by mouth At Bedtime    Hyperlipidemia LDL goal <100       sitagliptin 100 MG tablet    JANUVIA    90 tablet    TAKE 1 TABLET (100 MG) BY MOUTH DAILY    Type 2 diabetes mellitus without complication, without long-term current use of insulin (H)

## 2018-05-15 ENCOUNTER — TELEPHONE (OUTPATIENT)
Dept: FAMILY MEDICINE | Facility: CLINIC | Age: 53
End: 2018-05-15

## 2018-05-15 NOTE — TELEPHONE ENCOUNTER
Type of outreach:  Sent [employer] message.  Health Maintenance Due   Topic Date Due     HIV SCREEN (SYSTEM ASSIGNED)  05/06/1985     COLON CANCER SCREEN (SYSTEM ASSIGNED)  05/06/2017     A1C Q3 MO  05/09/2018     PHQ-9 Q6 MONTHS  06/01/2018     Sara MONIQUE M.A.

## 2018-05-22 ENCOUNTER — TRANSFERRED RECORDS (OUTPATIENT)
Dept: HEALTH INFORMATION MANAGEMENT | Facility: CLINIC | Age: 53
End: 2018-05-22

## 2018-05-29 DIAGNOSIS — E11.9 TYPE 2 DIABETES MELLITUS WITHOUT COMPLICATION (H): ICD-10-CM

## 2018-05-29 NOTE — TELEPHONE ENCOUNTER
"Requested Prescriptions   Pending Prescriptions Disp Refills     metFORMIN (GLUCOPHAGE-XR) 500 MG 24 hr tablet [Pharmacy Med Name: METFORMIN  MG TABLET]  Last Written Prescription Date:  3/20/18  Last Fill Quantity: 90,  # refills: 2   Last office visit: 2/16/2018 with prescribing provider:  2/16/2018     Future Office Visit:     120 tablet 2     Sig: TAKE 2 TABS BY MOUTH TWICE DAILY WITH MEALS. DUE FOR FOLLOW UP/LABS IN AUG. PLEASE CALL TO SCHEDULE    Biguanide Agents Failed    5/29/2018  1:53 AM       Failed - Patient has documented A1c within the specified period of time.    If HgbA1C is 8 or greater, it needs to be on file within the past 3 months.  If less than 8, must be on file within the past 6 months.     Recent Labs   Lab Test  02/09/18   0834   A1C  9.1*            Passed - Blood pressure less than 140/90 in past 6 months    BP Readings from Last 3 Encounters:   03/20/18 113/70   02/16/18 102/70   01/26/18 108/62                Passed - Patient has documented LDL within the past 12 mos.    Recent Labs   Lab Test  02/09/18   0834   LDL  55            Passed - Patient has had a Microalbumin in the past 12 mos.    Recent Labs   Lab Test  11/20/17   0902   MICROL  18   UMALCR  4.56            Passed - Patient is age 10 or older       Passed - Patient's CR is NOT>1.4 OR Patient's EGFR is NOT<45 within past 12 mos.    Recent Labs   Lab Test  02/09/18   0834   GFRESTIMATED  84   GFRESTBLACK  >90       Recent Labs   Lab Test  02/09/18   0834   CR  0.94            Passed - Patient does NOT have a diagnosis of CHF.       Passed - Recent (6 mo) or future (30 days) visit within the authorizing provider's specialty    Patient had office visit in the last 6 months or has a visit in the next 30 days with authorizing provider or within the authorizing provider's specialty.  See \"Patient Info\" tab in inbasket, or \"Choose Columns\" in Meds & Orders section of the refill encounter.              "

## 2018-06-04 RX ORDER — METFORMIN HCL 500 MG
TABLET, EXTENDED RELEASE 24 HR ORAL
Qty: 120 TABLET | Refills: 0 | Status: SHIPPED | OUTPATIENT
Start: 2018-06-04 | End: 2018-07-02

## 2018-06-04 NOTE — TELEPHONE ENCOUNTER
Prescription approved per [facility] [place] Refill Protocol.    Pam Jarquin RN   [facility]

## 2018-07-02 DIAGNOSIS — E11.9 TYPE 2 DIABETES MELLITUS WITHOUT COMPLICATION (H): ICD-10-CM

## 2018-07-02 NOTE — TELEPHONE ENCOUNTER
"Requested Prescriptions   Pending Prescriptions Disp Refills     metFORMIN (GLUCOPHAGE-XR) 500 MG 24 hr tablet [Pharmacy Med Name: METFORMIN  MG TABLET]  Last Written Prescription Date:  6/4/18  Last Fill Quantity: 120,  # refills: 0   Last office visit: 2/16/2018 with prescribing provider:  2/16/2018     Future Office Visit:     120 tablet 0     Sig: TAKE 2 TABS BY MOUTH TWICE DAILY WITH MEALS. DUE FOR FOLLOW UP/LABS IN AUG. PLEASE CALL TO SCHEDULE    Biguanide Agents Failed    7/2/2018  1:22 AM       Failed - Patient has documented A1c within the specified period of time.    If HgbA1C is 8 or greater, it needs to be on file within the past 3 months.  If less than 8, must be on file within the past 6 months.     Recent Labs   Lab Test  02/09/18   0834   A1C  9.1*            Passed - Blood pressure less than 140/90 in past 6 months    BP Readings from Last 3 Encounters:   03/20/18 113/70   02/16/18 102/70   01/26/18 108/62                Passed - Patient has documented LDL within the past 12 mos.    Recent Labs   Lab Test  02/09/18   0834   LDL  55            Passed - Patient has had a Microalbumin in the past 12 mos.    Recent Labs   Lab Test  11/20/17   0902   MICROL  18   UMALCR  4.56            Passed - Patient is age 10 or older       Passed - Patient's CR is NOT>1.4 OR Patient's EGFR is NOT<45 within past 12 mos.    Recent Labs   Lab Test  02/09/18   0834   GFRESTIMATED  84   GFRESTBLACK  >90       Recent Labs   Lab Test  02/09/18   0834   CR  0.94            Passed - Patient does NOT have a diagnosis of CHF.       Passed - Recent (6 mo) or future (30 days) visit within the authorizing provider's specialty    Patient had office visit in the last 6 months or has a visit in the next 30 days with authorizing provider or within the authorizing provider's specialty.  See \"Patient Info\" tab in inbasket, or \"Choose Columns\" in Meds & Orders section of the refill encounter.              "

## 2018-07-05 NOTE — TELEPHONE ENCOUNTER
Left message to call back [phone]   3/20/18 MTM - F/U with PCP in 3 mo.   Offer appointment and forward to RN for refill through visit.  Hardeep Ryan RN

## 2018-07-07 DIAGNOSIS — E11.9 TYPE 2 DIABETES MELLITUS WITHOUT COMPLICATION, WITHOUT LONG-TERM CURRENT USE OF INSULIN (H): ICD-10-CM

## 2018-07-07 NOTE — TELEPHONE ENCOUNTER
"Requested Prescriptions   Pending Prescriptions Disp Refills     TRULICITY 0.75 MG/0.5ML pen [Pharmacy Med Name: TRULICITY 0.75 MG/0.5 ML PEN]  Last Written Prescription Date:  6/12/18  Last Fill Quantity: 2mL,  # refills: 0   Last office visit: 2/16/2018 with prescribing provider:  6/12/18   Future Office Visit:      0     Sig: INJECT 0.75 MG SUBCUTANEOUSLY EVERY 7 DAYS.    GLP-1 Agonists Protocol Failed    7/7/2018  1:15 AM       Failed - HgbA1C in past 3 or 6 months    If HgbA1C is 8 or greater, it needs to be on file within the past 3 months.  If less than 8, must be on file within the past 6 months.     Recent Labs   Lab Test  02/09/18   0834   A1C  9.1*            Passed - Blood pressure less than 140/90 in past 6 months    BP Readings from Last 3 Encounters:   03/20/18 113/70   02/16/18 102/70   01/26/18 108/62                Passed - LDL on file in past 12 months    Recent Labs   Lab Test  02/09/18   0834   LDL  55            Passed - Microalbumin on file in past 12 months    Recent Labs   Lab Test  11/20/17   0902   MICROL  18   UMALCR  4.56            Passed - Patient is age 18 or older       Passed - Normal serum creatinine on file in past 12 months    Recent Labs   Lab Test  02/09/18   0834   CR  0.94            Passed - Recent (6 mo) or future (30 days) visit within the authorizing provider's specialty    Patient had office visit in the last 6 months or has a visit in the next 30 days with authorizing provider.  See \"Patient Info\" tab in inbasket, or \"Choose Columns\" in Meds & Orders section of the refill encounter.              "

## 2018-07-09 ENCOUNTER — DOCUMENTATION ONLY (OUTPATIENT)
Dept: LAB | Facility: CLINIC | Age: 53
End: 2018-07-09

## 2018-07-09 DIAGNOSIS — E11.9 TYPE 2 DIABETES MELLITUS WITHOUT COMPLICATION, WITHOUT LONG-TERM CURRENT USE OF INSULIN (H): Primary | ICD-10-CM

## 2018-07-09 NOTE — PROGRESS NOTES
This patient is coming to lab Friday July 13th. HM orders have been placed for you to associate and sign. Please future any further labs you may want, thanks [place] lab staff.

## 2018-07-10 RX ORDER — METFORMIN HCL 500 MG
TABLET, EXTENDED RELEASE 24 HR ORAL
Qty: 120 TABLET | Refills: 0 | Status: SHIPPED | OUTPATIENT
Start: 2018-07-10 | End: 2018-07-27 | Stop reason: DRUGHIGH

## 2018-07-10 NOTE — TELEPHONE ENCOUNTER
Patient has appointments set up. Prescription refilled x 1 per [facility] Refill Protocol.    Next 5 appointments (look out 90 days)     Jul 20, 2018  8:30 AM CDT   [facility]  [facility]    [address] [address] [phone]            Jul 27, 2018  1:00 PM CDT   [facility] with VALDEZ Cowart CNP   [facility]    [address] [address] [id number] [phone]

## 2018-07-11 ENCOUNTER — TELEPHONE (OUTPATIENT)
Dept: FAMILY MEDICINE | Facility: CLINIC | Age: 53
End: 2018-07-11

## 2018-07-11 NOTE — TELEPHONE ENCOUNTER
Type of outreachPatient aware. He is having labs drawn 7/13/18 and will follow up with [facility].Patient aware. He is having labs drawn 7/13/18 and will follow up with [facility].  Health Maintenance Due   Topic Date Due     HIV SCREEN (SYSTEM ASSIGNED)  05/06/1985     A1C Q3 MO  05/09/2018     EYE EXAM Q1 YEAR  07/28/2018     Sara MONIQUE M.A.

## 2018-07-20 DIAGNOSIS — E11.9 TYPE 2 DIABETES MELLITUS WITHOUT COMPLICATION, WITHOUT LONG-TERM CURRENT USE OF INSULIN (H): ICD-10-CM

## 2018-07-20 LAB
ALBUMIN SERPL-MCNC: 3.9 G/DL (ref 3.4–5)
ALP SERPL-CCNC: 60 U/L (ref 40–150)
ALT SERPL W P-5'-P-CCNC: 53 U/L (ref 0–70)
ANION GAP SERPL CALCULATED.3IONS-SCNC: 8 MMOL/L (ref 3–14)
AST SERPL W P-5'-P-CCNC: 34 U/L (ref 0–45)
BILIRUB SERPL-MCNC: 0.7 MG/DL (ref 0.2–1.3)
BUN SERPL-MCNC: 9 MG/DL (ref 7–30)
CALCIUM SERPL-MCNC: 8.9 MG/DL (ref 8.5–10.1)
CHLORIDE SERPL-SCNC: 98 MMOL/L (ref 94–109)
CO2 SERPL-SCNC: 28 MMOL/L (ref 20–32)
CREAT SERPL-MCNC: 0.96 MG/DL (ref 0.66–1.25)
CREAT UR-MCNC: 274 MG/DL
GFR SERPL CREATININE-BSD FRML MDRD: 83 ML/MIN/1.7M2
GLUCOSE SERPL-MCNC: 244 MG/DL (ref 70–99)
MICROALBUMIN UR-MCNC: 9 MG/L
MICROALBUMIN/CREAT UR: 3.34 MG/G CR (ref 0–17)
POTASSIUM SERPL-SCNC: 4.4 MMOL/L (ref 3.4–5.3)
PROT SERPL-MCNC: 7.5 G/DL (ref 6.8–8.8)
SODIUM SERPL-SCNC: 134 MMOL/L (ref 133–144)
TSH SERPL DL<=0.005 MIU/L-ACNC: 2.11 MU/L (ref 0.4–4)

## 2018-07-20 PROCEDURE — 82043 UR ALBUMIN QUANTITATIVE: CPT | Performed by: NURSE PRACTITIONER

## 2018-07-20 PROCEDURE — 84443 ASSAY THYROID STIM HORMONE: CPT | Performed by: NURSE PRACTITIONER

## 2018-07-20 PROCEDURE — 36415 COLL VENOUS BLD VENIPUNCTURE: CPT | Performed by: NURSE PRACTITIONER

## 2018-07-20 PROCEDURE — 80053 COMPREHEN METABOLIC PANEL: CPT | Performed by: NURSE PRACTITIONER

## 2018-07-20 RX ORDER — DULAGLUTIDE 0.75 MG/.5ML
INJECTION, SOLUTION SUBCUTANEOUS
Qty: 0.5 ML | Refills: 3 | Status: SHIPPED | OUTPATIENT
Start: 2018-07-20 | End: 2018-08-14 | Stop reason: ALTCHOICE

## 2018-07-27 ENCOUNTER — OFFICE VISIT (OUTPATIENT)
Dept: FAMILY MEDICINE | Facility: CLINIC | Age: 53
End: 2018-07-27
Payer: COMMERCIAL

## 2018-07-27 VITALS
TEMPERATURE: 98.6 F | BODY MASS INDEX: 35.57 KG/M2 | WEIGHT: 240.9 LBS | SYSTOLIC BLOOD PRESSURE: 102 MMHG | HEART RATE: 86 BPM | DIASTOLIC BLOOD PRESSURE: 62 MMHG | OXYGEN SATURATION: 96 %

## 2018-07-27 DIAGNOSIS — F33.2 SEVERE EPISODE OF RECURRENT MAJOR DEPRESSIVE DISORDER, WITHOUT PSYCHOTIC FEATURES (H): ICD-10-CM

## 2018-07-27 DIAGNOSIS — E11.9 TYPE 2 DIABETES MELLITUS WITHOUT COMPLICATION, WITHOUT LONG-TERM CURRENT USE OF INSULIN (H): Primary | ICD-10-CM

## 2018-07-27 LAB — HBA1C MFR BLD: 9.3 % (ref 0–5.6)

## 2018-07-27 PROCEDURE — 36415 COLL VENOUS BLD VENIPUNCTURE: CPT | Performed by: NURSE PRACTITIONER

## 2018-07-27 PROCEDURE — 99214 OFFICE O/P EST MOD 30 MIN: CPT | Performed by: NURSE PRACTITIONER

## 2018-07-27 PROCEDURE — 83036 HEMOGLOBIN GLYCOSYLATED A1C: CPT | Performed by: NURSE PRACTITIONER

## 2018-07-27 RX ORDER — BUPROPION HYDROCHLORIDE 300 MG/1
300 TABLET ORAL EVERY MORNING
Qty: 30 TABLET | Refills: 3 | Status: SHIPPED | OUTPATIENT
Start: 2018-07-27 | End: 2018-09-14

## 2018-07-27 ASSESSMENT — PATIENT HEALTH QUESTIONNAIRE - PHQ9: 5. POOR APPETITE OR OVEREATING: SEVERAL DAYS

## 2018-07-27 ASSESSMENT — ANXIETY QUESTIONNAIRES
1. FEELING NERVOUS, ANXIOUS, OR ON EDGE: NOT AT ALL
5. BEING SO RESTLESS THAT IT IS HARD TO SIT STILL: SEVERAL DAYS
2. NOT BEING ABLE TO STOP OR CONTROL WORRYING: NOT AT ALL
IF YOU CHECKED OFF ANY PROBLEMS ON THIS QUESTIONNAIRE, HOW DIFFICULT HAVE THESE PROBLEMS MADE IT FOR YOU TO DO YOUR WORK, TAKE CARE OF THINGS AT HOME, OR GET ALONG WITH OTHER PEOPLE: SOMEWHAT DIFFICULT
7. FEELING AFRAID AS IF SOMETHING AWFUL MIGHT HAPPEN: SEVERAL DAYS
GAD7 TOTAL SCORE: 5
6. BECOMING EASILY ANNOYED OR IRRITABLE: SEVERAL DAYS
3. WORRYING TOO MUCH ABOUT DIFFERENT THINGS: SEVERAL DAYS

## 2018-07-27 NOTE — MR AVS SNAPSHOT
After Visit Summary   7/27/2018    Everton Linn    MRN: [id number]           Patient Information     Date Of Birth          1965        Visit Information        Provider Department      7/27/2018 1:00 PM Lindsay Arroyo APRN CNP [facility]        Today's Diagnoses     Severe episode of recurrent major depressive disorder, without psychotic features (H)    -  1    Type 2 diabetes mellitus without complication, without long-term current use of insulin (H)           Follow-ups after your visit        Follow-up notes from your care team     Return in about 3 weeks (around 8/17/2018) for Routine Visit.      Your next 10 appointments already scheduled     Aug 14, 2018  4:00 PM CDT   Office Visit with Gertrude Carey [facility] ([facility])    [address] [address] [phone]           Bring a current list of meds and any records pertaining to this visit. For Physicals, please bring immunization records and any forms needing to be filled out. Please arrive 10 minutes early to complete paperwork.            Sep 07, 2018  4:20 PM CDT   Telephone Visit with VALDEZ Cowart CNP   [facility] ([facility])    [address] [address] [phone]           Note: this is not an onsite visit; there is no need to come to the facility.              Who to contact     If you have questions or need follow up information about today's clinic visit or your schedule please contact [facility] directly at [phone].  Normal or non-critical lab and imaging results will be communicated to you by MyChart, letter or phone within 4 business days after the clinic has received the results. If you do not hear from us within 7 days, please contact the clinic through MyChart or phone. If you have a critical or abnormal lab result, we will notify you by  phone as soon as possible.  Submit refill requests through [employer] or call your pharmacy and they will forward the refill request to us. Please allow 3 business days for your refill to be completed.          Additional Information About Your Visit        [employer] Information     [employer] gives you secure access to your electronic health record. If you see a primary care provider, you can also send messages to your care team and make appointments. If you have questions, please call your primary care clinic.  If you do not have a primary care provider, please call [phone] and they will assist you.        Care EveryWhere ID     This is your Care EveryWhere ID. This could be used by other organizations to access your [facility] medical records  [id number]        Your Vitals Were     Pulse Temperature Pulse Oximetry BMI (Body Mass Index)          86 98.6  F (37  C) (Oral) 96% 35.57 kg/m2         Blood Pressure from Last 3 Encounters:   07/27/18 102/62   03/20/18 113/70   02/16/18 102/70    Weight from Last 3 Encounters:   07/27/18 240 lb 14.4 oz (109.3 kg)   03/20/18 246 lb 9.6 oz (111.9 kg)   02/16/18 245 lb 8 oz (111.4 kg)              We Performed the Following     Hemoglobin A1c          Today's Medication Changes          These changes are accurate as of 7/27/18  1:48 PM.  If you have any questions, ask your nurse or doctor.               These medicines have changed or have updated prescriptions.        Dose/Directions    buPROPion 300 MG 24 hr tablet   Commonly known as:  WELLBUTRIN XL   This may have changed:    - medication strength  - how much to take  - how to take this  - when to take this  - additional instructions   Used for:  Severe episode of recurrent major depressive disorder, without psychotic features (H)   Changed by:  Lindsay Arroyo APRN CNP        Dose:  300 mg   Take 1 tablet (300 mg) by mouth every morning   Quantity:  30 tablet   Refills:  3       metFORMIN 500 MG 24 hr tablet   Commonly  known as:  GLUCOPHAGE-XR   This may have changed:  Another medication with the same name was removed. Continue taking this medication, and follow the directions you see here.   Used for:  Type 2 diabetes mellitus without complication, without long-term current use of insulin (H)   Changed by:  Lindsay Arroyo APRN CNP        Take 2 tablets in the morning and 1 tablet in the evening   Quantity:  90 tablet   Refills:  2            Where to get your medicines      These medications were sent to [facility]/pharmacy #[id number] - [facility] [address]  [address] [address] [address]     Phone:  [phone]     buPROPion 300 MG 24 hr tablet                Primary Care Provider Office Phone # Fax #    VALDEZ Cowart [phone] [address] [address] [address]        Equal Access to Services     [facility]: Hadii aad ku hadasho Soomaali, waaxda luqadaha, qaybta kaalmada angelitoyadouglas, marlyn godwin . So [facility] [phone].    ATENCIÓN: Si habla español, tiene a brandon disposición servicios gratuitos de asistencia lingüística. [facility] [phone].    We comply with applicable federal civil rights laws and [place] laws. We do not discriminate on the basis of race, color, national origin, age, disability, sex, sexual orientation, or gender identity.            Thank you!     Thank you for choosing [facility]  for your care. Our goal is always to provide you with excellent care. Hearing back from our patients is one way we can continue to improve our services. Please take a few minutes to complete the written survey that you may receive in the mail after your visit with us. Thank you!             Your Updated Medication List - Protect others around you: Learn how to safely use, store and throw away your medicines at www.disposemymeds.org.          This list is accurate as of 7/27/18  1:48 PM.  Always use your most recent med list.                    Brand Name Dispense Instructions for use Diagnosis    aspirin 81 MG tablet      Take 1 tablet (81 mg) by mouth daily    Type 2 diabetes mellitus without complication, without long-term current use of insulin (H)       blood glucose monitoring test strip    no brand specified    1 Box    3 Box by In Vitro route daily. contour    Type II or unspecified type diabetes mellitus without mention of complication, uncontrolled       buPROPion 300 MG 24 hr tablet    WELLBUTRIN XL    30 tablet    Take 1 tablet (300 mg) by mouth every morning    Severe episode of recurrent major depressive disorder, without psychotic features (H)       escitalopram 20 MG tablet    LEXAPRO    90 tablet    Take 1 tablet (20 mg) by mouth daily    Severe episode of recurrent major depressive disorder, without psychotic features (H)       glipiZIDE 10 MG 24 hr tablet    GLUCOTROL XL    90 tablet    TAKE 2 TABLETS (20 MG) BY MOUTH DAILY    Type 2 diabetes mellitus without complication, without long-term current use of insulin (H)       metFORMIN 500 MG 24 hr tablet    GLUCOPHAGE-XR    90 tablet    Take 2 tablets in the morning and 1 tablet in the evening    Type 2 diabetes mellitus without complication, without long-term current use of insulin (H)       simvastatin 40 MG tablet    ZOCOR    90 tablet    Take 1 tablet (40 mg) by mouth At Bedtime    Hyperlipidemia LDL goal <100       TRULICITY 0.75 MG/0.5ML pen   Generic drug:  dulaglutide     0.5 mL    INJECT 0.75 MG SUBCUTANEOUSLY EVERY 7 DAYS.    Type 2 diabetes mellitus without complication, without long-term current use of insulin (H)

## 2018-07-27 NOTE — PROGRESS NOTES
SUBJECTIVE:   Everton Linn is a 53 year old male who presents to clinic today for the following health issues:      Diabetes Follow-up      Patient is checking blood sugars: not at all    Diabetic concerns: None     Symptoms of hypoglycemia (low blood sugar): none     Paresthesias (numbness or burning in feet) or sores: No     Date of last diabetic eye exam: Appt in August    Diabetes Management Resources    Hyperlipidemia Follow-Up      Rate your low fat/cholesterol diet?: fair    Taking statin?  Yes, no muscle aches from statin    Other lipid medications/supplements?:  none      Hemoglobin A1C (%)   Date Value   07/27/2018 9.3 (H)   02/09/2018 9.1 (H)     LDL Cholesterol Calculated (mg/dL)   Date Value   02/09/2018 55   05/19/2017     Cannot estimate LDL when triglyceride exceeds 400 mg/dL     LDL Cholesterol Direct (mg/dL)   Date Value   05/19/2017 94     Depression Followup    Status since last visit: Stable     See PHQ-9 for current symptoms.  Other associated symptoms: None    Complicating factors:   Significant life event:  Yes-     Current substance abuse:  None  Anxiety or Panic symptoms:  No    CHINMAY-7 SCORE 6/30/2017 12/1/2017 7/27/2018   Total Score - - -   Total Score 3 5 5         PHQ-9 SCORE 12/1/2017 6/21/2018 7/27/2018   Total Score - - -   Total Score MyChart - 6 (Mild depression) -   Total Score 11 6 10     PHQ-9  English  PHQ-9   Any Language  Suicide Assessment Five-step Evaluation and Treatment (SAFE-T)    Amount of exercise or physical activity: 2-3 days/week for an average of 45-60 minutes    Problems taking medications regularly: No    Medication side effects: none    Diet: regular (no restrictions)        Patient Active Problem List   Diagnosis     Rosacea     Dermatophytosis of nail     Anxiety state     Inguinal hernia     HYPERLIPIDEMIA LDL GOAL <100     Ventral hernia     SANTOS (obstructive sleep apnea)     Type 2 diabetes mellitus without complication, without long-term current use  of insulin (H)     Recurrent major depression-severe (H)     Biliary colic     Cervical stenosis of spine     Morbid obesity (H)     Past Surgical History:   Procedure Laterality Date     ARTHROSCOPY KNEE RT/LT  2007    left knee     ARTHROSCOPY KNEE RT/LT  2005    right     LAPAROSCOPIC CHOLECYSTECTOMY WITH CHOLANGIOGRAMS N/A 5/28/2016    Procedure: LAPAROSCOPIC CHOLECYSTECTOMY WITH CHOLANGIOGRAMS;  Surgeon: Jerry Bello MD;  Location:  OR       Social History   Substance Use Topics     Smoking status: Never Smoker     Smokeless tobacco: Never Used     Alcohol use Yes      Comment: 2 -3 beers every few weeks     Family History   Problem Relation Age of Onset     C.A.D. Mother      CHF     Diabetes Mother      Hypertension Father      Cancer Father      Multiple Myloma     Musculoskeletal Disorder Paternal Grandmother      Paige Gehrig's Disease           Reviewed and updated as needed this visit by clinical staff  Tobacco  Allergies  Meds  Med Hx  Surg Hx  Fam Hx  Soc Hx      Reviewed and updated as needed this visit by Provider         ROS:  CONSTITUTIONAL: NEGATIVE for fever, chills, change in weight  ENT/MOUTH: NEGATIVE for ear, mouth and throat problems  RESP: NEGATIVE for significant cough or SOB  CV: NEGATIVE for chest pain, palpitations or peripheral edema    OBJECTIVE:     /62  Pulse 86  Temp 98.6  F (37  C) (Oral)  Wt 240 lb 14.4 oz (109.3 kg)  SpO2 96%  BMI 35.57 kg/m2  Body mass index is 35.57 kg/(m^2).  GENERAL: healthy, alert and no distress  RESP: lungs clear to auscultation - no rales, rhonchi or wheezes  CV: regular rate and rhythm, normal S1 S2, no S3 or S4, no murmur, click or rub, no peripheral edema and peripheral pulses strong  PSYCH: mentation appears normal, affect normal/bright    Wt Readings from Last 4 Encounters:   07/27/18 240 lb 14.4 oz (109.3 kg)   03/20/18 246 lb 9.6 oz (111.9 kg)   02/16/18 245 lb 8 oz (111.4 kg)   01/26/18 246 lb (111.6 kg)          Diagnostic Test Results:  Results for orders placed or performed in visit on 07/27/18   Hemoglobin A1c   Result Value Ref Range    Hemoglobin A1C 9.3 (H) 0 - 5.6 %       ASSESSMENT/PLAN:     A/P:    ICD-10-CM    1. Type 2 diabetes mellitus without complication, without long-term current use of insulin (H) E11.9 Hemoglobin A1c   2. Severe episode of recurrent major depressive disorder, without psychotic features (H) F33.2 buPROPion (WELLBUTRIN XL) 300 MG 24 hr tablet     Depression poor control.  Increase dose of wellbutrin.  Discussed possibility of seizure side effects.  Advised on ETOH intake and pt agrees.  Follow closely.  Discussed options should he worsen.  Patient to meet with [facility] pharmacy for further titration of diabetes meds.    Visit time 30 min over half spent discussing these issues.    VALDEZ Cowart [facility]

## 2018-07-28 ASSESSMENT — ANXIETY QUESTIONNAIRES: GAD7 TOTAL SCORE: 5

## 2018-07-28 ASSESSMENT — PATIENT HEALTH QUESTIONNAIRE - PHQ9: SUM OF ALL RESPONSES TO PHQ QUESTIONS 1-9: 10

## 2018-08-08 DIAGNOSIS — E11.9 TYPE 2 DIABETES MELLITUS WITHOUT COMPLICATION (H): ICD-10-CM

## 2018-08-09 NOTE — TELEPHONE ENCOUNTER
Requested Prescriptions   Pending Prescriptions Disp Refills     metFORMIN (GLUCOPHAGE-XR) 500 MG 24 hr tablet [Pharmacy Med Name: METFORMIN  MG TABLET]  Last Written Prescription Date:  3/20/18  Last Fill Quantity: 90,  # refills: 2   Last office visit: 7/27/2018 with prescribing provider:  Lindsay Arroyo APRN CNP   Future Office Visit:   Next 5 appointments (look out 90 days)     Aug 14, 2018  4:00 PM CDT   Office Visit with Gertrude Carey [facility] ([facility])    [address] [address]   [phone]            Sep 07, 2018  4:20 PM CDT   Telephone Visit with VALDEZ Cowart CNP   [facility] ([facility])    [address] [id number] [phone]                  120 tablet 0     Sig: TAKE 2 TABS BY MOUTH TWICE DAILY WITH MEALS. DUE FOR FOLLOW UP/LABS IN AUG. PLEASE CALL TO SCHEDULE    Biguanide Agents Passed    8/8/2018 12:20 PM       Passed - Blood pressure less than 140/90 in past 6 months    BP Readings from Last 3 Encounters:   07/27/18 102/62   03/20/18 113/70   02/16/18 102/70                Passed - Patient has documented LDL within the past 12 mos.    Recent Labs   Lab Test  02/09/18   0834   LDL  55            Passed - Patient has had a Microalbumin in the past 12 mos.    Recent Labs   Lab Test  07/20/18   0827   MICROL  9   UMALCR  3.34            Passed - Patient is age 10 or older       Passed - Patient has documented A1c within the specified period of time.    If HgbA1C is 8 or greater, it needs to be on file within the past 3 months.  If less than 8, must be on file within the past 6 months.     Recent Labs   Lab Test  07/27/18   1254   A1C  9.3*            Passed - Patient's CR is NOT>1.4 OR Patient's EGFR is NOT<45 within past 12 mos.    Recent Labs   Lab Test  07/20/18   0828   GFRESTIMATED  83   GFRESTBLACK  >90       Recent Labs   Lab Test  07/20/18    "0828   CR  0.96            Passed - Patient does NOT have a diagnosis of CHF.       Passed - Recent (6 mo) or future (30 days) visit within the authorizing provider's specialty    Patient had office visit in the last 6 months or has a visit in the next 30 days with authorizing provider or within the authorizing provider's specialty.  See \"Patient Info\" tab in inbasket, or \"Choose Columns\" in Meds & Orders section of the refill encounter.              "

## 2018-08-10 RX ORDER — METFORMIN HCL 500 MG
TABLET, EXTENDED RELEASE 24 HR ORAL
Qty: 120 TABLET | Refills: 0 | Status: SHIPPED | OUTPATIENT
Start: 2018-08-10 | End: 2018-09-14

## 2018-08-10 NOTE — TELEPHONE ENCOUNTER
Medication is being filled for 1 time refill only due to:  appts already scheduled   Mimi Fair RN

## 2018-08-14 ENCOUNTER — OFFICE VISIT (OUTPATIENT)
Dept: PHARMACY | Facility: CLINIC | Age: 53
End: 2018-08-14
Payer: COMMERCIAL

## 2018-08-14 VITALS
DIASTOLIC BLOOD PRESSURE: 74 MMHG | SYSTOLIC BLOOD PRESSURE: 110 MMHG | WEIGHT: 243.2 LBS | BODY MASS INDEX: 35.91 KG/M2 | HEART RATE: 86 BPM

## 2018-08-14 DIAGNOSIS — F33.2 SEVERE EPISODE OF RECURRENT MAJOR DEPRESSIVE DISORDER, WITHOUT PSYCHOTIC FEATURES (H): ICD-10-CM

## 2018-08-14 DIAGNOSIS — E11.9 TYPE 2 DIABETES MELLITUS WITHOUT COMPLICATION, WITHOUT LONG-TERM CURRENT USE OF INSULIN (H): Primary | ICD-10-CM

## 2018-08-14 PROCEDURE — 99607 MTMS BY PHARM ADDL 15 MIN: CPT | Performed by: PHARMACIST

## 2018-08-14 PROCEDURE — 99606 MTMS BY PHARM EST 15 MIN: CPT | Performed by: PHARMACIST

## 2018-08-14 RX ORDER — BLOOD-GLUCOSE METER
1 EACH MISCELLANEOUS 3 TIMES DAILY PRN
Qty: 1 KIT | Refills: 0 | Status: SHIPPED | OUTPATIENT
Start: 2018-08-14 | End: 2022-12-04

## 2018-08-14 NOTE — Clinical Note
Wanted to get Juan J checking blood sugars again before starting insulin, discussed trial of Ozempic which has improved efficacy over Trulicity first.  Hope he can decrease his soda.  Gertrude

## 2018-08-14 NOTE — MR AVS SNAPSHOT
After Visit Summary   8/14/2018    Everton Linn    MRN: [id number]           Patient Information     Date Of Birth          1965        Visit Information        Provider Department      8/14/2018 4:00 PM Gertrude Carey, [facility] MTM        Today's Diagnoses     Type 2 diabetes mellitus without complication, without long-term current use of insulin (H)    -  1      Care Instructions    Recommendations from today's MTM visit:                                                      1. Finish Trulicity and start Ozempic 0.5mg once weekly.    2.  Start testing blood sugars in the morning and 2 hours after largest meals several times a week    3.  Great job on working out!!! Keep it up!  Good job on weight loss!    Wt Readings from Last 4 Encounters:   08/14/18 243 lb 3.2 oz (110.3 kg)   07/27/18 240 lb 14.4 oz (109.3 kg)   03/20/18 246 lb 9.6 oz (111.9 kg)   02/16/18 245 lb 8 oz (111.4 kg)     Next MTM/pharmacist visit: **    To schedule another MTM appointment, please call the clinic directly or you may call the MTM scheduling line at [phone] or toll-free at [phone].     My Clinical Pharmacist's contact information:                                                      It was a pleasure seeing you today!  Please feel free to contact me with any questions or concerns you have.      Gertrude Carey, PharmD [facility]  Medication Therapy Management Practitioner   #[phone]    You may receive a survey about the MTM services you received.  I would appreciate your feedback to help me serve you better in the future. Please fill it out and return it when you can. Your comments will be anonymous.      My healthcare goals:                                                      Diabetes Goals:    Home Monitoring of Blood Sugars:Fasting  mg/dL and 2 hours after a meal less than 150 mg/dL.    Hemoglobin A1C: Less than 7%. Yours is   Lab Results   Component Value Date     A1C 9.3 07/27/2018   .    Blood Pressure: Less than 130/80mmHg. Yours is /74  Pulse 86  Wt 243 lb 3.2 oz (110.3 kg)  BMI 35.91 kg/m2.    Cholesterol: You are taking simvastatin to help decrease the risk of heart disease.    Things you can do to help lower your blood sugars:    Diet: 3 meals and 1-2 snacks per day with 60 grams of carbohydrates per meal and 15-30 grams of carbohydrates per snack. Try to fill your plate at least half-full with vegetables, fill one-quarter full with lean meats or protein, and also make sure you get at least some carbohydrate with every meal.    Exercise: 30 minutes per day of anything that will increase your heart rate and make you break a sweat! Gardening, walking, cleaning the house, changing the oil in your car, etc. If you feel like 30 minutes per day is too much, start small. Even lifting canned foods or working your arms with a resistance band in front of the TV can help.                  Follow-ups after your visit        Your next 10 appointments already scheduled     Sep 07, 2018  4:20 PM CDT   Telephone Visit with VALDEZ Cowart [facility] ([facility])    [address] [address] [phone]           Note: this is not an onsite visit; there is no need to come to the facility.            Sep 20, 2018  2:00 PM CDT   SHORT with Gertrude Carey [facility] ([facility])    [address]  [address]  [facility] [address] [phone]              Who to contact     If you have questions or need follow up information about today's clinic visit or your schedule please contact [facility] directly at [phone].  Normal or non-critical lab and imaging results will be communicated to you by MyChart, letter or phone within 4 business days after the clinic has received the results. If you do not hear from us within 7 days, please  contact the clinic through [employer] or phone. If you have a critical or abnormal lab result, we will notify you by phone as soon as possible.  Submit refill requests through [employer] or call your pharmacy and they will forward the refill request to us. Please allow 3 business days for your refill to be completed.          Additional Information About Your Visit        [employer] Information     [employer] gives you secure access to your electronic health record. If you see a primary care provider, you can also send messages to your care team and make appointments. If you have questions, please call your primary care clinic.  If you do not have a primary care provider, please call [phone] and they will assist you.        Care EveryWhere ID     This is your Care EveryWhere ID. This could be used by other organizations to access your [place] medical records  [id number]        Your Vitals Were     Pulse BMI (Body Mass Index)                86 35.91 kg/m2           Blood Pressure from Last 3 Encounters:   08/14/18 110/74   07/27/18 102/62   03/20/18 113/70    Weight from Last 3 Encounters:   08/14/18 243 lb 3.2 oz (110.3 kg)   07/27/18 240 lb 14.4 oz (109.3 kg)   03/20/18 246 lb 9.6 oz (111.9 kg)              Today, you had the following     No orders found for display         Today's Medication Changes          These changes are accurate as of 8/14/18  4:33 PM.  If you have any questions, ask your nurse or doctor.               Start taking these medicines.        Dose/Directions    blood glucose monitoring lancets   Used for:  Type 2 diabetes mellitus without complication, without long-term current use of insulin (H)        Use to test blood sugar 2-3 times daily or as directed. May substitute per insurance coverage.   Quantity:  100 each   Refills:  11       ONETOUCH VERIO FLEX SYSTEM w/Device Kit   Used for:  Type 2 diabetes mellitus without complication, without long-term current use of insulin (H)        Dose:  1  each   1 each 3 times daily as needed May substitute per insurance coverage.   Quantity:  1 kit   Refills:  0       Semaglutide 0.25 or 0.5 MG/DOSE Sopn   Used for:  Type 2 diabetes mellitus without complication, without long-term current use of insulin (H)        Dose:  0.5 mg   Inject 0.5 mg Subcutaneous every 7 days   Quantity:  1.5 mL   Refills:  1         These medicines have changed or have updated prescriptions.        Dose/Directions    * blood glucose monitoring test strip   Commonly known as:  no brand specified   This may have changed:  Another medication with the same name was added. Make sure you understand how and when to take each.   Used for:  Type II or unspecified type diabetes mellitus without mention of complication, uncontrolled        Dose:  3 Box   3 Box by In Vitro route daily. contour   Quantity:  1 Box   Refills:  12       * blood glucose monitoring test strip   Commonly known as:  ONETOUCH VERIO IQ   This may have changed:  You were already taking a medication with the same name, and this prescription was added. Make sure you understand how and when to take each.   Used for:  Type 2 diabetes mellitus without complication, without long-term current use of insulin (H)        Use to test blood sugar 2-3 times daily or as directed.May substitute per insurance coverage.   Quantity:  100 each   Refills:  11       metFORMIN 500 MG 24 hr tablet   Commonly known as:  GLUCOPHAGE-XR   This may have changed:  Another medication with the same name was removed. Continue taking this medication, and follow the directions you see here.   Used for:  Type 2 diabetes mellitus without complication (H)        TAKE 2 TABS BY MOUTH TWICE DAILY WITH MEALS. DUE FOR FOLLOW UP/LABS IN AUG. PLEASE CALL TO SCHEDULE   Quantity:  120 tablet   Refills:  0       * Notice:  This list has 2 medication(s) that are the same as other medications prescribed for you. Read the directions carefully, and ask your doctor or other  care provider to review them with you.      Stop taking these medicines if you haven't already. Please contact your care team if you have questions.     TRULICITY 0.75 MG/0.5ML pen   Generic drug:  dulaglutide                Where to get your medicines      These medications were sent to [facility]/pharmacy #[id number] - [place], [place] - [address]  [address], [facility] [address]     Phone:  [phone]     blood glucose monitoring lancets    blood glucose monitoring test strip    ONETOUCH VERIO FLEX SYSTEM w/Device Kit    Semaglutide 0.25 or 0.5 MG/DOSE Sopn                Primary Care Provider Office Phone # Fax #    Lindsay Arroyo, APRN [phone] [address] [id number]        Equal Access to Services     MITALI DIAZ : Hadii derian valderrama hadasho Soomaali, waaxda luqadaha, qaybta kaalmada adeegyada, marlyn jimenesin derrell godwin . So [facility] [phone].    ATENCIÓN: Si habla español, tiene a brandon disposición servicios gratuitos de asistencia lingüística. Llame al [phone].    We comply with applicable federal civil rights laws and [place] laws. We do not discriminate on the basis of race, color, national origin, age, disability, sex, sexual orientation, or gender identity.            Thank you!     Thank you for choosing [facility]  for your care. Our goal is always to provide you with excellent care. Hearing back from our patients is one way we can continue to improve our services. Please take a few minutes to complete the written survey that you may receive in the mail after your visit with us. Thank you!             Your Updated Medication List - Protect others around you: Learn how to safely use, store and throw away your medicines at www.disposemymeds.org.          This list is accurate as of 8/14/18  4:33 PM.  Always use your most recent med list.                   Brand Name Dispense Instructions for use Diagnosis    aspirin 81 MG  tablet      Take 1 tablet (81 mg) by mouth daily    Type 2 diabetes mellitus without complication, without long-term current use of insulin (H)       blood glucose monitoring lancets     100 each    Use to test blood sugar 2-3 times daily or as directed. May substitute per insurance coverage.    Type 2 diabetes mellitus without complication, without long-term current use of insulin (H)       * blood glucose monitoring test strip    no brand specified    1 Box    3 Box by In Vitro route daily. contour    Type II or unspecified type diabetes mellitus without mention of complication, uncontrolled       * blood glucose monitoring test strip    ONETOUCH VERIO IQ    100 each    Use to test blood sugar 2-3 times daily or as directed.May substitute per insurance coverage.    Type 2 diabetes mellitus without complication, without long-term current use of insulin (H)       buPROPion 300 MG 24 hr tablet    WELLBUTRIN XL    30 tablet    Take 1 tablet (300 mg) by mouth every morning    Severe episode of recurrent major depressive disorder, without psychotic features (H)       escitalopram 20 MG tablet    LEXAPRO    90 tablet    Take 1 tablet (20 mg) by mouth daily    Severe episode of recurrent major depressive disorder, without psychotic features (H)       glipiZIDE 10 MG 24 hr tablet    GLUCOTROL XL    90 tablet    TAKE 2 TABLETS (20 MG) BY MOUTH DAILY    Type 2 diabetes mellitus without complication, without long-term current use of insulin (H)       metFORMIN 500 MG 24 hr tablet    GLUCOPHAGE-XR    120 tablet    TAKE 2 TABS BY MOUTH TWICE DAILY WITH MEALS. DUE FOR FOLLOW UP/LABS IN AUG. PLEASE CALL TO SCHEDULE    Type 2 diabetes mellitus without complication (H)       ONETOUCH VERIO FLEX SYSTEM w/Device Kit     1 kit    1 each 3 times daily as needed May substitute per insurance coverage.    Type 2 diabetes mellitus without complication, without long-term current use of insulin (H)       Semaglutide 0.25 or 0.5 MG/DOSE Sopn      1.5 mL    Inject 0.5 mg Subcutaneous every 7 days    Type 2 diabetes mellitus without complication, without long-term current use of insulin (H)       simvastatin 40 MG tablet    ZOCOR    90 tablet    Take 1 tablet (40 mg) by mouth At Bedtime    Hyperlipidemia LDL goal <100       * Notice:  This list has 2 medication(s) that are the same as other medications prescribed for you. Read the directions carefully, and ask your doctor or other care provider to review them with you.

## 2018-08-14 NOTE — PATIENT INSTRUCTIONS
Recommendations from today's MTM visit:                                                      1. Finish Trulicity and start Ozempic 0.5mg once weekly.    2.  Start testing blood sugars in the morning and 2 hours after largest meals several times a week    3.  Great job on working out!!! Keep it up!  Good job on weight loss!    Wt Readings from Last 4 Encounters:   08/14/18 243 lb 3.2 oz (110.3 kg)   07/27/18 240 lb 14.4 oz (109.3 kg)   03/20/18 246 lb 9.6 oz (111.9 kg)   02/16/18 245 lb 8 oz (111.4 kg)     Next MTM/pharmacist visit: 5 weeks    To schedule another MTM appointment, please call the clinic directly or you may call the MTM scheduling line at [phone] or toll-free at [phone].     My Clinical Pharmacist's contact information:                                                      It was a pleasure seeing you today!  Please feel free to contact me with any questions or concerns you have.      Gertrude Carey, PharmD [facility]  Medication Therapy Management Practitioner   #[phone]    You may receive a survey about the MTM services you received.  I would appreciate your feedback to help me serve you better in the future. Please fill it out and return it when you can. Your comments will be anonymous.      My healthcare goals:                                                      Diabetes Goals:    Home Monitoring of Blood Sugars:Fasting  mg/dL and 2 hours after a meal less than 150 mg/dL.    Hemoglobin A1C: Less than 7%. Yours is   Lab Results   Component Value Date    A1C 9.3 07/27/2018   .    Blood Pressure: Less than 130/80mmHg. Yours is /74  Pulse 86  Wt 243 lb 3.2 oz (110.3 kg)  BMI 35.91 kg/m2.    Cholesterol: You are taking simvastatin to help decrease the risk of heart disease.    Things you can do to help lower your blood sugars:    Diet: 3 meals and 1-2 snacks per day with 60 grams of carbohydrates per meal and 15-30 grams of carbohydrates per snack. Try to fill your plate at  least half-full with vegetables, fill one-quarter full with lean meats or protein, and also make sure you get at least some carbohydrate with every meal.    Exercise: 30 minutes per day of anything that will increase your heart rate and make you break a sweat! Gardening, walking, cleaning the house, changing the oil in your car, etc. If you feel like 30 minutes per day is too much, start small. Even lifting canned foods or working your arms with a resistance band in front of the TV can help.

## 2018-08-15 DIAGNOSIS — E78.5 HYPERLIPIDEMIA LDL GOAL <100: ICD-10-CM

## 2018-08-15 RX ORDER — SIMVASTATIN 40 MG
TABLET ORAL
Qty: 90 TABLET | Refills: 1 | Status: SHIPPED | OUTPATIENT
Start: 2018-08-15 | End: 2019-02-01

## 2018-08-15 NOTE — TELEPHONE ENCOUNTER
"Requested Prescriptions   Pending Prescriptions Disp Refills     simvastatin (ZOCOR) 40 MG tablet [Pharmacy Med Name: SIMVASTATIN 40 MG TABLET] 90 tablet 3    Last Written Prescription Date:  8/25/17  Last Fill Quantity: 90,  # refills: 3   Last office visit: 7/27/2018 with prescribing provider:  YES   Future Office Visit:   Next 5 appointments (look out 90 days)     Sep 07, 2018  4:20 PM CDT   Telephone Visit with VALDEZ Cowart CNP   [facility]    [address] [address] [phone]            Sep 20, 2018  2:00 PM CDT   SHORT with Gertrude [facility] ([facility])    [address]  [address]  [facility] [address] [phone]                  Sig: TAKE 1 TABLET (40 MG) BY MOUTH AT BEDTIME    Statins Protocol Passed    8/15/2018  1:28 AM       Passed - LDL on file in past 12 months    Recent Labs   Lab Test  02/09/18   0834   LDL  55            Passed - No abnormal creatine kinase in past 12 months    Recent Labs   Lab Test  08/17/13   0555   CKT  207               Passed - Recent (12 mo) or future (30 days) visit within the authorizing provider's specialty    Patient had office visit in the last 12 months or has a visit in the next 30 days with authorizing provider or within the authorizing provider's specialty.  See \"Patient Info\" tab in inbasket, or \"Choose Columns\" in Meds & Orders section of the refill encounter.           Passed - Patient is age 18 or older          "

## 2018-08-15 NOTE — TELEPHONE ENCOUNTER
Prescription approved per [facility], [facility] or [facility] refill protocol.  Rola Daniels RN - Triage  [facility]

## 2018-08-17 ENCOUNTER — TRANSFERRED RECORDS (OUTPATIENT)
Dept: HEALTH INFORMATION MANAGEMENT | Facility: CLINIC | Age: 53
End: 2018-08-17

## 2018-09-13 DIAGNOSIS — E11.9 TYPE 2 DIABETES MELLITUS WITHOUT COMPLICATION, WITHOUT LONG-TERM CURRENT USE OF INSULIN (H): ICD-10-CM

## 2018-09-13 DIAGNOSIS — F33.2 SEVERE EPISODE OF RECURRENT MAJOR DEPRESSIVE DISORDER, WITHOUT PSYCHOTIC FEATURES (H): ICD-10-CM

## 2018-09-13 RX ORDER — GLIPIZIDE 10 MG/1
TABLET, FILM COATED, EXTENDED RELEASE ORAL
Qty: 90 TABLET | Refills: 3 | Status: CANCELLED | OUTPATIENT
Start: 2018-09-13

## 2018-09-13 RX ORDER — ESCITALOPRAM OXALATE 20 MG/1
TABLET ORAL
Qty: 90 TABLET | Refills: 1 | Status: CANCELLED | OUTPATIENT
Start: 2018-09-13

## 2018-09-13 NOTE — TELEPHONE ENCOUNTER
"Requested Prescriptions   Pending Prescriptions Disp Refills     escitalopram (LEXAPRO) 20 MG tablet [Pharmacy Med Name: ESCITALOPRAM 20 MG TABLET]  Last Written Prescription Date:  2/16/18  Last Fill Quantity: 90,  # refills: 1   Last office visit: 7/27/2018 with prescribing provider:  Lindsay Arroyo APRN CNP   Future Office Visit:   Next 5 appointments (look out 90 days)     Sep 14, 2018  1:00 PM CDT   Telephone Visit with VALDEZ Cowart CNP   [facility] ([facility])    [id number] [place] [address] [phone]            Sep 20, 2018  2:00 PM CDT   SHORT with Gertrude Carey [facility] ([facility]    [address]  [address]  [facility] [address] [phone]                  90 tablet 1     Sig: TAKE 1 TABLET (20 MG) BY MOUTH DAILY    SSRIs Protocol Failed    9/13/2018  1:24 AM       Failed - PHQ-9 score less than 5 in past 6 months    Please review last PHQ-9 score.   PHQ-9 SCORE 12/1/2017 6/21/2018 7/27/2018   Total Score - - -   Total Score MyChart - 6 (Mild depression) -   Total Score 11 6 10     CHINMAY-7 SCORE 6/30/2017 12/1/2017 7/27/2018   Total Score - - -   Total Score 3 5 5                Passed - Medication is Bupropion    If the medication is Bupropion (Wellbutrin), and the patient is taking for smoking cessation; OK to refill.         Passed - Patient is age 18 or older       Passed - Recent (6 mo) or future (30 days) visit within the authorizing provider's specialty    Patient had office visit in the last 6 months or has a visit in the next 30 days with authorizing provider or within the authorizing provider's specialty.  See \"Patient Info\" tab in inbasket, or \"Choose Columns\" in Meds & Orders section of the refill encounter.            glipiZIDE (GLUCOTROL XL) 10 MG 24 hr tablet [Pharmacy Med Name: GLIPIZIDE ER 10 MG TABLET]  Last Written Prescription Date:  2/16/18  Last Fill " "Quantity: 90,  # refills: 3   Last office visit: 7/27/2018 with prescribing provider:  Lindsay Arroyo APRN CNP   Future Office Visit:   Next 5 appointments (look out 90 days)     Sep 14, 2018  1:00 PM CDT   Telephone Visit with VALDEZ Cowart CNP   [facility] ([facility])    [address] [address] [phone]            Sep 20, 2018  2:00 PM CDT   SHORT with Gertrude Carey, [facility] ([facility])    [address]  [address]  [facility] [address] [phone]                  90 tablet 3     Sig: TAKE 2 TABLETS (20 MG) BY MOUTH DAILY    Sulfonylurea Agents Passed    9/13/2018  1:24 AM       Passed - Blood pressure less than 140/90 in past 6 months    BP Readings from Last 3 Encounters:   08/14/18 110/74   07/27/18 102/62   03/20/18 113/70                Passed - Patient has documented LDL within the past 12 mos.    Recent Labs   Lab Test  02/09/18   0834   LDL  55            Passed - Patient has had a Microalbumin in the past 12 mos.    Recent Labs   Lab Test  07/20/18   0827   MICROL  9   UMALCR  3.34            Passed - Patient has documented A1c within the specified period of time.    If HgbA1C is 8 or greater, it needs to be on file within the past 3 months.  If less than 8, must be on file within the past 6 months.     Recent Labs   Lab Test  07/27/18   1254   A1C  9.3*            Passed - Patient is age 18 or older       Passed - Patient has a recent creatinine (normal) within the past 12 mos.    Recent Labs   Lab Test  07/20/18   0828   CR  0.96            Passed - Recent (6 mo) or future (30 days) visit within the authorizing provider's specialty    Patient had office visit in the last 6 months or has a visit in the next 30 days with authorizing provider or within the authorizing provider's specialty.  See \"Patient Info\" tab in inbasket, or \"Choose Columns\" in Meds & Orders section " "of the refill encounter.            buPROPion (WELLBUTRIN XL) 150 MG 24 hr tablet [Pharmacy Med Name: BUPROPION HCL  MG TABLET]  Last Written Prescription Date:  7/27/18  Last Fill Quantity: 30,  # refills: 3   Last office visit: 7/27/2018 with prescribing provider:  Lindsay Arroyo APRN CNP   Future Office Visit:   Next 5 appointments (look out 90 days)     Sep 14, 2018  1:00 PM CDT   Telephone Visit with VALDEZ Cowart CNP   [facility] ([facility])    [address] [place] [address] [phone]            Sep 20, 2018  2:00 PM CDT   SHORT with Gertrude Carey [facility] ([facility])    [address]  [address]  [facility] [address] [phone]                  90 tablet 1     Sig: TAKE 1 TABLET (150 MG) BY MOUTH EVERY MORNING    SSRIs Protocol Failed    9/13/2018  1:24 AM       Failed - PHQ-9 score less than 5 in past 6 months    Please review last PHQ-9 score.   PHQ-9 SCORE 12/1/2017 6/21/2018 7/27/2018   Total Score - - -   Total Score MyChart - 6 (Mild depression) -   Total Score 11 6 10     CHINMAY-7 SCORE 6/30/2017 12/1/2017 7/27/2018   Total Score - - -   Total Score 3 5 5                Passed - Medication is Bupropion    If the medication is Bupropion (Wellbutrin), and the patient is taking for smoking cessation; OK to refill.         Passed - Patient is age 18 or older       Passed - Recent (6 mo) or future (30 days) visit within the authorizing provider's specialty    Patient had office visit in the last 6 months or has a visit in the next 30 days with authorizing provider or within the authorizing provider's specialty.  See \"Patient Info\" tab in inbasket, or \"Choose Columns\" in Meds & Orders section of the refill encounter.              "

## 2018-09-14 ENCOUNTER — VIRTUAL VISIT (OUTPATIENT)
Dept: FAMILY MEDICINE | Facility: CLINIC | Age: 53
End: 2018-09-14
Payer: COMMERCIAL

## 2018-09-14 DIAGNOSIS — E11.9 TYPE 2 DIABETES MELLITUS WITHOUT COMPLICATION, WITHOUT LONG-TERM CURRENT USE OF INSULIN (H): ICD-10-CM

## 2018-09-14 DIAGNOSIS — F33.2 SEVERE EPISODE OF RECURRENT MAJOR DEPRESSIVE DISORDER, WITHOUT PSYCHOTIC FEATURES (H): ICD-10-CM

## 2018-09-14 DIAGNOSIS — F33.42 RECURRENT MAJOR DEPRESSION IN COMPLETE REMISSION (H): ICD-10-CM

## 2018-09-14 PROCEDURE — 98966 PH1 ASSMT&MGMT NQHP 5-10: CPT | Performed by: NURSE PRACTITIONER

## 2018-09-14 RX ORDER — GLIPIZIDE 10 MG/1
TABLET, FILM COATED, EXTENDED RELEASE ORAL
Qty: 90 TABLET | Refills: 3 | Status: SHIPPED | OUTPATIENT
Start: 2018-09-14 | End: 2019-02-01

## 2018-09-14 NOTE — PROGRESS NOTES
S:  Everton Linn is a 53 year old male who presents for phone visit.  Needs refills on meds.      Concerns:    Depression--    Increased depression meds:  Went from 150 mg to 300 mg Wellbutrin.  Some dizzyness since starting on the 300 mg dose.  Some spinning with position changes.  However, not sustained vertigo and no other symptoms.  Is feeling much better on this dose of medication and no sleep issues.  No other side effects.      Feels like the increased dose of wellbutrin is helping mood.    CHINMAY-7 SCORE 6/30/2017 12/1/2017 7/27/2018   Total Score - - -   Total Score 3 5 5         PHQ-9 SCORE 6/21/2018 7/27/2018 9/14/2018   Total Score - - -   Total Score MyChart 6 (Mild depression) - 2 (Minimal depression)   Total Score 6 10 2     Also:  Diabetes--    Next Diabetes visit will be on Tuesday with [facility] pharmacy.  Feels control is improving.  Wants to continue current meds.        A/P:    ICD-10-CM    1. Type 2 diabetes mellitus without complication, without long-term current use of insulin (H) E11.9 glipiZIDE (GLUCOTROL XL) 10 MG 24 hr tablet     metFORMIN (GLUCOPHAGE-XR) 500 MG 24 hr tablet   2. Severe episode of recurrent major depressive disorder, without psychotic features (H) F33.2 escitalopram (LEXAPRO) 20 MG tablet     buPROPion (WELLBUTRIN XL) 300 MG 24 hr tablet      Refill meds  Continue current depression meds-- is in remission. No side effects of meds. Updated problem list.     Check in with depression December or January.    Visit time 10 min

## 2018-09-14 NOTE — MR AVS SNAPSHOT
After Visit Summary   9/14/2018    Everton Linn    MRN: [id number]           Patient Information     Date Of Birth          1965        Visit Information        Provider Department      9/14/2018 1:00 PM Lindsay Arroyo APRN CNP [facility]        Today's Diagnoses     Type 2 diabetes mellitus without complication, without long-term current use of insulin (H)        Severe episode of recurrent major depressive disorder, without psychotic features (H)        Recurrent major depression in complete remission (H)           Follow-ups after your visit        Follow-up notes from your care team     Return in about 3 months (around 12/14/2018) for Recheck.      Who to contact     If you have questions or need follow up information about today's clinic visit or your schedule please contact [facility] directly at [phone].  Normal or non-critical lab and imaging results will be communicated to you by [employer], letter or phone within 4 business days after the clinic has received the results. If you do not hear from us within 7 days, please contact the clinic through [employer] or phone. If you have a critical or abnormal lab result, we will notify you by phone as soon as possible.  Submit refill requests through [employer] or call your pharmacy and they will forward the refill request to us. Please allow 3 business days for your refill to be completed.          Additional Information About Your Visit        MyChart Information     Sk[employer] gives you secure access to your electronic health record. If you see a primary care provider, you can also send messages to your care team and make appointments. If you have questions, please call your primary care clinic.  If you do not have a primary care provider, please call [phone] and they will assist you.        Care EveryWhere ID     This is your Care EveryWhere ID. This could be used by other organizations to access your  [place] medical records  [id number]         Blood Pressure from Last 3 Encounters:   09/20/18 (P) 104/68   08/14/18 110/74   07/27/18 102/62    Weight from Last 3 Encounters:   09/20/18 (P) 237 lb 9.6 oz (107.8 kg)   08/14/18 243 lb 3.2 oz (110.3 kg)   07/27/18 240 lb 14.4 oz (109.3 kg)              Today, you had the following     No orders found for display         Today's Medication Changes          These changes are accurate as of 9/14/18 11:59 PM.  If you have any questions, ask your nurse or doctor.               These medicines have changed or have updated prescriptions.        Dose/Directions    metFORMIN 500 MG 24 hr tablet   Commonly known as:  GLUCOPHAGE-XR   This may have changed:  See the new instructions.   Used for:  Type 2 diabetes mellitus without complication, without long-term current use of insulin (H)   Changed by:  Lindsay Arroyo APRN CNP        TAKE 2 TABS BY MOUTH TWICE DAILY WITH MEALS.   Quantity:  120 tablet   Refills:  0            Where to get your medicines      These medications were sent to [facility]/pharmacy #[id number] - [facility] [address] [address] [facility] [facility]  [address] [facility] [address]     Phone:  [phone]     buPROPion 300 MG 24 hr tablet    escitalopram 20 MG tablet    glipiZIDE 10 MG 24 hr tablet    metFORMIN 500 MG 24 hr tablet                Primary Care Provider Office Phone # Fax #    VALDEZ Cowart [phone] [address] [address]        Equal Access to Services     MITALI DIAZ AH: Hadii aad ku hadasho Soomaali, waaxda luqadaha, qaybta kaalmada adeegyada, waxay pedrito haylaquita dixon. So [facility] [phone].    ATENCIÓN: Si habla español, tiene a brandon disposición servicios gratuitos de asistencia lingüística. Llame al [phone].    We comply with applicable federal civil rights laws and [place] laws. We do not discriminate on the basis of race, color, national origin, age, disability, sex, sexual  orientation, or gender identity.            Thank you!     Thank you for choosing [facility]  for your care. Our goal is always to provide you with excellent care. Hearing back from our patients is one way we can continue to improve our services. Please take a few minutes to complete the written survey that you may receive in the mail after your visit with us. Thank you!             Your Updated Medication List - Protect others around you: Learn how to safely use, store and throw away your medicines at www.disposemymeds.org.          This list is accurate as of 9/14/18 11:59 PM.  Always use your most recent med list.                   Brand Name Dispense Instructions for use Diagnosis    aspirin 81 MG tablet      Take 1 tablet (81 mg) by mouth daily    Type 2 diabetes mellitus without complication, without long-term current use of insulin (H)       blood glucose monitoring lancets     100 each    Use to test blood sugar 2-3 times daily or as directed. May substitute per insurance coverage.    Type 2 diabetes mellitus without complication, without long-term current use of insulin (H)       * blood glucose monitoring test strip    no brand specified    1 Box    3 Box by In Vitro route daily. contour    Type II or unspecified type diabetes mellitus without mention of complication, uncontrolled       * blood glucose monitoring test strip    ONETOUCH VERIO IQ    100 each    Use to test blood sugar 2-3 times daily or as directed.May substitute per insurance coverage.    Type 2 diabetes mellitus without complication, without long-term current use of insulin (H)       buPROPion 300 MG 24 hr tablet    WELLBUTRIN XL    90 tablet    Take 1 tablet (300 mg) by mouth every morning    Severe episode of recurrent major depressive disorder, without psychotic features (H)       escitalopram 20 MG tablet    LEXAPRO    90 tablet    Take 1 tablet (20 mg) by mouth daily    Severe episode of recurrent major depressive  disorder, without psychotic features (H)       glipiZIDE 10 MG 24 hr tablet    GLUCOTROL XL    90 tablet    TAKE 2 TABLETS (20 MG) BY MOUTH DAILY    Type 2 diabetes mellitus without complication, without long-term current use of insulin (H)       metFORMIN 500 MG 24 hr tablet    GLUCOPHAGE-XR    120 tablet    TAKE 2 TABS BY MOUTH TWICE DAILY WITH MEALS.    Type 2 diabetes mellitus without complication, without long-term current use of insulin (H)       SpeechCycle FLEX SYSTEM w/Device Kit     1 kit    1 each 3 times daily as needed May substitute per insurance coverage.    Type 2 diabetes mellitus without complication, without long-term current use of insulin (H)       Semaglutide 0.25 or 0.5 MG/DOSE Sopn     1.5 mL    Inject 0.5 mg Subcutaneous every 7 days    Type 2 diabetes mellitus without complication, without long-term current use of insulin (H)       simvastatin 40 MG tablet    ZOCOR    90 tablet    TAKE 1 TABLET (40 MG) BY MOUTH AT BEDTIME    Hyperlipidemia LDL goal <100       * Notice:  This list has 2 medication(s) that are the same as other medications prescribed for you. Read the directions carefully, and ask your doctor or other care provider to review them with you.

## 2018-09-17 NOTE — TELEPHONE ENCOUNTER
Routing refill request to provider for review/approval because:  phq9 > 4, recent virtual visit 9/14/18    Glipizide filled 9/14/18     Will forward to pcp as well as LUIGI Ramos

## 2018-09-18 PROBLEM — F33.42 RECURRENT MAJOR DEPRESSION IN COMPLETE REMISSION (H): Status: ACTIVE | Noted: 2018-09-18

## 2018-09-18 RX ORDER — BUPROPION HYDROCHLORIDE 300 MG/1
300 TABLET ORAL EVERY MORNING
Qty: 90 TABLET | Refills: 1 | Status: SHIPPED | OUTPATIENT
Start: 2018-09-18 | End: 2019-02-01

## 2018-09-18 RX ORDER — METFORMIN HCL 500 MG
TABLET, EXTENDED RELEASE 24 HR ORAL
Qty: 120 TABLET | Refills: 0 | Status: SHIPPED | OUTPATIENT
Start: 2018-09-18 | End: 2018-09-20

## 2018-09-18 RX ORDER — ESCITALOPRAM OXALATE 20 MG/1
20 TABLET ORAL DAILY
Qty: 90 TABLET | Refills: 1 | Status: SHIPPED | OUTPATIENT
Start: 2018-09-18 | End: 2019-02-01

## 2018-09-19 RX ORDER — BUPROPION HYDROCHLORIDE 150 MG/1
TABLET ORAL
Qty: 90 TABLET | Refills: 1 | OUTPATIENT
Start: 2018-09-19

## 2018-09-19 RX ORDER — ESCITALOPRAM OXALATE 20 MG/1
20 TABLET ORAL DAILY
Qty: 90 TABLET | Refills: 1 | Status: CANCELLED | OUTPATIENT
Start: 2018-09-19

## 2018-09-20 ENCOUNTER — OFFICE VISIT (OUTPATIENT)
Dept: PHARMACY | Facility: CLINIC | Age: 53
End: 2018-09-20
Payer: COMMERCIAL

## 2018-09-20 DIAGNOSIS — F33.42 RECURRENT MAJOR DEPRESSION IN COMPLETE REMISSION (H): ICD-10-CM

## 2018-09-20 DIAGNOSIS — E11.9 TYPE 2 DIABETES MELLITUS WITHOUT COMPLICATION, WITHOUT LONG-TERM CURRENT USE OF INSULIN (H): Primary | ICD-10-CM

## 2018-09-20 PROCEDURE — 99607 MTMS BY PHARM ADDL 15 MIN: CPT | Performed by: PHARMACIST

## 2018-09-20 PROCEDURE — 99606 MTMS BY PHARM EST 15 MIN: CPT | Performed by: PHARMACIST

## 2018-09-20 RX ORDER — METFORMIN HCL 500 MG
TABLET, EXTENDED RELEASE 24 HR ORAL
Qty: 360 TABLET | Refills: 0 | Status: SHIPPED | OUTPATIENT
Start: 2018-09-20 | End: 2019-01-11

## 2018-09-20 NOTE — Clinical Note
"He was doing so well on the Ozempic with 6lb weight loss but insurance will not cover copay until listed as \"preventative\", the insurance said Victoza or Trulicity is listed as preventative so we went to the higher Trulicity dose and he will work on his lifestyle.  Gertrude"

## 2018-09-20 NOTE — PATIENT INSTRUCTIONS
Recommendations from today's MTM visit:                                                      Great job on taking less Pepsi!!  Great work on working out.    Change Trulicity 1.5mg once weekly.    Next MTM/pharmacist visit: 3 months see Lindsay Arroyo CNP, Nicole in January or February check to see if Ozempic is on preventative list    To schedule another MTM appointment, please call the clinic directly or you may call the MTM scheduling line at [phone] or toll-free at [phone].     My Clinical Pharmacist's contact information:                                                      It was a pleasure seeing you today!  Please feel free to contact me with any questions or concerns you have.      Gertrude Carey, PharmD [facility]  Medication Therapy Management Practitioner  VM #[phone]    You may receive a survey about the MTM services you received.  I would appreciate your feedback to help me serve you better in the future. Please fill it out and return it when you can. Your comments will be anonymous.

## 2018-09-20 NOTE — PROGRESS NOTES
SUBJECTIVE/OBJECTIVE:                Everton Linn is a 53 year old male coming in for a follow-up visit for Medication Therapy Management.  He was referred to me from Lindsay Arroyo.     Chief Complaint: Follow up from our visit on 08/14/2018.  Price of Ozempic increased to $500    Tobacco: No tobacco use  Alcohol: 2 every other week  3 sodas per day, Down from 4-6  Gym 3 times a week for an hour.    Medication Adherence/Access:  no issues reported      Diabetes:  Pt currently taking metformin ER 1000mg BID-AM breakfast and evening,  glipizide 2 tablets XL every day, Semaglutide 0.5 mg once weekly.  Pt is experiencing the following side effects:  Some diarrhea, noticed more with increased dose of metformin from 1500mg to 2000mg but tolerating so he will continue the 2000mg dose. He is concerned about the increase in Ozembic pricing.  The first time he picked up the medication it was around $25.  This last time he picked up the medication it was over $500. During the meeting we called his insurance company who stated that only preventative medications are offered at a $0 co-pay.  Ozembic is not on the list as a preventative medication.  Only Trulicity and Victoza were on the list currently for preventative medications.   SMBG: he reports (no meter today) 110-180 in AM and later in the day.  He feels that they are a little lower overall.    Symptoms of low blood sugar? none, shaky, sweaty. Frequency of hypoglycemia? never.  Recent symptoms of high blood sugar? none  Eye exam: due  Foot exam: up to date  Microalbumin is < 30 mg/g. Pt is not taking an ACEi/ARB.  Aspirin: taking  Diet/Exercise: He reports drinking 3, 12 ounces of pepsi (30 grams) per day,  He would like to work his way off soda, he is having a very hard time doing this.  Pt states that his appetite isn't what it used to be.  He also only has interest in bland foods right now.   Exercise--Gym 3 times a week for an hour.    Wt Readings from Last 4  Encounters:   09/20/18 (P) 237 lb 9.6 oz (107.8 kg)   08/14/18 243 lb 3.2 oz (110.3 kg)   07/27/18 240 lb 14.4 oz (109.3 kg)   03/20/18 246 lb 9.6 oz (111.9 kg)       Depression:  Current medications include: Escitalopram 20mg once daily and Bupropion 300mg ER once daily. S/E:  Reports a bit more dry mouth from the Bupropion but is tolerating.  He has a phone appointment with provider in a few weeks to reassess symptoms. Pt experienced more dizziness initially with the increased dose of bupropion, but improved overtime.   Now experiences little occurrences of dizziness.   PHQ-9 SCORE 6/21/2018 7/27/2018 9/14/2018   Total Score - - -   Total Score MyChart 6 (Mild depression) - 2 (Minimal depression)   Total Score 6 10 2       PHQ-2 ( 1999 Pfizer) 9/20/2018 7/30/2015   Q1: Little interest or pleasure in doing things 0 3   Q2: Feeling down, depressed or hopeless 0 3   PHQ-2 Score 0 6     Current labs include:  BP Readings from Last 3 Encounters:   09/20/18 (P) 104/68   08/14/18 110/74   07/27/18 102/62     Today's Vitals: BP (P) 104/68 (BP Location: Right arm)  Pulse (P) 92  Wt (P) 237 lb 9.6 oz (107.8 kg)  BMI (P) 35.09 kg/m2  Lab Results   Component Value Date    A1C 9.3 07/27/2018   .  Lab Results   Component Value Date    CHOL 153 02/09/2018     Lab Results   Component Value Date    TRIG 321 02/09/2018     Lab Results   Component Value Date    HDL 34 02/09/2018     Lab Results   Component Value Date    LDL 55 02/09/2018       Liver Function Studies -   Recent Labs   Lab Test  07/20/18   0828   PROTTOTAL  7.5   ALBUMIN  3.9   BILITOTAL  0.7   ALKPHOS  60   AST  34   ALT  53       Lab Results   Component Value Date    UCRR 274 07/20/2018    MICROL 9 07/20/2018    UMALCR 3.34 07/20/2018       Last Basic Metabolic Panel:  Lab Results   Component Value Date     07/20/2018      Lab Results   Component Value Date    POTASSIUM 4.4 07/20/2018     Lab Results   Component Value Date    CHLORIDE 98 07/20/2018     Lab  Results   Component Value Date    BUN 9 07/20/2018     Lab Results   Component Value Date    CR 0.96 07/20/2018     GFR Estimate   Date Value Ref Range Status   07/20/2018 83 >60 mL/min/1.7m2 Final     Comment:     Non  GFR Calc   02/09/2018 84 >60 mL/min/1.7m2 Final     Comment:     Non  GFR Calc   05/19/2017 88 >60 mL/min/1.7m2 Final     Comment:     Non  GFR Calc     GFR Estimate If Black   Date Value Ref Range Status   07/20/2018 >90 >60 mL/min/1.7m2 Final     Comment:      GFR Calc   02/09/2018 >90 >60 mL/min/1.7m2 Final     Comment:      GFR Calc   05/19/2017 >90   GFR Calc   >60 mL/min/1.7m2 Final       Most Recent Immunizations   Administered Date(s) Administered     Influenza (H1N1) 12/01/2009     Influenza (IIV3) PF 10/18/2013     Influenza (intradermal) 09/28/2012     Influenza Vaccine IM 3yrs+ 4 Valent IIV4 10/21/2016     Pneumococcal 23 valent 09/28/2012     TDAP Vaccine (Adacel) 08/25/2017       ASSESSMENT:              Current medications were reviewed today as discussed above.      Medication Adherence: good, no issues identified    Diabetes: Needs Improvement. Patient is not meeting A1c goal of < 7%. Due to high cost the Pt may benefit from the discontinuation of Ozempic until it is placed on his insurance company's preventative medication list. The patient would still prefer to be on a once weekly injection and would therefore benefit from re-initiating Trulicity at a higher dose.  Congrat patient on weight loss and increase in activity    Depression: Stable. Patient would benefit from continue current dose.  Side effects have improved and has no current complaints at this time.   PLAN:                  Diabetes:  Stop Ozempic due to cost.  Start taking Trulicity 1.5mg once weekly.  Reassess at the beginning of the new year to assess insurance coverage for restarting Ozempic.     Congrats on 6lb weight  loss, less soda, and increase in activity!    I spent 40 minutes with this patient today. All changes were made via collaborative practice agreement with Lindsay Arroyo. A copy of the visit note was provided to the patient's primary care provider.     Will follow up in 3 months.    The patient was given a summary of these recommendations as an after visit summary.    Will Chris Pharmacy Student  [employer]    Gertrude Carey PharmD BCPS  Medication Therapy Management Practitioner   #[phone]

## 2018-09-20 NOTE — MR AVS SNAPSHOT
After Visit Summary   9/20/2018    Everotn Linn    MRN: [id number]           Patient Information     Date Of Birth          1965        Visit Information        Provider Department      9/20/2018 10:30 AM Gertrude Carey, [facility]        Today's Diagnoses     Type 2 diabetes mellitus without complication, without long-term current use of insulin (H)    -  1      Care Instructions    Recommendations from today's MTM visit:                                                      Great job on taking less Pepsi!!  Great work on working out.    Change Trulicity 1.5mg once weekly.    Next MTM/pharmacist visit: 3 months see Lindsay Arroyo CNP, Nicole in January or February check to see if Ozempic is on preventative list    To schedule another MTM appointment, please call the clinic directly or you may call the MTM scheduling line at [phone] or toll-free at [phone].     My Clinical Pharmacist's contact information:                                                      It was a pleasure seeing you today!  Please feel free to contact me with any questions or concerns you have.      Gertrude Carey, PharmD [facility]  Medication Therapy Management Practitioner   #[phone]    You may receive a survey about the MTM services you received.  I would appreciate your feedback to help me serve you better in the future. Please fill it out and return it when you can. Your comments will be anonymous.                    Follow-ups after your visit        Who to contact     If you have questions or need follow up information about today's clinic visit or your schedule please contact [facility] directly at [phone].  Normal or non-critical lab and imaging results will be communicated to you by MyChart, letter or phone within 4 business days after the clinic has received the results. If you do not hear from us within 7 days, please contact the clinic through  [employer] or phone. If you have a critical or abnormal lab result, we will notify you by phone as soon as possible.  Submit refill requests through [employer] or call your pharmacy and they will forward the refill request to us. Please allow 3 business days for your refill to be completed.          Additional Information About Your Visit        Dat[employer] Information     [employer] gives you secure access to your electronic health record. If you see a primary care provider, you can also send messages to your care team and make appointments. If you have questions, please call your primary care clinic.  If you do not have a primary care provider, please call [phone] and they will assist you.        Care EveryWhere ID     This is your Care EveryWhere ID. This could be used by other organizations to access your [place] medical records  [id number]         Blood Pressure from Last 3 Encounters:   09/20/18 (P) 104/68   08/14/18 110/74   07/27/18 102/62    Weight from Last 3 Encounters:   09/20/18 (P) 237 lb 9.6 oz (107.8 kg)   08/14/18 243 lb 3.2 oz (110.3 kg)   07/27/18 240 lb 14.4 oz (109.3 kg)              Today, you had the following     No orders found for display         Today's Medication Changes          These changes are accurate as of 9/20/18 11:10 AM.  If you have any questions, ask your nurse or doctor.               Start taking these medicines.        Dose/Directions    dulaglutide 1.5 MG/0.5ML pen   Commonly known as:  TRULICITY   Used for:  Type 2 diabetes mellitus without complication, without long-term current use of insulin (H)        Dose:  1.5 mg   Inject 1.5 mg Subcutaneous every 7 days   Quantity:  2 mL   Refills:  2         These medicines have changed or have updated prescriptions.        Dose/Directions    metFORMIN 500 MG 24 hr tablet   Commonly known as:  GLUCOPHAGE-XR   This may have changed:  additional instructions   Used for:  Type 2 diabetes mellitus without complication, without long-term  current use of insulin (H)        TAKE 2 TABS BY MOUTH TWICE DAILY WITH MEALS. Profile Rx: patient will contact pharmacy when needed   Quantity:  360 tablet   Refills:  0         Stop taking these medicines if you haven't already. Please contact your care team if you have questions.     Semaglutide 0.25 or 0.5 MG/DOSE Sopn                Where to get your medicines      These medications were sent to [facility]/pharmacy #[id number] - [place], [place] - [address]  [address] [address]     Phone:  [phone]     dulaglutide 1.5 MG/0.5ML pen    metFORMIN 500 MG 24 hr tablet                Primary Care Provider Office Phone # Fax #    Lindsay Arroyo, APRN [phone] [address] [facility] [address]        Equal Access to Services     MITALI DIAZ : Hadii derian guevarao Soshola, waaxda luqadaha, qaybta kaalmada adeegyada, marlyn godwin . So [facility] [phone].    ATENCIÓN: Si habla español, tiene a brandon disposición servicios gratuitos de asistencia lingüística. [facility] [phone].    We comply with applicable federal civil rights laws and [place] laws. We do not discriminate on the basis of race, color, national origin, age, disability, sex, sexual orientation, or gender identity.            Thank you!     Thank you for choosing [facility]  for your care. Our goal is always to provide you with excellent care. Hearing back from our patients is one way we can continue to improve our services. Please take a few minutes to complete the written survey that you may receive in the mail after your visit with us. Thank you!             Your Updated Medication List - Protect others around you: Learn how to safely use, store and throw away your medicines at www.disposemymeds.org.          This list is accurate as of 9/20/18 11:10 AM.  Always use your most recent med list.                   Brand Name Dispense Instructions for use  Diagnosis    aspirin 81 MG tablet      Take 1 tablet (81 mg) by mouth daily    Type 2 diabetes mellitus without complication, without long-term current use of insulin (H)       blood glucose monitoring lancets     100 each    Use to test blood sugar 2-3 times daily or as directed. May substitute per insurance coverage.    Type 2 diabetes mellitus without complication, without long-term current use of insulin (H)       * blood glucose monitoring test strip    no brand specified    1 Box    3 Box by In Vitro route daily. contour    Type II or unspecified type diabetes mellitus without mention of complication, uncontrolled       * blood glucose monitoring test strip    ONETOUCH VERIO IQ    100 each    Use to test blood sugar 2-3 times daily or as directed.May substitute per insurance coverage.    Type 2 diabetes mellitus without complication, without long-term current use of insulin (H)       buPROPion 300 MG 24 hr tablet    WELLBUTRIN XL    90 tablet    Take 1 tablet (300 mg) by mouth every morning    Severe episode of recurrent major depressive disorder, without psychotic features (H)       dulaglutide 1.5 MG/0.5ML pen    TRULICITY    2 mL    Inject 1.5 mg Subcutaneous every 7 days    Type 2 diabetes mellitus without complication, without long-term current use of insulin (H)       escitalopram 20 MG tablet    LEXAPRO    90 tablet    Take 1 tablet (20 mg) by mouth daily    Severe episode of recurrent major depressive disorder, without psychotic features (H)       glipiZIDE 10 MG 24 hr tablet    GLUCOTROL XL    90 tablet    TAKE 2 TABLETS (20 MG) BY MOUTH DAILY    Type 2 diabetes mellitus without complication, without long-term current use of insulin (H)       metFORMIN 500 MG 24 hr tablet    GLUCOPHAGE-XR    360 tablet    TAKE 2 TABS BY MOUTH TWICE DAILY WITH MEALS. Profile Rx: patient will contact pharmacy when needed    Type 2 diabetes mellitus without complication, without long-term current use of insulin (H)        MeaningoIO FLEX SYSTEM w/Device Kit     1 kit    1 each 3 times daily as needed May substitute per insurance coverage.    Type 2 diabetes mellitus without complication, without long-term current use of insulin (H)       simvastatin 40 MG tablet    ZOCOR    90 tablet    TAKE 1 TABLET (40 MG) BY MOUTH AT BEDTIME    Hyperlipidemia LDL goal <100       * Notice:  This list has 2 medication(s) that are the same as other medications prescribed for you. Read the directions carefully, and ask your doctor or other care provider to review them with you.

## 2018-10-16 ENCOUNTER — TELEPHONE (OUTPATIENT)
Dept: FAMILY MEDICINE | Facility: CLINIC | Age: 53
End: 2018-10-16

## 2018-10-16 NOTE — TELEPHONE ENCOUNTER
Type of outreach:  Patient aware. He will follow up in Dec with Ashley Arroyo. Will postpone and make sure he's scheduled for labs and a visit.  Health Maintenance Due   Topic Date Due     HIV SCREEN (SYSTEM ASSIGNED)  05/06/1985     FOOT EXAM Q1 YEAR  08/25/2018     INFLUENZA VACCINE (1) 09/01/2018     A1C Q3 MO  10/27/2018     Sara MONIQUE M.A.

## 2018-10-23 ENCOUNTER — MYC MEDICAL ADVICE (OUTPATIENT)
Dept: FAMILY MEDICINE | Facility: CLINIC | Age: 53
End: 2018-10-23

## 2018-10-26 ENCOUNTER — OFFICE VISIT (OUTPATIENT)
Dept: FAMILY MEDICINE | Facility: CLINIC | Age: 53
End: 2018-10-26
Payer: COMMERCIAL

## 2018-10-26 VITALS
HEART RATE: 84 BPM | HEIGHT: 69 IN | BODY MASS INDEX: 34.66 KG/M2 | RESPIRATION RATE: 18 BRPM | SYSTOLIC BLOOD PRESSURE: 116 MMHG | OXYGEN SATURATION: 97 % | WEIGHT: 234 LBS | DIASTOLIC BLOOD PRESSURE: 80 MMHG | TEMPERATURE: 98.1 F

## 2018-10-26 DIAGNOSIS — N52.9 ERECTILE DYSFUNCTION, UNSPECIFIED ERECTILE DYSFUNCTION TYPE: Primary | ICD-10-CM

## 2018-10-26 DIAGNOSIS — Z23 NEED FOR PROPHYLACTIC VACCINATION AND INOCULATION AGAINST INFLUENZA: ICD-10-CM

## 2018-10-26 DIAGNOSIS — F33.42 RECURRENT MAJOR DEPRESSION IN COMPLETE REMISSION (H): ICD-10-CM

## 2018-10-26 PROCEDURE — 90682 RIV4 VACC RECOMBINANT DNA IM: CPT | Performed by: PHYSICIAN ASSISTANT

## 2018-10-26 PROCEDURE — 99213 OFFICE O/P EST LOW 20 MIN: CPT | Mod: 25 | Performed by: PHYSICIAN ASSISTANT

## 2018-10-26 PROCEDURE — 90471 IMMUNIZATION ADMIN: CPT | Performed by: PHYSICIAN ASSISTANT

## 2018-10-26 RX ORDER — SILDENAFIL 25 MG/1
25 TABLET, FILM COATED ORAL DAILY PRN
Qty: 6 TABLET | Refills: 1 | Status: SHIPPED | OUTPATIENT
Start: 2018-10-26 | End: 2019-02-01 | Stop reason: DRUGHIGH

## 2018-10-26 ASSESSMENT — ANXIETY QUESTIONNAIRES
5. BEING SO RESTLESS THAT IT IS HARD TO SIT STILL: NOT AT ALL
1. FEELING NERVOUS, ANXIOUS, OR ON EDGE: NOT AT ALL
IF YOU CHECKED OFF ANY PROBLEMS ON THIS QUESTIONNAIRE, HOW DIFFICULT HAVE THESE PROBLEMS MADE IT FOR YOU TO DO YOUR WORK, TAKE CARE OF THINGS AT HOME, OR GET ALONG WITH OTHER PEOPLE: NOT DIFFICULT AT ALL
2. NOT BEING ABLE TO STOP OR CONTROL WORRYING: NOT AT ALL
7. FEELING AFRAID AS IF SOMETHING AWFUL MIGHT HAPPEN: NOT AT ALL
GAD7 TOTAL SCORE: 1
6. BECOMING EASILY ANNOYED OR IRRITABLE: NOT AT ALL
3. WORRYING TOO MUCH ABOUT DIFFERENT THINGS: NOT AT ALL

## 2018-10-26 ASSESSMENT — PATIENT HEALTH QUESTIONNAIRE - PHQ9
SUM OF ALL RESPONSES TO PHQ QUESTIONS 1-9: 4
5. POOR APPETITE OR OVEREATING: SEVERAL DAYS

## 2018-10-26 NOTE — PROGRESS NOTES
SUBJECTIVE:   Everton Linn is a 53 year old male who presents to clinic today for the following health issues:      Medication Followup of Lexapro    Taking Medication as prescribed: NO-stopped taking it on Monday    Side Effects:  Thinks it has been impacting sexual performance, has noticed a difference since he's been taking it, see phone encounter from 10/23    Medication Helping Symptoms:  Thinks it was helping, is also taking Wellbutrin so hard to tell if Lexapro was adding anything       Patient is here today to discuss his mood medication because he feels like it is affecting his sexual performance  He notes he has had a hard time starting and maintaining an erection  He notes this has not been an issue in the past  Did not take his medication Monday  Feels like his mood is really well controlled  No chest pain, no exertional chest pain    Problem list and histories reviewed & adjusted, as indicated.  Additional history: as documented    Patient Active Problem List   Diagnosis     Rosacea     Dermatophytosis of nail     Anxiety state     Inguinal hernia     HYPERLIPIDEMIA LDL GOAL <100     Ventral hernia     SANTOS (obstructive sleep apnea)     Type 2 diabetes mellitus without complication, without long-term current use of insulin (H)     Biliary colic     Cervical stenosis of spine     Morbid obesity (H)     Recurrent major depression in complete remission (H)     Past Surgical History:   Procedure Laterality Date     ARTHROSCOPY KNEE RT/LT  2007    left knee     ARTHROSCOPY KNEE RT/LT  2005    right     LAPAROSCOPIC CHOLECYSTECTOMY WITH CHOLANGIOGRAMS N/A 5/28/2016    Procedure: LAPAROSCOPIC CHOLECYSTECTOMY WITH CHOLANGIOGRAMS;  Surgeon: Jerry Bello MD;  Location:  OR       Social History   Substance Use Topics     Smoking status: Never Smoker     Smokeless tobacco: Never Used     Alcohol use Yes      Comment: 2 -3 beers every few weeks     Family History   Problem Relation Age of Onset      DRAKE Mother      CHF     Diabetes Mother      Hypertension Father      Cancer Father      Multiple Myloma     Musculoskeletal Disorder Paternal Grandmother      Paige Gehrig's Disease         Current Outpatient Prescriptions   Medication Sig Dispense Refill     aspirin 81 MG tablet Take 1 tablet (81 mg) by mouth daily       blood glucose monitoring (ONE TOUCH DELICA) lancets Use to test blood sugar 2-3 times daily or as directed. May substitute per insurance coverage. 100 each 11     blood glucose monitoring (ONETOUCH VERIO IQ) test strip Use to test blood sugar 2-3 times daily or as directed.May substitute per insurance coverage. 100 each 11     Blood Glucose Monitoring Suppl (ONETOUCH VERIO FLEX SYSTEM) w/Device KIT 1 each 3 times daily as needed May substitute per insurance coverage. 1 kit 0     buPROPion (WELLBUTRIN XL) 300 MG 24 hr tablet Take 1 tablet (300 mg) by mouth every morning 90 tablet 1     dulaglutide (TRULICITY) 1.5 MG/0.5ML pen Inject 1.5 mg Subcutaneous every 7 days 2 mL 2     glipiZIDE (GLUCOTROL XL) 10 MG 24 hr tablet TAKE 2 TABLETS (20 MG) BY MOUTH DAILY 90 tablet 3     metFORMIN (GLUCOPHAGE-XR) 500 MG 24 hr tablet TAKE 2 TABS BY MOUTH TWICE DAILY WITH MEALS. Profile Rx: patient will contact pharmacy when needed 360 tablet 0     sildenafil (VIAGRA) 25 MG tablet Take 1 tablet (25 mg) by mouth daily as needed (erectile) 30 min to 4 hrs before sex. Do not use with nitroglycerin, terazosin or doxazosin. 6 tablet 1     simvastatin (ZOCOR) 40 MG tablet TAKE 1 TABLET (40 MG) BY MOUTH AT BEDTIME 90 tablet 1     escitalopram (LEXAPRO) 20 MG tablet Take 1 tablet (20 mg) by mouth daily (Patient not taking: Reported on 10/26/2018) 90 tablet 1     Allergies   Allergen Reactions     No Known Drug Allergies      Seasonal Allergies        Reviewed and updated as needed this visit by clinical staff  Tobacco  Allergies  Meds  Med Hx  Surg Hx  Fam Hx  Soc Hx      Reviewed and updated as needed this visit  "by Provider         ROS:  Constitutional, HEENT, cardiovascular, pulmonary, gi and gu systems are negative, except as otherwise noted.    OBJECTIVE:     /80 (BP Location: Right arm, Patient Position: Chair, Cuff Size: Adult Large)  Pulse 84  Temp 98.1  F (36.7  C) (Oral)  Resp 18  Ht 5' 8.5\" (1.74 m)  Wt 234 lb (106.1 kg)  SpO2 97%  BMI 35.06 kg/m2  Body mass index is 35.06 kg/(m^2).  GENERAL: healthy, alert and no distress  NECK: no adenopathy, no asymmetry, masses, or scars and thyroid normal to palpation  RESP: lungs clear to auscultation - no rales, rhonchi or wheezes  CV: regular rate and rhythm, normal S1 S2, no S3 or S4, no murmur, click or rub, no peripheral edema and peripheral pulses strong  MS: no gross musculoskeletal defects noted, no edema    Diagnostic Test Results:  none     ASSESSMENT/PLAN:             1. Erectile dysfunction, unspecified erectile dysfunction type  New problem, will trial Viagra to see if symptoms improve.  Discussed effect of serotonin specific reuptake inhibitor on sexual performance.  Also discussed working on lower glucose readings, tight BP control and weight loss to improve sexual performance.  - sildenafil (VIAGRA) 25 MG tablet; Take 1 tablet (25 mg) by mouth daily as needed (erectile) 30 min to 4 hrs before sex. Do not use with nitroglycerin, terazosin or doxazosin.  Dispense: 6 tablet; Refill: 1    2. Recurrent major depression in complete remission (H)  Chronic issue, since mood is well controlled, will continue Wellbutrin and Lexapro and see if Viagra is helpful.    3. Need for prophylactic vaccination and inoculation against influenza  - FLU VACCINE, (RIV4) RECOMBINANT HA  , IM (FluBlok, egg free) [37015]- >18 YRS ([facility] recommended  50-64 YRS)  - Vaccine Administration, Initial [32290]    Risks, benefits and alternatives were discussed with patient. Agreeable to the plan of care.      Tia Olguin PA-C  [facility]    Injectable " Influenza Immunization Documentation    1.  Is the person to be vaccinated sick today?   No    2. Does the person to be vaccinated have an allergy to a component   of the vaccine?   No  Egg Allergy Algorithm Link    3. Has the person to be vaccinated ever had a serious reaction   to influenza vaccine in the past?   No    4. Has the person to be vaccinated ever had Guillain-Barré syndrome?   No    Form completed by Kinjal Maxwell CMA ([facility])

## 2018-10-26 NOTE — MR AVS SNAPSHOT
After Visit Summary   10/26/2018    Everton Linn    MRN: [id number]           Patient Information     Date Of Birth          1965        Visit Information        Provider Department      10/26/2018 9:10 AM Tia Olguin PA-C [facility]        Today's Diagnoses     Erectile dysfunction, unspecified erectile dysfunction type    -  1    Recurrent major depression in complete remission (H)        Need for prophylactic vaccination and inoculation against influenza           Follow-ups after your visit        Follow-up notes from your care team     Return in about 6 weeks (around 12/7/2018) for Diabetic check.      Who to contact     If you have questions or need follow up information about today's clinic visit or your schedule please contact [facility] directly at [phone].  Normal or non-critical lab and imaging results will be communicated to you by [employer]unct[employer], letter or phone within 4 business days after the clinic has received the results. If you do not hear from us within 7 days, please contact the clinic through Conjuncthart or phone. If you have a critical or abnormal lab result, we will notify you by phone as soon as possible.  Submit refill requests through [employer] or call your pharmacy and they will forward the refill request to us. Please allow 3 business days for your refill to be completed.          Additional Information About Your Visit        MyChart Information     [employer] gives you secure access to your electronic health record. If you see a primary care provider, you can also send messages to your care team and make appointments. If you have questions, please call your primary care clinic.  If you do not have a primary care provider, please call [phone] and they will assist you.        Care EveryWhere ID     This is your Care EveryWhere ID. This could be used by other organizations to access your [facility]  "records  [id number]        Your Vitals Were     Pulse Temperature Respirations Height Pulse Oximetry BMI (Body Mass Index)    84 98.1  F (36.7  C) (Oral) 18 5' 8.5\" (1.74 m) 97% 35.06 kg/m2       Blood Pressure from Last 3 Encounters:   10/26/18 116/80   09/20/18 (P) 104/68   08/14/18 110/74    Weight from Last 3 Encounters:   10/26/18 234 lb (106.1 kg)   09/20/18 (P) 237 lb 9.6 oz (107.8 kg)   08/14/18 243 lb 3.2 oz (110.3 kg)              We Performed the Following     FLU VACCINE, (RIV4) RECOMBINANT HA  , IM (FluBlok, egg free) [96555]- >18 YRS ([facility] recommended  50-64 YRS)     Vaccine Administration, Initial [65293]          Today's Medication Changes          These changes are accurate as of 10/26/18  9:18 AM.  If you have any questions, ask your nurse or doctor.               Start taking these medicines.        Dose/Directions    sildenafil 25 MG tablet   Commonly known as:  VIAGRA   Used for:  Erectile dysfunction, unspecified erectile dysfunction type   Started by:  Tia Olguin PA-C        Dose:  25 mg   Take 1 tablet (25 mg) by mouth daily as needed (erectile) 30 min to 4 hrs before sex. Do not use with nitroglycerin, terazosin or doxazosin.   Quantity:  6 tablet   Refills:  1            Where to get your medicines      These medications were sent to [facility]/pharmacy #[id number] - [facility] [id number] [place] [address]     Phone:  [phone]     sildenafil 25 MG tablet                Primary Care Provider Office Phone # Fax #    VALDEZ Cowart [phone] [address] [facility] [address]        Equal Access to Services     [facility] JOE AH: Shaye Painter, wamikeda luqadaha, qaybta kaalmada adeeverton, marlyn dixon. So [facility] [phone].    ATENCIÓN: Si habla español, tiene a brandon disposición servicios gratuitos de asistencia lingüística. Llame al [phone].    We comply with " applicable federal civil rights laws and [place] laws. We do not discriminate on the basis of race, color, national origin, age, disability, sex, sexual orientation, or gender identity.            Thank you!     Thank you for choosing [facility]  for your care. Our goal is always to provide you with excellent care. Hearing back from our patients is one way we can continue to improve our services. Please take a few minutes to complete the written survey that you may receive in the mail after your visit with us. Thank you!             Your Updated Medication List - Protect others around you: Learn how to safely use, store and throw away your medicines at www.disposemymeds.org.          This list is accurate as of 10/26/18  9:18 AM.  Always use your most recent med list.                   Brand Name Dispense Instructions for use Diagnosis    aspirin 81 MG tablet      Take 1 tablet (81 mg) by mouth daily    Type 2 diabetes mellitus without complication, without long-term current use of insulin (H)       blood glucose monitoring lancets     100 each    Use to test blood sugar 2-3 times daily or as directed. May substitute per insurance coverage.    Type 2 diabetes mellitus without complication, without long-term current use of insulin (H)       blood glucose monitoring test strip    ONETOUCH VERIO IQ    100 each    Use to test blood sugar 2-3 times daily or as directed.May substitute per insurance coverage.    Type 2 diabetes mellitus without complication, without long-term current use of insulin (H)       buPROPion 300 MG 24 hr tablet    WELLBUTRIN XL    90 tablet    Take 1 tablet (300 mg) by mouth every morning    Severe episode of recurrent major depressive disorder, without psychotic features (H)       dulaglutide 1.5 MG/0.5ML pen    TRULICITY    2 mL    Inject 1.5 mg Subcutaneous every 7 days    Type 2 diabetes mellitus without complication, without long-term current use of insulin (H)        escitalopram 20 MG tablet    LEXAPRO    90 tablet    Take 1 tablet (20 mg) by mouth daily    Severe episode of recurrent major depressive disorder, without psychotic features (H)       glipiZIDE 10 MG 24 hr tablet    GLUCOTROL XL    90 tablet    TAKE 2 TABLETS (20 MG) BY MOUTH DAILY    Type 2 diabetes mellitus without complication, without long-term current use of insulin (H)       metFORMIN 500 MG 24 hr tablet    GLUCOPHAGE-XR    360 tablet    TAKE 2 TABS BY MOUTH TWICE DAILY WITH MEALS. Profile Rx: patient will contact pharmacy when needed    Type 2 diabetes mellitus without complication, without long-term current use of insulin (H)       [employer] FLEX SYSTEM w/Device Kit     1 kit    1 each 3 times daily as needed May substitute per insurance coverage.    Type 2 diabetes mellitus without complication, without long-term current use of insulin (H)       sildenafil 25 MG tablet    VIAGRA    6 tablet    Take 1 tablet (25 mg) by mouth daily as needed (erectile) 30 min to 4 hrs before sex. Do not use with nitroglycerin, terazosin or doxazosin.    Erectile dysfunction, unspecified erectile dysfunction type       simvastatin 40 MG tablet    ZOCOR    90 tablet    TAKE 1 TABLET (40 MG) BY MOUTH AT BEDTIME    Hyperlipidemia LDL goal <100

## 2018-10-27 ASSESSMENT — ANXIETY QUESTIONNAIRES: GAD7 TOTAL SCORE: 1

## 2018-11-29 ENCOUNTER — OFFICE VISIT (OUTPATIENT)
Dept: ORTHOPEDICS | Facility: CLINIC | Age: 53
End: 2018-11-29
Payer: COMMERCIAL

## 2018-11-29 ENCOUNTER — RADIANT APPOINTMENT (OUTPATIENT)
Dept: GENERAL RADIOLOGY | Facility: CLINIC | Age: 53
End: 2018-11-29
Attending: FAMILY MEDICINE
Payer: COMMERCIAL

## 2018-11-29 VITALS
DIASTOLIC BLOOD PRESSURE: 70 MMHG | SYSTOLIC BLOOD PRESSURE: 118 MMHG | HEIGHT: 69 IN | BODY MASS INDEX: 33.47 KG/M2 | WEIGHT: 226 LBS

## 2018-11-29 DIAGNOSIS — M25.562 CHRONIC PAIN OF LEFT KNEE: Primary | ICD-10-CM

## 2018-11-29 DIAGNOSIS — M25.562 CHRONIC PAIN OF LEFT KNEE: ICD-10-CM

## 2018-11-29 DIAGNOSIS — G89.29 CHRONIC PAIN OF LEFT KNEE: ICD-10-CM

## 2018-11-29 DIAGNOSIS — G89.29 CHRONIC PAIN OF LEFT KNEE: Primary | ICD-10-CM

## 2018-11-29 DIAGNOSIS — M17.12 PRIMARY OSTEOARTHRITIS OF LEFT KNEE: ICD-10-CM

## 2018-11-29 PROCEDURE — 99213 OFFICE O/P EST LOW 20 MIN: CPT | Mod: 25 | Performed by: FAMILY MEDICINE

## 2018-11-29 PROCEDURE — 20611 DRAIN/INJ JOINT/BURSA W/US: CPT | Mod: LT | Performed by: FAMILY MEDICINE

## 2018-11-29 PROCEDURE — 73562 X-RAY EXAM OF KNEE 3: CPT

## 2018-11-29 RX ADMIN — METHYLPREDNISOLONE ACETATE 40 MG: 40 INJECTION, SUSPENSION INTRA-ARTICULAR; INTRALESIONAL; INTRAMUSCULAR; SOFT TISSUE at 15:45

## 2018-11-29 NOTE — LETTER
"    11/29/2018         RE: Everton Linn  [address] [address] [facility] [facility] [address]        Dear Colleague,    Thank you for referring your patient, Everton Linn, to the [facility]. Please see a copy of my visit note below.    ASSESSMENT & PLAN    1. Chronic pain of left knee    2. Primary osteoarthritis of left knee      Continue to stay as active as possible  Squats, lunges, stairs for exercise will be challenging  Steroid injection of the left knee was performed today in clinic  Can repeat in 4 months or anytime thereafter    Follow-up as needed.    -----    SUBJECTIVE  Everton Linn is a/an 53 year old male who is seen as a self referral for evaluation of left knee pain. The patient is seen by themselves.    Onset: chronic left knee pain worse in the past month since starting to exercise more. Denies any injury or trauma..   Location of Pain: left knee, posterior and anterior (kathrine-patellar)  Rating of Pain at worst: 7/10  Rating of Pain Currently: 5/10  Worsened by: exercises, getting up after prolonged sitting  Better with: ice  Treatments tried: rest/activity avoidance, ice, ibuprofen and knee sleeve  Associated symptoms:  crepitus, radiation of pain into the calf and ankle  Orthopedic history: chronic left knee pain, chronic ACL tear  Relevant surgical history:  has had 2 left knee arthroscopies (last was 2013)  Patient Social History: works at a desk job    Patient's past medical, surgical, social, and family histories were reviewed today and no changes are noted.    REVIEW OF SYSTEMS:  10 point ROS is negative other than symptoms noted above in HPI, Past Medical History or as stated below  Constitutional: NEGATIVE for fever, chills, change in weight  Skin: NEGATIVE for worrisome rashes, moles or lesions  GI/: NEGATIVE for bowel or bladder changes  Neuro: NEGATIVE for weakness, dizziness or paresthesias    OBJECTIVE:  /70  Ht 5' 8.5\" (1.74 m)  Wt 226 lb (102.5 kg) "  BMI 33.86 kg/m2   General: healthy, alert and in no distress  HEENT: no scleral icterus or conjunctival erythema  Skin: no suspicious lesions or rash. No jaundice.  CV: no pedal edema  Resp: normal respiratory effort without conversational dyspnea   Psych: normal mood and affect  Gait: normal steady gait with appropriate coordination and balance  Neuro: Normal light sensory exam of lower extremity  MSK:  LEFT KNEE  Inspection:    normal alignment  Palpation:    Tender about the medial patellar facet and medial joint line. Remainder of bony and ligamentous landmarks are nontender.    No effusion is present    Patellofemoral crepitus is Present  Range of Motion:     00 extension to 1350 flexion  Strength:    Extensor mechanism intact  Special Tests:    Positive: Patellar grind    Negative: MCL/valgus stress (0 & 30 deg), LCL/varus stress (0 & 30 deg), Lachman's, Luh's    Independent visualization of the below image:  KNEE STANDING AP BILATERAL SUNRISE BILATERAL LATERAL LEFT 11/29/2018  3:21 PM      HISTORY:  Chronic pain of left knee.     COMPARISON: None.         IMPRESSION: No acute bony abnormality. Mild-to-moderate medial joint  space narrowing with minimal marginal bony spurring. Mild marginal  bony spurring at the patellofemoral joint. Possible small left knee  joint effusion. Two views of the right knee are included and show mild  medial joint space narrowing and no acute abnormality.     AGUSTÍN PARK MD    Large Joint Injection/Arthocentesis  Date/Time: 11/29/2018 3:45 PM  Performed by: CAMILA GORDILLO  Authorized by: CAMILA GORDILLO     Indications:  Osteoarthritis and pain  Needle Size:  22 G  Guidance: ultrasound    Approach:  Superolateral  Location:  Knee  Site:  L knee joint  Medications:  40 mg methylPREDNISolone 40 MG/ML  Outcome:  Tolerated well, no immediate complications  Procedure discussed: discussed risks, benefits, and alternatives    Consent Given by:  Patient  Timeout:  timeout called immediately prior to procedure    Prep: patient was prepped and draped in usual sterile fashion       Pain noted to be a 5/10 before completion of the procedure and 2/10 after completion of the procedure.          Patient's conditions were thoroughly discussed during today's visit with greater than 50% of the visit spent counseling the patient with total time spent face-to-face with the patient being 20 minutes injection taking 5 minutes.    Brii Jarrett DO [facility]      Again, thank you for allowing me to participate in the care of your patient.        Sincerely,        Brii Jarrett DO

## 2018-11-29 NOTE — MR AVS SNAPSHOT
After Visit Summary   11/29/2018    Everton Linn    MRN: [id number]           Patient Information     Date Of Birth          1965        Visit Information        Provider Department      11/29/2018 3:00 PM Brii Jarrett,  [facility]        Today's Diagnoses     Chronic pain of left knee    -  1    Primary osteoarthritis of left knee          Care Instructions    1. Chronic pain of left knee    2. Primary osteoarthritis of left knee      Continue to stay as active as possible  Squats, lunges, stairs for exercise will be challenging  Steroid injection of the left knee was performed today in clinic  - Ok to shower  - No bathtub, hot tub or swimming for 2 days  - The lidocaine (what is giving you pain relief right now) will likely stop working in 1-2 hours.  You will then have pain again, similar to before you received the injection. The corticosteroid will not start working until approximately 1-2 weeks from now.  Can repeat in 4 months or anytime thereafter    Follow-up as needed.    Official Walk with a Doc Chapter  Join me in the lobby of the [facility], December 1st at 1 PM for a free health talk.  Get a free T-shirt, listen and walk at your own pace!              Follow-ups after your visit        Your next 10 appointments already scheduled     Dec 21, 2018  3:20 PM CST   [facility]    [address] [facility] [address] [phone]           Bring a current list of meds and any records pertaining to this visit. For Physicals, please bring immunization records and any forms needing to be filled out. Please arrive 10 minutes early to complete paperwork.              Who to contact     If you have questions or need follow up information about today's clinic visit or your schedule please contact [facility] directly at  "[phone].  Normal or non-critical lab and imaging results will be communicated to you by MyChart, letter or phone within 4 business days after the clinic has received the results. If you do not hear from us within 7 days, please contact the clinic through [email or web address] or phone. If you have a critical or abnormal lab result, we will notify you by phone as soon as possible.  Submit refill requests through [email or web address] or call your pharmacy and they will forward the refill request to us. Please allow 3 business days for your refill to be completed.          Additional Information About Your Visit        valuescopehart Information     [email or web address] gives you secure access to your electronic health record. If you see a primary care provider, you can also send messages to your care team and make appointments. If you have questions, please call your primary care clinic.  If you do not have a primary care provider, please call [phone] and they will assist you.        Care EveryWhere ID     This is your Care EveryWhere ID. This could be used by other organizations to access your [facility] medical records  [id number]        Your Vitals Were     Height BMI (Body Mass Index)                5' 8.5\" (1.74 m) 33.86 kg/m2           Blood Pressure from Last 3 Encounters:   11/29/18 118/70   10/26/18 116/80   09/20/18 (P) 104/68    Weight from Last 3 Encounters:   11/29/18 226 lb (102.5 kg)   10/26/18 234 lb (106.1 kg)   09/20/18 (P) 237 lb 9.6 oz (107.8 kg)               Primary Care Provider Office Phone # Fax #    Lindsay Dina, APRN [phone] [address] [address]        Equal Access to Services     RAE DIAZ AH: Hadii derian Painter, wamikeda luqadaha, qaybta kaalmada tanner, marlyn dixon. So [facility] [phone].    ATENCIÓN: Si habla español, tiene a brandon disposición servicios gratuitos de asistencia lingüística. Llame al [phone].    We comply with " applicable federal civil rights laws and [place] laws. We do not discriminate on the basis of race, color, national origin, age, disability, sex, sexual orientation, or gender identity.            Thank you!     Thank you for choosing [facility]  for your care. Our goal is always to provide you with excellent care. Hearing back from our patients is one way we can continue to improve our services. Please take a few minutes to complete the written survey that you may receive in the mail after your visit with us. Thank you!             Your Updated Medication List - Protect others around you: Learn how to safely use, store and throw away your medicines at www.disposemymeds.org.          This list is accurate as of 11/29/18  3:41 PM.  Always use your most recent med list.                   Brand Name Dispense Instructions for use Diagnosis    aspirin 81 MG tablet    ASA     Take 1 tablet (81 mg) by mouth daily    Type 2 diabetes mellitus without complication, without long-term current use of insulin (H)       blood glucose monitoring lancets     100 each    Use to test blood sugar 2-3 times daily or as directed. May substitute per insurance coverage.    Type 2 diabetes mellitus without complication, without long-term current use of insulin (H)       blood glucose monitoring test strip    ONETOUCH VERIO IQ    100 each    Use to test blood sugar 2-3 times daily or as directed.May substitute per insurance coverage.    Type 2 diabetes mellitus without complication, without long-term current use of insulin (H)       buPROPion 300 MG 24 hr tablet    WELLBUTRIN XL    90 tablet    Take 1 tablet (300 mg) by mouth every morning    Severe episode of recurrent major depressive disorder, without psychotic features (H)       dulaglutide 1.5 MG/0.5ML pen    TRULICITY    2 mL    Inject 1.5 mg Subcutaneous every 7 days    Type 2 diabetes mellitus without complication, without long-term current use of insulin (H)        escitalopram 20 MG tablet    LEXAPRO    90 tablet    Take 1 tablet (20 mg) by mouth daily    Severe episode of recurrent major depressive disorder, without psychotic features (H)       glipiZIDE 10 MG 24 hr tablet    GLUCOTROL XL    90 tablet    TAKE 2 TABLETS (20 MG) BY MOUTH DAILY    Type 2 diabetes mellitus without complication, without long-term current use of insulin (H)       metFORMIN 500 MG 24 hr tablet    GLUCOPHAGE-XR    360 tablet    TAKE 2 TABS BY MOUTH TWICE DAILY WITH MEALS. Profile Rx: patient will contact pharmacy when needed    Type 2 diabetes mellitus without complication, without long-term current use of insulin (H)       Edifilm FLEX SYSTEM w/Device Kit     1 kit    1 each 3 times daily as needed May substitute per insurance coverage.    Type 2 diabetes mellitus without complication, without long-term current use of insulin (H)       sildenafil 25 MG tablet    VIAGRA    6 tablet    Take 1 tablet (25 mg) by mouth daily as needed (erectile) 30 min to 4 hrs before sex. Do not use with nitroglycerin, terazosin or doxazosin.    Erectile dysfunction, unspecified erectile dysfunction type       simvastatin 40 MG tablet    ZOCOR    90 tablet    TAKE 1 TABLET (40 MG) BY MOUTH AT BEDTIME    Hyperlipidemia LDL goal <100

## 2018-11-29 NOTE — PATIENT INSTRUCTIONS
1. Chronic pain of left knee    2. Primary osteoarthritis of left knee      Continue to stay as active as possible  Squats, lunges, stairs for exercise will be challenging  Steroid injection of the left knee was performed today in clinic  - Ok to shower  - No bathtub, hot tub or swimming for 2 days  - The lidocaine (what is giving you pain relief right now) will likely stop working in 1-2 hours.  You will then have pain again, similar to before you received the injection. The corticosteroid will not start working until approximately 1-2 weeks from now.  Can repeat in 4 months or anytime thereafter    Follow-up as needed.    Official Walk with a Doc Chapter  Join me in the lobby of the [facility], December 1st at 1 PM for a free health talk.  Get a free T-shirt, listen and walk at your own pace!

## 2018-11-29 NOTE — PROGRESS NOTES
"ASSESSMENT & PLAN    1. Chronic pain of left knee    2. Primary osteoarthritis of left knee      Continue to stay as active as possible  Squats, lunges, stairs for exercise will be challenging  Steroid injection of the left knee was performed today in clinic  Can repeat in 4 months or anytime thereafter    Follow-up as needed.    -----    SUBJECTIVE  Everton Linn is a/an 53 year old male who is seen as a self referral for evaluation of left knee pain. The patient is seen by themselves.    Onset: chronic left knee pain worse in the past month since starting to exercise more. Denies any injury or trauma..   Location of Pain: left knee, posterior and anterior (kathrine-patellar)  Rating of Pain at worst: 7/10  Rating of Pain Currently: 5/10  Worsened by: exercises, getting up after prolonged sitting  Better with: ice  Treatments tried: rest/activity avoidance, ice, ibuprofen and knee sleeve  Associated symptoms:  crepitus, radiation of pain into the calf and ankle  Orthopedic history: chronic left knee pain, chronic ACL tear  Relevant surgical history:  has had 2 left knee arthroscopies (last was 2013)  Patient Social History: works at a desk job    Patient's past medical, surgical, social, and family histories were reviewed today and no changes are noted.    REVIEW OF SYSTEMS:  10 point ROS is negative other than symptoms noted above in HPI, Past Medical History or as stated below  Constitutional: NEGATIVE for fever, chills, change in weight  Skin: NEGATIVE for worrisome rashes, moles or lesions  GI/: NEGATIVE for bowel or bladder changes  Neuro: NEGATIVE for weakness, dizziness or paresthesias    OBJECTIVE:  /70  Ht 5' 8.5\" (1.74 m)  Wt 226 lb (102.5 kg)  BMI 33.86 kg/m2   General: healthy, alert and in no distress  HEENT: no scleral icterus or conjunctival erythema  Skin: no suspicious lesions or rash. No jaundice.  CV: no pedal edema  Resp: normal respiratory effort without conversational dyspnea "   Psych: normal mood and affect  Gait: normal steady gait with appropriate coordination and balance  Neuro: Normal light sensory exam of lower extremity  MSK:  LEFT KNEE  Inspection:    normal alignment  Palpation:    Tender about the medial patellar facet and medial joint line. Remainder of bony and ligamentous landmarks are nontender.    No effusion is present    Patellofemoral crepitus is Present  Range of Motion:     00 extension to 1350 flexion  Strength:    Extensor mechanism intact  Special Tests:    Positive: Patellar grind    Negative: MCL/valgus stress (0 & 30 deg), LCL/varus stress (0 & 30 deg), Lachman's, Luh's    Independent visualization of the below image:  KNEE STANDING AP BILATERAL SUNRISE BILATERAL LATERAL LEFT 11/29/2018  3:21 PM      HISTORY:  Chronic pain of left knee.     COMPARISON: None.         IMPRESSION: No acute bony abnormality. Mild-to-moderate medial joint  space narrowing with minimal marginal bony spurring. Mild marginal  bony spurring at the patellofemoral joint. Possible small left knee  joint effusion. Two views of the right knee are included and show mild  medial joint space narrowing and no acute abnormality.     AGUSTÍN PARK MD    Large Joint Injection/Arthocentesis  Date/Time: 11/29/2018 3:45 PM  Performed by: CAMILA GORDILLO  Authorized by: CAMILA GORDILLO     Indications:  Osteoarthritis and pain  Needle Size:  22 G  Guidance: ultrasound    Approach:  Superolateral  Location:  Knee  Site:  L knee joint  Medications:  40 mg methylPREDNISolone 40 MG/ML  Outcome:  Tolerated well, no immediate complications  Procedure discussed: discussed risks, benefits, and alternatives    Consent Given by:  Patient  Timeout: timeout called immediately prior to procedure    Prep: patient was prepped and draped in usual sterile fashion       Pain noted to be a 5/10 before completion of the procedure and 2/10 after completion of the procedure.          Patient's conditions were  thoroughly discussed during today's visit with greater than 50% of the visit spent counseling the patient with total time spent face-to-face with the patient being 20 minutes injection taking 5 minutes.    Brii Jarrett DO [facility]

## 2018-12-03 RX ORDER — METHYLPREDNISOLONE ACETATE 40 MG/ML
40 INJECTION, SUSPENSION INTRA-ARTICULAR; INTRALESIONAL; INTRAMUSCULAR; SOFT TISSUE
Status: DISCONTINUED | OUTPATIENT
Start: 2018-11-29 | End: 2019-02-01

## 2019-01-04 ENCOUNTER — TELEPHONE (OUTPATIENT)
Dept: FAMILY MEDICINE | Facility: CLINIC | Age: 54
End: 2019-01-04

## 2019-01-04 DIAGNOSIS — E78.5 HYPERLIPIDEMIA LDL GOAL <100: Primary | ICD-10-CM

## 2019-01-04 DIAGNOSIS — E11.9 TYPE 2 DIABETES MELLITUS WITHOUT COMPLICATION, WITHOUT LONG-TERM CURRENT USE OF INSULIN (H): ICD-10-CM

## 2019-01-04 NOTE — TELEPHONE ENCOUNTER
Patient is scheduled for labs on 1/25 and OV on 2/1.  He would like A1C and any other lab orders placed for his visit. Please place any necessary orders.

## 2019-01-07 ENCOUNTER — TELEPHONE (OUTPATIENT)
Dept: FAMILY MEDICINE | Facility: CLINIC | Age: 54
End: 2019-01-07

## 2019-01-07 DIAGNOSIS — E11.9 TYPE 2 DIABETES MELLITUS WITHOUT COMPLICATION, WITHOUT LONG-TERM CURRENT USE OF INSULIN (H): Primary | ICD-10-CM

## 2019-01-07 NOTE — TELEPHONE ENCOUNTER
Type of outreach:  Labs placed for patient's upcoming appointment.  Health Maintenance Due   Topic Date Due     HIV SCREEN (SYSTEM ASSIGNED)  05/06/1985     ZOSTER IMMUNIZATION (1 of 2) 05/06/2017     FOOT EXAM Q1 YEAR  08/25/2018     A1C Q6 MO  01/27/2019     Sara MONIQUE M.A.

## 2019-01-09 DIAGNOSIS — E11.9 TYPE 2 DIABETES MELLITUS WITHOUT COMPLICATION, WITHOUT LONG-TERM CURRENT USE OF INSULIN (H): ICD-10-CM

## 2019-01-10 NOTE — TELEPHONE ENCOUNTER
Requested Prescriptions   Pending Prescriptions Disp Refills     metFORMIN (GLUCOPHAGE-XR) 500 MG 24 hr tablet  Last Written Prescription Date:  9/20/18  Last Fill Quantity: 360,  # refills: 0   Last office visit: 10/26/2018 with prescribing provider:  Tia Olguin PA-C    Future Office Visit:   Next 5 appointments (look out 90 days)    Feb 01, 2019  3:20 PM CST  [facility] with VALDEZ Cowart CNP  [facility] [address] [facility] [address] [phone]          360 tablet 0     Sig: TAKE 2 TABS BY MOUTH TWICE DAILY WITH MEALS. Profile Rx: patient will contact pharmacy when needed    Biguanide Agents Failed - 1/10/2019 10:57 AM       Failed - Patient has documented A1c within the specified period of time.    If HgbA1C is 8 or greater, it needs to be on file within the past 3 months.  If less than 8, must be on file within the past 6 months.     Recent Labs   Lab Test 07/27/18  1254   A1C 9.3*            Passed - Blood pressure less than 140/90 in past 6 months    BP Readings from Last 3 Encounters:   11/29/18 118/70   10/26/18 116/80   09/20/18 (P) 104/68                Passed - Patient has documented LDL within the past 12 mos.    Recent Labs   Lab Test 02/09/18  0834   LDL 55            Passed - Patient has had a Microalbumin in the past 15 mos.    Recent Labs   Lab Test 07/20/18  0827   MICROL 9   UMALCR 3.34            Passed - Patient is age 10 or older       Passed - Patient's CR is NOT>1.4 OR Patient's EGFR is NOT<45 within past 12 mos.    Recent Labs   Lab Test 07/20/18  0828   GFRESTIMATED 83   GFRESTBLACK >90       Recent Labs   Lab Test 07/20/18  0828   CR 0.96            Passed - Patient does NOT have a diagnosis of CHF.       Passed - Medication is active on med list       Passed - Recent (6 mo) or future (30 days) visit within the authorizing provider's specialty    Patient had office visit in the last 6 months or has a  "visit in the next 30 days with authorizing provider or within the authorizing provider's specialty.  See \"Patient Info\" tab in inbasket, or \"Choose Columns\" in Meds & Orders section of the refill encounter.              "

## 2019-01-11 RX ORDER — METFORMIN HCL 500 MG
TABLET, EXTENDED RELEASE 24 HR ORAL
Qty: 360 TABLET | Refills: 0 | Status: SHIPPED | OUTPATIENT
Start: 2019-01-11 | End: 2019-04-12

## 2019-01-11 NOTE — TELEPHONE ENCOUNTER
"Patient has appt 2/1/2019 with pcp.    Requested Prescriptions   Pending Prescriptions Disp Refills     metFORMIN (GLUCOPHAGE-XR) 500 MG 24 hr tablet 360 tablet 0     Sig: TAKE 2 TABS BY MOUTH TWICE DAILY WITH MEALS. Profile Rx: patient will contact pharmacy when needed    Biguanide Agents Failed - 1/10/2019 12:16 PM       Failed - Patient has documented A1c within the specified period of time.    If HgbA1C is 8 or greater, it needs to be on file within the past 3 months.  If less than 8, must be on file within the past 6 months.     Recent Labs   Lab Test 07/27/18  1254   A1C 9.3*            Passed - Blood pressure less than 140/90 in past 6 months    BP Readings from Last 3 Encounters:   11/29/18 118/70   10/26/18 116/80   09/20/18 (P) 104/68                Passed - Patient has documented LDL within the past 12 mos.    Recent Labs   Lab Test 02/09/18  0834   LDL 55            Passed - Patient has had a Microalbumin in the past 15 mos.    Recent Labs   Lab Test 07/20/18  0827   MICROL 9   UMALCR 3.34            Passed - Patient is age 10 or older       Passed - Patient's CR is NOT>1.4 OR Patient's EGFR is NOT<45 within past 12 mos.    Recent Labs   Lab Test 07/20/18  0828   GFRESTIMATED 83   GFRESTBLACK >90       Recent Labs   Lab Test 07/20/18  0828   CR 0.96            Passed - Patient does NOT have a diagnosis of CHF.       Passed - Medication is active on med list       Passed - Recent (6 mo) or future (30 days) visit within the authorizing provider's specialty    Patient had office visit in the last 6 months or has a visit in the next 30 days with authorizing provider or within the authorizing provider's specialty.  See \"Patient Info\" tab in inbasket, or \"Choose Columns\" in Meds & Orders section of the refill encounter.            Prescription approved per [facility] Refill Protocol.    Doreen Villegas RN    "

## 2019-01-15 ENCOUNTER — E-VISIT (OUTPATIENT)
Dept: FAMILY MEDICINE | Facility: CLINIC | Age: 54
End: 2019-01-15

## 2019-01-15 ENCOUNTER — OFFICE VISIT (OUTPATIENT)
Dept: URGENT CARE | Facility: URGENT CARE | Age: 54
End: 2019-01-15
Payer: COMMERCIAL

## 2019-01-15 VITALS
HEART RATE: 91 BPM | SYSTOLIC BLOOD PRESSURE: 106 MMHG | WEIGHT: 221 LBS | OXYGEN SATURATION: 97 % | BODY MASS INDEX: 33.11 KG/M2 | TEMPERATURE: 97.4 F | RESPIRATION RATE: 16 BRPM | DIASTOLIC BLOOD PRESSURE: 68 MMHG

## 2019-01-15 DIAGNOSIS — Z53.9 ERRONEOUS ENCOUNTER--DISREGARD: Primary | ICD-10-CM

## 2019-01-15 DIAGNOSIS — J01.90 ACUTE SINUSITIS WITH SYMPTOMS > 10 DAYS: Primary | ICD-10-CM

## 2019-01-15 DIAGNOSIS — R07.0 THROAT PAIN: ICD-10-CM

## 2019-01-15 LAB
DEPRECATED S PYO AG THROAT QL EIA: NORMAL
SPECIMEN SOURCE: NORMAL

## 2019-01-15 PROCEDURE — 99213 OFFICE O/P EST LOW 20 MIN: CPT | Performed by: PHYSICIAN ASSISTANT

## 2019-01-15 PROCEDURE — 87081 CULTURE SCREEN ONLY: CPT | Performed by: PHYSICIAN ASSISTANT

## 2019-01-15 PROCEDURE — 87880 STREP A ASSAY W/OPTIC: CPT | Performed by: FAMILY MEDICINE

## 2019-01-15 NOTE — PROGRESS NOTES
SUBJECTIVE:  Everton Linn is a 53 year old male here with concerns about sinus infection.  He states onset of symptoms were 1 week(s) ago.  He has had maxillary, frontal pressure. Course of illness is same. Severity moderate  Current and Associated symptoms: nasal congestion, ear pain left, sore throat, facial pain/pressure, tooth pain and headache  Predisposing factors include none. Recent treatment has included: Decongestants and mucinex spray    No past medical history on file.  Social History     Tobacco Use     Smoking status: Never Smoker     Smokeless tobacco: Never Used   Substance Use Topics     Alcohol use: Yes     Comment: 2 -3 beers every few weeks       ROS:  Review of systems negative except as stated above.    OBJECTIVE:  /68 (BP Location: Right arm, Patient Position: Sitting, Cuff Size: Adult Large)   Pulse 91   Temp 97.4  F (36.3  C) (Tympanic)   Resp 16   Wt 100.2 kg (221 lb)   SpO2 97%   BMI 33.11 kg/m    Exam:GENERAL APPEARANCE: healthy, alert and no distress  EYES: EOMI,  PERRL, conjunctiva clear  HENT: ear canals and TM's normal.  Nose and mouth without ulcers, erythema or lesions  NECK: supple, nontender, no lymphadenopathy  RESP: lungs clear to auscultation - no rales, rhonchi or wheezes  CV: regular rates and rhythm, normal S1 S2, no murmur noted          ASSESSMENT:  (J01.90) Acute sinusitis with symptoms > 10 days  (primary encounter diagnosis)  Plan: amoxicillin-clavulanate (AUGMENTIN) 875-125 MG         Tablet  Trial conservative, initiate AB as needed        (R07.0) Throat pain  Plan: Strep, Rapid Screen, Beta strep group A culture        Patient Instructions         With distilled water 2-3x per day for a minimum 2-3 days  Mucinex, flonase, increased fluids, humidifier at night, steaming in the day              Patient Education     Acute Bacterial Rhinosinusitis (ABRS)    Acute bacterial rhinosinusitis (ABRS) is an infection of your nasal cavity and sinuses. It s  caused by bacteria. Acute means that you ve had symptoms for less than 4 weeks, but possibly up to 12 weeks.  Understanding your sinuses  The nasal cavity is the large air-filled space behind your nose. The sinuses are a group of spaces formed by the bones of your face. They connect with your nasal cavity. ABRS causes the tissue lining these spaces to become inflamed. Mucus may not drain normally. This leads to facial pain and other symptoms.  What causes ABRS?  ABRS most often follows an upper respiratory infection caused by a virus. Bacteria then infect the lining of your nasal cavity and sinuses. But you can also get ABRS if you have:    Nasal allergies    Long-term nasal swelling and congestion not caused by allergies    Blockage in the nose  Symptoms of ABRS  The symptoms of ABRS may be different for each person and include:    Nasal congestion or blockage    Pain or pressure in the face    Thick, colored drainage from the nose  Other symptoms may include:    Runny nose    Fluid draining from the nose down the throat (postnasal drip)    Headache    Cough    Pain    Fever  Diagnosing ABRS  ABRS may be diagnosed if you ve had an upper respiratory infection like a cold and cough for 10 or more days without improvement or with worsening symptoms. Your healthcare provider will ask about your symptoms and your medical history. The provider will check your vital signs, including your temperature. You ll have a physical exam. The healthcare provider will check your ears, nose, and throat. You likely won t need any tests. If ABRS comes back, you may have a culture or other tests.  Treatment for ABRS  Treatment may include:    Antibiotic medicine. This is for symptoms that last for at least 10 to 14 days.    Nasal corticosteroid medicine. Drops or spray used in the nose can lessen swelling and congestion.    Over-the-counter pain medicine. This is to lessen sinus pain and pressure.    Nasal decongestant medicine. Spray  or drops may help to lessen congestion. Do not use them for more than a few days.    Salt wash (saline irrigation). This can help to loosen mucus.  Possible complications of ABRS  ABRS may come back or become long-term (chronic). In rare cases, ABRS may cause complications such as:     Inflamed tissue around the brain and spinal cord (meningitis)    Inflamed tissue around the eyes (orbital cellulitis)    Inflamed bones around the sinuses (osteitis)  These problems may need to be treated in a hospital with intravenous (IV) antibiotic medicine or surgery.  When to call the healthcare provider  Call your healthcare provider if you have any of the following:    Symptoms that don t get better, or get worse    Symptoms that don t get better after 3 to 5 days on antibiotics    Trouble seeing    Swelling around your eyes    Confusion or trouble staying awake   Date Last Reviewed: 5/1/2017 2000-2018 [employer] [address] [place] [address], [place] [address]. All rights reserved. This information is not intended as a substitute for professional medical care. Always follow your healthcare professional's instructions.

## 2019-01-15 NOTE — PATIENT INSTRUCTIONS
With distilled water 2-3x per day for a minimum 2-3 days  Mucinex, flonase, increased fluids, humidifier at night, steaming in the day              Patient Education     Acute Bacterial Rhinosinusitis (ABRS)    Acute bacterial rhinosinusitis (ABRS) is an infection of your nasal cavity and sinuses. It s caused by bacteria. Acute means that you ve had symptoms for less than 4 weeks, but possibly up to 12 weeks.  Understanding your sinuses  The nasal cavity is the large air-filled space behind your nose. The sinuses are a group of spaces formed by the bones of your face. They connect with your nasal cavity. ABRS causes the tissue lining these spaces to become inflamed. Mucus may not drain normally. This leads to facial pain and other symptoms.  What causes ABRS?  ABRS most often follows an upper respiratory infection caused by a virus. Bacteria then infect the lining of your nasal cavity and sinuses. But you can also get ABRS if you have:    Nasal allergies    Long-term nasal swelling and congestion not caused by allergies    Blockage in the nose  Symptoms of ABRS  The symptoms of ABRS may be different for each person and include:    Nasal congestion or blockage    Pain or pressure in the face    Thick, colored drainage from the nose  Other symptoms may include:    Runny nose    Fluid draining from the nose down the throat (postnasal drip)    Headache    Cough    Pain    Fever  Diagnosing ABRS  ABRS may be diagnosed if you ve had an upper respiratory infection like a cold and cough for 10 or more days without improvement or with worsening symptoms. Your healthcare provider will ask about your symptoms and your medical history. The provider will check your vital signs, including your temperature. You ll have a physical exam. The healthcare provider will check your ears, nose, and throat. You likely won t need any tests. If ABRS comes back, you may have a culture or other tests.  Treatment for ABRS  Treatment may  include:    Antibiotic medicine. This is for symptoms that last for at least 10 to 14 days.    Nasal corticosteroid medicine. Drops or spray used in the nose can lessen swelling and congestion.    Over-the-counter pain medicine. This is to lessen sinus pain and pressure.    Nasal decongestant medicine. Spray or drops may help to lessen congestion. Do not use them for more than a few days.    Salt wash (saline irrigation). This can help to loosen mucus.  Possible complications of ABRS  ABRS may come back or become long-term (chronic). In rare cases, ABRS may cause complications such as:     Inflamed tissue around the brain and spinal cord (meningitis)    Inflamed tissue around the eyes (orbital cellulitis)    Inflamed bones around the sinuses (osteitis)  These problems may need to be treated in a hospital with intravenous (IV) antibiotic medicine or surgery.  When to call the healthcare provider  Call your healthcare provider if you have any of the following:    Symptoms that don t get better, or get worse    Symptoms that don t get better after 3 to 5 days on antibiotics    Trouble seeing    Swelling around your eyes    Confusion or trouble staying awake   Date Last Reviewed: 5/1/2017 2000-2018 [employer]. [address] [place] [address], [place] [address]. All rights reserved. This information is not intended as a substitute for professional medical care. Always follow your healthcare professional's instructions.

## 2019-01-16 LAB
BACTERIA SPEC CULT: NORMAL
SPECIMEN SOURCE: NORMAL

## 2019-01-22 ENCOUNTER — TELEPHONE (OUTPATIENT)
Dept: FAMILY MEDICINE | Facility: CLINIC | Age: 54
End: 2019-01-22

## 2019-01-22 NOTE — TELEPHONE ENCOUNTER
Type of outreach:  Sent [employer] message. PHQ 9 due.  Health Maintenance Due   Topic Date Due     HIV SCREEN (SYSTEM ASSIGNED)  05/06/1985     ZOSTER IMMUNIZATION (1 of 2) 05/06/2017     FOOT EXAM Q1 YEAR  08/25/2018     A1C Q6 MO  01/27/2019     LIPID MONITORING Q1 YEAR  02/09/2019     Sara MONIQUE M.A.

## 2019-01-25 DIAGNOSIS — E78.5 HYPERLIPIDEMIA LDL GOAL <100: ICD-10-CM

## 2019-01-25 DIAGNOSIS — E11.9 TYPE 2 DIABETES MELLITUS WITHOUT COMPLICATION, WITHOUT LONG-TERM CURRENT USE OF INSULIN (H): ICD-10-CM

## 2019-01-25 LAB
ALBUMIN SERPL-MCNC: 4.2 G/DL (ref 3.4–5)
ALP SERPL-CCNC: 56 U/L (ref 40–150)
ALT SERPL W P-5'-P-CCNC: 29 U/L (ref 0–70)
ANION GAP SERPL CALCULATED.3IONS-SCNC: 7 MMOL/L (ref 3–14)
AST SERPL W P-5'-P-CCNC: 24 U/L (ref 0–45)
BILIRUB SERPL-MCNC: 0.6 MG/DL (ref 0.2–1.3)
BUN SERPL-MCNC: 11 MG/DL (ref 7–30)
CALCIUM SERPL-MCNC: 9.7 MG/DL (ref 8.5–10.1)
CHLORIDE SERPL-SCNC: 100 MMOL/L (ref 94–109)
CHOLEST SERPL-MCNC: 141 MG/DL
CO2 SERPL-SCNC: 28 MMOL/L (ref 20–32)
CREAT SERPL-MCNC: 1 MG/DL (ref 0.66–1.25)
GFR SERPL CREATININE-BSD FRML MDRD: 86 ML/MIN/{1.73_M2}
GLUCOSE SERPL-MCNC: 118 MG/DL (ref 70–99)
HBA1C MFR BLD: 6.7 % (ref 0–5.6)
HDLC SERPL-MCNC: 38 MG/DL
LDLC SERPL CALC-MCNC: 65 MG/DL
NONHDLC SERPL-MCNC: 103 MG/DL
POTASSIUM SERPL-SCNC: 4.1 MMOL/L (ref 3.4–5.3)
PROT SERPL-MCNC: 8 G/DL (ref 6.8–8.8)
SODIUM SERPL-SCNC: 135 MMOL/L (ref 133–144)
TRIGL SERPL-MCNC: 189 MG/DL
TSH SERPL DL<=0.005 MIU/L-ACNC: 2.69 MU/L (ref 0.4–4)

## 2019-01-25 PROCEDURE — 84443 ASSAY THYROID STIM HORMONE: CPT | Performed by: NURSE PRACTITIONER

## 2019-01-25 PROCEDURE — 82043 UR ALBUMIN QUANTITATIVE: CPT | Performed by: NURSE PRACTITIONER

## 2019-01-25 PROCEDURE — 80061 LIPID PANEL: CPT | Performed by: NURSE PRACTITIONER

## 2019-01-25 PROCEDURE — 36415 COLL VENOUS BLD VENIPUNCTURE: CPT | Performed by: NURSE PRACTITIONER

## 2019-01-25 PROCEDURE — 80053 COMPREHEN METABOLIC PANEL: CPT | Performed by: NURSE PRACTITIONER

## 2019-01-25 PROCEDURE — 83036 HEMOGLOBIN GLYCOSYLATED A1C: CPT | Performed by: NURSE PRACTITIONER

## 2019-01-26 LAB
CREAT UR-MCNC: 180 MG/DL
MICROALBUMIN UR-MCNC: 7 MG/L
MICROALBUMIN/CREAT UR: 4.05 MG/G CR (ref 0–17)

## 2019-02-01 ENCOUNTER — OFFICE VISIT (OUTPATIENT)
Dept: FAMILY MEDICINE | Facility: CLINIC | Age: 54
End: 2019-02-01
Payer: COMMERCIAL

## 2019-02-01 VITALS
TEMPERATURE: 98.4 F | WEIGHT: 225.2 LBS | DIASTOLIC BLOOD PRESSURE: 70 MMHG | HEART RATE: 102 BPM | HEIGHT: 69 IN | BODY MASS INDEX: 33.36 KG/M2 | SYSTOLIC BLOOD PRESSURE: 110 MMHG | OXYGEN SATURATION: 96 %

## 2019-02-01 DIAGNOSIS — E66.811 CLASS 1 OBESITY DUE TO EXCESS CALORIES WITH SERIOUS COMORBIDITY AND BODY MASS INDEX (BMI) OF 31.0 TO 31.9 IN ADULT: ICD-10-CM

## 2019-02-01 DIAGNOSIS — F33.42 RECURRENT MAJOR DEPRESSION IN COMPLETE REMISSION (H): ICD-10-CM

## 2019-02-01 DIAGNOSIS — E11.9 TYPE 2 DIABETES MELLITUS WITHOUT COMPLICATION, WITHOUT LONG-TERM CURRENT USE OF INSULIN (H): Primary | ICD-10-CM

## 2019-02-01 DIAGNOSIS — B35.1 DERMATOPHYTOSIS OF NAIL: ICD-10-CM

## 2019-02-01 DIAGNOSIS — E78.5 HYPERLIPIDEMIA LDL GOAL <100: ICD-10-CM

## 2019-02-01 DIAGNOSIS — E66.09 CLASS 1 OBESITY DUE TO EXCESS CALORIES WITH SERIOUS COMORBIDITY AND BODY MASS INDEX (BMI) OF 31.0 TO 31.9 IN ADULT: ICD-10-CM

## 2019-02-01 DIAGNOSIS — F33.2 SEVERE EPISODE OF RECURRENT MAJOR DEPRESSIVE DISORDER, WITHOUT PSYCHOTIC FEATURES (H): ICD-10-CM

## 2019-02-01 DIAGNOSIS — Z11.4 SCREENING FOR HIV (HUMAN IMMUNODEFICIENCY VIRUS): ICD-10-CM

## 2019-02-01 DIAGNOSIS — N52.9 ERECTILE DYSFUNCTION, UNSPECIFIED ERECTILE DYSFUNCTION TYPE: ICD-10-CM

## 2019-02-01 PROBLEM — E66.01 MORBID OBESITY (H): Status: RESOLVED | Noted: 2018-02-18 | Resolved: 2019-02-01

## 2019-02-01 PROCEDURE — 99214 OFFICE O/P EST MOD 30 MIN: CPT | Performed by: NURSE PRACTITIONER

## 2019-02-01 RX ORDER — SILDENAFIL 100 MG/1
100 TABLET, FILM COATED ORAL DAILY PRN
Qty: 30 TABLET | Refills: 1 | Status: SHIPPED | OUTPATIENT
Start: 2019-02-01 | End: 2022-11-30

## 2019-02-01 RX ORDER — BUPROPION HYDROCHLORIDE 150 MG/1
150 TABLET ORAL EVERY MORNING
Qty: 90 TABLET | Refills: 0 | Status: SHIPPED | OUTPATIENT
Start: 2019-02-01 | End: 2019-04-05

## 2019-02-01 RX ORDER — SIMVASTATIN 40 MG
40 TABLET ORAL DAILY
Qty: 90 TABLET | Refills: 1 | Status: SHIPPED | OUTPATIENT
Start: 2019-02-01 | End: 2019-02-01

## 2019-02-01 RX ORDER — GLIPIZIDE 10 MG/1
TABLET, FILM COATED, EXTENDED RELEASE ORAL
Qty: 90 TABLET | Refills: 3 | Status: SHIPPED | OUTPATIENT
Start: 2019-02-01 | End: 2019-02-01

## 2019-02-01 RX ORDER — GLIPIZIDE 10 MG/1
TABLET, FILM COATED, EXTENDED RELEASE ORAL
Qty: 90 TABLET | Refills: 3 | Status: SHIPPED | OUTPATIENT
Start: 2019-02-01 | End: 2019-08-09 | Stop reason: DRUGHIGH

## 2019-02-01 RX ORDER — SIMVASTATIN 40 MG
40 TABLET ORAL DAILY
Qty: 90 TABLET | Refills: 1 | Status: SHIPPED | OUTPATIENT
Start: 2019-02-01 | End: 2019-08-10

## 2019-02-01 ASSESSMENT — MIFFLIN-ST. JEOR: SCORE: 1858.94

## 2019-02-01 NOTE — PROGRESS NOTES
SUBJECTIVE:   Everton Linn is a 53 year old male who presents to clinic today for the following health issues:      Diabetes Follow-up      Patient is checking blood sugars: yes    Diabetic concerns: None     Symptoms of hypoglycemia (low blood sugar): none     Paresthesias (numbness or burning in feet) or sores: No        BP Readings from Last 2 Encounters:   02/01/19 110/70   01/15/19 106/68     Hemoglobin A1C (%)   Date Value   01/25/2019 6.7 (H)   07/27/2018 9.3 (H)     LDL Cholesterol Calculated (mg/dL)   Date Value   01/25/2019 65   02/09/2018 55     Wt Readings from Last 4 Encounters:   02/01/19 102.2 kg (225 lb 3.2 oz)   01/15/19 100.2 kg (221 lb)   11/29/18 102.5 kg (226 lb)   10/26/18 106.1 kg (234 lb)         Diabetes Management Resources    Amount of exercise or physical activity: 6-7 days/week for an average of 30-45 minutes    Problems taking medications regularly: No    Medication side effects: none    Diet: working on reducing calories      Also:  Depression  Has weaned off his lexapro and feels great.  Felt that he had side effects of erectile dysfunction with this.  Is on Wellbutrin 300 and would like to wean down to at least 150 mg and potentially off.  Discussed and will switch down to 150.    CHINMAY-7 SCORE 7/27/2018 10/26/2018 1/22/2019   Total Score - - 0 (minimal anxiety)   Total Score 5 1 0         PHQ-9 SCORE 9/14/2018 10/26/2018 1/22/2019   PHQ-9 Total Score - - -   PHQ-9 Total Score MyChart 2 (Minimal depression) - 0   PHQ-9 Total Score 2 4 0       Also:  Erectile dysfunction  Has and new relationship and finds that he had trouble with erectile dysfunction despite weaning his serotonin specific reuptake inhibitor.  Is only using 25 mg viagra.  No side effects   Is interested in higher dose.  Discussed splitting pills using 100 mg tab and trying 50 mg dose.        Reviewed and updated as needed this visit by clinical staff  Tobacco  Allergies  Meds  Problems  Med Hx       Reviewed  "and updated as needed this visit by Provider  Tobacco  Allergies  Meds  Problems  Med Hx             Patient Active Problem List   Diagnosis     Dermatophytosis of nail     Inguinal hernia     HYPERLIPIDEMIA LDL GOAL <100     Ventral hernia     SANTOS (obstructive sleep apnea)     Type 2 diabetes mellitus without complication, without long-term current use of insulin (H)     Cervical stenosis of spine     Class 1 obesity due to excess calories with serious comorbidity and body mass index (BMI) of 31.0 to 31.9 in adult     Recurrent major depression in complete remission (H)     Past Surgical History:   Procedure Laterality Date     ARTHROSCOPY KNEE RT/LT  2007    left knee     ARTHROSCOPY KNEE RT/LT  2005    right     LAPAROSCOPIC CHOLECYSTECTOMY WITH CHOLANGIOGRAMS N/A 5/28/2016    Procedure: LAPAROSCOPIC CHOLECYSTECTOMY WITH CHOLANGIOGRAMS;  Surgeon: Jerry Bello MD;  Location:  OR       Social History     Tobacco Use     Smoking status: Never Smoker     Smokeless tobacco: Never Used   Substance Use Topics     Alcohol use: Yes     Comment: 2 -3 beers every few weeks     Family History   Problem Relation Age of Onset     C.A.D. Mother         CHF     Diabetes Mother      Hypertension Father      Cancer Father         Multiple Myloma     Musculoskeletal Disorder Paternal Grandmother         Paige Gehrig's Disease                ROS:  CV: NEGATIVE for chest pain, palpitations or peripheral edema  C: NEGATIVE for fever, chills, change in weight  E/M: NEGATIVE for ear, mouth and throat problems  R: NEGATIVE for significant cough or SOB    OBJECTIVE:                                                    /70   Pulse 102   Temp 98.4  F (36.9  C) (Oral)   Ht 1.74 m (5' 8.5\")   Wt 102.2 kg (225 lb 3.2 oz)   SpO2 96%   BMI 33.74 kg/m   Body mass index is 33.74 kg/m .   GENERAL: healthy, alert, well nourished, well hydrated, no distress  RESP: lungs clear to auscultation - no rales, no rhonchi, no " wheezes  Diabetic foot exam: normal DP and PT pulses, no trophic changes or ulcerative lesions, normal sensory exam, normal monofilament exam, onychomycosis and diffusely dry skin.         ASSESSMENT/PLAN:                                                        ICD-10-CM    1. Type 2 diabetes mellitus without complication, without long-term current use of insulin (H) E11.9    2. Screening for HIV (human immunodeficiency virus) Z11.4 **HIV Antigen Antibody Combo FUTURE anytime   3. Erectile dysfunction, unspecified erectile dysfunction type N52.9 sildenafil (VIAGRA) 100 MG tablet   4. Severe episode of recurrent major depressive disorder, without psychotic features (H) F33.2 buPROPion (WELLBUTRIN XL) 150 MG 24 hr tablet   5. Recurrent major depression in complete remission (H) F33.42    6. Dermatophytosis of nail B35.1    7. Class 1 obesity due to excess calories with serious comorbidity and body mass index (BMI) of 31.0 to 31.9 in adult E66.09     Z68.31      Obesity improving with diet and exercise.  DM control excellent  Erective dysfunction--trial higher dose of sildenafil  Depression is in remission.  Continue to wean meds.  Reduce wellbutrin to 150 mg and follow up in 3 months.      Patient Instructions               VALDEZ Cowart [facility]UNT

## 2019-03-05 DIAGNOSIS — E11.9 TYPE 2 DIABETES MELLITUS WITHOUT COMPLICATION, WITHOUT LONG-TERM CURRENT USE OF INSULIN (H): ICD-10-CM

## 2019-03-05 NOTE — TELEPHONE ENCOUNTER
"Requested Prescriptions   Pending Prescriptions Disp Refills     glipiZIDE (GLUCOTROL XL) 10 MG 24 hr tablet [Pharmacy Med Name: GLIPIZIDE ER 10 MG TABLET]  Last Written Prescription Date:  2/1/19  Last Fill Quantity: 90,  # refills: 3   Last office visit: 2/1/2019 with prescribing provider:  Lindsay Arroyo APRN CNP    Future Office Visit:     90 tablet 3     Sig: TAKE 2 TABLETS (20 MG) BY MOUTH DAILY    Sulfonylurea Agents Passed - 3/5/2019  1:09 AM       Passed - Blood pressure less than 140/90 in past 6 months    BP Readings from Last 3 Encounters:   02/01/19 110/70   01/15/19 106/68   11/29/18 118/70                Passed - Patient has documented LDL within the past 12 mos.    Recent Labs   Lab Test 01/25/19  0958   LDL 65            Passed - Patient has had a Microalbumin in the past 12 mos.    Recent Labs   Lab Test 01/25/19  0958   MICROL 7   UMALCR 4.05            Passed - Patient has documented A1c within the specified period of time.    If HgbA1C is 8 or greater, it needs to be on file within the past 3 months.  If less than 8, must be on file within the past 6 months.     Recent Labs   Lab Test 01/25/19  0958   A1C 6.7*            Passed - Medication is active on med list       Passed - Patient is age 18 or older       Passed - Patient has a recent creatinine (normal) within the past 12 mos.    Recent Labs   Lab Test 01/25/19  0958   CR 1.00            Passed - Recent (6 mo) or future (30 days) visit within the authorizing provider's specialty    Patient had office visit in the last 6 months or has a visit in the next 30 days with authorizing provider or within the authorizing provider's specialty.  See \"Patient Info\" tab in inbasket, or \"Choose Columns\" in Meds & Orders section of the refill encounter.              "

## 2019-03-07 RX ORDER — GLIPIZIDE 10 MG/1
TABLET, FILM COATED, EXTENDED RELEASE ORAL
Refills: 0
Start: 2019-03-07

## 2019-03-08 ENCOUNTER — OFFICE VISIT (OUTPATIENT)
Dept: FAMILY MEDICINE | Facility: CLINIC | Age: 54
End: 2019-03-08
Payer: COMMERCIAL

## 2019-03-08 VITALS
WEIGHT: 226.1 LBS | HEART RATE: 90 BPM | OXYGEN SATURATION: 98 % | BODY MASS INDEX: 33.88 KG/M2 | SYSTOLIC BLOOD PRESSURE: 102 MMHG | TEMPERATURE: 98.1 F | DIASTOLIC BLOOD PRESSURE: 72 MMHG

## 2019-03-08 DIAGNOSIS — J34.89 NASAL MASS: Primary | ICD-10-CM

## 2019-03-08 DIAGNOSIS — E78.5 HYPERLIPIDEMIA LDL GOAL <70: ICD-10-CM

## 2019-03-08 PROCEDURE — 99213 OFFICE O/P EST LOW 20 MIN: CPT | Performed by: NURSE PRACTITIONER

## 2019-03-08 NOTE — PROGRESS NOTES
SUBJECTIVE:   Everton Linn is a 53 year old male who presents to clinic today for the following health issues:      Growth in nose      Duration: 3 months or so    Description (location/character/radiation): Growth in right nostril    Intensity:  moderate    Accompanying signs and symptoms: Bleeds all the time--no pain    History (similar episodes/previous evaluation): None    Precipitating or alleviating factors: None    Therapies tried and outcome: None    No recent or remote history of of injury to explain symptoms.             Reviewed and updated as needed this visit by clinical staff  Tobacco  Allergies  Meds  Problems  Med Hx  Surg Hx  Fam Hx  Soc Hx        Reviewed and updated as needed this visit by Provider  Problems         Patient Active Problem List   Diagnosis     Dermatophytosis of nail     Inguinal hernia     Ventral hernia     SANTOS (obstructive sleep apnea)     Type 2 diabetes mellitus without complication, without long-term current use of insulin (H)     Cervical stenosis of spine     Class 1 obesity due to excess calories with serious comorbidity and body mass index (BMI) of 31.0 to 31.9 in adult     Recurrent major depression in complete remission (H)     Hyperlipidemia LDL goal <70     Past Surgical History:   Procedure Laterality Date     ARTHROSCOPY KNEE RT/LT  2007    left knee     ARTHROSCOPY KNEE RT/LT  2005    right     LAPAROSCOPIC CHOLECYSTECTOMY WITH CHOLANGIOGRAMS N/A 5/28/2016    Procedure: LAPAROSCOPIC CHOLECYSTECTOMY WITH CHOLANGIOGRAMS;  Surgeon: Jerry Bello MD;  Location:  OR       Social History     Tobacco Use     Smoking status: Never Smoker     Smokeless tobacco: Never Used   Substance Use Topics     Alcohol use: Yes     Comment: 2 -3 beers every few weeks     Family History   Problem Relation Age of Onset     C.A.D. Mother         CHF     Diabetes Mother      Hypertension Father      Cancer Father         Multiple Myloma     Musculoskeletal  Disorder Paternal Grandmother         Paige Gehrig's Disease            ROS:  Review of Systems   Constitutional: Negative.    HENT: Positive for nosebleeds and rhinorrhea. Negative for sore throat.    Eyes: Negative.    Respiratory: Negative.    Cardiovascular: Negative.          OBJECTIVE:                                                    /72   Pulse 90   Temp 98.1  F (36.7  C) (Oral)   Wt 102.6 kg (226 lb 1.6 oz)   SpO2 98%   BMI 33.88 kg/m   Body mass index is 33.88 kg/m .     Physical Exam   Constitutional: He appears well-developed and well-nourished.   HENT:   Head: Normocephalic.   Right nare with large mass noted anterior nare.  No bleeding currently.    Neck: Normal range of motion. Neck supple.   Cardiovascular: Normal rate and regular rhythm.   Pulmonary/Chest: Effort normal and breath sounds normal.        ASSESSMENT/PLAN:                                                      Problem List Items Addressed This Visit     Hyperlipidemia LDL goal <70      Other Visit Diagnoses     Nasal mass    -  Primary    Relevant Orders    OTOLARYNGOLOGY REFERRAL         Mass is large and recurrent bleeding.  Needs eval per ENT      There are no Patient Instructions on file for this visit.        VALDEZ Cowart [facility]

## 2019-03-11 ENCOUNTER — TRANSFERRED RECORDS (OUTPATIENT)
Dept: HEALTH INFORMATION MANAGEMENT | Facility: CLINIC | Age: 54
End: 2019-03-11

## 2019-03-13 DIAGNOSIS — F33.2 SEVERE EPISODE OF RECURRENT MAJOR DEPRESSIVE DISORDER, WITHOUT PSYCHOTIC FEATURES (H): ICD-10-CM

## 2019-03-14 RX ORDER — BUPROPION HYDROCHLORIDE 300 MG/1
300 TABLET ORAL EVERY MORNING
Qty: 90 TABLET | Refills: 0 | Status: SHIPPED | OUTPATIENT
Start: 2019-03-14 | End: 2019-04-05

## 2019-03-14 NOTE — TELEPHONE ENCOUNTER
"Requested Prescriptions   Pending Prescriptions Disp Refills     buPROPion (WELLBUTRIN XL) 300 MG 24 hr tablet [Pharmacy Med Name: BUPROPION HCL  MG TABLET]  Last Written Prescription Date:  2/1/19  Last Fill Quantity: 90,  # refills: 0   Last office visit: 3/8/19 with prescribing provider:  Lindsay Arroyo APRN CNP    Future Office Visit:     90 tablet 1     Sig: TAKE 1 TABLET (300 MG) BY MOUTH EVERY MORNING    SSRIs Protocol Passed - 3/13/2019  1:24 AM       Passed - PHQ-9 score less than 5 in past 6 months    Please review last PHQ-9 score.   PHQ-9 SCORE 9/14/2018 10/26/2018 1/22/2019   PHQ-9 Total Score - - -   PHQ-9 Total Score MyChart 2 (Minimal depression) - 0   PHQ-9 Total Score 2 4 0     CHINMAY-7 SCORE 7/27/2018 10/26/2018 1/22/2019   Total Score - - 0 (minimal anxiety)   Total Score 5 1 0                Passed - Medication is Bupropion    If the medication is Bupropion (Wellbutrin), and the patient is taking for smoking cessation; OK to refill.         Passed - Medication is active on med list       Passed - Patient is age 18 or older       Passed - Recent (6 mo) or future (30 days) visit within the authorizing provider's specialty    Patient had office visit in the last 6 months or has a visit in the next 30 days with authorizing provider or within the authorizing provider's specialty.  See \"Patient Info\" tab in inbasket, or \"Choose Columns\" in Meds & Orders section of the refill encounter.              "

## 2019-03-22 PROBLEM — E78.5 HYPERLIPIDEMIA LDL GOAL <70: Status: ACTIVE | Noted: 2019-03-22

## 2019-03-22 RX ORDER — LORATADINE 10 MG/1
10 TABLET ORAL EVERY 24 HOURS
COMMUNITY
Start: 2013-08-18 | End: 2020-03-06

## 2019-03-22 ASSESSMENT — ENCOUNTER SYMPTOMS
EYES NEGATIVE: 1
RHINORRHEA: 1
CARDIOVASCULAR NEGATIVE: 1
CONSTITUTIONAL NEGATIVE: 1
RESPIRATORY NEGATIVE: 1
SORE THROAT: 0

## 2019-04-05 ENCOUNTER — OFFICE VISIT (OUTPATIENT)
Dept: FAMILY MEDICINE | Facility: CLINIC | Age: 54
End: 2019-04-05
Payer: COMMERCIAL

## 2019-04-05 VITALS
HEART RATE: 94 BPM | WEIGHT: 225.9 LBS | TEMPERATURE: 98.5 F | DIASTOLIC BLOOD PRESSURE: 62 MMHG | SYSTOLIC BLOOD PRESSURE: 108 MMHG | BODY MASS INDEX: 33.85 KG/M2

## 2019-04-05 DIAGNOSIS — E11.628 TYPE 2 DIABETES MELLITUS WITH OTHER SKIN COMPLICATIONS (H): ICD-10-CM

## 2019-04-05 DIAGNOSIS — Z11.4 SCREENING FOR HIV (HUMAN IMMUNODEFICIENCY VIRUS): ICD-10-CM

## 2019-04-05 DIAGNOSIS — Z01.818 PREOP GENERAL PHYSICAL EXAM: Primary | ICD-10-CM

## 2019-04-05 DIAGNOSIS — E66.01 MORBID OBESITY (H): ICD-10-CM

## 2019-04-05 LAB — HGB BLD-MCNC: 14.5 G/DL (ref 13.3–17.7)

## 2019-04-05 PROCEDURE — 36415 COLL VENOUS BLD VENIPUNCTURE: CPT | Performed by: NURSE PRACTITIONER

## 2019-04-05 PROCEDURE — 87389 HIV-1 AG W/HIV-1&-2 AB AG IA: CPT | Performed by: NURSE PRACTITIONER

## 2019-04-05 PROCEDURE — 85018 HEMOGLOBIN: CPT | Performed by: NURSE PRACTITIONER

## 2019-04-05 PROCEDURE — 99215 OFFICE O/P EST HI 40 MIN: CPT | Performed by: NURSE PRACTITIONER

## 2019-04-05 NOTE — PROGRESS NOTES
[facility]  [address] [id number] [phone]  Dept: [phone]    PRE-OP EVALUATION:  Today's date: 2019    Everton Linn (: 1965) presents for pre-operative evaluation assessment as requested by Dr. Eduard Berry.  He requires evaluation and anesthesia risk assessment prior to undergoing surgery/procedure for treatment of Pyogenic Granuloma .    Fax number for surgical facility: [phone]  Primary Physician: Lindsay Arroyo  Type of Anesthesia Anticipated: General    Patient has a Health Care Directive or Living Will:  YES     Preop Questions 2019   Who is doing your surgery? Dr. Eduard Berry   What are you having done? pyogenic granuloma excision   Date of Surgery/Procedure: 2019   Facility or Hospital where procedure[facility]surgery [facility] across the street from [facility]   1.  Do you have a history of Heart attack, stroke, stent, coronary bypass surgery, or other heart surgery? No   2.  Do you ever have any pain or discomfort in your chest? No   3.  Do you have a history of  Heart Failure? No   4.   Are you troubled by shortness of breath when:  walking on a level surface, or up a slight hill, or at night? No   5.  Do you currently have a cold, bronchitis or other respiratory infection? No   6.  Do you have a cough, shortness of breath, or wheezing? No   7.  Do you sometimes get pains in the calves of your legs when you walk? No   8. Do you or anyone in your family have previous history of blood clots? No   9.  Do you or does anyone in your family have a serious bleeding problem such as prolonged bleeding following surgeries or cuts? No   10. Have you ever had problems with anemia or been told to take iron pills? No   11. Have you had any abnormal blood loss such as black, tarry or bloody stools? No   12. Have you ever had a blood transfusion? No   13. Have you or any of your relatives ever had problems  with anesthesia? No   14. Do you have sleep apnea, excessive snoring or daytime drowsiness? YES - Uses CPAP at home   15. Do you have any prosthetic heart valves? No   16. Do you have prosthetic joints? No         HPI:     HPI related to upcoming procedure: Onset of mass in right nare that is irritated and intermittently bleeding.  Is pyogenic granuloma.  Will have this removed surgically.      See problem list for active medical problems.  Problems all longstanding and stable, except as noted/documented.  See ROS for pertinent symptoms related to these conditions.                                                                                                                                                          .    MEDICAL HISTORY:     Patient Active Problem List    Diagnosis Date Noted     Hyperlipidemia LDL goal <70 03/22/2019     Priority: Medium     Recurrent major depression in complete remission (H) 09/18/2018     Priority: Medium     Class 1 obesity due to excess calories with serious comorbidity and body mass index (BMI) of 31.0 to 31.9 in adult 02/18/2018     Priority: Medium     Type 2 diabetes mellitus without complication, without long-term current use of insulin (H) 10/23/2015     Priority: Medium     SANTOS (obstructive sleep apnea) 09/17/2015     Priority: Medium     Cervical stenosis of spine 11/16/2013     Priority: Medium     Ventral hernia 06/14/2013     Priority: Medium     Inguinal hernia 12/15/2009     Priority: Medium     Dermatophytosis of nail 11/03/2005     Priority: Medium      No past medical history on file.  Past Surgical History:   Procedure Laterality Date     ARTHROSCOPY KNEE RT/LT  2007    left knee     ARTHROSCOPY KNEE RT/LT  2005    right     LAPAROSCOPIC CHOLECYSTECTOMY WITH CHOLANGIOGRAMS N/A 5/28/2016    Procedure: LAPAROSCOPIC CHOLECYSTECTOMY WITH CHOLANGIOGRAMS;  Surgeon: Jerry Bello MD;  Location: [facility] OR     Current Outpatient Medications   Medication Sig  Dispense Refill     blood glucose monitoring (ONE TOUCH DELICA) lancets Use to test blood sugar 2-3 times daily or as directed. May substitute per insurance coverage. 100 each 11     blood glucose monitoring (ONETOUCH VERIO IQ) test strip Use to test blood sugar 2-3 times daily or as directed.May substitute per insurance coverage. 100 each 11     Blood Glucose Monitoring Suppl (ONETOUCH VERIO FLEX SYSTEM) w/Device KIT 1 each 3 times daily as needed May substitute per insurance coverage. 1 kit 0     dulaglutide (TRULICITY) 1.5 MG/0.5ML pen Inject 1.5 mg Subcutaneous every 7 days 2 mL 2     glipiZIDE (GLUCOTROL XL) 10 MG 24 hr tablet TAKE 2 TABLETS (20 MG) BY MOUTH DAILY 90 tablet 3     loratadine (CLARITIN) 10 MG tablet Take 10 mg by mouth every 24 hours       metFORMIN (GLUCOPHAGE-XR) 500 MG 24 hr tablet TAKE 2 TABS BY MOUTH TWICE DAILY WITH MEALS. 360 tablet 0     sildenafil (VIAGRA) 100 MG tablet Take 1 tablet (100 mg) by mouth daily as needed (as needed) 30 tablet 1     aspirin 81 MG tablet Take 1 tablet (81 mg) by mouth daily       simvastatin (ZOCOR) 40 MG tablet Take 1 tablet (40 mg) by mouth daily 90 tablet 1     OTC products: Prn use of claritin.  Off aspirin as of 4/5/2019 and understands to restart per surgeon recommendation     Allergies   Allergen Reactions     Seasonal Allergies       Latex Allergy: NO    Social History     Tobacco Use     Smoking status: Never Smoker     Smokeless tobacco: Never Used   Substance Use Topics     Alcohol use: Yes     Comment: 2 -3 beers every few weeks     History   Drug Use No       REVIEW OF SYSTEMS:   CONSTITUTIONAL: NEGATIVE for fever, chills, change in weight  INTEGUMENTARY/SKIN: NEGATIVE for worrisome rashes, moles and notable for right nare lesion  EYES: NEGATIVE for vision changes or irritation  ENT/MOUTH: NEGATIVE for ear, mouth and throat problems  RESP: NEGATIVE for significant cough or SOB  CV: NEGATIVE for chest pain, palpitations or peripheral edema  GI:  NEGATIVE for nausea, abdominal pain, heartburn, or change in bowel habits  : NEGATIVE for frequency, dysuria, or hematuria  MUSCULOSKELETAL: NEGATIVE for significant arthralgias or myalgia  NEURO: NEGATIVE for weakness, dizziness or paresthesias  ENDOCRINE: NEGATIVE for temperature intolerance, skin/hair changes  HEME/ALLERGY/IMMUNE: Negative for overall bleeding problems and negative for bruising.  Has bleeding intermittently from right nare  PSYCHIATRIC: NEGATIVE for changes in mood or affect    EXAM:   /62   Pulse 94   Temp 98.5  F (36.9  C) (Oral)   Wt 102.5 kg (225 lb 14.4 oz)   BMI 33.85 kg/m      GENERAL APPEARANCE: healthy, alert and no distress     EYES: EOMI,  PERRL     HENT: ear canals and TM's normal and nose and mouth without ulcers or lesions     NECK: no adenopathy, no asymmetry, masses, or scars and thyroid normal to palpation     RESP: lungs clear to auscultation - no rales, rhonchi or wheezes     CV: regular rates and rhythm, normal S1 S2, no S3 or S4 and no murmur, click or rub     ABDOMEN:  soft, nontender, no HSM or masses and bowel sounds normal     MS: extremities normal- no gross deformities noted, no evidence of inflammation in joints, FROM in all extremities.     SKIN: no suspicious lesions or rashes     NEURO: Normal strength and tone, sensory exam grossly normal, mentation intact and speech normal     PSYCH: mentation appears normal. and affect normal/bright     LYMPHATICS: No cervical adenopathy    DIAGNOSTICS:     Hemoglobin   Date Value Ref Range Status   04/05/2019 14.5 13.3 - 17.7 g/dL Final   ]      Recent Labs   Lab Test 01/25/19  0958 07/27/18  1254 07/20/18  0828  05/28/16  0615 05/27/16  1838   HGB  --   --   --   --  13.0* 14.2   PLT  --   --   --   --  230 279     --  134   < > 136 137   POTASSIUM 4.1  --  4.4   < > 3.7 3.5   CR 1.00  --  0.96   < > 0.98 0.88   A1C 6.7* 9.3*  --    < >  --   --     < > = values in this interval not displayed.         IMPRESSION:   Reason for surgery/procedure: pyogenic granuloma  Diagnosis/reason for consult: type 2 diabetes    The proposed surgical procedure is considered LOW risk.    REVISED CARDIAC RISK INDEX  The patient has the following serious cardiovascular risks for perioperative complications such as (MI, PE, VFib and 3  AV Block):  No serious cardiac risks  INTERPRETATION: 0 risks: Class I (very low risk - 0.4% complication rate)    The patient has the following additional risks for perioperative complications:  No identified additional risks      ICD-10-CM    1. Preop general physical exam Z01.818        RECOMMENDATIONS:       Obstructive Sleep Apnea (or suspected sleep apnea)  Use of CPAP per surgeon recommendations    --Patient is to take all scheduled medications on the day of surgery EXCEPT for modifications listed below.    Diabetes Medication Use    -----Hold usual oral and non-insulin diabetic meds (e.g. Metformin, Actos, Glipizide) while NPO.   Hold aspirin as of 4/5/2019 and restart per surgeon recommendations      APPROVAL GIVEN to proceed with proposed procedure, without further diagnostic evaluation       Signed Electronically by: VALDEZ Cowart CNP    Copy of this evaluation report is provided to requesting physician.    [place] Preop Guidelines    Revised Cardiac Risk Index

## 2019-04-05 NOTE — PATIENT INSTRUCTIONS
Before Your Surgery      Call your surgeon if there is any change in your health. This includes signs of a cold or flu (such as a sore throat, runny nose, cough, rash or fever).    Do not smoke, drink alcohol or take over the counter medicine (unless your surgeon or primary care doctor tells you to) for the 24 hours before and after surgery.    If you take prescribed drugs: Follow your doctor s orders about which medicines to take and which to stop until after surgery.    Eating and drinking prior to surgery: follow the instructions from your surgeon    Take a shower or bath the night before surgery. Use the soap your surgeon gave you to gently clean your skin. If you do not have soap from your surgeon, use your regular soap. Do not shave or scrub the surgery site.  Wear clean pajamas and have clean sheets on your bed.     Hold your metformin night before and morning of surgery  Hold glipizide the morning of surgery.      Restart these meds when you are eating again.

## 2019-04-08 LAB — HIV 1+2 AB+HIV1 P24 AG SERPL QL IA: NONREACTIVE

## 2019-04-12 DIAGNOSIS — E11.9 TYPE 2 DIABETES MELLITUS WITHOUT COMPLICATION, WITHOUT LONG-TERM CURRENT USE OF INSULIN (H): ICD-10-CM

## 2019-04-12 NOTE — TELEPHONE ENCOUNTER
"Requested Prescriptions   Pending Prescriptions Disp Refills     metFORMIN (GLUCOPHAGE-XR) 500 MG 24 hr tablet [Pharmacy Med Name: METFORMIN  MG TABLET]  Last Written Prescription Date:  1/11/19  Last Fill Quantity: 360,  # refills: 0    Last office visit: 4/5/2019 with prescribing provider:  Lindsay Arroyo APRN CNP       Future Office Visit:     360 tablet 0     Sig: TAKE 2 TABS BY MOUTH TWICE DAILY WITH MEALS.       Biguanide Agents Passed - 4/12/2019  1:00 AM        Passed - Blood pressure less than 140/90 in past 6 months     BP Readings from Last 3 Encounters:   04/05/19 108/62   03/08/19 102/72   02/01/19 110/70                 Passed - Patient has documented LDL within the past 12 mos.     Recent Labs   Lab Test 01/25/19  0958   LDL 65             Passed - Patient has had a Microalbumin in the past 15 mos.     Recent Labs   Lab Test 01/25/19  0958   MICROL 7   UMALCR 4.05             Passed - Patient is age 10 or older        Passed - Patient has documented A1c within the specified period of time.     If HgbA1C is 8 or greater, it needs to be on file within the past 3 months.  If less than 8, must be on file within the past 6 months.     Recent Labs   Lab Test 01/25/19  0958   A1C 6.7*             Passed - Patient's CR is NOT>1.4 OR Patient's EGFR is NOT<45 within past 12 mos.     Recent Labs   Lab Test 01/25/19  0958   GFRESTIMATED 86   GFRESTBLACK >90       Recent Labs   Lab Test 01/25/19  0958   CR 1.00             Passed - Patient does NOT have a diagnosis of CHF.        Passed - Medication is active on med list        Passed - Recent (6 mo) or future (30 days) visit within the authorizing provider's specialty     Patient had office visit in the last 6 months or has a visit in the next 30 days with authorizing provider or within the authorizing provider's specialty.  See \"Patient Info\" tab in inbasket, or \"Choose Columns\" in Meds & Orders section of the refill encounter.              "

## 2019-04-15 RX ORDER — METFORMIN HCL 500 MG
TABLET, EXTENDED RELEASE 24 HR ORAL
Qty: 360 TABLET | Refills: 0 | Status: SHIPPED | OUTPATIENT
Start: 2019-04-15 | End: 2019-07-07

## 2019-04-15 NOTE — TELEPHONE ENCOUNTER
Prescription approved per [facility] Refill Protocol.  Suzi Jacobson RN  Message handled by Nurse Triage.

## 2019-04-22 ENCOUNTER — TRANSFERRED RECORDS (OUTPATIENT)
Dept: HEALTH INFORMATION MANAGEMENT | Facility: CLINIC | Age: 54
End: 2019-04-22

## 2019-05-09 ENCOUNTER — MYC REFILL (OUTPATIENT)
Dept: FAMILY MEDICINE | Facility: CLINIC | Age: 54
End: 2019-05-09

## 2019-05-09 DIAGNOSIS — E11.9 TYPE 2 DIABETES MELLITUS WITHOUT COMPLICATION, WITHOUT LONG-TERM CURRENT USE OF INSULIN (H): ICD-10-CM

## 2019-05-12 NOTE — TELEPHONE ENCOUNTER
"  Prescription approved per [facility], [facility] or [facility] refill protocol.  Rola TALBERT RN - [facility]        Requested Prescriptions   Pending Prescriptions Disp Refills     dulaglutide (TRULICITY) 1.5 MG/0.5ML pen 2 mL 2     Sig: Inject 1.5 mg Subcutaneous every 7 days       GLP-1 Agonists Protocol Passed - 5/9/2019  1:18 PM        Passed - Blood pressure less than 140/90 in past 6 months     BP Readings from Last 3 Encounters:   04/05/19 108/62   03/08/19 102/72   02/01/19 110/70                 Passed - LDL on file in past 12 months     Recent Labs   Lab Test 01/25/19  0958   LDL 65             Passed - Microalbumin on file in past 12 months     Recent Labs   Lab Test 01/25/19  0958   MICROL 7   UMALCR 4.05             Passed - HgbA1C in past 3 or 6 months     If HgbA1C is 8 or greater, it needs to be on file within the past 3 months.  If less than 8, must be on file within the past 6 months.     Recent Labs   Lab Test 01/25/19  0958   A1C 6.7*             Passed - Medication is active on med list        Passed - Patient is age 18 or older        Passed - Normal serum creatinine on file in past 12 months     Recent Labs   Lab Test 01/25/19  0958   CR 1.00             Passed - Recent (6 mo) or future (30 days) visit within the authorizing provider's specialty     Patient had office visit in the last 6 months or has a visit in the next 30 days with authorizing provider.  See \"Patient Info\" tab in inbasket, or \"Choose Columns\" in Meds & Orders section of the refill encounter.              "

## 2019-07-07 DIAGNOSIS — E11.9 TYPE 2 DIABETES MELLITUS WITHOUT COMPLICATION, WITHOUT LONG-TERM CURRENT USE OF INSULIN (H): ICD-10-CM

## 2019-07-07 NOTE — TELEPHONE ENCOUNTER
"Requested Prescriptions   Pending Prescriptions Disp Refills     metFORMIN (GLUCOPHAGE-XR) 500 MG 24 hr tablet [Pharmacy Med Name: METFORMIN HCL  MG TABLET] 360 tablet 0     Sig: TAKE 2 TABS BY MOUTH TWICE DAILY WITH MEALS.  Last Written Prescription Date:  04/15/2019  Last Fill Quantity: 360 tablet,  # refills: 0   Last office visit: 4/5/2019 with prescribing provider:  Lindsay Arroyo APRN CNP    Future Office Visit:           Biguanide Agents Passed - 7/7/2019 12:36 PM        Passed - Blood pressure less than 140/90 in past 6 months     BP Readings from Last 3 Encounters:   04/05/19 108/62   03/08/19 102/72   02/01/19 110/70                 Passed - Patient has documented LDL within the past 12 mos.     Recent Labs   Lab Test 01/25/19  0958   LDL 65             Passed - Patient has had a Microalbumin in the past 15 mos.     Recent Labs   Lab Test 01/25/19  0958   MICROL 7   UMALCR 4.05             Passed - Patient is age 10 or older        Passed - Patient has documented A1c within the specified period of time.     If HgbA1C is 8 or greater, it needs to be on file within the past 3 months.  If less than 8, must be on file within the past 6 months.     Recent Labs   Lab Test 01/25/19  0958   A1C 6.7*             Passed - Patient's CR is NOT>1.4 OR Patient's EGFR is NOT<45 within past 12 mos.     Recent Labs   Lab Test 01/25/19  0958   GFRESTIMATED 86   GFRESTBLACK >90       Recent Labs   Lab Test 01/25/19  0958   CR 1.00             Passed - Patient does NOT have a diagnosis of CHF.        Passed - Medication is active on med list        Passed - Recent (6 mo) or future (30 days) visit within the authorizing provider's specialty     Patient had office visit in the last 6 months or has a visit in the next 30 days with authorizing provider or within the authorizing provider's specialty.  See \"Patient Info\" tab in inbasket, or \"Choose Columns\" in Meds & Orders section of the refill encounter.              "

## 2019-07-08 RX ORDER — METFORMIN HCL 500 MG
TABLET, EXTENDED RELEASE 24 HR ORAL
Qty: 120 TABLET | Refills: 0 | Status: SHIPPED | OUTPATIENT
Start: 2019-07-08 | End: 2019-09-20

## 2019-07-08 NOTE — TELEPHONE ENCOUNTER
Medication is being filled for 1 time refill only due to:  Patient needs to be seen because due for DM recheck.

## 2019-07-24 ENCOUNTER — DOCUMENTATION ONLY (OUTPATIENT)
Dept: LAB | Facility: CLINIC | Age: 54
End: 2019-07-24

## 2019-07-24 DIAGNOSIS — E11.9 TYPE 2 DIABETES MELLITUS WITHOUT COMPLICATION, WITHOUT LONG-TERM CURRENT USE OF INSULIN (H): Primary | ICD-10-CM

## 2019-08-02 DIAGNOSIS — E11.9 TYPE 2 DIABETES MELLITUS WITHOUT COMPLICATION, WITHOUT LONG-TERM CURRENT USE OF INSULIN (H): ICD-10-CM

## 2019-08-02 LAB — HBA1C MFR BLD: 6.4 % (ref 0–5.6)

## 2019-08-02 PROCEDURE — 36415 COLL VENOUS BLD VENIPUNCTURE: CPT | Performed by: NURSE PRACTITIONER

## 2019-08-02 PROCEDURE — 83036 HEMOGLOBIN GLYCOSYLATED A1C: CPT | Performed by: NURSE PRACTITIONER

## 2019-08-05 DIAGNOSIS — E11.9 TYPE 2 DIABETES MELLITUS WITHOUT COMPLICATION, WITHOUT LONG-TERM CURRENT USE OF INSULIN (H): ICD-10-CM

## 2019-08-05 RX ORDER — DULAGLUTIDE 1.5 MG/.5ML
INJECTION, SOLUTION SUBCUTANEOUS
Qty: 6 ML | Refills: 0 | Status: SHIPPED | OUTPATIENT
Start: 2019-08-05 | End: 2020-01-20

## 2019-08-05 NOTE — TELEPHONE ENCOUNTER
"Requested Prescriptions   Pending Prescriptions Disp Refills     TRULICITY 1.5 MG/0.5ML pen [Pharmacy Med Name: TRULICITY 1.5 MG/0.5 ML PEN]  0     Sig: INJECT 1.5 MG SUBCUTANEOUS EVERY 7 DAYS  Last Written Prescription Date:  5/12/19  Last Fill Quantity: 2 mL,  # refills: 2   Last office visit: 4/5/2019 with prescribing provider:  Dina   Future Office Visit:   Next 5 appointments (look out 90 days)    Aug 09, 2019  4:00 PM ETIENNET  Stephanie Cavanaugh with VALDEZ Cowart CNP  [facility] [address] [id number] [phone]             GLP-1 Agonists Protocol Passed - 8/5/2019  1:14 AM        Passed - Blood pressure less than 140/90 in past 6 months     BP Readings from Last 3 Encounters:   04/05/19 108/62   03/08/19 102/72   02/01/19 110/70                 Passed - LDL on file in past 12 months     Recent Labs   Lab Test 01/25/19  0958   LDL 65             Passed - Microalbumin on file in past 12 months     Recent Labs   Lab Test 01/25/19  0958   MICROL 7   UMALCR 4.05             Passed - HgbA1C in past 3 or 6 months     If HgbA1C is 8 or greater, it needs to be on file within the past 3 months.  If less than 8, must be on file within the past 6 months.     Recent Labs   Lab Test 08/02/19  0906   A1C 6.4*             Passed - Medication is active on med list        Passed - Patient is age 18 or older        Passed - Normal serum creatinine on file in past 12 months     Recent Labs   Lab Test 01/25/19  0958   CR 1.00             Passed - Recent (6 mo) or future (30 days) visit within the authorizing provider's specialty     Patient had office visit in the last 6 months or has a visit in the next 30 days with authorizing provider.  See \"Patient Info\" tab in inbasket, or \"Choose Columns\" in Meds & Orders section of the refill encounter.              "

## 2019-08-09 ENCOUNTER — OFFICE VISIT (OUTPATIENT)
Dept: FAMILY MEDICINE | Facility: CLINIC | Age: 54
End: 2019-08-09
Payer: COMMERCIAL

## 2019-08-09 VITALS
HEART RATE: 90 BPM | OXYGEN SATURATION: 97 % | TEMPERATURE: 98.5 F | BODY MASS INDEX: 35 KG/M2 | DIASTOLIC BLOOD PRESSURE: 64 MMHG | WEIGHT: 233.6 LBS | SYSTOLIC BLOOD PRESSURE: 102 MMHG

## 2019-08-09 DIAGNOSIS — M25.511 ACUTE PAIN OF BOTH SHOULDERS: ICD-10-CM

## 2019-08-09 DIAGNOSIS — M25.512 ACUTE PAIN OF BOTH SHOULDERS: ICD-10-CM

## 2019-08-09 DIAGNOSIS — E11.628 TYPE 2 DIABETES MELLITUS WITH OTHER SKIN COMPLICATIONS (H): Primary | ICD-10-CM

## 2019-08-09 DIAGNOSIS — E66.01 MORBID OBESITY (H): ICD-10-CM

## 2019-08-09 DIAGNOSIS — R52 PAIN: ICD-10-CM

## 2019-08-09 PROCEDURE — 36415 COLL VENOUS BLD VENIPUNCTURE: CPT | Performed by: NURSE PRACTITIONER

## 2019-08-09 PROCEDURE — 99214 OFFICE O/P EST MOD 30 MIN: CPT | Performed by: NURSE PRACTITIONER

## 2019-08-09 PROCEDURE — 82306 VITAMIN D 25 HYDROXY: CPT | Performed by: NURSE PRACTITIONER

## 2019-08-09 RX ORDER — GLIPIZIDE 10 MG/1
10 TABLET, FILM COATED, EXTENDED RELEASE ORAL DAILY
Qty: 90 TABLET | Refills: 3 | Status: SHIPPED | OUTPATIENT
Start: 2019-08-09 | End: 2020-03-06

## 2019-08-09 NOTE — PROGRESS NOTES
SUBJECTIVE:   Everton Linn is a 54 year old male   who presents to clinic today for the following health issues:      Diabetes Follow-up      How often are you checking your blood sugar? A few times a month    What time of day are you checking your blood sugars (select all that apply)?  Before meals    Have you had any blood sugars above 200?  No    Have you had any blood sugars below 70?  No    What symptoms do you notice when your blood sugar is low?  Shaky    What concerns do you have today about your diabetes? None     Do you have any of these symptoms? (Select all that apply)  No numbness or tingling in feet.  No redness, sores or blisters on feet.  No complaints of excessive thirst.  No reports of blurry vision.  No significant changes to weight.     Have you had a diabetic eye exam in the last 12 months? Appointment August 23rd    Also:  Shoulder pain bilateral. Worse with sleeping on side.  Is working out with some weight lifting and is limiting workout routine.  Discussed options, patient wishes pt  BP Readings from Last 2 Encounters:   08/09/19 102/64   04/05/19 108/62     Hemoglobin A1C (%)   Date Value   08/02/2019 6.4 (H)   01/25/2019 6.7 (H)     LDL Cholesterol Calculated (mg/dL)   Date Value   01/25/2019 65   02/09/2018 55       Diabetes Management Resources    Amount of exercise or physical activity: 4-5 days/week for an average of 45-60 minutes    Problems taking medications regularly: No    Medication side effects: none    Diet: regular (no restrictions)        Reviewed and updated as needed this visit by clinical staff  Tobacco  Allergies  Meds  Problems  Med Hx  Surg Hx  Fam Hx  Soc Hx        Reviewed and updated as needed this visit by Provider  Allergies  Meds  Problems         Patient Active Problem List   Diagnosis     Dermatophytosis of nail     Inguinal hernia     Ventral hernia     SANTOS (obstructive sleep apnea)     Type 2 diabetes mellitus with other skin complications (H)      Cervical stenosis of spine     Morbid obesity (H)     Hyperlipidemia LDL goal <70     Bilateral shoulder pain     Past Surgical History:   Procedure Laterality Date     ARTHROSCOPY KNEE RT/LT  2007    left knee     ARTHROSCOPY KNEE RT/LT  2005    right     LAPAROSCOPIC CHOLECYSTECTOMY WITH CHOLANGIOGRAMS N/A 5/28/2016    Procedure: LAPAROSCOPIC CHOLECYSTECTOMY WITH CHOLANGIOGRAMS;  Surgeon: Jerry Bello MD;  Location:  OR       Social History     Tobacco Use     Smoking status: Never Smoker     Smokeless tobacco: Never Used   Substance Use Topics     Alcohol use: Yes     Comment: 2 -3 beers every few weeks     Family History   Problem Relation Age of Onset     C.A.D. Mother         CHF     Diabetes Mother      Hypertension Father      Cancer Father         Multiple Myloma     Musculoskeletal Disorder Paternal Grandmother         Paige Gehrig's Disease            ROS:  Review of Systems   Constitutional: Negative.  Negative for activity change and unexpected weight change.   HENT: Negative.    Eyes: Negative.    Respiratory: Negative for cough and shortness of breath.    Cardiovascular: Negative for chest pain and leg swelling.   Gastrointestinal: Negative.    Endocrine: Negative.    Psychiatric/Behavioral: Negative.          OBJECTIVE:                                                    /64   Pulse 90   Temp 98.5  F (36.9  C) (Oral)   Wt 106 kg (233 lb 9.6 oz)   SpO2 97%   BMI 35.00 kg/m   Body mass index is 35 kg/m .     Physical Exam   Constitutional: He appears well-developed and well-nourished.   HENT:   Head: Normocephalic.   Eyes: Conjunctivae are normal.   Neck: Neck supple.   Cardiovascular: Normal rate, regular rhythm and normal heart sounds.   Pulmonary/Chest: Effort normal and breath sounds normal.   Psychiatric: He has a normal mood and affect. His behavior is normal. Judgment and thought content normal.   Paitent demonstrates painful ROM with abduction of shoulders bilaterally      ASSESSMENT/PLAN:                                                      Problem List Items Addressed This Visit     Bilateral shoulder pain     Shoulder pain, worse when sleeping on shoulder.  Has pain with workout routine.  Refer to PT per patient request.  If not improving refer to Ortho         Relevant Orders    [facility]    Morbid obesity (H)     Continued monitoring.  Improved overall on trulicity but rising again.  Patient continues diet and exercise routines.               Relevant Medications    glipiZIDE (GLUCOTROL XL) 10 MG 24 hr tablet    Type 2 diabetes mellitus with other skin complications (H) - Primary     Improved control.  Weight improved on trulicity.  Will reduce glucotrol from 20 mg to 10 mg extended release daily.  Follow up 6 months as he is in control with A1C 6.4         Relevant Medications    glipiZIDE (GLUCOTROL XL) 10 MG 24 hr tablet      Other Visit Diagnoses     Pain        Relevant Orders    Vitamin D Deficiency (Completed)               There are no Patient Instructions on file for this visit.      Return in about 6 months (around 2/9/2020) for Recheck.      VALDEZ Cowart [facility]

## 2019-08-10 DIAGNOSIS — E78.5 HYPERLIPIDEMIA LDL GOAL <100: ICD-10-CM

## 2019-08-10 NOTE — TELEPHONE ENCOUNTER
"Requested Prescriptions   Pending Prescriptions Disp Refills     simvastatin (ZOCOR) 40 MG tablet [Pharmacy Med Name: SIMVASTATIN 40 MG TABLET] 90 tablet 1     Sig: TAKE 1 TABLET BY MOUTH AT BEDTIME  Last Written Prescription Date: 2/1/19  Last Fill Quantity: 90 tab,  # refills: 1   Last office visit: 8/9/2019 with prescribing provider:  Dina   Future Office Visit:         Statins Protocol Passed - 8/10/2019  8:32 AM        Passed - LDL on file in past 12 months     Recent Labs   Lab Test 01/25/19  0958   LDL 65             Passed - No abnormal creatine kinase in past 12 months     Recent Labs   Lab Test 08/17/13  0555                   Passed - Recent (12 mo) or future (30 days) visit within the authorizing provider's specialty     Patient had office visit in the last 12 months or has a visit in the next 30 days with authorizing provider or within the authorizing provider's specialty.  See \"Patient Info\" tab in inbasket, or \"Choose Columns\" in Meds & Orders section of the refill encounter.              Passed - Medication is active on med list        Passed - Patient is age 18 or older          "

## 2019-08-11 RX ORDER — SIMVASTATIN 40 MG
TABLET ORAL
Qty: 90 TABLET | Refills: 1 | Status: SHIPPED | OUTPATIENT
Start: 2019-08-11 | End: 2020-02-03

## 2019-08-12 LAB — DEPRECATED CALCIDIOL+CALCIFEROL SERPL-MC: 40 UG/L (ref 20–75)

## 2019-08-13 ENCOUNTER — THERAPY VISIT (OUTPATIENT)
Dept: PHYSICAL THERAPY | Facility: CLINIC | Age: 54
End: 2019-08-13
Payer: COMMERCIAL

## 2019-08-13 DIAGNOSIS — M25.511 ACUTE PAIN OF BOTH SHOULDERS: ICD-10-CM

## 2019-08-13 DIAGNOSIS — M25.512 ACUTE PAIN OF BOTH SHOULDERS: ICD-10-CM

## 2019-08-13 PROCEDURE — 97110 THERAPEUTIC EXERCISES: CPT | Mod: GP | Performed by: PHYSICAL THERAPIST

## 2019-08-13 PROCEDURE — 97162 PT EVAL MOD COMPLEX 30 MIN: CPT | Mod: GP | Performed by: PHYSICAL THERAPIST

## 2019-08-13 NOTE — PROGRESS NOTES
[facility] Initial Evaluation  Subjective:  The history is provided by the patient. No  was used.   Everton Linn being seen for B shoulder pain.   Problem began 5/12/2019 (rough estimate). Where condition occurred: during recreation / sport (possibly lifting weights).Problem occurred: Probably lifting weights, unsure  and reported as 5/10 on pain scale. General health as reported by patient is good. Pertinent medical history includes:  Concussions/dizziness, depression, diabetes, numbness/tingling and sleep disorder/apnea (numbness and tingling into R hand from spinal fusion).    Surgeries include:  Orthopedic surgery and other. Other surgery history details: Spinal fusion C6-C7, gallbladder removal.  Current medications:  Other. Other medications details: metformin, glipizide, trulicity.   Primary job tasks include:  Computer work and prolonged sitting.  Pain is described as aching and sharp (sharp when moving overhead) and is constant (gets more intense with use). Pain is the same all the time. Since onset symptoms are gradually worsening.  Previous treatment includes physical therapy (PT for neck before and after fusion). There was mild (ROM improvement but numbness/tingling still in R hand) improvement following previous treatment.   Patient is . Restrictions include:  Working in normal job without restrictions.    Barriers include:  None as reported by patient.  Red flags:  None as reported by patient.  Type of problem:  Left shoulder   Condition occurred with:  Lifting. This is a new condition   Problem details: Possibly from lifting weights overhead, bench press or dips.    7 hours of sleep on average, L shoulder wakes him once a night. Preferred sleeping position on the back because of CPAP mask..   Patient reports pain:  Anterior, lateral and upper arm. Radiates to:  Elbow. Associated symptoms:  Loss of strength and loss of motion/stiffness.  Symptoms are exacerbated by using arm at shoulder level, using arm overhead, using arm behind back and lying on extremity (driving) and relieved by ice, rest and NSAID's.                      Objective:  Standing Alignment:    Cervical/Thoracic:  Forward head  Shoulder/UE:  Rounded shoulders                                       Shoulder Evaluation:  ROM:  AROM:    Flexion:  Left:  121 (PDM)    Right:  110 (ERP)    Abduction:  Left: 109 (PDM)   Right:  92 (ERP)                Flexion/External Rotation:  Left:  Back of the head (PDM)    Right:  C4 (PDM)  Extension/Internal Rotation:  Left:  L5 (+)    Right:  T10          Strength:  : very weak scapular strength, B.  Flexion: Left:4+/5    Pain: +    Right: 5/5     Pain:     Abduction:  Right: 5/5     Pain:    Internal Rotation:  Left:5/5     Pain:    Right: 5/5     Pain:  External Rotation:   Left:4/5      Pain:+   Right:5/5     Pain:              Special Tests:  Special tests assessed shoulder: Empty can (+) on L.      Palpation:  Palpation assessed shoulder: TTP over anterior deltoid, but not directly over supraspinatus tendon.    Left shoulder tenderness not present at: Supraspinatus                                       General     ROS    Assessment/Plan:    Patient is a 54 year old male with both sides shoulder complaints.    Patient has the following significant findings with corresponding treatment plan.                Diagnosis 1:  Bilateral shoulder pain  Pain -  hot/cold therapy and home program  Decreased ROM/flexibility - manual therapy, therapeutic exercise and home program  Decreased joint mobility - manual therapy, therapeutic exercise and home program  Decreased strength - therapeutic exercise, therapeutic activities and home program  Decreased function - therapeutic activities and home program  Impaired posture - neuro re-education and home program    Therapy Evaluation Codes:   1) History comprised of:   Personal factors that impact the plan of care:       Time since onset of symptoms and Work status.    Comorbidity factors that impact the plan of care are:      Concussion, Diabetes, Depression, Numbness/tingling and Sleep disorder/apnea.     Medications impacting care: Diabetes medication.  2) Examination of Body Systems comprised of:   Body structures and functions that impact the plan of care:      Cervical spine and Shoulder.   Activity limitations that impact the plan of care are:      Dressing, Lifting, Laying down and Reaching overhead.  3) Clinical presentation characteristics are:   Evolving/Changing.  4) Decision-Making    Moderate complexity using standardized patient assessment instrument and/or measureable assessment of functional outcome.  Cumulative Therapy Evaluation is: Moderate complexity.    Previous and current functional limitations:  (See Goal Flow Sheet for this information)    Short term and Long term goals: (See Goal Flow Sheet for this information)     Communication ability:  Patient appears to be able to clearly communicate and understand verbal and written communication and follow directions correctly.  Treatment Explanation - The following has been discussed with the patient:   RX ordered/plan of care  Anticipated outcomes  Possible risks and side effects  This patient would benefit from PT intervention to resume normal activities.   Rehab potential is fair.    Frequency:  1 X week, once daily  Duration:  for 8-10 weeks  Discharge Plan:  Achieve all LTG.  Independent in home treatment program.  Return to previous functional level by discharge.  Reach maximal therapeutic benefit.    Please refer to the daily flowsheet for treatment today, total treatment time and time spent performing 1:1 timed codes.     Examination was performed by Senthil Crespo, SPT, ATC, under full supervision by NEEL DelarosaT, Cert MDT.

## 2019-08-20 ENCOUNTER — THERAPY VISIT (OUTPATIENT)
Dept: PHYSICAL THERAPY | Facility: CLINIC | Age: 54
End: 2019-08-20
Payer: COMMERCIAL

## 2019-08-20 DIAGNOSIS — M25.511 BILATERAL SHOULDER PAIN, UNSPECIFIED CHRONICITY: ICD-10-CM

## 2019-08-20 DIAGNOSIS — M25.512 BILATERAL SHOULDER PAIN, UNSPECIFIED CHRONICITY: ICD-10-CM

## 2019-08-20 PROCEDURE — 97112 NEUROMUSCULAR REEDUCATION: CPT | Mod: GP | Performed by: PHYSICAL THERAPIST

## 2019-08-20 PROCEDURE — 97110 THERAPEUTIC EXERCISES: CPT | Mod: GP | Performed by: PHYSICAL THERAPIST

## 2019-08-23 ENCOUNTER — TRANSFERRED RECORDS (OUTPATIENT)
Dept: MULTI SPECIALTY CLINIC | Facility: CLINIC | Age: 54
End: 2019-08-23

## 2019-08-23 ASSESSMENT — ENCOUNTER SYMPTOMS
COUGH: 0
CONSTITUTIONAL NEGATIVE: 1
ENDOCRINE NEGATIVE: 1
UNEXPECTED WEIGHT CHANGE: 0
EYES NEGATIVE: 1
GASTROINTESTINAL NEGATIVE: 1
PSYCHIATRIC NEGATIVE: 1
ACTIVITY CHANGE: 0
SHORTNESS OF BREATH: 0

## 2019-08-24 NOTE — ASSESSMENT & PLAN NOTE
Improved control.  Weight improved on trulicity.  Will reduce glucotrol from 20 mg to 10 mg extended release daily.  Follow up 6 months as he is in control with A1C 6.4

## 2019-08-24 NOTE — ASSESSMENT & PLAN NOTE
Shoulder pain, worse when sleeping on shoulder.  Has pain with workout routine.  Refer to PT per patient request.  If not improving refer to Ortho

## 2019-08-24 NOTE — ASSESSMENT & PLAN NOTE
Continued monitoring.  Improved overall on trulicity but rising again.  Patient continues diet and exercise routines.

## 2019-09-03 ENCOUNTER — TELEPHONE (OUTPATIENT)
Dept: FAMILY MEDICINE | Facility: CLINIC | Age: 54
End: 2019-09-03

## 2019-09-03 ENCOUNTER — THERAPY VISIT (OUTPATIENT)
Dept: PHYSICAL THERAPY | Facility: CLINIC | Age: 54
End: 2019-09-03
Payer: COMMERCIAL

## 2019-09-03 DIAGNOSIS — M25.511 BILATERAL SHOULDER PAIN, UNSPECIFIED CHRONICITY: ICD-10-CM

## 2019-09-03 DIAGNOSIS — M25.512 BILATERAL SHOULDER PAIN, UNSPECIFIED CHRONICITY: ICD-10-CM

## 2019-09-03 PROCEDURE — 97110 THERAPEUTIC EXERCISES: CPT | Mod: GP | Performed by: PHYSICAL THERAPIST

## 2019-09-03 PROCEDURE — 97112 NEUROMUSCULAR REEDUCATION: CPT | Mod: GP | Performed by: PHYSICAL THERAPIST

## 2019-09-03 NOTE — TELEPHONE ENCOUNTER
Type of outreach:  Patient had diabetic eye exam done 8/23/19. Will forward results.  Health Maintenance Due   Topic Date Due     PREVENTIVE CARE VISIT  01/28/2011     PHQ-9  07/22/2019     EYE EXAM  08/17/2019     INFLUENZA VACCINE (1) 09/01/2019     Sara MONIQUE M.A.

## 2019-09-11 NOTE — TELEPHONE ENCOUNTER
Diabetes:  Continue efforts with weight loss. Increase exercise    Combine Inovkana + metformin - > Invokamet /1000 twice daily. Hopefully this will be better tolerated. Continue Ozempic 0.5 mg weekly. Can increase to 1.0 mg weekly in future if desired  Continue Lantus - lower does to 25 units  ** Lower dose by 5 unit increments further for values < 90    Take b12 twice weekly. Goals for blood sugars:  ** focus on checking bedtime and fasting sugars  Fasting  (less than 150)  Other times -  (less than 180). Cholesterol   continue rosuvastatin - 5 mg    Blood pressure:  Stop losartan.  We will follow Prescription approved per [facility], [facility] or [facility] refill protocol.  Rola TALBERT RN - Triage  [facility]

## 2019-09-16 ENCOUNTER — THERAPY VISIT (OUTPATIENT)
Dept: PHYSICAL THERAPY | Facility: CLINIC | Age: 54
End: 2019-09-16
Payer: COMMERCIAL

## 2019-09-16 DIAGNOSIS — M25.512 BILATERAL SHOULDER PAIN, UNSPECIFIED CHRONICITY: ICD-10-CM

## 2019-09-16 DIAGNOSIS — M25.511 BILATERAL SHOULDER PAIN, UNSPECIFIED CHRONICITY: ICD-10-CM

## 2019-09-16 PROCEDURE — 97110 THERAPEUTIC EXERCISES: CPT | Mod: GP | Performed by: PHYSICAL THERAPIST

## 2019-09-16 PROCEDURE — 97140 MANUAL THERAPY 1/> REGIONS: CPT | Mod: GP | Performed by: PHYSICAL THERAPIST

## 2019-09-20 ENCOUNTER — MYC REFILL (OUTPATIENT)
Dept: FAMILY MEDICINE | Facility: CLINIC | Age: 54
End: 2019-09-20

## 2019-09-20 DIAGNOSIS — E11.9 TYPE 2 DIABETES MELLITUS WITHOUT COMPLICATION, WITHOUT LONG-TERM CURRENT USE OF INSULIN (H): ICD-10-CM

## 2019-09-20 RX ORDER — METFORMIN HCL 500 MG
1000 TABLET, EXTENDED RELEASE 24 HR ORAL 2 TIMES DAILY WITH MEALS
Qty: 360 TABLET | Refills: 1 | Status: SHIPPED | OUTPATIENT
Start: 2019-09-20 | End: 2020-03-06

## 2019-09-20 NOTE — TELEPHONE ENCOUNTER
"Metformin XR 500mg    Last Written Prescription Date:  7/8/2019  Last Fill Quantity: 120,  # refills: 0   Last office visit: 8/9/2019 with prescribing provider:  SYL 6 months   Future Office Visit:     Requested Prescriptions   Pending Prescriptions Disp Refills     metFORMIN (GLUCOPHAGE-XR) 500 MG 24 hr tablet 120 tablet 0       Biguanide Agents Passed - 9/20/2019  8:20 AM        Passed - Blood pressure less than 140/90 in past 6 months     BP Readings from Last 3 Encounters:   08/09/19 102/64   04/05/19 108/62   03/08/19 102/72                 Passed - Patient has documented LDL within the past 12 mos.     Recent Labs   Lab Test 01/25/19  0958   LDL 65             Passed - Patient has had a Microalbumin in the past 15 mos.     Recent Labs   Lab Test 01/25/19  0958   MICROL 7   UMALCR 4.05             Passed - Patient is age 10 or older        Passed - Patient has documented A1c within the specified period of time.     If HgbA1C is 8 or greater, it needs to be on file within the past 3 months.  If less than 8, must be on file within the past 6 months.     Recent Labs   Lab Test 08/02/19  0906   A1C 6.4*             Passed - Patient's CR is NOT>1.4 OR Patient's EGFR is NOT<45 within past 12 mos.     Recent Labs   Lab Test 01/25/19  0958   GFRESTIMATED 86   GFRESTBLACK >90       Recent Labs   Lab Test 01/25/19  0958   CR 1.00             Passed - Patient does NOT have a diagnosis of CHF.        Passed - Medication is active on med list        Passed - Recent (6 mo) or future (30 days) visit within the authorizing provider's specialty     Patient had office visit in the last 6 months or has a visit in the next 30 days with authorizing provider or within the authorizing provider's specialty.  See \"Patient Info\" tab in inbasket, or \"Choose Columns\" in Meds & Orders section of the refill encounter.            Prescription approved per [facility] Refill Protocol.    Doreen Villegas RN       "

## 2019-09-30 ENCOUNTER — THERAPY VISIT (OUTPATIENT)
Dept: PHYSICAL THERAPY | Facility: CLINIC | Age: 54
End: 2019-09-30
Payer: COMMERCIAL

## 2019-09-30 DIAGNOSIS — M25.512 BILATERAL SHOULDER PAIN, UNSPECIFIED CHRONICITY: ICD-10-CM

## 2019-09-30 DIAGNOSIS — M25.511 BILATERAL SHOULDER PAIN, UNSPECIFIED CHRONICITY: ICD-10-CM

## 2019-09-30 PROCEDURE — 97110 THERAPEUTIC EXERCISES: CPT | Mod: GP | Performed by: PHYSICAL THERAPIST

## 2019-09-30 PROCEDURE — 97112 NEUROMUSCULAR REEDUCATION: CPT | Mod: GP | Performed by: PHYSICAL THERAPIST

## 2019-09-30 PROCEDURE — 97140 MANUAL THERAPY 1/> REGIONS: CPT | Mod: GP | Performed by: PHYSICAL THERAPIST

## 2019-10-01 ENCOUNTER — HEALTH MAINTENANCE LETTER (OUTPATIENT)
Age: 54
End: 2019-10-01

## 2020-01-17 DIAGNOSIS — E11.9 TYPE 2 DIABETES MELLITUS WITHOUT COMPLICATION, WITHOUT LONG-TERM CURRENT USE OF INSULIN (H): ICD-10-CM

## 2020-01-17 NOTE — TELEPHONE ENCOUNTER
"Requested Prescriptions   Pending Prescriptions Disp Refills     TRULICITY 1.5 MG/0.5ML pen [Pharmacy Med Name: TRULICITY 1.5 MG/0.5 ML PEN] 6 mL 0     Sig: INJECT 1.5 MG SUBCUTANEOUSLY EVERY 7 DAYS   Last Written Prescription Date:  8/5/19  Last Fill Quantity: 6 ml,  # refills: 0   Last office visit: 8/9/2019 with prescribing provider:  Lindsay Arroyo APRN CNP    Future Office Visit:        GLP-1 Agonists Protocol Passed - 1/17/2020  1:57 PM        Passed - Blood pressure less than 140/90 in past 6 months     BP Readings from Last 3 Encounters:   08/09/19 102/64   04/05/19 108/62   03/08/19 102/72                 Passed - LDL on file in past 12 months     Recent Labs   Lab Test 01/25/19  0958   LDL 65             Passed - Microalbumin on file in past 12 months     Recent Labs   Lab Test 01/25/19  0958   MICROL 7   UMALCR 4.05             Passed - HgbA1C in past 3 or 6 months     If HgbA1C is 8 or greater, it needs to be on file within the past 3 months.  If less than 8, must be on file within the past 6 months.     Recent Labs   Lab Test 08/02/19  0906   A1C 6.4*             Passed - Medication is active on med list        Passed - Patient is age 18 or older        Passed - Normal serum creatinine on file in past 12 months     Recent Labs   Lab Test 01/25/19  0958   CR 1.00             Passed - Recent (6 mo) or future (30 days) visit within the authorizing provider's specialty     Patient had office visit in the last 6 months or has a visit in the next 30 days with authorizing provider.  See \"Patient Info\" tab in inbasket, or \"Choose Columns\" in Meds & Orders section of the refill encounter.             "

## 2020-01-20 PROBLEM — M25.511 BILATERAL SHOULDER PAIN: Status: RESOLVED | Noted: 2019-08-20 | Resolved: 2020-01-20

## 2020-01-20 PROBLEM — M25.512 BILATERAL SHOULDER PAIN: Status: RESOLVED | Noted: 2019-08-20 | Resolved: 2020-01-20

## 2020-01-20 RX ORDER — DULAGLUTIDE 1.5 MG/.5ML
INJECTION, SOLUTION SUBCUTANEOUS
Qty: 3 ML | Refills: 0 | Status: SHIPPED | OUTPATIENT
Start: 2020-01-20 | End: 2020-02-11

## 2020-01-20 NOTE — PROGRESS NOTES
Subjective:  HPI                    Objective:  System    Physical Exam    General     ROS    Assessment/Plan:    DISCHARGE REPORT    Progress reporting period is from 8/13/2019 to 9/30/2019.       SUBJECTIVE  Subjective changes noted by patient:  As of last PT visit on 9/30/2019, pt reports that his R shoulder was starting to hurt him again in posterior shoulder andd today his L shoulder is a little more achy - hasn't been bad for the past couple of days.  He has been doing his neck and thoracic stretches and he doesn't have the shoulder blade pain anymore.  He has not returned for any further PT since 9/30/2019, so current subjective changes are unknown.  Changes in function:  Yes (See Goal flowsheet attached for changes in current functional level)  Adverse reaction to treatment or activity: activity - reaching activities.    OBJECTIVE  Changes noted in objective findings:  The objective findings below are from DOS 9/30/2019.  Objective: Current pain is anterior L shoulder.  L shoulder AROM flex 148 (ERP), abd 120 IERP)     ASSESSMENT/PLAN  STG/LTGs have been met or progress has been made towards goals:  Yes (See Goal flow sheet completed today.)  Assessment of Progress: The patient has not returned to therapy. Current status is unknown.  Self Management Plans:  Patient has been instructed in a home treatment program.    Recommendations:  Pt will be discharged from PT.

## 2020-01-20 NOTE — TELEPHONE ENCOUNTER
Medication is being filled for 1 time refill only due to:  Patient needs to be seen because February 2020 and is due for lab work.     Will forward to the station, please try and call the pt to schedule an appt for a d/m f/u.  Thanks!

## 2020-02-01 DIAGNOSIS — E78.5 HYPERLIPIDEMIA LDL GOAL <100: ICD-10-CM

## 2020-02-03 RX ORDER — SIMVASTATIN 40 MG
TABLET ORAL
Qty: 30 TABLET | Refills: 0 | Status: SHIPPED | OUTPATIENT
Start: 2020-02-03 | End: 2020-02-26

## 2020-02-03 NOTE — TELEPHONE ENCOUNTER
Medication is being filled for 1 time refill only due to:  Patient needs to be seen because and fasting labs, has appts scheduled for February 2020.

## 2020-02-03 NOTE — TELEPHONE ENCOUNTER
"Requested Prescriptions   Pending Prescriptions Disp Refills     simvastatin (ZOCOR) 40 MG tablet [Pharmacy Med Name: SIMVASTATIN 40 MG TABLET] 90 tablet 1     Sig: TAKE 1 TABLET BY MOUTH EVERYDAY AT BEDTIME   Last Written Prescription Date:  8/11/19  Last Fill Quantity: 90,  # refills: 1   Last office visit: 8/9/2019 with prescribing provider:  Lindsay Arroyo APRN CNP   Future Office Visit:   Next 5 appointments (look out 90 days)    Feb 21, 2020  9:10 AM CST  Lab visit with  LAB  [facility] [address] [facility] [address]  [phone]   Feb 28, 2020  4:00 PM CST  MyChart Long with VALDEZ Cowart CNP  [facility] [address] [address]  [phone]             Statins Protocol Failed - 2/1/2020 10:17 AM        Failed - LDL on file in past 12 months     Recent Labs   Lab Test 01/25/19  0958   LDL 65             Passed - No abnormal creatine kinase in past 12 months     Recent Labs   Lab Test 08/17/13  0555                   Passed - Recent (12 mo) or future (30 days) visit within the authorizing provider's specialty     Patient has had an office visit with the authorizing provider or a provider within the authorizing providers department within the previous 12 mos or has a future within next 30 days. See \"Patient Info\" tab in inbasket, or \"Choose Columns\" in Meds & Orders section of the refill encounter.              Passed - Medication is active on med list        Passed - Patient is age 18 or older         "

## 2020-02-10 ENCOUNTER — DOCUMENTATION ONLY (OUTPATIENT)
Dept: LAB | Facility: CLINIC | Age: 55
End: 2020-02-10

## 2020-02-10 DIAGNOSIS — E11.628 TYPE 2 DIABETES MELLITUS WITH OTHER SKIN COMPLICATIONS (H): Primary | ICD-10-CM

## 2020-02-10 NOTE — PROGRESS NOTES
Patient is requesting lab only before appointment. Orders are pended for you to sign and associate, thanks Taryn

## 2020-02-11 DIAGNOSIS — E11.9 TYPE 2 DIABETES MELLITUS WITHOUT COMPLICATION, WITHOUT LONG-TERM CURRENT USE OF INSULIN (H): ICD-10-CM

## 2020-02-11 RX ORDER — DULAGLUTIDE 1.5 MG/.5ML
INJECTION, SOLUTION SUBCUTANEOUS
Qty: 6 ML | Refills: 0 | Status: SHIPPED | OUTPATIENT
Start: 2020-02-11 | End: 2020-05-08

## 2020-02-11 NOTE — TELEPHONE ENCOUNTER
"Requested Prescriptions   Pending Prescriptions Disp Refills     TRULICITY 1.5 MG/0.5ML pen [Pharmacy Med Name: TRULICITY 1.5 MG/0.5 ML PEN] 6 mL 0     Sig: INJECT 1.5 MG SUBCUTANEOUSLY EVERY 7 DAYSDUE FOR APPT.   Last Written Prescription Date:  1/20/20  Last Fill Quantity: 3 ml,  # refills: 0   Last office visit: 8/9/2019 with prescribing provider:  Lindsay Arroyo APRN CNP   Future Office Visit:   Next 5 appointments (look out 90 days)    Feb 21, 2020  9:10 AM CST  Lab visit with  [facility]  [facility] [address] [facility] [address] [address]  [phone]   Feb 28, 2020  4:00 PM CST  MyChart Long with VALDEZ Cowart CNP  [facility] ([facility]) [address] [facility] [address]  [phone]             GLP-1 Agonists Protocol Failed - 2/11/2020  2:43 PM        Failed - Blood pressure less than 140/90 in past 6 months     BP Readings from Last 3 Encounters:   08/09/19 102/64   04/05/19 108/62   03/08/19 102/72                 Failed - LDL on file in past 12 months     Recent Labs   Lab Test 01/25/19  0958   LDL 65             Failed - HgbA1C in past 3 or 6 months     If HgbA1C is 8 or greater, it needs to be on file within the past 3 months.  If less than 8, must be on file within the past 6 months.     Recent Labs   Lab Test 08/02/19  0906   A1C 6.4*             Failed - Normal serum creatinine on file in past 12 months     Recent Labs   Lab Test 01/25/19  0958   CR 1.00             Passed - Microalbumin on file in past 12 months     Recent Labs   Lab Test 01/25/19  0958   MICROL 7   UMALCR 4.05             Passed - Medication is active on med list        Passed - Patient is age 18 or older        Passed - Recent (6 mo) or future (30 days) visit within the authorizing provider's specialty     Patient had office visit in the last 6 months or has a visit in the next 30 days with authorizing provider.  See \"Patient " "Info\" tab in inbasket, or \"Choose Columns\" in Meds & Orders section of the refill encounter.             "

## 2020-02-12 NOTE — TELEPHONE ENCOUNTER
"Patient has upcoming/ pending appointment:    Next 5 appointments (look out 90 days)    Feb 21, 2020  9:10 AM CST  Lab visit with  LAB  [facility] ([facility]) [address] [facility] [address] [address] [phone]   Feb 28, 2020  4:00 PM CST  MyChart Long with VALDEZ Cowart CNP  [facility] ([facility]) [address] [facility] [address] [phone]          Rx refilled per [facility] refill protocol.    Violetta FERNANDEZN, RN      Requested Prescriptions   Signed Prescriptions Disp Refills    TRULICITY 1.5 MG/0.5ML pen 6 mL 0     Sig: INJECT 1.5 MG SUBCUTANEOUSLY EVERY 7 DAYS*DUE FOR APPT.       GLP-1 Agonists Protocol Failed - 2/11/2020  3:46 PM        Failed - Blood pressure less than 140/90 in past 6 months     BP Readings from Last 3 Encounters:   08/09/19 102/64   04/05/19 108/62   03/08/19 102/72                 Failed - LDL on file in past 12 months     Recent Labs   Lab Test 01/25/19  0958   LDL 65             Failed - HgbA1C in past 3 or 6 months     If HgbA1C is 8 or greater, it needs to be on file within the past 3 months.  If less than 8, must be on file within the past 6 months.     Recent Labs   Lab Test 08/02/19  0906   A1C 6.4*             Failed - Normal serum creatinine on file in past 12 months     Recent Labs   Lab Test 01/25/19  0958   CR 1.00             Passed - Microalbumin on file in past 12 months     Recent Labs   Lab Test 01/25/19  0958   MICROL 7   UMALCR 4.05             Passed - Medication is active on med list        Passed - Patient is age 18 or older        Passed - Recent (6 mo) or future (30 days) visit within the authorizing provider's specialty     Patient had office visit in the last 6 months or has a visit in the next 30 days with authorizing provider.  See \"Patient Info\" tab in inbasket, or \"Choose Columns\" in Meds & Orders section of the refill encounter.              "

## 2020-02-26 DIAGNOSIS — E78.5 HYPERLIPIDEMIA LDL GOAL <100: ICD-10-CM

## 2020-02-26 RX ORDER — SIMVASTATIN 40 MG
TABLET ORAL
Qty: 30 TABLET | Refills: 0 | Status: SHIPPED | OUTPATIENT
Start: 2020-02-26 | End: 2020-03-23

## 2020-02-26 NOTE — TELEPHONE ENCOUNTER
"Routing refill request to provider for review/approval because:  Will given x1, please authorize additional will refill if appropriate.  Next 5 appointments (look out 90 days)    Mar 06, 2020  3:20 PM CST  Office Visit with VALDEZ Cowart CNP  [facility] [address] [facility] [address] [phone]          Requested Prescriptions   Pending Prescriptions Disp Refills     simvastatin (ZOCOR) 40 MG tablet [Pharmacy Med Name: SIMVASTATIN 40 MG TABLET] 30 tablet 0     Sig: TAKE 1 TABLET BY MOUTH EVERYDAY AT BEDTIME       Statins Protocol Failed - 2/26/2020  1:28 AM        Failed - LDL on file in past 12 months     Recent Labs   Lab Test 01/25/19  0958   LDL 65           Passed - No abnormal creatine kinase in past 12 months     Recent Labs   Lab Test 08/17/13  0555               Passed - Recent (12 mo) or future (30 days) visit within the authorizing provider's specialty     Patient has had an office visit with the authorizing provider or a provider within the authorizing providers department within the previous 12 mos or has a future within next 30 days. See \"Patient Info\" tab in inbasket, or \"Choose Columns\" in Meds & Orders section of the refill encounter.          Passed - Medication is active on med list        Passed - Patient is age 18 or older        GOLDIE Fuentes, RN    "

## 2020-02-28 DIAGNOSIS — E11.628 TYPE 2 DIABETES MELLITUS WITH OTHER SKIN COMPLICATIONS (H): ICD-10-CM

## 2020-02-28 LAB
ALBUMIN SERPL-MCNC: 4.2 G/DL (ref 3.4–5)
ALP SERPL-CCNC: 56 U/L (ref 40–150)
ALT SERPL W P-5'-P-CCNC: 39 U/L (ref 0–70)
ANION GAP SERPL CALCULATED.3IONS-SCNC: 4 MMOL/L (ref 3–14)
AST SERPL W P-5'-P-CCNC: 19 U/L (ref 0–45)
BILIRUB SERPL-MCNC: 0.9 MG/DL (ref 0.2–1.3)
BUN SERPL-MCNC: 11 MG/DL (ref 7–30)
CALCIUM SERPL-MCNC: 9.4 MG/DL (ref 8.5–10.1)
CHLORIDE SERPL-SCNC: 101 MMOL/L (ref 94–109)
CHOLEST SERPL-MCNC: 130 MG/DL
CO2 SERPL-SCNC: 31 MMOL/L (ref 20–32)
CREAT SERPL-MCNC: 0.95 MG/DL (ref 0.66–1.25)
CREAT UR-MCNC: 187 MG/DL
GFR SERPL CREATININE-BSD FRML MDRD: >90 ML/MIN/{1.73_M2}
GLUCOSE SERPL-MCNC: 170 MG/DL (ref 70–99)
HBA1C MFR BLD: 7.5 % (ref 0–5.6)
HDLC SERPL-MCNC: 38 MG/DL
LDLC SERPL CALC-MCNC: 58 MG/DL
MICROALBUMIN UR-MCNC: 7 MG/L
MICROALBUMIN/CREAT UR: 3.53 MG/G CR (ref 0–17)
NONHDLC SERPL-MCNC: 92 MG/DL
POTASSIUM SERPL-SCNC: 4.6 MMOL/L (ref 3.4–5.3)
PROT SERPL-MCNC: 8.3 G/DL (ref 6.8–8.8)
SODIUM SERPL-SCNC: 136 MMOL/L (ref 133–144)
TRIGL SERPL-MCNC: 169 MG/DL

## 2020-02-28 PROCEDURE — 80061 LIPID PANEL: CPT | Performed by: NURSE PRACTITIONER

## 2020-02-28 PROCEDURE — 83036 HEMOGLOBIN GLYCOSYLATED A1C: CPT | Performed by: NURSE PRACTITIONER

## 2020-02-28 PROCEDURE — 82043 UR ALBUMIN QUANTITATIVE: CPT | Performed by: NURSE PRACTITIONER

## 2020-02-28 PROCEDURE — 36415 COLL VENOUS BLD VENIPUNCTURE: CPT | Performed by: NURSE PRACTITIONER

## 2020-02-28 PROCEDURE — 80053 COMPREHEN METABOLIC PANEL: CPT | Performed by: NURSE PRACTITIONER

## 2020-03-06 ENCOUNTER — OFFICE VISIT (OUTPATIENT)
Dept: FAMILY MEDICINE | Facility: CLINIC | Age: 55
End: 2020-03-06
Payer: COMMERCIAL

## 2020-03-06 VITALS
HEART RATE: 82 BPM | DIASTOLIC BLOOD PRESSURE: 60 MMHG | SYSTOLIC BLOOD PRESSURE: 100 MMHG | TEMPERATURE: 98.1 F | OXYGEN SATURATION: 98 % | WEIGHT: 243.2 LBS | BODY MASS INDEX: 36.44 KG/M2

## 2020-03-06 DIAGNOSIS — E11.9 TYPE 2 DIABETES MELLITUS WITHOUT COMPLICATION, WITHOUT LONG-TERM CURRENT USE OF INSULIN (H): ICD-10-CM

## 2020-03-06 PROCEDURE — 99213 OFFICE O/P EST LOW 20 MIN: CPT | Performed by: NURSE PRACTITIONER

## 2020-03-06 RX ORDER — GLIPIZIDE 10 MG/1
10 TABLET, FILM COATED, EXTENDED RELEASE ORAL DAILY
Qty: 90 TABLET | Refills: 1 | Status: SHIPPED | OUTPATIENT
Start: 2020-03-06 | End: 2020-09-02

## 2020-03-06 RX ORDER — METFORMIN HCL 500 MG
1000 TABLET, EXTENDED RELEASE 24 HR ORAL 2 TIMES DAILY WITH MEALS
Qty: 360 TABLET | Refills: 1 | Status: SHIPPED | OUTPATIENT
Start: 2020-03-06 | End: 2020-09-01

## 2020-03-06 ASSESSMENT — ANXIETY QUESTIONNAIRES
6. BECOMING EASILY ANNOYED OR IRRITABLE: NOT AT ALL
1. FEELING NERVOUS, ANXIOUS, OR ON EDGE: NOT AT ALL
3. WORRYING TOO MUCH ABOUT DIFFERENT THINGS: NOT AT ALL
5. BEING SO RESTLESS THAT IT IS HARD TO SIT STILL: NOT AT ALL
IF YOU CHECKED OFF ANY PROBLEMS ON THIS QUESTIONNAIRE, HOW DIFFICULT HAVE THESE PROBLEMS MADE IT FOR YOU TO DO YOUR WORK, TAKE CARE OF THINGS AT HOME, OR GET ALONG WITH OTHER PEOPLE: NOT DIFFICULT AT ALL
2. NOT BEING ABLE TO STOP OR CONTROL WORRYING: NOT AT ALL

## 2020-03-06 ASSESSMENT — PATIENT HEALTH QUESTIONNAIRE - PHQ9
SUM OF ALL RESPONSES TO PHQ QUESTIONS 1-9: 2
5. POOR APPETITE OR OVEREATING: SEVERAL DAYS

## 2020-03-06 NOTE — PROGRESS NOTES
SUBJECTIVE:   Everton Linn is a 54 year old male   who presents to clinic today for the following health issues:      Diabetes Follow-up      How often are you checking your blood sugar? Not at all    What concerns do you have today about your diabetes? None     Do you have any of these symptoms? (Select all that apply)  No numbness or tingling in feet.  No redness, sores or blisters on feet.  No complaints of excessive thirst.  No reports of blurry vision.  No significant changes to weight.    Worsened dietary control and less exercise in the last six month.  Father passed away 3 months ago.  Patient coping well and denies any depressive symptoms.  Has new dog and feels that he is slowly getting back into exercise routine and eating well again. He  Would like to see his A1c down to 7.0  BP Readings from Last 2 Encounters:   03/06/20 100/60   08/09/19 102/64     Hemoglobin A1C (%)   Date Value   02/28/2020 7.5 (H)   08/02/2019 6.4 (H)     LDL Cholesterol Calculated (mg/dL)   Date Value   02/28/2020 58   01/25/2019 65               Hyperlipidemia Follow-Up      Are you regularly taking any medication or supplement to lower your cholesterol?   Yes- .    Are you having muscle aches or other side effects that you think could be caused by your cholesterol lowering medication?  No      How many servings of fruits and vegetables do you eat daily?  0-1    On average, how many sweetened beverages do you drink each day (Examples: soda, juice, sweet tea, etc.  Do NOT count diet or artificially sweetened beverages)?   4    How many days per week do you exercise enough to make your heart beat faster? 7    How many minutes a day do you exercise enough to make your heart beat faster? 30 - 60    How many days per week do you miss taking your medication? 0      Reviewed and updated as needed this visit by clinical staff  Tobacco  Allergies  Meds  Problems  Med Hx  Surg Hx  Fam Hx  Soc Hx        Reviewed and updated as  needed this visit by Provider  Tobacco  Allergies  Meds  Problems  Med Hx  Surg Hx  Fam Hx              ROS:  Review of Systems   Constitutional: Negative.    HENT: Negative.    Eyes: Negative.    Respiratory: Negative.    Cardiovascular: Negative.    Gastrointestinal: Negative.    Psychiatric/Behavioral: Negative.          OBJECTIVE:                                                    /60   Pulse 82   Temp 98.1  F (36.7  C) (Oral)   Wt 110.3 kg (243 lb 3.2 oz)   SpO2 98%   BMI 36.44 kg/m   Body mass index is 36.44 kg/m .     Physical Exam  Constitutional:       Appearance: Normal appearance.   Cardiovascular:      Rate and Rhythm: Normal rate and regular rhythm.   Pulmonary:      Effort: Pulmonary effort is normal.      Breath sounds: Normal breath sounds.   Skin:     General: Skin is warm and dry.      Findings: No rash.   Neurological:      Mental Status: He is alert.   Psychiatric:         Mood and Affect: Mood normal.         Behavior: Behavior normal.          ASSESSMENT/PLAN:                                                      Problem List Items Addressed This Visit     None      Visit Diagnoses     Type 2 diabetes mellitus without complication, without long-term current use of insulin (H)        Relevant Medications    glipiZIDE (GLUCOTROL XL) 10 MG 24 hr tablet    metFORMIN (GLUCOPHAGE-XR) 500 MG 24 hr tablet           Refills.  Continue to improve diet and exercise.      There are no Patient Instructions on file for this visit.      Return in about 6 months (around 9/6/2020) for Recheck.      VALDEZ Cowart [facility]

## 2020-03-07 ASSESSMENT — ENCOUNTER SYMPTOMS
PSYCHIATRIC NEGATIVE: 1
EYES NEGATIVE: 1
RESPIRATORY NEGATIVE: 1
CONSTITUTIONAL NEGATIVE: 1
GASTROINTESTINAL NEGATIVE: 1
CARDIOVASCULAR NEGATIVE: 1

## 2020-07-30 DIAGNOSIS — E11.9 TYPE 2 DIABETES MELLITUS WITHOUT COMPLICATION, WITHOUT LONG-TERM CURRENT USE OF INSULIN (H): ICD-10-CM

## 2020-07-30 RX ORDER — DULAGLUTIDE 1.5 MG/.5ML
INJECTION, SOLUTION SUBCUTANEOUS
Qty: 6 ML | Refills: 0 | Status: SHIPPED | OUTPATIENT
Start: 2020-07-30 | End: 2020-09-02

## 2020-07-30 NOTE — TELEPHONE ENCOUNTER
Medication is being filled for 1 time refill only due to:  Patient needs labs A1c. Patient needs to be seen because due for diabetic visit.     Next 5 appointments (look out 90 days)    Aug 28, 2020  8:15 AM CDT  Lab visit with  LAB  [facility] [address] [address] [phone]   Sep 02, 2020  3:40 PM CDT  Office Visit with VALDEZ Cowart CNP  [facility] [address] [address] [facility] [facility] [address] [phone]        Doreen Villegas RN

## 2020-08-17 ENCOUNTER — DOCUMENTATION ONLY (OUTPATIENT)
Dept: LAB | Facility: CLINIC | Age: 55
End: 2020-08-17

## 2020-08-17 DIAGNOSIS — E11.628 TYPE 2 DIABETES MELLITUS WITH OTHER SKIN COMPLICATIONS (H): Primary | ICD-10-CM

## 2020-08-17 NOTE — PROGRESS NOTES
This patient has  orders that are pended for you to sign and associate. Please place any other orders as future, thanks [facility] staff, Taryn

## 2020-08-28 DIAGNOSIS — E11.628 TYPE 2 DIABETES MELLITUS WITH OTHER SKIN COMPLICATIONS (H): ICD-10-CM

## 2020-08-28 LAB — HBA1C MFR BLD: 8 % (ref 0–5.6)

## 2020-08-28 PROCEDURE — 83036 HEMOGLOBIN GLYCOSYLATED A1C: CPT | Performed by: NURSE PRACTITIONER

## 2020-08-28 PROCEDURE — 36415 COLL VENOUS BLD VENIPUNCTURE: CPT | Performed by: NURSE PRACTITIONER

## 2020-08-31 ENCOUNTER — TRANSFERRED RECORDS (OUTPATIENT)
Dept: HEALTH INFORMATION MANAGEMENT | Facility: CLINIC | Age: 55
End: 2020-08-31

## 2020-08-31 LAB — RETINOPATHY: NEGATIVE

## 2020-09-02 ENCOUNTER — OFFICE VISIT (OUTPATIENT)
Dept: FAMILY MEDICINE | Facility: CLINIC | Age: 55
End: 2020-09-02
Payer: COMMERCIAL

## 2020-09-02 VITALS
DIASTOLIC BLOOD PRESSURE: 70 MMHG | WEIGHT: 242.3 LBS | OXYGEN SATURATION: 97 % | HEART RATE: 88 BPM | SYSTOLIC BLOOD PRESSURE: 108 MMHG | TEMPERATURE: 98.6 F | BODY MASS INDEX: 36.31 KG/M2

## 2020-09-02 DIAGNOSIS — E78.5 HYPERLIPIDEMIA LDL GOAL <100: ICD-10-CM

## 2020-09-02 DIAGNOSIS — Z23 NEED FOR PROPHYLACTIC VACCINATION AND INOCULATION AGAINST INFLUENZA: Primary | ICD-10-CM

## 2020-09-02 DIAGNOSIS — E11.9 TYPE 2 DIABETES MELLITUS WITHOUT COMPLICATION, WITHOUT LONG-TERM CURRENT USE OF INSULIN (H): ICD-10-CM

## 2020-09-02 DIAGNOSIS — E66.01 MORBID OBESITY (H): ICD-10-CM

## 2020-09-02 DIAGNOSIS — E11.628 TYPE 2 DIABETES MELLITUS WITH OTHER SKIN COMPLICATIONS (H): ICD-10-CM

## 2020-09-02 PROCEDURE — 90682 RIV4 VACC RECOMBINANT DNA IM: CPT | Performed by: NURSE PRACTITIONER

## 2020-09-02 PROCEDURE — 99207 C FOOT EXAM  NO CHARGE: CPT | Performed by: NURSE PRACTITIONER

## 2020-09-02 PROCEDURE — 90471 IMMUNIZATION ADMIN: CPT | Performed by: NURSE PRACTITIONER

## 2020-09-02 PROCEDURE — 99214 OFFICE O/P EST MOD 30 MIN: CPT | Mod: 25 | Performed by: NURSE PRACTITIONER

## 2020-09-02 RX ORDER — SIMVASTATIN 40 MG
TABLET ORAL
Qty: 90 TABLET | Refills: 3 | Status: SHIPPED | OUTPATIENT
Start: 2020-09-02 | End: 2021-08-18

## 2020-09-02 RX ORDER — DULAGLUTIDE 1.5 MG/.5ML
1.5 INJECTION, SOLUTION SUBCUTANEOUS
Qty: 6 ML | Refills: 1 | Status: SHIPPED | OUTPATIENT
Start: 2020-09-02 | End: 2021-03-03

## 2020-09-02 RX ORDER — METFORMIN HCL 500 MG
TABLET, EXTENDED RELEASE 24 HR ORAL
Qty: 360 TABLET | Refills: 1 | Status: SHIPPED | OUTPATIENT
Start: 2020-09-02 | End: 2021-03-03

## 2020-09-02 RX ORDER — GLIPIZIDE 10 MG/1
10 TABLET, FILM COATED, EXTENDED RELEASE ORAL DAILY
Qty: 90 TABLET | Refills: 1 | Status: SHIPPED | OUTPATIENT
Start: 2020-09-02 | End: 2021-03-03 | Stop reason: DRUGHIGH

## 2020-09-02 NOTE — PROGRESS NOTES
SUBJECTIVE:   Everton Linn is a 55 year old male   who presents to clinic today for the following health issues:      Diabetes Follow-up      How often are you checking your blood sugar? Not at all    What concerns do you have today about your diabetes? None     Do you have any of these symptoms? (Select all that apply)  No numbness or tingling in feet.  No redness, sores or blisters on feet.  No complaints of excessive thirst.  No reports of blurry vision.  No significant changes to weight.    Have you had a diabetic eye exam in the last 12 months? Yes-  Location: [facility] 8/30/20  Working on restarting exercise routine--still not going to Gym with Covid  Working with derm related to nevus changes    Hyperlipidemia Follow-Up      Are you regularly taking any medication or supplement to lower your cholesterol?   No    Are you having muscle aches or other side effects that you think could be caused by your cholesterol lowering medication?  No    Hypertension Follow-up      Do you check your blood pressure regularly outside of the clinic? No     Are you following a low salt diet? No    Are your blood pressures ever more than 140 on the top number (systolic) OR more   than 90 on the bottom number (diastolic), for example 140/90? No    BP Readings from Last 2 Encounters:   09/02/20 108/70   03/06/20 100/60     Hemoglobin A1C (%)   Date Value   08/28/2020 8.0 (H)   02/28/2020 7.5 (H)     LDL Cholesterol Calculated (mg/dL)   Date Value   02/28/2020 58   01/25/2019 65         How many servings of fruits and vegetables do you eat daily?  0-1    On average, how many sweetened beverages do you drink each day (Examples: soda, juice, sweet tea, etc.  Do NOT count diet or artificially sweetened beverages)?   3    How many days per week do you exercise enough to make your heart beat faster? 5    How many minutes a day do you exercise enough to make your heart beat faster? 30 - 60    How many days per week do you  miss taking your medication? 0      Reviewed and updated as needed this visit by clinical staff  Tobacco  Allergies  Meds  Problems  Med Hx  Surg Hx  Fam Hx  Soc Hx        Reviewed and updated as needed this visit by Provider  Tobacco  Allergies  Meds  Problems  Med Hx  Surg Hx  Fam Hx              ROS:  Review of Systems   Constitutional: Negative.    HENT: Negative.    Eyes: Negative.    Respiratory: Negative.    Cardiovascular: Negative.    Gastrointestinal: Negative.    Skin:        Continues with thickened toenails.  Declines further treatment.  Discussed options for toenail trimming   Psychiatric/Behavioral: Negative.          OBJECTIVE:                                                    /70   Pulse 88   Temp 98.6  F (37  C) (Oral)   Wt 109.9 kg (242 lb 4.8 oz)   SpO2 97%   BMI 36.31 kg/m   Body mass index is 36.31 kg/m .     Physical Exam  Constitutional:       Appearance: Normal appearance.   HENT:      Head: Normocephalic.      Right Ear: Tympanic membrane and ear canal normal.      Left Ear: Tympanic membrane and ear canal normal.      Nose: Nose normal.      Mouth/Throat:      Mouth: Mucous membranes are moist.      Pharynx: Oropharynx is clear.   Neck:      Musculoskeletal: Normal range of motion and neck supple.   Cardiovascular:      Rate and Rhythm: Normal rate and regular rhythm.      Heart sounds: Normal heart sounds.   Pulmonary:      Effort: Pulmonary effort is normal.      Breath sounds: Normal breath sounds.   Abdominal:      General: Abdomen is flat. Bowel sounds are normal.      Palpations: Abdomen is soft.   Musculoskeletal:      Comments: Foot exam - both sides normal; no swelling, tenderness or skin or vascular lesions. Color and temperature is normal. Sensation is intact. Peripheral pulses are palpable. Toenails are thickened      Skin:     General: Skin is warm and dry.   Neurological:      Mental Status: He is alert.   Psychiatric:         Mood and Affect: Mood  normal.          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)    Relevant Medications    dulaglutide (TRULICITY) 1.5 MG/0.5ML pen    metFORMIN (GLUCOPHAGE-XR) 500 MG 24 hr tablet    glipiZIDE (GLUCOTROL XL) 10 MG 24 hr tablet      Other Visit Diagnoses     Need for prophylactic vaccination and inoculation against influenza    -  Primary    Relevant Orders    INFLUENZA QUAD, RECOMBINANT, P-FREE (RIV4) (FLUBLOCK) [74233] (Completed)    Vaccine Administration, Initial [57044] (Completed)    Hyperlipidemia LDL goal <100        Relevant Medications    simvastatin (ZOCOR) 40 MG tablet    Type 2 diabetes mellitus without complication, without long-term current use of insulin (H)        Relevant Medications    dulaglutide (TRULICITY) 1.5 MG/0.5ML pen    metFORMIN (GLUCOPHAGE-XR) 500 MG 24 hr tablet    glipiZIDE (GLUCOTROL XL) 10 MG 24 hr tablet               Patient Instructions   Good luck on your exercise.  Make the appointment with derm        Return in about 3 months (around 12/2/2020) for Lab Work, Recheck.      [facility]art [facility]

## 2020-09-22 ASSESSMENT — ENCOUNTER SYMPTOMS
CARDIOVASCULAR NEGATIVE: 1
EYES NEGATIVE: 1
RESPIRATORY NEGATIVE: 1
CONSTITUTIONAL NEGATIVE: 1
PSYCHIATRIC NEGATIVE: 1
GASTROINTESTINAL NEGATIVE: 1

## 2021-01-05 DIAGNOSIS — L71.1 RHINOPHYMA: ICD-10-CM

## 2021-01-05 DIAGNOSIS — L71.9 ROSACEA: Primary | ICD-10-CM

## 2021-01-05 DIAGNOSIS — Z79.899 LONG TERM USE OF DRUG: ICD-10-CM

## 2021-01-05 PROCEDURE — 84450 TRANSFERASE (AST) (SGOT): CPT | Performed by: FAMILY MEDICINE

## 2021-01-05 PROCEDURE — 36415 COLL VENOUS BLD VENIPUNCTURE: CPT | Performed by: FAMILY MEDICINE

## 2021-01-05 PROCEDURE — 84460 ALANINE AMINO (ALT) (SGPT): CPT | Performed by: FAMILY MEDICINE

## 2021-01-06 LAB
ALT SERPL W P-5'-P-CCNC: 40 U/L (ref 0–70)
AST SERPL W P-5'-P-CCNC: 26 U/L (ref 0–45)

## 2021-01-15 ENCOUNTER — HEALTH MAINTENANCE LETTER (OUTPATIENT)
Age: 56
End: 2021-01-15

## 2021-02-01 ENCOUNTER — TELEPHONE (OUTPATIENT)
Dept: FAMILY MEDICINE | Facility: CLINIC | Age: 56
End: 2021-02-01

## 2021-02-01 DIAGNOSIS — E78.5 HYPERLIPIDEMIA LDL GOAL <70: ICD-10-CM

## 2021-02-01 DIAGNOSIS — Z11.59 NEED FOR HEPATITIS C SCREENING TEST: Primary | ICD-10-CM

## 2021-02-01 DIAGNOSIS — E11.628 TYPE 2 DIABETES MELLITUS WITH OTHER SKIN COMPLICATIONS (H): ICD-10-CM

## 2021-02-01 NOTE — TELEPHONE ENCOUNTER
Reason for Call: Request for an order or referral:    Order or referral being requested: lab, A1C    Date needed: before my next appointment    Has the patient been seen by the PCP for this problem? YES    Additional comments: appt is scheduled 02/25/21    Phone number Patient can be reached at:  Home number on file [phone] (home)    Best Time:  any    Can we leave a detailed message on this number?  YES    Call taken on 2/1/2021 at 1:39 PM by Shiresa H. Ormond

## 2021-02-25 DIAGNOSIS — E78.5 HYPERLIPIDEMIA LDL GOAL <70: ICD-10-CM

## 2021-02-25 DIAGNOSIS — E11.628 TYPE 2 DIABETES MELLITUS WITH OTHER SKIN COMPLICATIONS (H): ICD-10-CM

## 2021-02-25 DIAGNOSIS — Z11.59 NEED FOR HEPATITIS C SCREENING TEST: ICD-10-CM

## 2021-02-25 LAB
ALBUMIN SERPL-MCNC: 4.1 G/DL (ref 3.4–5)
ALP SERPL-CCNC: 57 U/L (ref 40–150)
ALT SERPL W P-5'-P-CCNC: 32 U/L (ref 0–70)
ANION GAP SERPL CALCULATED.3IONS-SCNC: 4 MMOL/L (ref 3–14)
AST SERPL W P-5'-P-CCNC: 15 U/L (ref 0–45)
BILIRUB SERPL-MCNC: 0.7 MG/DL (ref 0.2–1.3)
BUN SERPL-MCNC: 13 MG/DL (ref 7–30)
CALCIUM SERPL-MCNC: 10.4 MG/DL (ref 8.5–10.1)
CHLORIDE SERPL-SCNC: 100 MMOL/L (ref 94–109)
CHOLEST SERPL-MCNC: 148 MG/DL
CO2 SERPL-SCNC: 30 MMOL/L (ref 20–32)
CREAT SERPL-MCNC: 0.99 MG/DL (ref 0.66–1.25)
CREAT UR-MCNC: 177 MG/DL
GFR SERPL CREATININE-BSD FRML MDRD: 86 ML/MIN/{1.73_M2}
GLUCOSE SERPL-MCNC: 198 MG/DL (ref 70–99)
HBA1C MFR BLD: 8.3 % (ref 0–5.6)
HCV AB SERPL QL IA: NONREACTIVE
HDLC SERPL-MCNC: 44 MG/DL
LDLC SERPL CALC-MCNC: 62 MG/DL
MICROALBUMIN UR-MCNC: 7 MG/L
MICROALBUMIN/CREAT UR: 3.94 MG/G CR (ref 0–17)
NONHDLC SERPL-MCNC: 104 MG/DL
POTASSIUM SERPL-SCNC: 4.6 MMOL/L (ref 3.4–5.3)
PROT SERPL-MCNC: 8.1 G/DL (ref 6.8–8.8)
SODIUM SERPL-SCNC: 134 MMOL/L (ref 133–144)
TRIGL SERPL-MCNC: 208 MG/DL

## 2021-02-25 PROCEDURE — 82043 UR ALBUMIN QUANTITATIVE: CPT | Performed by: NURSE PRACTITIONER

## 2021-02-25 PROCEDURE — 83036 HEMOGLOBIN GLYCOSYLATED A1C: CPT | Performed by: NURSE PRACTITIONER

## 2021-02-25 PROCEDURE — 80061 LIPID PANEL: CPT | Performed by: NURSE PRACTITIONER

## 2021-02-25 PROCEDURE — 86803 HEPATITIS C AB TEST: CPT | Performed by: NURSE PRACTITIONER

## 2021-02-25 PROCEDURE — 36415 COLL VENOUS BLD VENIPUNCTURE: CPT | Performed by: NURSE PRACTITIONER

## 2021-02-25 PROCEDURE — 80053 COMPREHEN METABOLIC PANEL: CPT | Performed by: NURSE PRACTITIONER

## 2021-03-03 ENCOUNTER — OFFICE VISIT (OUTPATIENT)
Dept: FAMILY MEDICINE | Facility: CLINIC | Age: 56
End: 2021-03-03
Payer: COMMERCIAL

## 2021-03-03 VITALS
WEIGHT: 235.3 LBS | OXYGEN SATURATION: 96 % | DIASTOLIC BLOOD PRESSURE: 72 MMHG | HEIGHT: 69 IN | SYSTOLIC BLOOD PRESSURE: 118 MMHG | HEART RATE: 92 BPM | TEMPERATURE: 98.3 F | BODY MASS INDEX: 34.85 KG/M2

## 2021-03-03 DIAGNOSIS — E66.01 MORBID OBESITY (H): ICD-10-CM

## 2021-03-03 DIAGNOSIS — E83.52 CALCIUM BLOOD INCREASED: Primary | ICD-10-CM

## 2021-03-03 DIAGNOSIS — E11.9 TYPE 2 DIABETES MELLITUS WITHOUT COMPLICATION, WITHOUT LONG-TERM CURRENT USE OF INSULIN (H): ICD-10-CM

## 2021-03-03 DIAGNOSIS — E11.628 TYPE 2 DIABETES MELLITUS WITH OTHER SKIN COMPLICATIONS (H): ICD-10-CM

## 2021-03-03 PROCEDURE — 36415 COLL VENOUS BLD VENIPUNCTURE: CPT | Performed by: NURSE PRACTITIONER

## 2021-03-03 PROCEDURE — 99214 OFFICE O/P EST MOD 30 MIN: CPT | Performed by: NURSE PRACTITIONER

## 2021-03-03 PROCEDURE — 82310 ASSAY OF CALCIUM: CPT | Performed by: NURSE PRACTITIONER

## 2021-03-03 RX ORDER — METFORMIN HCL 500 MG
TABLET, EXTENDED RELEASE 24 HR ORAL
Qty: 360 TABLET | Refills: 1 | Status: SHIPPED | OUTPATIENT
Start: 2021-03-03 | End: 2021-11-03

## 2021-03-03 RX ORDER — IVERMECTIN 10 MG/G
CREAM TOPICAL
COMMUNITY
Start: 2021-01-12 | End: 2022-12-04

## 2021-03-03 RX ORDER — DULAGLUTIDE 1.5 MG/.5ML
1.5 INJECTION, SOLUTION SUBCUTANEOUS
Qty: 6 ML | Refills: 1 | Status: SHIPPED | OUTPATIENT
Start: 2021-03-03 | End: 2021-09-13

## 2021-03-03 RX ORDER — DOXYCYCLINE 100 MG/1
100 CAPSULE ORAL DAILY
COMMUNITY
Start: 2020-12-18 | End: 2022-12-12

## 2021-03-03 RX ORDER — GLIPIZIDE 10 MG/1
20 TABLET, FILM COATED, EXTENDED RELEASE ORAL DAILY
Qty: 180 TABLET | Refills: 1 | Status: SHIPPED | OUTPATIENT
Start: 2021-03-03 | End: 2021-08-24

## 2021-03-03 ASSESSMENT — ENCOUNTER SYMPTOMS
CARDIOVASCULAR NEGATIVE: 1
ENDOCRINE NEGATIVE: 1
CONSTITUTIONAL NEGATIVE: 1
RESPIRATORY NEGATIVE: 1
EYES NEGATIVE: 1

## 2021-03-03 ASSESSMENT — MIFFLIN-ST. JEOR: SCORE: 1894.75

## 2021-03-03 NOTE — PROGRESS NOTES
Assessment & Plan   Problem List Items Addressed This Visit     Morbid obesity (H)    Relevant Medications    glipiZIDE (GLUCOTROL XL) 10 MG 24 hr tablet    metFORMIN (GLUCOPHAGE-XR) 500 MG 24 hr tablet    dulaglutide (TRULICITY) 1.5 MG/0.5ML pen    Type 2 diabetes mellitus with other skin complications (H)    Relevant Medications    SOOLANTRA 1 % cream    glipiZIDE (GLUCOTROL XL) 10 MG 24 hr tablet    metFORMIN (GLUCOPHAGE-XR) 500 MG 24 hr tablet    dulaglutide (TRULICITY) 1.5 MG/0.5ML pen      Other Visit Diagnoses     Calcium blood increased    -  Primary    Relevant Orders    Calcium (Completed)    Type 2 diabetes mellitus without complication, without long-term current use of insulin (H)        Relevant Medications    glipiZIDE (GLUCOTROL XL) 10 MG 24 hr tablet    metFORMIN (GLUCOPHAGE-XR) 500 MG 24 hr tablet    dulaglutide (TRULICITY) 1.5 MG/0.5ML pen        There are no Patient Instructions on file for this visit.  Return in about 3 months (around 6/3/2021) for Video Visit Follow up.    VALDEZ Cowart [facility] RUDYMOVINOD Arita is a 55 year old who presents for the following health issues     HPI       Diabetes Follow-up      How often are you checking your blood sugar? Not at all--discussed and pt will start testing again    What concerns do you have today about your diabetes? None     Do you have any of these symptoms? (Select all that apply)  No numbness or tingling in feet.  No redness, sores or blisters on feet.  No complaints of excessive thirst.  No reports of blurry vision.  No significant changes to weight.      BP Readings from Last 2 Encounters:   03/03/21 118/72   09/02/20 108/70     Hemoglobin A1C (%)   Date Value   02/25/2021 8.3 (H)   08/28/2020 8.0 (H)     LDL Cholesterol Calculated (mg/dL)   Date Value   02/25/2021 62   02/28/2020 58                 How many servings of fruits and vegetables do you eat daily?  0-1    On average, how many sweetened  "beverages do you drink each day (Examples: soda, juice, sweet tea, etc.  Do NOT count diet or artificially sweetened beverages)?   2    How many days per week do you exercise enough to make your heart beat faster? 6    How many minutes a day do you exercise enough to make your heart beat faster? 30 - 60    How many days per week do you miss taking your medication? 0    Calcium mildly elevated  Repeat calcium today    Rosacea and history of tinea  Better on doxycycline.  No new concerns with fee        Review of Systems   Constitutional: Negative.    HENT: Negative.    Eyes: Negative.         Ocular rosacea is improved on doxycycline   Respiratory: Negative.    Cardiovascular: Negative.    Endocrine: Negative.             Objective    /72   Pulse 92   Temp 98.3  F (36.8  C) (Oral)   Ht 1.74 m (5' 8.5\")   Wt 106.7 kg (235 lb 4.8 oz)   SpO2 96%   BMI 35.26 kg/m    Body mass index is 35.26 kg/m .  Physical Exam  Constitutional:       Appearance: Normal appearance.   Eyes:      Pupils: Pupils are equal, round, and reactive to light.      Comments: Better control of ocular rosacea   Pulmonary:      Effort: Pulmonary effort is normal.   Skin:     Comments: Rosacea on nose improved with active treatment    Foot exam next visit    Neurological:      Mental Status: He is alert.                        "

## 2021-03-04 LAB — CALCIUM SERPL-MCNC: 10 MG/DL (ref 8.5–10.1)

## 2021-03-23 ENCOUNTER — IMMUNIZATION (OUTPATIENT)
Dept: NURSING | Facility: CLINIC | Age: 56
End: 2021-03-23
Payer: COMMERCIAL

## 2021-03-23 PROCEDURE — 0001A PR COVID VAC PFIZER DIL RECON 30 MCG/0.3 ML IM: CPT

## 2021-03-23 PROCEDURE — 91300 PR COVID VAC PFIZER DIL RECON 30 MCG/0.3 ML IM: CPT

## 2021-04-13 ENCOUNTER — IMMUNIZATION (OUTPATIENT)
Dept: NURSING | Facility: CLINIC | Age: 56
End: 2021-04-13
Attending: INTERNAL MEDICINE
Payer: COMMERCIAL

## 2021-04-13 PROCEDURE — 0002A PR COVID VAC PFIZER DIL RECON 30 MCG/0.3 ML IM: CPT

## 2021-04-13 PROCEDURE — 91300 PR COVID VAC PFIZER DIL RECON 30 MCG/0.3 ML IM: CPT

## 2021-06-18 ENCOUNTER — OFFICE VISIT (OUTPATIENT)
Dept: SURGERY | Facility: CLINIC | Age: 56
End: 2021-06-18
Payer: COMMERCIAL

## 2021-06-18 ENCOUNTER — TELEPHONE (OUTPATIENT)
Dept: SURGERY | Facility: CLINIC | Age: 56
End: 2021-06-18

## 2021-06-18 VITALS
OXYGEN SATURATION: 98 % | SYSTOLIC BLOOD PRESSURE: 110 MMHG | BODY MASS INDEX: 34.8 KG/M2 | HEIGHT: 69 IN | RESPIRATION RATE: 16 BRPM | DIASTOLIC BLOOD PRESSURE: 66 MMHG | HEART RATE: 80 BPM | WEIGHT: 235 LBS

## 2021-06-18 DIAGNOSIS — K40.90 RIGHT INGUINAL HERNIA: Primary | ICD-10-CM

## 2021-06-18 DIAGNOSIS — Z11.59 ENCOUNTER FOR SCREENING FOR OTHER VIRAL DISEASES: ICD-10-CM

## 2021-06-18 PROCEDURE — 99203 OFFICE O/P NEW LOW 30 MIN: CPT | Performed by: SURGERY

## 2021-06-18 ASSESSMENT — MIFFLIN-ST. JEOR: SCORE: 1888.39

## 2021-06-18 NOTE — TELEPHONE ENCOUNTER
Type of surgery: OPEN REPAIR RIGHT INGUIANL HERNIA WITH MESH       Location of surgery: [facility]  Date and time of surgery: 8/13/2021 @ 8:15 AM   Surgeon: SALVADOR MCCLAIN MD    Pre-Op Appt Date: PATIENT TO SCHEDULE    Post-Op Appt Date: PATIENT TO SCHEDULE     Packet sent out: Yes  Pre-cert/Authorization completed:  Not Applicable  Date: 6/18/2021      OPEN REPAIR RIGHT INGUIANL HERNIA WITH MESH      GENERAL PT INST TO HAVE H&P WITH DR SINGLETARY 60 MIN REQ PA ASSIST RMO NMS

## 2021-06-18 NOTE — LETTER
[facility]    [address], [address]  [place], [place] [address]  Phone [phone]  Fax [phone]    [address], [address]  [facility] [address]  Phone [phone]  Fax [phone]    [email or web address]   June 18, 2021    Everton MATHIS Kaveh  [address] [address]    We realize with scheduling surgery, one of your first questions is, how much will this cost?  Below we have provided you with the information you will need to receive an estimate for your surgery.    You are scheduled for the following procedure:  OPEN REPAIR RIGHT INGUIANL HERNIA WITH MESH     Surgeon:  SALVADOR MCCLAIN MD       Physician Assistant:  Yes      Please make sure to have your insurance card available at the time of calling.    Surgeon & Physician Assistant charges and facility charges for [facility] or [facility]:    Consumer Price Line at [phone]   -  It is important to note that there may be a Physician Assistant assisting with your surgery.  Please be sure to mention this when calling for the estimate.      If you prefer, you can also request a price estimate online by completing the secure form at:  [email or web address]    Facility Charges at [facility], [facility] or [facility] [place]:  [facility] at [phone]  [facility] at [phone]  [facility] [place] at [phone] or [phone]    Anesthesiologist Charges:  [facility], [facility] by [facility] at [phone]    [facility] - Nurse Anesthetist Charges:  [facility] at [phone]

## 2021-06-18 NOTE — LETTER
[facility]    [address], [address]  [place], [place] [address]  Phone [phone]  Fax [phone]    [address], [address]  [place] [address]  Phone [phone]  Fax [phone]    [email or web address]   Everton Linn      You have been scheduled for a COVID-19 testing appointment at [facility].    8/11/2021 at 9:30 AM     The clinic is located at:  [address] [place] [address]    Please wear a mask or face covering to the testing site.      Following your testing, you will be required to self-isolate until your surgery.  If you need a note for your employer due to this, please let us know.

## 2021-06-18 NOTE — PROGRESS NOTES
"Everton is a 56 year old male who presents for evaluation for inguinal hernia.  Symptoms began several years ago. Initially the hernia was noted by his PCP but did not cause him any discomfort. Over the last year he has started to have a mild discomfort in the groin which is worse with activity. He recently had to stop a golf game because of it and states that he has reduced his exercise routine as well. The patient has noticed a bulge. Pt has had previous abdominal surgery including cholecystectomy by me in 2016. 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    Constipation No   Dysuria No  Cough No    Pt's chart has been reviewed for PMH, PSH, allergies, medications, social history and family history.    ROS:  Pulm:  No shortness of breath, dyspnea on exertion, cough, or hemoptysis  CV:  negative  ABD:  See chief complaint  :  Negative  All other systems negative/normal    /66   Pulse 80   Resp 16   Ht 1.74 m (5' 8.5\")   Wt 106.6 kg (235 lb)   SpO2 98%   BMI 35.21 kg/m    Physical exam:  Patient able to get up on table without difficulty.  Abdomen is abdomen is soft without significant tenderness, masses, organomegaly or guarding  bowel sounds are positive and no caput medusa noted.  Hernia  Left inguinal hernia is not present with valsalva              Right inguinal hernia is present with valsalva              The hernia is reducible              Testicles are normal    Assesment: right inguinal hernia, symptomatic    Plan: The nature of hernias, anatomic considerations, indications and approaches to repair were discussed.  Risks of operative intervention were discussed including infection, bleeding, harm to structures including vessels and nerves as well as recurrence, which is about 5% per decade of life.  Post operative recuperation and activity limitations were reviewed as well.  The patient is interested in pursuing repair and we will assist in scheduling this with " him.      Jerry Gomez MD  6/18/2021 2:39 PM    Please route or send letter to:  Primary Care Provider (PCP)

## 2021-08-04 ENCOUNTER — OFFICE VISIT (OUTPATIENT)
Dept: FAMILY MEDICINE | Facility: CLINIC | Age: 56
End: 2021-08-04
Payer: COMMERCIAL

## 2021-08-04 VITALS
WEIGHT: 239.2 LBS | TEMPERATURE: 98.3 F | DIASTOLIC BLOOD PRESSURE: 80 MMHG | SYSTOLIC BLOOD PRESSURE: 118 MMHG | RESPIRATION RATE: 20 BRPM | BODY MASS INDEX: 35.84 KG/M2 | OXYGEN SATURATION: 96 % | HEART RATE: 90 BPM

## 2021-08-04 DIAGNOSIS — Z01.818 PREOP GENERAL PHYSICAL EXAM: Primary | ICD-10-CM

## 2021-08-04 DIAGNOSIS — E11.628 TYPE 2 DIABETES MELLITUS WITH OTHER SKIN COMPLICATIONS (H): ICD-10-CM

## 2021-08-04 LAB — HBA1C MFR BLD: 7.4 % (ref 0–5.6)

## 2021-08-04 PROCEDURE — 83036 HEMOGLOBIN GLYCOSYLATED A1C: CPT | Performed by: NURSE PRACTITIONER

## 2021-08-04 PROCEDURE — 36415 COLL VENOUS BLD VENIPUNCTURE: CPT | Performed by: NURSE PRACTITIONER

## 2021-08-04 PROCEDURE — 99214 OFFICE O/P EST MOD 30 MIN: CPT | Performed by: NURSE PRACTITIONER

## 2021-08-04 ASSESSMENT — PAIN SCALES - GENERAL: PAINLEVEL: NO PAIN (0)

## 2021-08-04 NOTE — PATIENT INSTRUCTIONS
Hold Trulicity before surgery  Hold metform and glipizide on the day of surgery  OK to restart when you are taking a normal diet              Preparing for Your Surgery  Getting started  A nurse will call you to review your health history and instructions. They will give you an arrival time based on your scheduled surgery time.  Please be ready to share the following:    Your doctor's clinic name and phone number    Your medical, surgical and anesthesia history    A list of allergies and sensitivities    A list of medicines, including herbal treatments and over-the-counter drugs    Whether the patient has a legal guardian (ask how to send us the papers in advance)  If you have a child who's having surgery, please ask for a copy of Preparing for Your Child's Surgery.    Preparing for surgery    Within 30 days of surgery: Have a pre-op exam (sometimes called an H&P, or History and Physical). This can be done at a clinic or pre-operative center.  ? If you're having a , you may not need this exam. Talk to your care team    At your pre-op exam, talk to your care team about all medicines you take. If you need to stop any medicines before surgery, ask when to start taking them again.  ? We do this for your safety. Many medicines can make you bleed too much during surgery. Some change how well surgery (anesthesia) drugs work.    Call your insurance company to let them know you're having surgery. (If you don't have insurance, call [phone].)    Call your clinic if there's any change in your health. This includes signs of a cold or flu (sore throat, runny nose, cough, rash, fever). It also includes a scrape or scratch near the surgery site.    If you have questions on the day of surgery, call your hospital or surgery center.  Eating and drinking guidelines  For your safety: Unless your surgeon tells you otherwise, follow the guidelines below.    Eat and drink as usual until 8 hours before surgery. After that, no  food or milk.    Drink clear liquids until 2 hours before surgery. These are liquids you can see through, like water, Gatorade and Propel Water. You may also have black coffee and tea (no cream or milk).    Nothing by mouth within 2 hours of surgery. This includes gum, candy and breath mints.    If you drink, stop drinking alcohol the night before surgery.    If your care team tells you to take medicine on the morning of surgery, it's okay to take it with a sip of water.  Preventing infection    Shower or bathe the night before and morning of your surgery. Follow the instructions your clinic gave you. (If no instructions, use regular soap.)    Don't shave or clip hair near your surgery site. We'll remove the hair if needed.    Don't smoke or vape the morning of surgery. You may chew nicotine gum up to 2 hours before surgery. A nicotine patch is okay.  ? Note: Some surgeries require you to completely quit smoking and nicotine. Check with your surgeon.    Your care team will make every effort to keep you safe from infection. We will:  ? Clean our hands often with soap and water (or an alcohol-based hand rub).  ? Clean the skin at your surgery site with a special soap that kills germs.  ? Give you a special gown to keep you warm. (Cold raises the risk of infection.)  ? Wear special hair covers, masks, gowns and gloves during surgery.  ? Give antibiotic medicine, if prescribed. Not all surgeries need antibiotics.  What to bring on the day of surgery    Photo ID and insurance card    Copy of your health care directive, if you have one    Glasses and hearing aides (bring cases)  ? You can't wear contacts during surgery    Inhaler and eye drops, if you use them (tell us about these when you arrive)    CPAP machine or breathing device, if you use them    A few personal items, if spending the night    If you have . . .  ? A pacemaker or ICD (cardiac defibrillator): Bring the ID card.  ? An implanted stimulator: Bring the  remote control.  ? A legal guardian: Bring a copy of the certified (court-stamped) guardianship papers.  Please remove any jewelry, including body piercings. Leave jewelry and other valuables at home.  If you're going home the day of surgery  Important: If you don't follow the rules below, we must cancel your surgery.     Arrange for someone to drive you home after surgery. You may not drive, take a taxi or take public transportation by yourself (unless you'll have local anesthesia only).    Arrange for a responsible adult to stay with you overnight. If you don't, we may keep you in the hospital overnight, and you may need to pay the costs yourself.  Questions?   If you have any questions for your care team, list them here: _________________________________________________________________________________________________________________________________________________________________________________________________________________________________________________________________________________________________________________________  For informational purposes only. Not to replace the advice of your health care provider. Copyright   2003, 2019 [facility]. All rights reserved. Clinically reviewed by Kianna Child MD. [employer] [id number] - REV 4/20.

## 2021-08-04 NOTE — PROGRESS NOTES
[facility]  [address] [facility] [address]  Phone: [phone]  Primary Provider: Miah Singletary  Pre-op Performing Provider: MIAH SINGLETARY      PREOPERATIVE EVALUATION:  Today's date: 8/4/2021    Everton Linn is a 56 year old male who presents for a preoperative evaluation.    Surgical Information:  Surgery/Procedure:Surgical Information:  Surgery/Procedure: Right Inguinal hernia  Surgery Location: [facility]  Surgeon: Jerry Arenas MD  Surgery Date: 8/13/21  Time of Surgery: 8:15 AM  Where patient plans to recover: At home alone  Fax number for surgical facility: Note does not need to be faxed, will be available electronically in  NUMBER:[phone]}    Type of Anesthesia Anticipated: General    Assessment & Plan     The proposed surgical procedure is considered INTERMEDIATE risk.    Problem List Items Addressed This Visit     Type 2 diabetes mellitus with other skin complications (H)    Relevant Orders    Hemoglobin A1c (Completed)      Other Visit Diagnoses     Preop general physical exam    -  Primary    Relevant Orders    Hemoglobin A1c (Completed)               Risks and Recommendations:  The patient has the following additional risks and recommendations for perioperative complications:  Diabetes:  - Patient is not on insulin therapy: regular NPO guidelines can be followed.   Obstructive Sleep Apnea:   Patient will bring CPAP machine    Medication Instructions:  Hold trulicity until after surgery, day of surgery hold metformin and glipizide    RECOMMENDATION:  APPROVAL GIVEN to proceed with proposed procedure, without further diagnostic evaluation.          Subjective     HPI related to upcoming procedure: Right inguinal pain for years with activity and now worsening and desires hernia repair for same    Preop Questions 8/4/2021   1. Have you ever had a heart attack or stroke? No   2. Have you ever had surgery on your heart or blood vessels, such as a stent  placement, a coronary artery bypass, or surgery on an artery in your head, neck, heart, or legs? No   3. Do you have chest pain with activity? No   4. Do you have a history of  heart failure? No   5. Do you currently have a cold, bronchitis or symptoms of other infection? No   6. Do you have a cough, shortness of breath, or wheezing? No   7. Do you or anyone in your family have previous history of blood clots? No   8. Do you or does anyone in your family have a serious bleeding problem such as prolonged bleeding following surgeries or cuts? No   9. Have you ever had problems with anemia or been told to take iron pills? No   10. Have you had any abnormal blood loss such as black, tarry or bloody stools? No   11. Have you ever had a blood transfusion? No   12. Are you willing to have a blood transfusion if it is medically needed before, during, or after your surgery? Yes   13. Have you or any of your relatives ever had problems with anesthesia? No   14. Do you have sleep apnea, excessive snoring or daytime drowsiness? YES - has CPAP machine and will bring to surgery   14a. Do you have a CPAP machine? Yes   15. Do you have any artifical heart valves or other implanted medical devices like a pacemaker, defibrillator, or continuous glucose monitor? No   16. Do you have artificial joints? No   17. Are you allergic to latex? No       Health Care Directive:  Patient does not have a Health Care Directive or Living Will: We discussed and patient will consider options.  Paperwork given.    Preoperative Review of :   reviewed - no record of controlled substances prescribed.      Status of Chronic Conditions:  DEPRESSION - Patient has a long history of Depression of moderate severity in complete remission and is off medication  DIABETES - Patient has a longstanding history of DiabetesType Type II . Patient is being treated with oral agents and trulicity and denies significant side effects. Control has been good.  Complicating factors include but are not limited to: hyperlipidemia.     HYPERLIPIDEMIA - Patient has a long history of significant Hyperlipidemia requiring medication for treatment with recent good control. Patient reports no problems or side effects with the medication.       Review of Systems  CONSTITUTIONAL: NEGATIVE for fever, chills, change in weight  INTEGUMENTARY/SKIN: NEGATIVE for worrisome rashes, moles or lesions  EYES: NEGATIVE for vision changes or irritation  ENT/MOUTH: NEGATIVE for ear, mouth and throat problems  RESP: NEGATIVE for significant cough or SOB  CV: NEGATIVE for chest pain, palpitations or peripheral edema  GI: NEGATIVE for nausea, abdominal pain, heartburn, or change in bowel habits  : NEGATIVE for frequency, dysuria, or hematuria  MUSCULOSKELETAL: NEGATIVE for significant arthralgias or myalgia  NEURO: NEGATIVE for weakness, dizziness or paresthesias  ENDOCRINE: NEGATIVE for temperature intolerance, skin/hair changes  HEME: NEGATIVE for bleeding problems  PSYCHIATRIC: NEGATIVE for changes in mood or affect    Patient Active Problem List    Diagnosis Date Noted     Right inguinal hernia 06/18/2021     Priority: Medium     Added automatically from request for surgery [id number]       Hyperlipidemia LDL goal <70 03/22/2019     Priority: Medium     Morbid obesity (H) 02/18/2018     Priority: Medium     Type 2 diabetes mellitus with other skin complications (H) 10/23/2015     Priority: Medium     SANTOS (obstructive sleep apnea) 09/17/2015     Priority: Medium     Cervical stenosis of spine 11/16/2013     Priority: Medium     Ventral hernia 06/14/2013     Priority: Medium     Inguinal hernia 12/15/2009     Priority: Medium     Dermatophytosis of nail 11/03/2005     Priority: Medium      History reviewed. No pertinent past medical history.  Past Surgical History:   Procedure Laterality Date     ARTHROSCOPY KNEE RT/LT  2007    left knee     ARTHROSCOPY KNEE RT/LT  2005    right     LAPAROSCOPIC  CHOLECYSTECTOMY WITH CHOLANGIOGRAMS N/A 5/28/2016    Procedure: LAPAROSCOPIC CHOLECYSTECTOMY WITH CHOLANGIOGRAMS;  Surgeon: Jerry Bello MD;  Location: [facility] OR     Current Outpatient Medications   Medication Sig Dispense Refill     blood glucose monitoring (ONE TOUCH DELICA) lancets Use to test blood sugar 2-3 times daily or as directed. May substitute per insurance coverage. 100 each 11     blood glucose monitoring (ONETOUCH VERIO IQ) test strip Use to test blood sugar 2-3 times daily or as directed.May substitute per insurance coverage. 100 each 11     Blood Glucose Monitoring Suppl (ONETOUCH VERIO FLEX SYSTEM) w/Device KIT 1 each 3 times daily as needed May substitute per insurance coverage. 1 kit 0     doxycycline monohydrate (MONODOX) 100 MG capsule 1 CAPSULE BY MOUTH DAILY       dulaglutide (TRULICITY) 1.5 MG/0.5ML pen Inject 1.5 mg Subcutaneous every 7 days 6 mL 1     glipiZIDE (GLUCOTROL XL) 10 MG 24 hr tablet Take 2 tablets (20 mg) by mouth daily 180 tablet 1     metFORMIN (GLUCOPHAGE-XR) 500 MG 24 hr tablet TAKE 2 TABLETS BY MOUTH 2 TIMES DAILY WITH MEALS 360 tablet 1     simvastatin (ZOCOR) 40 MG tablet TAKE 1 TABLET BY MOUTH EVERYDAY AT BEDTIME 90 tablet 3     SOOLANTRA 1 % cream 1 APPLICATION AT BEDTIME TOPICALLY TO WHOLE FACE       sildenafil (VIAGRA) 100 MG tablet Take 1 tablet (100 mg) by mouth daily as needed (as needed) 30 tablet 1       Allergies   Allergen Reactions     Seasonal Allergies         Social History     Tobacco Use     Smoking status: Never Smoker     Smokeless tobacco: Never Used   Substance Use Topics     Alcohol use: Yes     Comment: 2 -3 beers every few weeks     History   Drug Use No         Objective     /80   Pulse 90   Temp 98.3  F (36.8  C) (Oral)   Resp 20   Wt 108.5 kg (239 lb 3.2 oz)   SpO2 96%   BMI 35.84 kg/m      Physical Exam  Constitutional:       Appearance: Normal appearance.   HENT:      Head: Normocephalic.      Right Ear: Tympanic membrane  and ear canal normal.      Left Ear: Tympanic membrane and ear canal normal.      Nose: Nose normal.      Mouth/Throat:      Mouth: Mucous membranes are moist.      Pharynx: Oropharynx is clear.   Cardiovascular:      Rate and Rhythm: Normal rate and regular rhythm.      Heart sounds: Normal heart sounds.   Pulmonary:      Effort: Pulmonary effort is normal.      Breath sounds: Normal breath sounds.   Abdominal:      General: Abdomen is flat. Bowel sounds are normal.      Palpations: Abdomen is soft.   Musculoskeletal:      Cervical back: Normal range of motion and neck supple.   Skin:     General: Skin is warm and dry.   Neurological:      Mental Status: He is alert.   Psychiatric:         Mood and Affect: Mood normal.           Recent Labs   Lab Test 02/25/21  1014 08/28/20  0810 02/28/20  0913     --  136   POTASSIUM 4.6  --  4.6   CR 0.99  --  0.95   A1C 8.3* 8.0* 7.5*        Diagnostics:  Recent Results (from the past 720 hour(s))   Hemoglobin A1c    Collection Time: 08/04/21  4:44 PM   Result Value Ref Range    Hemoglobin A1C 7.4 (H) 0.0 - 5.6 %      No EKG required, no history of coronary heart disease, significant arrhythmia, peripheral arterial disease or other structural heart disease.    Revised Cardiac Risk Index (RCRI):  The patient has the following serious cardiovascular risks for perioperative complications:   - No serious cardiac risks = 0 points     RCRI Interpretation: 0 points: Class I (very low risk - 0.4% complication rate)           Signed Electronically by: VALDEZ Cowart CNP  Copy of this evaluation report is provided to requesting physician.

## 2021-08-04 NOTE — PROGRESS NOTES
[facility]  [address] [facility] [address]  Phone: [phone]  Primary Provider: Lindsay Arroyo  {FV AMB Performing Provider (Optional):599183}    {Provider  Link to PREOP SmartSet  Use this to apply standard patient instructions to AVS; includes medication directions, common orders, guidelines for anemia, warfarin, additional testing   :346681}  PREOPERATIVE EVALUATION:  Today's date: 8/4/2021    Everton Linn is a 56 year old male who presents for a preoperative evaluation.    Surgical Information:  Surgery/Procedure: Right Inguinal hernia  Surgery Location: [facility]  Surgeon: Jerry Arenas MD  Surgery Date: 8/13/21  Time of Surgery: 8:15 AM  Where patient plans to recover: {Preop post recovery plans :192580}  Fax number for surgical facility: Note does not need to be faxed, will be available electronically in Epic.    Type of Anesthesia Anticipated: General    {2021 Provider Charting Preference for Preop :376020}    Subjective     HPI related to upcoming procedure: ***    {Click here to pull in Questionnaire Data after Qnr completed :645415}  Health Care Directive:  Patient does not have a Health Care Directive or Living Will: {ADVANCE_DIRECTIVE_STATUS:139293}    Preoperative Review of :  {Mnpmpreport:782529}  {Review MNPMP for all patients per ICSI MNPMP Profile:218614}    {Chronic problem details (Optional) :666837}    Review of Systems  {ROS Preop Choices:170123}    Patient Active Problem List    Diagnosis Date Noted     Right inguinal hernia 06/18/2021     Priority: Medium     Added automatically from request for surgery [id number]       Hyperlipidemia LDL goal <70 03/22/2019     Priority: Medium     Morbid obesity (H) 02/18/2018     Priority: Medium     Type 2 diabetes mellitus with other skin complications (H) 10/23/2015     Priority: Medium     SANTOS (obstructive sleep apnea) 09/17/2015     Priority: Medium     Cervical stenosis of spine 11/16/2013      Priority: Medium     Ventral hernia 06/14/2013     Priority: Medium     Inguinal hernia 12/15/2009     Priority: Medium     Dermatophytosis of nail 11/03/2005     Priority: Medium      No past medical history on file.  Past Surgical History:   Procedure Laterality Date     ARTHROSCOPY KNEE RT/LT  2007    left knee     ARTHROSCOPY KNEE RT/LT  2005    right     LAPAROSCOPIC CHOLECYSTECTOMY WITH CHOLANGIOGRAMS N/A 5/28/2016    Procedure: LAPAROSCOPIC CHOLECYSTECTOMY WITH CHOLANGIOGRAMS;  Surgeon: Jerry Bello MD;  Location: [facility] OR     Current Outpatient Medications   Medication Sig Dispense Refill     blood glucose monitoring (ONE TOUCH DELICA) lancets Use to test blood sugar 2-3 times daily or as directed. May substitute per insurance coverage. 100 each 11     blood glucose monitoring (ONETOUCH VERIO IQ) test strip Use to test blood sugar 2-3 times daily or as directed.May substitute per insurance coverage. 100 each 11     Blood Glucose Monitoring Suppl (ONETOUCH VERIO FLEX SYSTEM) w/Device KIT 1 each 3 times daily as needed May substitute per insurance coverage. 1 kit 0     doxycycline monohydrate (MONODOX) 100 MG capsule 1 CAPSULE BY MOUTH DAILY       dulaglutide (TRULICITY) 1.5 MG/0.5ML pen Inject 1.5 mg Subcutaneous every 7 days 6 mL 1     glipiZIDE (GLUCOTROL XL) 10 MG 24 hr tablet Take 2 tablets (20 mg) by mouth daily 180 tablet 1     metFORMIN (GLUCOPHAGE-XR) 500 MG 24 hr tablet TAKE 2 TABLETS BY MOUTH 2 TIMES DAILY WITH MEALS 360 tablet 1     sildenafil (VIAGRA) 100 MG tablet Take 1 tablet (100 mg) by mouth daily as needed (as needed) 30 tablet 1     simvastatin (ZOCOR) 40 MG tablet TAKE 1 TABLET BY MOUTH EVERYDAY AT BEDTIME 90 tablet 3     SOOLANTRA 1 % cream 1 APPLICATION AT BEDTIME TOPICALLY TO WHOLE FACE         Allergies   Allergen Reactions     Seasonal Allergies         Social History     Tobacco Use     Smoking status: Never Smoker     Smokeless tobacco: Never Used   Substance Use Topics      "Alcohol use: Yes     Comment: 2 -3 beers every few weeks     {FAMILY HISTORY (Optional):243240514}  History   Drug Use No         Objective     There were no vitals taken for this visit.    Physical Exam  {EXAM Preop Choices:367527}    Recent Labs   Lab Test 21  1014 20  0810 20  0913     --  136   POTASSIUM 4.6  --  4.6   CR 0.99  --  0.95   A1C 8.3* 8.0* 7.5*        Diagnostics:  {LABS:632396}   {EK}    Revised Cardiac Risk Index (RCRI):  The patient has the following serious cardiovascular risks for perioperative complications:  {PREOP REVISED CARDIAC RISK INDEX (RCRI) :826165::\" - No serious cardiac risks = 0 points\"}     RCRI Interpretation: {REVISED CARDIAC RISK INTERPRETATION :810401}         Signed Electronically by: VALDEZ Cowart CNP  Copy of this evaluation report is provided to requesting physician.    {Provider Resources  Preop [facility] Preop Guidelines  Revised Cardiac Risk Index :781403}  "

## 2021-08-10 RX ORDER — FEXOFENADINE HCL 180 MG/1
180 TABLET ORAL EVERY MORNING
COMMUNITY
End: 2022-11-30

## 2021-08-11 ENCOUNTER — LAB (OUTPATIENT)
Dept: LAB | Facility: CLINIC | Age: 56
End: 2021-08-11
Payer: COMMERCIAL

## 2021-08-11 DIAGNOSIS — Z11.59 ENCOUNTER FOR SCREENING FOR OTHER VIRAL DISEASES: ICD-10-CM

## 2021-08-11 LAB — SARS-COV-2 RNA RESP QL NAA+PROBE: NEGATIVE

## 2021-08-11 PROCEDURE — U0003 INFECTIOUS AGENT DETECTION BY NUCLEIC ACID (DNA OR RNA); SEVERE ACUTE RESPIRATORY SYNDROME CORONAVIRUS 2 (SARS-COV-2) (CORONAVIRUS DISEASE [COVID-19]), AMPLIFIED PROBE TECHNIQUE, MAKING USE OF HIGH THROUGHPUT TECHNOLOGIES AS DESCRIBED BY CMS-2020-01-R: HCPCS

## 2021-08-11 PROCEDURE — U0005 INFEC AGEN DETEC AMPLI PROBE: HCPCS

## 2021-08-13 ENCOUNTER — HOSPITAL ENCOUNTER (OUTPATIENT)
Facility: CLINIC | Age: 56
Discharge: HOME | End: 2021-08-13
Attending: SURGERY | Admitting: SURGERY
Payer: COMMERCIAL

## 2021-08-13 ENCOUNTER — ANESTHESIA EVENT (OUTPATIENT)
Dept: SURGERY | Facility: CLINIC | Age: 56
End: 2021-08-13
Payer: COMMERCIAL

## 2021-08-13 ENCOUNTER — ANESTHESIA (OUTPATIENT)
Dept: SURGERY | Facility: CLINIC | Age: 56
End: 2021-08-13
Payer: COMMERCIAL

## 2021-08-13 ENCOUNTER — APPOINTMENT (OUTPATIENT)
Dept: SURGERY | Facility: PHYSICIAN GROUP | Age: 56
End: 2021-08-13
Payer: COMMERCIAL

## 2021-08-13 VITALS
OXYGEN SATURATION: 97 % | DIASTOLIC BLOOD PRESSURE: 78 MMHG | WEIGHT: 234.6 LBS | TEMPERATURE: 97 F | HEIGHT: 69 IN | SYSTOLIC BLOOD PRESSURE: 135 MMHG | RESPIRATION RATE: 16 BRPM | BODY MASS INDEX: 34.75 KG/M2 | HEART RATE: 67 BPM

## 2021-08-13 DIAGNOSIS — K40.90 RIGHT INGUINAL HERNIA: ICD-10-CM

## 2021-08-13 LAB
GLUCOSE BLDC GLUCOMTR-MCNC: 159 MG/DL (ref 70–99)
GLUCOSE BLDC GLUCOMTR-MCNC: 177 MG/DL (ref 70–99)

## 2021-08-13 PROCEDURE — 710N000009 HC RECOVERY PHASE 1, LEVEL 1, PER MIN: Performed by: SURGERY

## 2021-08-13 PROCEDURE — 250N000009 HC RX 250: Performed by: NURSE ANESTHETIST, CERTIFIED REGISTERED

## 2021-08-13 PROCEDURE — 360N000075 HC SURGERY LEVEL 2, PER MIN: Performed by: SURGERY

## 2021-08-13 PROCEDURE — 49505 PRP I/HERN INIT REDUC >5 YR: CPT | Mod: RT | Performed by: SURGERY

## 2021-08-13 PROCEDURE — 250N000011 HC RX IP 250 OP 636: Performed by: SURGERY

## 2021-08-13 PROCEDURE — 370N000017 HC ANESTHESIA TECHNICAL FEE, PER MIN: Performed by: SURGERY

## 2021-08-13 PROCEDURE — 49505 PRP I/HERN INIT REDUC >5 YR: CPT | Mod: AS | Performed by: PHYSICIAN ASSISTANT

## 2021-08-13 PROCEDURE — 710N000012 HC RECOVERY PHASE 2, PER MINUTE: Performed by: SURGERY

## 2021-08-13 PROCEDURE — 250N000011 HC RX IP 250 OP 636: Performed by: NURSE ANESTHETIST, CERTIFIED REGISTERED

## 2021-08-13 PROCEDURE — 250N000013 HC RX MED GY IP 250 OP 250 PS 637: Performed by: SURGERY

## 2021-08-13 PROCEDURE — 250N000009 HC RX 250: Performed by: SURGERY

## 2021-08-13 PROCEDURE — 258N000003 HC RX IP 258 OP 636: Performed by: ANESTHESIOLOGY

## 2021-08-13 PROCEDURE — 999N000141 HC STATISTIC PRE-PROCEDURE NURSING ASSESSMENT: Performed by: SURGERY

## 2021-08-13 PROCEDURE — C1781 MESH (IMPLANTABLE): HCPCS | Performed by: SURGERY

## 2021-08-13 PROCEDURE — 250N000011 HC RX IP 250 OP 636: Performed by: ANESTHESIOLOGY

## 2021-08-13 PROCEDURE — 272N000001 HC OR GENERAL SUPPLY STERILE: Performed by: SURGERY

## 2021-08-13 DEVICE — MESH PROGRIP 4.7X3" PARIETEX RIGHT TEM1208GR: Type: IMPLANTABLE DEVICE | Site: INGUINAL | Status: FUNCTIONAL

## 2021-08-13 RX ORDER — PROPOFOL 10 MG/ML
INJECTION, EMULSION INTRAVENOUS PRN
Status: DISCONTINUED | OUTPATIENT
Start: 2021-08-13 | End: 2021-08-13

## 2021-08-13 RX ORDER — LIDOCAINE 40 MG/G
CREAM TOPICAL
Status: DISCONTINUED | OUTPATIENT
Start: 2021-08-13 | End: 2021-08-13 | Stop reason: HOSPADM

## 2021-08-13 RX ORDER — LABETALOL HYDROCHLORIDE 5 MG/ML
10 INJECTION, SOLUTION INTRAVENOUS
Status: DISCONTINUED | OUTPATIENT
Start: 2021-08-13 | End: 2021-08-13 | Stop reason: HOSPADM

## 2021-08-13 RX ORDER — DEXAMETHASONE SODIUM PHOSPHATE 4 MG/ML
INJECTION, SOLUTION INTRA-ARTICULAR; INTRALESIONAL; INTRAMUSCULAR; INTRAVENOUS; SOFT TISSUE PRN
Status: DISCONTINUED | OUTPATIENT
Start: 2021-08-13 | End: 2021-08-13

## 2021-08-13 RX ORDER — KETOROLAC TROMETHAMINE 30 MG/ML
INJECTION, SOLUTION INTRAMUSCULAR; INTRAVENOUS PRN
Status: DISCONTINUED | OUTPATIENT
Start: 2021-08-13 | End: 2021-08-13

## 2021-08-13 RX ORDER — LIDOCAINE HYDROCHLORIDE 10 MG/ML
INJECTION, SOLUTION INFILTRATION; PERINEURAL PRN
Status: DISCONTINUED | OUTPATIENT
Start: 2021-08-13 | End: 2021-08-13

## 2021-08-13 RX ORDER — GLYCOPYRROLATE 0.2 MG/ML
INJECTION, SOLUTION INTRAMUSCULAR; INTRAVENOUS PRN
Status: DISCONTINUED | OUTPATIENT
Start: 2021-08-13 | End: 2021-08-13

## 2021-08-13 RX ORDER — OXYCODONE HYDROCHLORIDE 5 MG/1
5 TABLET ORAL EVERY 4 HOURS PRN
Status: DISCONTINUED | OUTPATIENT
Start: 2021-08-13 | End: 2021-08-13 | Stop reason: HOSPADM

## 2021-08-13 RX ORDER — BUPIVACAINE HYDROCHLORIDE 5 MG/ML
INJECTION, SOLUTION EPIDURAL; INTRACAUDAL PRN
Status: DISCONTINUED | OUTPATIENT
Start: 2021-08-13 | End: 2021-08-13 | Stop reason: HOSPADM

## 2021-08-13 RX ORDER — GLYCINE 1.5 G/100ML
SOLUTION IRRIGATION PRN
Status: DISCONTINUED | OUTPATIENT
Start: 2021-08-13 | End: 2021-08-13 | Stop reason: HOSPADM

## 2021-08-13 RX ORDER — SODIUM CHLORIDE, SODIUM LACTATE, POTASSIUM CHLORIDE, CALCIUM CHLORIDE 600; 310; 30; 20 MG/100ML; MG/100ML; MG/100ML; MG/100ML
INJECTION, SOLUTION INTRAVENOUS CONTINUOUS
Status: DISCONTINUED | OUTPATIENT
Start: 2021-08-13 | End: 2021-08-13 | Stop reason: HOSPADM

## 2021-08-13 RX ORDER — FENTANYL CITRATE 50 UG/ML
25 INJECTION, SOLUTION INTRAMUSCULAR; INTRAVENOUS
Status: DISCONTINUED | OUTPATIENT
Start: 2021-08-13 | End: 2021-08-13 | Stop reason: HOSPADM

## 2021-08-13 RX ORDER — ONDANSETRON 4 MG/1
4 TABLET, ORALLY DISINTEGRATING ORAL EVERY 30 MIN PRN
Status: DISCONTINUED | OUTPATIENT
Start: 2021-08-13 | End: 2021-08-13 | Stop reason: HOSPADM

## 2021-08-13 RX ORDER — KETOROLAC TROMETHAMINE 15 MG/ML
15 INJECTION, SOLUTION INTRAMUSCULAR; INTRAVENOUS EVERY 6 HOURS PRN
Status: DISCONTINUED | OUTPATIENT
Start: 2021-08-13 | End: 2021-08-13 | Stop reason: HOSPADM

## 2021-08-13 RX ORDER — HYDROCODONE BITARTRATE AND ACETAMINOPHEN 5; 325 MG/1; MG/1
1 TABLET ORAL
Status: COMPLETED | OUTPATIENT
Start: 2021-08-13 | End: 2021-08-13

## 2021-08-13 RX ORDER — FENTANYL CITRATE 50 UG/ML
25 INJECTION, SOLUTION INTRAMUSCULAR; INTRAVENOUS EVERY 5 MIN PRN
Status: DISCONTINUED | OUTPATIENT
Start: 2021-08-13 | End: 2021-08-13 | Stop reason: HOSPADM

## 2021-08-13 RX ORDER — ONDANSETRON 2 MG/ML
INJECTION INTRAMUSCULAR; INTRAVENOUS PRN
Status: DISCONTINUED | OUTPATIENT
Start: 2021-08-13 | End: 2021-08-13

## 2021-08-13 RX ORDER — HYDROCODONE BITARTRATE AND ACETAMINOPHEN 5; 325 MG/1; MG/1
1-2 TABLET ORAL EVERY 4 HOURS PRN
Qty: 10 TABLET | Refills: 0 | Status: SHIPPED | OUTPATIENT
Start: 2021-08-13 | End: 2021-08-14

## 2021-08-13 RX ORDER — HYDROMORPHONE HCL IN WATER/PF 6 MG/30 ML
0.2 PATIENT CONTROLLED ANALGESIA SYRINGE INTRAVENOUS EVERY 5 MIN PRN
Status: DISCONTINUED | OUTPATIENT
Start: 2021-08-13 | End: 2021-08-13 | Stop reason: HOSPADM

## 2021-08-13 RX ORDER — HYDRALAZINE HYDROCHLORIDE 20 MG/ML
2.5-5 INJECTION INTRAMUSCULAR; INTRAVENOUS EVERY 10 MIN PRN
Status: DISCONTINUED | OUTPATIENT
Start: 2021-08-13 | End: 2021-08-13 | Stop reason: HOSPADM

## 2021-08-13 RX ORDER — CEFAZOLIN SODIUM 2 G/100ML
2 INJECTION, SOLUTION INTRAVENOUS SEE ADMIN INSTRUCTIONS
Status: DISCONTINUED | OUTPATIENT
Start: 2021-08-13 | End: 2021-08-13 | Stop reason: HOSPADM

## 2021-08-13 RX ORDER — CEFAZOLIN SODIUM 2 G/100ML
2 INJECTION, SOLUTION INTRAVENOUS
Status: COMPLETED | OUTPATIENT
Start: 2021-08-13 | End: 2021-08-13

## 2021-08-13 RX ORDER — ONDANSETRON 2 MG/ML
4 INJECTION INTRAMUSCULAR; INTRAVENOUS EVERY 30 MIN PRN
Status: DISCONTINUED | OUTPATIENT
Start: 2021-08-13 | End: 2021-08-13 | Stop reason: HOSPADM

## 2021-08-13 RX ORDER — NEOSTIGMINE METHYLSULFATE 1 MG/ML
VIAL (ML) INJECTION PRN
Status: DISCONTINUED | OUTPATIENT
Start: 2021-08-13 | End: 2021-08-13

## 2021-08-13 RX ORDER — MEPERIDINE HYDROCHLORIDE 25 MG/ML
12.5 INJECTION INTRAMUSCULAR; INTRAVENOUS; SUBCUTANEOUS
Status: DISCONTINUED | OUTPATIENT
Start: 2021-08-13 | End: 2021-08-13 | Stop reason: HOSPADM

## 2021-08-13 RX ORDER — FENTANYL CITRATE 50 UG/ML
INJECTION, SOLUTION INTRAMUSCULAR; INTRAVENOUS PRN
Status: DISCONTINUED | OUTPATIENT
Start: 2021-08-13 | End: 2021-08-13

## 2021-08-13 RX ADMIN — MIDAZOLAM 2 MG: 1 INJECTION INTRAMUSCULAR; INTRAVENOUS at 08:25

## 2021-08-13 RX ADMIN — LIDOCAINE HYDROCHLORIDE 50 MG: 10 INJECTION, SOLUTION INFILTRATION; PERINEURAL at 08:31

## 2021-08-13 RX ADMIN — FENTANYL CITRATE 25 MCG: 50 INJECTION, SOLUTION INTRAMUSCULAR; INTRAVENOUS at 09:55

## 2021-08-13 RX ADMIN — SODIUM CHLORIDE, POTASSIUM CHLORIDE, SODIUM LACTATE AND CALCIUM CHLORIDE: 600; 310; 30; 20 INJECTION, SOLUTION INTRAVENOUS at 09:26

## 2021-08-13 RX ADMIN — HYDROMORPHONE HYDROCHLORIDE 0.2 MG: 0.2 INJECTION, SOLUTION INTRAMUSCULAR; INTRAVENOUS; SUBCUTANEOUS at 10:20

## 2021-08-13 RX ADMIN — FENTANYL CITRATE 100 MCG: 50 INJECTION, SOLUTION INTRAMUSCULAR; INTRAVENOUS at 08:28

## 2021-08-13 RX ADMIN — ROCURONIUM BROMIDE 10 MG: 10 INJECTION INTRAVENOUS at 08:32

## 2021-08-13 RX ADMIN — FENTANYL CITRATE 25 MCG: 50 INJECTION, SOLUTION INTRAMUSCULAR; INTRAVENOUS at 10:34

## 2021-08-13 RX ADMIN — CEFAZOLIN SODIUM 2 G: 2 INJECTION, SOLUTION INTRAVENOUS at 08:25

## 2021-08-13 RX ADMIN — HYDROCODONE BITARTRATE AND ACETAMINOPHEN 1 TABLET: 5; 325 TABLET ORAL at 11:01

## 2021-08-13 RX ADMIN — GLYCOPYRROLATE 0.6 MG: 0.2 INJECTION, SOLUTION INTRAMUSCULAR; INTRAVENOUS at 09:26

## 2021-08-13 RX ADMIN — FENTANYL CITRATE 25 MCG: 50 INJECTION, SOLUTION INTRAMUSCULAR; INTRAVENOUS at 10:04

## 2021-08-13 RX ADMIN — DEXAMETHASONE SODIUM PHOSPHATE 4 MG: 4 INJECTION, SOLUTION INTRA-ARTICULAR; INTRALESIONAL; INTRAMUSCULAR; INTRAVENOUS; SOFT TISSUE at 08:32

## 2021-08-13 RX ADMIN — SODIUM CHLORIDE, POTASSIUM CHLORIDE, SODIUM LACTATE AND CALCIUM CHLORIDE: 600; 310; 30; 20 INJECTION, SOLUTION INTRAVENOUS at 08:25

## 2021-08-13 RX ADMIN — NEOSTIGMINE METHYLSULFATE 3 MG: 1 INJECTION, SOLUTION INTRAVENOUS at 09:26

## 2021-08-13 RX ADMIN — ONDANSETRON HYDROCHLORIDE 4 MG: 2 INJECTION, SOLUTION INTRAVENOUS at 08:39

## 2021-08-13 RX ADMIN — PROPOFOL 200 MG: 10 INJECTION, EMULSION INTRAVENOUS at 08:31

## 2021-08-13 RX ADMIN — KETOROLAC TROMETHAMINE 30 MG: 30 INJECTION, SOLUTION INTRAMUSCULAR at 09:26

## 2021-08-13 ASSESSMENT — MIFFLIN-ST. JEOR: SCORE: 1894.52

## 2021-08-13 NOTE — ANESTHESIA PROCEDURE NOTES
Airway       Patient location during procedure: OR  Staff -        CRNA: Elli Barney APRN CRNA       Performed By: CRNA  Consent for Airway        Urgency: elective  Indications and Patient Condition       Indications for airway management: kathrine-procedural       Induction type:intravenous       Mask difficulty assessment: 0 - not attempted    Final Airway Details       Final airway type: supraglottic airway    Supraglottic Airway Details        Type: LMA       LMA size: 5    Post intubation assessment        Placement verified by: capnometry, equal breath sounds and chest rise        Number of attempts at approach: 1       Ease of procedure: easy       Dentition: Intact

## 2021-08-13 NOTE — ANESTHESIA CARE TRANSFER NOTE
Patient: Everton Linn    Procedure(s):  HERNIORRHAPHY, INGUINAL, OPEN with mesh    Diagnosis: Right inguinal hernia [K40.90]  Diagnosis Additional Information: No value filed.    Anesthesia Type:   No value filed.     Note:    Oropharynx: oropharynx clear of all foreign objects and spontaneously breathing  Level of Consciousness: drowsy  Oxygen Supplementation: face mask  Level of Supplemental Oxygen (L/min / FiO2): 6  Independent Airway: airway patency satisfactory and stable  Dentition: dentition unchanged  Vital Signs Stable: post-procedure vital signs reviewed and stable  Report to RN Given: handoff report given  Patient transferred to: PACU    Handoff Report: Identifed the Patient, Identified the Reponsible Provider, Reviewed the pertinent medical history, Discussed the surgical course, Reviewed Intra-OP anesthesia mangement and issues during anesthesia, Set expectations for post-procedure period and Allowed opportunity for questions and acknowledgement of understanding      Vitals:  Vitals Value Taken Time   /84 08/13/21 0938   Temp 97.3  F (36.3  C) 08/13/21 0938   Pulse 74 08/13/21 0941   Resp 12 08/13/21 0941   SpO2 100 % 08/13/21 0941   Vitals shown include unvalidated device data.    Electronically Signed By: VALDEZ Calle CRNA  August 13, 2021  9:43 AM

## 2021-08-13 NOTE — BRIEF OP NOTE
"[facility]    Brief Operative Note    Pre-operative diagnosis: Right inguinal hernia [K40.90]  Post-operative diagnosis Right inguinal hernia    Procedure: Procedure(s):  HERNIORRHAPHY, INGUINAL, OPEN with mesh  Surgeon: Surgeon(s) and Role:     * Jerry Bello MD - Primary     * Seema Wiley PA-C - Assisting  Anesthesia: General   Estimated blood loss: Less than 10 ml  Drains: None  Specimens: * No specimens in log *  Findings:   Direct defect containing fat, closed and repaired with ProGrip onlay mesh..  Complications: None.  Implants:   Implant Name Type Inv. Item Serial No.  Lot No. LRB No. Used Action   MESH PROGRIP 4.7X3\" PARIETEX RIGHT [id number] - [id number] Mesh MESH PROGRIP 4.7X3\" PARIETEX RIGHT [id number]  [facility] [id number] Right 1 Implanted           "

## 2021-08-13 NOTE — DISCHARGE INSTRUCTIONS
You received Toradol, an IV form of ibuprofen (Motrin) at  0930 am.  Do not take any ibuprofen products until 3:30 pm.  HOME CARE FOLLOWING INGUINAL/FEMORAL HERNIA REPAIR  LORENA Cool, GALI Salvador R. O Donnell, J. Shaheen    DIET:  Start with liquids and gradually resume your regular diet as tolerated.  Drink plenty of fluids.  While taking pain medications, consider use of a stool softener, increase your fiber in your diet, or add a fiber supplement (like Metamucil, Citrucel) to help prevent constipation - a possible side effect of pain medications.    NAUSEA:  If nauseated from the anesthetic/pain meds; rest in bed, get up cautiously with assistance, and drink clear liquids (juice, tea, broth).  ACTIVITY:  Light Activity -- you may immediately be up and about as tolerated.  Walking is encouraged, increase as tolerated.  Driving/Light Work-- when comfortable and off narcotic pain medications.  Strenuous Work/Activity -- limit lifting to 20 pounds for 3 weeks.  Active Sports (running, biking, etc.) -- cautiously resume after 4 weeks.    INCISIONAL CARE:    If you have a dressing in place, keep clean and dry for 48 hours; you may replace the gauze if it becomes soiled.    After 48 hours you may remove the dressing and shower.  Do not submerse incision in water for 1 week.    If you have a Dermabond dressing (a type of skin glue), you may shower immediately.    Sutures will absorb and need not be removed.    If present, leave the steri-strips (white paper tapes) in place for 14 days after surgery.    If present, leave Dermabond glue in place until it wears/flakes off.    Do not apply lotions, creams, or ointments to incisions.    Expect a variable amount of swelling/black and blue discoloration that may involve the penis/scrotum or labia.    Some numbness around the incision is common.    A lump/ridge under the incision is normal and will gradually resolve.    DISCOMFORT:  Local anesthetic  placed at surgery should provide relief for 4-8 hours.  Begin taking pain pills before discomfort is severe.  Take the pain medication with some food, when possible, to minimize side effects.  Intermittent use of ice packs to the hernia repair site may help during the first 1-3 weeks after surgery.  Expect gradual improvement.    Over-the-counter anti-inflammatory medications (i.e. Ibuprofen/Advil/Motrin or Naprosyn/Aleve) may be used per package instructions in addition to or while tapering off the narcotic pain medications to decrease swelling and sensitivity at the repair site.  DO NOT TAKE these Anti-inflammatory medications if your primary physician has advised against doing so, or if you have acid reflux, ulcer, or bleeding disorder, or take blood-thinner medications.  Call your primary physician or the surgery office if you have medication questions.    FOLLOW-UP AFTER SURGERY:  -Our office will contact you approximately 2-3 weeks after surgery to check on your progress and answer any questions you may have.  If you are doing well, you will not need to return for an office appointment.  If any concerns are identified over the phone, we will help you make an appointment to see a provider.    -If you have not received a phone call, have any questions or concerns, or would like to be seen, please call us at [phone].  [facility] at: [address] [facility] [address]; [place] [address]    -CONTACT US IF THE FOLLOWING DEVELOPS:   1. A fever that is above 101     2. Increased redness, warmth, drainage, bleeding, or swelling.   3. Pain that is not relieved by rest/ice and your prescription.   4.  Increasing pain after 48 hours.   5. Drainage that is thick, cloudy, yellow, green or white.   6. Any other questions or concerns.      FREQUENTLY ASKED QUESTIONS:    Q:  How should my incision look?    A:  Normally your incision will appear slightly swollen with light redness directly along the incision itself as  it heals.  It may feel like a bump or ridge as the healing/scarring happens, and over time (3-4 months) this bump or ridge feeling should slowly go away.  In general, clear or pink watery drainage can be normal at first as your incision heals, but should decrease over time.    Q:  How do I know if my incision is infected?  A:  Look at your incision for signs of infection, like redness around the incision spreading to surrounding skin, or drainage of cloudy or foul-smelling drainage.  If you feel warm, check your temperature to see if you are running a fever.    **If any of these things occur, please notify the nurse at our office.  We may need you to come into the office for an incision check.      Q:  How do I take care of my incision?  A:  If you have a dressing in place - Starting the day after surgery, replace the dressing 1-2 times a day until there is no further drainage from the incision.  At that time, a dressing is no longer needed.  Try to minimize tape on the skin if irritation is occurring at the tape sites.  If you have significant irritation from tape on the skin, please call the office to discuss other method of dressing your incision.    Small pieces of tape called  steri-strips  may be present directly overlying your incision; these may be removed 10 days after surgery unless otherwise specified by your surgeon.  If these tapes start to loosen at the ends, you may trim them back until they fall off or are removed.    A:  If you had  Dermabond  tissue glue used as a dressing (this causes your incision to look shiny with a clear covering over it) - This type of dressing wears off with time and does not require more dressings over the top unless it is draining around the glue as it wears off.  Do not apply ointments or lotions over the incisions until the glue has completely worn off.    Q:  There is a piece of tape or a sticky  lead  still on my skin.  Can I remove this?  A:  Sometimes the sticky   leads  used for monitoring during surgery or for evaluation in the emergency department are not all removed while you are in the hospital.  These sometimes have a tab or metal dot on them.  You can easily remove these on your own, like taking off a band-aid.  If there is a gel substance under the  lead , simply wipe/clean it off with a washcloth or paper towel.      Q:  What can I do to minimize constipation (very hard stools, or lack of stools)?  A:  Stay well hydrated.  Increase your dietary fiber intake or take a fiber supplement -with plenty of water.  Walk around frequently.  You may consider an over-the-counter stool-softener.  Your Pharmacist can assist you with choosing one that is stocked at your pharmacy.  Constipation is also one of the most common side effects of pain medication.  If you are using pain medication, be pro-active and try to PREVENT problems with constipation by taking the steps above BEFORE constipation becomes a problem.    Q:  What do I do if I need more pain medications?  A:  Call the office to receive refills.  Be aware that certain pain meds cannot be called into a pharmacy and actually require a paper prescription.  A change may be made in your pain med as you progress thru your recovery period or if you have side effects to certain meds.    --Pain meds are NOT refilled after 5pm on weekdays, and NOT AT ALL on the weekends, so please look ahead to prevent problems.    Q:  Why am I having a hard time sleeping now that I am at home?  A:  Many medications you receive while you are in the hospital can impact your sleep for a number of days after your surgery/hospitalization.  Decreased level of activity and naps during the day may also make sleeping at night difficult.  Try to minimize day-time naps, and get up frequently during the day to walk around your home during your recovery time.  Sleep aides may be of some help, but are not recommended for long-term use.      Q:  I am having  some back discomfort.  What should I do?  A:  This may be related to certain positioning that was required for your surgery, extended periods of time in bed, or other changes in your overall activity level.  You may try ice, heat, acetaminophen, or ibuprofen to treat this temporarily.  Note that many pain medications have acetaminophen in them and would state this on the prescription bottle.  Be sure not to exceed the maximum of 4000mg per day of acetaminophen.     **If the pain you are having does not resolve, is severe, or is a flare of back pain you have had on other occasions prior to surgery, please contact your primary physician for further recommendations or for an appointment to be examined at their office.    Q:  Why am I having headaches?  A:  Headaches can be caused by many things:  caffeine withdrawal, use of pain meds, dehydration, high blood pressure, lack of sleep, over-activity/exhaustion, flare-up of usual migraine headaches.  If you feel this is related to muscle tension (a band-like feeling around the head, or a pressure at the low-back of the head) you may try ice or heat to this area.  You may need to drink more fluids (try electrolyte drink like Gatorade), rest, or take your usual migraine medications.   **If your headaches do not resolve, worsen, are accompanied by other symptoms, or if your blood pressure is high, please call your primary physician for recommendation and/or examination.    Q:  I am unable to urinate.  What do I do?  A:  A small percentage of people can have difficulty urinating initially after surgery.  This includes being able to urinate only a very small amount at a time and feeling discomfort or pressure in the very low abdomen.  This is called  urinary retention , and is actually an urgent situation.  Proceed to your nearest Emergency department for evaluation (not an Urgent Care Center).  Sometimes the bladder does not work correctly after certain medications you receive  during surgery, or related to certain procedures.  You may need to have a catheter placed until your bladder recovers.  When planning to go to an Emergency department, it may help to call the ER to let them know you are coming in for this problem after a surgery.  This may help you get in quicker to be evaluated.  **If you have symptoms of a urinary tract infection, please contact your primary physician for the proper evaluation and treatment.        If you have other questions, please call the office Monday thru Friday between 8am and 5pm to discuss with the nurse or physician assistant.  #[phone]    There is a surgeon ON CALL on weekday evenings and over the weekend in case of urgent need only, and may be contacted at the same number.    If you are having an emergency, call 911 or proceed to your nearest emergency department.    GENERAL ANESTHESIA OR SEDATION ADULT DISCHARGE INSTRUCTIONS   SPECIAL PRECAUTIONS FOR 24 HOURS AFTER SURGERY    IT IS NOT UNUSUAL TO FEEL LIGHT-HEADED OR FAINT, UP TO 24 HOURS AFTER SURGERY OR WHILE TAKING PAIN MEDICATION.  IF YOU HAVE THESE SYMPTOMS; SIT FOR A FEW MINUTES BEFORE STANDING AND HAVE SOMEONE ASSIST YOU WHEN YOU GET UP TO WALK OR USE THE BATHROOM.    YOU SHOULD REST AND RELAX FOR THE NEXT 24 HOURS AND YOU MUST MAKE ARRANGEMENTS TO HAVE SOMEONE STAY WITH YOU FOR AT LEAST 24 HOURS AFTER YOUR DISCHARGE.  AVOID HAZARDOUS AND STRENUOUS ACTIVITIES.  DO NOT MAKE IMPORTANT DECISIONS FOR 24 HOURS.    DO NOT DRIVE ANY VEHICLE OR OPERATE MECHANICAL EQUIPMENT FOR 24 HOURS FOLLOWING THE END OF YOUR SURGERY.  EVEN THOUGH YOU MAY FEEL NORMAL, YOUR REACTIONS MAY BE AFFECTED BY THE MEDICATION YOU HAVE RECEIVED.    DO NOT DRINK ALCOHOLIC BEVERAGES FOR 24 HOURS FOLLOWING YOUR SURGERY.    DRINK CLEAR LIQUIDS (APPLE JUICE, GINGER ALE, 7-UP, BROTH, ETC.).  PROGRESS TO YOUR REGULAR DIET AS YOU FEEL ABLE.    YOU MAY HAVE A DRY MOUTH, A SORE THROAT, MUSCLES ACHES OR TROUBLE SLEEPING.  THESE  SHOULD GO AWAY AFTER 24 HOURS.    CALL YOUR DOCTOR FOR ANY OF THE FOLLOWING:  SIGNS OF INFECTION (FEVER, GROWING TENDERNESS AT THE SURGERY SITE, A LARGE AMOUNT OF DRAINAGE OR BLEEDING, SEVERE PAIN, FOUL-SMELLING DRAINAGE, REDNESS OR SWELLING.    IT HAS BEEN OVER 8 TO 10 HOURS SINCE SURGERY AND YOU ARE STILL NOT ABLE TO URINATE (PASS WATER).     Maximum acetaminophen (Tylenol) dose from all sources should not exceed 4 grams (4000 mg) per day.  You received one norco at 11 am.            You received Toradol, an IV form of ibuprofen (Motrin) at 9:26am.  Do not take any ibuprofen products until after 5:26pm if needed.

## 2021-08-13 NOTE — OP NOTE
Procedure Date: 08/13/2021    PREOPERATIVE DIAGNOSIS:  Symptomatic right inguinal hernia.    POSTOPERATIVE DIAGNOSIS:  Symptomatic right inguinal hernia.    PROCEDURE:  Open repair of right inguinal hernia with placement of onlay mesh.    ANESTHESIA:  General plus local.    SURGEON:  Jerry Gomez M.D.    ASSISTANT:  Seema Wiley PA-C.  The physician assistant was medically necessary for skills in suturing, cutting, suture exposure, traction, and suctioning throughout the operation.    SPECIMENS:  None submitted.    COMPLICATIONS:  None.    INDICATIONS FOR PROCEDURE:  Mr. Linn is a 56-year-old gentleman who I met in my office in June and presented with a symptomatic right groin bulge.  Initially, the hernia was noted by his primary care provider, but did not cause him discomfort until the last year or so when he had mild discomfort with activity.  Because of these new symptoms, the patient desired repair.  Before became more irritable to him.  Risks of the operation were therefore discussed in detail.  These include infection, bleeding harm to structures, mesh infection, hernia recurrence, chronic pain and testicular loss, the patient verbalized understanding of the above and consented to proceed.    FINDINGS:  The patient had a direct defect containing preperitoneal fat.  There was no indirect component.  This was repaired with a ProGrip onlay mesh.    DESCRIPTION OF PROCEDURE:  With the patient under excellent general anesthesia in the supine position, the right groin was clippered and prepped and draped out in the usual sterile surgical fashion.  A timeout was then performed confirming the patient, procedure to be done as well as drug allergies and site markings.  The patient did receive a dose of Ancef for infection prophylaxis prior to making our initial operation incision.  We began by making approximately 5 cm oblique incision extending from the lateral edge of the pubic tubercle out towards the  ASIS.  Electrocautery dissection was carried out down to and through Gely's fascia to expose the external oblique fibers.  Fibers were cleared of fat in all directions and a self-retaining cerebellar retractor was placed into the wound.  We infiltrated the external obliques with 0.5% Marcaine and entered it sharply with a 15 blade.  The obliques were then split down to the level of the external ring using Metzenbaum scissors.  The cord and hernia contents were then bluntly dissected free from the inguinal canal and at the level of the pubis.  The cord was surrounded and elevated into view.  There was lobule of fat adhered to the superior medial edge of the cord, which was dissected free without difficulty.  This hernia contents seemed to be emanating through the floor of the canal.  These were reduced and the floor of the canal was closed with a running 0 PDS stitch.  Examination of the lateral edge of the cord revealed no indirect component.  We introduced a right sided anatomic ProGrip mesh into the field, soaked it in Irrisept, and deployed it such that the marker was over the pubic tubercle and the tab was wrapped around the base of the cord structure.  A single 2-0 Prolene was used to reapproximate the two edges of the mesh above the cord.  We then soaked the wound in Irrisept, and suctioned it dry and found to be satisfactorily hemostatic.  The Penrose drain was removed and the external obliques were closed with running 3-0 Vicryls.  Gely's was reapproximated with interrupted 3-0 Vicryls.  Skin was closed with 4-0 Vicryl running subcuticular stitch.  Skin glue was applied atop the closed wound.  The patient tolerated the procedure well.  He was extubated and brought to recovery in excellent condition.  All sharps and sponge counts were correct at the conclusion of the case.    Jerry Olsen MD        D: 08/13/2021   T: 08/13/2021   MT: [id number]    Name:     JOHN HANEY  MRN:      [id number]         Account:        [id number]   :      1965           Procedure Date: 2021     Document: [id number]    cc:  Lindsay Arroyo NP

## 2021-08-13 NOTE — ANESTHESIA PREPROCEDURE EVALUATION
Anesthesia Pre-Procedure Evaluation    Patient: Everton Linn   MRN: [id number] : 1965        Preoperative Diagnosis: Right inguinal hernia [K40.90]   Procedure : Procedure(s):  HERNIORRHAPHY, INGUINAL, OPEN with mesh     Past Medical History:   Diagnosis Date     Diabetes (H)      Sleep apnea     CPAP      Past Surgical History:   Procedure Laterality Date     ARTHROSCOPY KNEE RT/LT      left knee     ARTHROSCOPY KNEE RT/LT      right     ENT SURGERY      [place] [facility].     LAPAROSCOPIC CHOLECYSTECTOMY WITH CHOLANGIOGRAMS N/A 2016    Procedure: LAPAROSCOPIC CHOLECYSTECTOMY WITH CHOLANGIOGRAMS;  Surgeon: Jerry Bello MD;  Location: [facility] OR     ORTHOPEDIC SURGERY      Cerv. Fusion 2013: plate       Allergies   Allergen Reactions     Seasonal Allergies Other (See Comments)     Runny Nose/itchy eyes      Social History     Tobacco Use     Smoking status: Never Smoker     Smokeless tobacco: Never Used   Substance Use Topics     Alcohol use: Yes     Comment: 2 -3 beers every few weeks      Wt Readings from Last 1 Encounters:   21 106.4 kg (234 lb 9.6 oz)        Anesthesia Evaluation   Pt has had prior anesthetic. Type: General.    No history of anesthetic complications       ROS/MED HX  ENT/Pulmonary:     (+) sleep apnea, uses CPAP,     Neurologic:  - neg neurologic ROS     Cardiovascular:  - neg cardiovascular ROS     METS/Exercise Tolerance:     Hematologic:  - neg hematologic  ROS     Musculoskeletal:  - neg musculoskeletal ROS     GI/Hepatic:  - neg GI/hepatic ROS     Renal/Genitourinary:  - neg Renal ROS     Endo:     (+) type II DM, Obesity,     Psychiatric/Substance Use:  - neg psychiatric ROS     Infectious Disease:  - neg infectious disease ROS     Malignancy:  - neg malignancy ROS     Other:  - neg other ROS          Physical Exam    Airway        Mallampati: III   TM distance: > 3 FB      Respiratory Devices and Support         Dental  no notable dental history          Cardiovascular   cardiovascular exam normal          Pulmonary   pulmonary exam normal                OUTSIDE LABS:  CBC:   Lab Results   Component Value Date    WBC 6.9 05/28/2016    WBC 6.4 05/27/2016    HGB 14.5 04/05/2019    HGB 13.0 (L) 05/28/2016    HCT 37.4 (L) 05/28/2016    HCT 40.0 05/27/2016     05/28/2016     05/27/2016     BMP:   Lab Results   Component Value Date     02/25/2021     02/28/2020    POTASSIUM 4.6 02/25/2021    POTASSIUM 4.6 02/28/2020    CHLORIDE 100 02/25/2021    CHLORIDE 101 02/28/2020    CO2 30 02/25/2021    CO2 31 02/28/2020    BUN 13 02/25/2021    BUN 11 02/28/2020    CR 0.99 02/25/2021    CR 0.95 02/28/2020     (H) 08/13/2021     (H) 02/25/2021     COAGS: No results found for: PTT, INR, FIBR  POC:   Lab Results   Component Value Date     (H) 05/28/2016     HEPATIC:   Lab Results   Component Value Date    ALBUMIN 4.1 02/25/2021    PROTTOTAL 8.1 02/25/2021    ALT 32 02/25/2021    AST 15 02/25/2021    ALKPHOS 57 02/25/2021    BILITOTAL 0.7 02/25/2021     OTHER:   Lab Results   Component Value Date    A1C 7.4 (H) 08/04/2021    DAMIÁN 10.0 03/03/2021    LIPASE 510 (H) 05/27/2016    TSH 2.69 01/25/2019       Anesthesia Plan    ASA Status:  2   NPO Status:  NPO Appropriate    Anesthesia Type: General.     - Airway: ETT   Induction: Intravenous, Propofol.   Maintenance: Balanced.        Consents    Anesthesia Plan(s) and associated risks, benefits, and realistic alternatives discussed. Questions answered and patient/representative(s) expressed understanding.     - Discussed with:  Patient         Postoperative Care    Pain management: IV analgesics, Oral pain medications, Multi-modal analgesia.   PONV prophylaxis: Ondansetron (or other 5HT-3), Dexamethasone or Solumedrol     Comments:                Connor Lester MD

## 2021-08-14 DIAGNOSIS — K40.90 RIGHT INGUINAL HERNIA: ICD-10-CM

## 2021-08-14 RX ORDER — HYDROCODONE BITARTRATE AND ACETAMINOPHEN 5; 325 MG/1; MG/1
1-2 TABLET ORAL EVERY 4 HOURS PRN
Qty: 10 TABLET | Refills: 0 | Status: SHIPPED | OUTPATIENT
Start: 2021-08-14 | End: 2021-12-16

## 2021-08-17 DIAGNOSIS — E78.5 HYPERLIPIDEMIA LDL GOAL <100: ICD-10-CM

## 2021-08-18 RX ORDER — SIMVASTATIN 40 MG
TABLET ORAL
Qty: 90 TABLET | Refills: 1 | Status: SHIPPED | OUTPATIENT
Start: 2021-08-18 | End: 2022-02-08

## 2021-08-18 NOTE — TELEPHONE ENCOUNTER
Prescription approved per [facility] Refill Protocol.  Kandace Bhagat RN

## 2021-08-23 NOTE — ADDENDUM NOTE
Addendum  created 08/23/21 0959 by Connor Lester MD    Attestation recorded in Intraprocedure, Clinical Note Signed, Intraprocedure Attestations filed, Intraprocedure Event edited

## 2021-08-23 NOTE — ANESTHESIA POSTPROCEDURE EVALUATION
Patient: Everton Linn    Procedure(s):  HERNIORRHAPHY, INGUINAL, OPEN with mesh    Diagnosis:Right inguinal hernia [K40.90]  Diagnosis Additional Information: No value filed.    Anesthesia Type:  No value filed.    Note:     Postop Pain Control: Uneventful            Sign Out: Well controlled pain   PONV: No   Neuro/Psych: Uneventful            Sign Out: Acceptable/Baseline neuro status   Airway/Respiratory: Uneventful            Sign Out: Acceptable/Baseline resp. status   CV/Hemodynamics: Uneventful            Sign Out: Acceptable CV status   Other NRE: NONE   DID A NON-ROUTINE EVENT OCCUR? No           Last vitals:  Vitals Value Taken Time   /63 08/13/21 1130   Temp 97.1  F (36.2  C) 08/13/21 1130   Pulse 67 08/13/21 1100   Resp 16 08/13/21 1130   SpO2 98 % 08/13/21 1130       Electronically Signed By: Connor Lester MD  August 23, 2021  9:59 AM

## 2021-09-01 ENCOUNTER — TELEPHONE (OUTPATIENT)
Dept: SURGERY | Facility: CLINIC | Age: 56
End: 2021-09-01
Payer: COMMERCIAL

## 2021-09-01 DIAGNOSIS — Z98.890 POSTOPERATIVE STATE: Primary | ICD-10-CM

## 2021-09-01 PROCEDURE — 99024 POSTOP FOLLOW-UP VISIT: CPT | Performed by: PHYSICIAN ASSISTANT

## 2021-09-01 NOTE — TELEPHONE ENCOUNTER
[facility]   Postoperative Follow-up Phone Call  -Call to patient to review recent procedure and recovery    Procedure Date:  August/13  Surgeon:  Dr. Gomez  Procedure:  Right Inguinal hernia repair with Ethicon Ultrapro Mesh   Intra-operative findings, reviewed with patient:  Direct defect    He is now 2 weeks postop.   Voiding issues: none.  Rx/OTC pain medication has been used appropriately for pain control.  Narcotic pain med used: hydrocodone (5/325mg) tab, until POD#5; total 16 tablets.  Then, transitioned to use of advil.    He is feeling well, tolerating normal diet, normal bowel function and denies incision concerns. Doing a walking program.  Current pain management: none.  Following restrictions per surgeon; 20lb weight limit for 3 weeks postop.      Pt concerns:  none    Everton may continue to slowly advance his activities at this time.  General recommendation is to remain at a 20 lb weight restriction until 3 weeks after surgery.  After that time, he may increase activity as tolerated.  He may continue to utilize OTC pain management options as well as use of ice/heat to site for comfort.  He should expect progressive resolution of the healing ridge along the incisional site over the following 2-3 months.      Pt is recommended to contact the office if worsening pain, onset of fever/redness at any inc site, or new drainage from the area.  Pt also recommended to call office at any time if ongoing questions/concerns during recovery, but otherwise may follow-up on a prn basis.  Everton is in agreement with this plan.    Seema Wiley PA-C    Please route or send letter to:  Primary Care Provider (PCP)

## 2021-09-02 ENCOUNTER — TRANSFERRED RECORDS (OUTPATIENT)
Dept: HEALTH INFORMATION MANAGEMENT | Facility: CLINIC | Age: 56
End: 2021-09-02

## 2021-09-02 LAB — RETINOPATHY: NEGATIVE

## 2021-09-04 ENCOUNTER — HEALTH MAINTENANCE LETTER (OUTPATIENT)
Age: 56
End: 2021-09-04

## 2021-09-09 DIAGNOSIS — E11.9 TYPE 2 DIABETES MELLITUS WITHOUT COMPLICATION, WITHOUT LONG-TERM CURRENT USE OF INSULIN (H): ICD-10-CM

## 2021-09-13 RX ORDER — DULAGLUTIDE 1.5 MG/.5ML
INJECTION, SOLUTION SUBCUTANEOUS
Qty: 6 ML | Refills: 0 | Status: SHIPPED | OUTPATIENT
Start: 2021-09-13 | End: 2021-12-15

## 2021-11-02 DIAGNOSIS — E11.9 TYPE 2 DIABETES MELLITUS WITHOUT COMPLICATION, WITHOUT LONG-TERM CURRENT USE OF INSULIN (H): ICD-10-CM

## 2021-11-03 RX ORDER — METFORMIN HCL 500 MG
TABLET, EXTENDED RELEASE 24 HR ORAL
Qty: 360 TABLET | Refills: 0 | Status: SHIPPED | OUTPATIENT
Start: 2021-11-03 | End: 2022-01-26

## 2021-11-03 NOTE — TELEPHONE ENCOUNTER
Prescription approved per [facility] Refill Protocol.  Kandace Bhagat RN

## 2021-11-15 DIAGNOSIS — E11.9 TYPE 2 DIABETES MELLITUS WITHOUT COMPLICATION, WITHOUT LONG-TERM CURRENT USE OF INSULIN (H): ICD-10-CM

## 2021-11-16 RX ORDER — GLIPIZIDE 10 MG/1
TABLET, FILM COATED, EXTENDED RELEASE ORAL
Qty: 180 TABLET | Refills: 1 | Status: SHIPPED | OUTPATIENT
Start: 2021-11-16 | End: 2022-05-13

## 2021-12-10 ENCOUNTER — NURSE TRIAGE (OUTPATIENT)
Dept: FAMILY MEDICINE | Facility: CLINIC | Age: 56
End: 2021-12-10
Payer: COMMERCIAL

## 2021-12-10 NOTE — TELEPHONE ENCOUNTER
S-(situation): Pt calls reporting low back pain that is not improving.     B-(background): Pt reports that he had an initial injury of the left lower back in the early 90's. Pt has had episodic back pain stemming from this initial injury that usually resolves within 7-10 days. Pt states that this current episode started three weeks ago at the gym when placing a dumbbell on the rack and felt a sharp pain near his left lower back.    A-(assessment): Pt describes the pain as sharp in the left lower back area and burning pain that radiates down his left hip, buttock, groin, and thigh. Pain rates anywhere between 5-8/10. Pt states that the pain is constant and worse with sitting or any activity. Pt denies numbness/tingling and denies bowel/bladder changes. Pt reports moderate pain that interferes with daily activities. Pt states that he has seen a chiropractor three times in the past week with no improvement. Pt thought it was a muscle issue, but now wonders if it may be related to a disk after discussing with the chiropractor. Pt thinks that he may need an x-ray.    R-(recommendations): Protocol recommends that pt be seen within three days. PCP is out of office on Monday. Pt agreeable to appointment on Monday with another provider. Appointment scheduled with Destinee Rabago on 12/13/2021 at 0700. Pt instructed to use heat PRN and reviewed safe use of tylenol and ibuprofen for pain management. Pt was instructed to call the care line if he experiences numbness, weakness, bowel and bladder problems, or worsening symptoms. Pt verbalized understanding and is in agreement of plan.      Reason for Disposition    MODERATE back pain (e.g., interferes with normal activities) and present > 3 days    Additional Information    Negative: Passed out (i.e., fainted, collapsed and was not responding)    Negative: Shock suspected (e.g., cold/pale/clammy skin, too weak to stand, low BP, rapid pulse)    Negative: Sounds like a  life-threatening emergency to the triager    Negative: Major injury to the back (e.g., MVA, fall > 10 feet or 3 meters, penetrating injury, etc.)    Negative: Pain in the upper back over the ribs (rib cage) that radiates (travels) into the chest    Negative: Pain in the upper back over the ribs (rib cage) and worsened by coughing (or clearly increases with breathing)    Negative: SEVERE back pain of sudden onset and age > 60    Negative: SEVERE abdominal pain (e.g., excruciating)    Negative: Abdominal pain and age > 60    Negative: Unable to urinate (or only a few drops) and bladder feels very full    Negative: Loss of bladder or bowel control (urine or bowel incontinence; wetting self, leaking stool) of new onset    Negative: Numbness (loss of sensation) in groin or rectal area    Negative: Blood in urine (red, pink, or tea-colored)    Negative: Vomiting and pain over lower ribs of back (i.e., flank - kidney area)    Negative: Weakness of a leg or foot (e.g., unable to bear weight, dragging foot)    Negative: Patient sounds very sick or weak to the triager    Negative: Pain radiates into groin, scrotum    Negative: Fever > 100.4 F (38.0 C) and flank pain    Negative: Pain or burning with passing urine (urination)    Negative: SEVERE back pain (e.g., excruciating, unable to do any normal activities) and not improved after pain medicine and CARE ADVICE    Negative: Numbness in an arm or hand (i.e., loss of sensation) and upper back pain    Negative: Numbness in a leg or foot (i.e., loss of sensation)    Negative: High-risk adult (e.g., history of cancer, history of HIV, or history of IV drug abuse)    Negative: Painful rash with multiple small blisters grouped together (i.e., dermatomal distribution or 'band' or 'stripe')    Negative: Pain radiates into the thigh or further down the leg, and in both legs    Negative: Age > 50 and no history of prior similar back pain    Answer Assessment - Initial Assessment  "Questions  1. ONSET: \"When did the pain begin?\"       Started about three weeks ago. Pt hurt back while exercising, placing dumbbell on rack and felt sharp pain on left lower side of back. Pt has been having pain off/on on this side for years.  2. LOCATION: \"Where does it hurt?\" (upper, mid or lower back)      Lower left side of back.  3. SEVERITY: \"How bad is the pain?\"  (e.g., Scale 1-10; mild, moderate, or severe)    - MILD (1-3): doesn't interfere with normal activities     - MODERATE (4-7): interferes with normal activities or awakens from sleep     - SEVERE (8-10): excruciating pain, unable to do any normal activities       Ranges from 5-8. Pain worse with sitting, activity. Pt eventually finds comfortable position to lie down.  4. PATTERN: \"Is the pain constant?\" (e.g., yes, no; constant, intermittent)       Constant.  5. RADIATION: \"Does the pain shoot into your legs or elsewhere?\"      Pain radiates down hip, buttock, thigh, and groin on left side. Denies numbness and weakness.  6. CAUSE:  \"What do you think is causing the back pain?\"       Pt thinks muscle issue, but chiropractor thinks may be disk issue.  7. BACK OVERUSE:  \"Any recent lifting of heavy objects, strenuous work or exercise?\"      No.  8. MEDICATIONS: \"What have you taken so far for the pain?\" (e.g., nothing, acetaminophen, NSAIDS)      Ibuprofen 800 mg twice daily.  9. NEUROLOGIC SYMPTOMS: \"Do you have any weakness, numbness, or problems with bowel/bladder control?\"      No.  10. OTHER SYMPTOMS: \"Do you have any other symptoms?\" (e.g., fever, abdominal pain, burning with urination, blood in urine)        No.  11. PREGNANCY: \"Is there any chance you are pregnant?\" (e.g., yes, no; LMP)        No.    Protocols used: BACK PAIN-A-[place]    Gertrude ZARAGOZA RN    "

## 2021-12-12 DIAGNOSIS — E11.9 TYPE 2 DIABETES MELLITUS WITHOUT COMPLICATION, WITHOUT LONG-TERM CURRENT USE OF INSULIN (H): ICD-10-CM

## 2021-12-15 RX ORDER — DULAGLUTIDE 1.5 MG/.5ML
INJECTION, SOLUTION SUBCUTANEOUS
Qty: 6 ML | Refills: 0 | Status: SHIPPED | OUTPATIENT
Start: 2021-12-15 | End: 2022-03-02 | Stop reason: DRUGHIGH

## 2021-12-15 NOTE — TELEPHONE ENCOUNTER
Prescription approved per [facility] Refill Protocol.  Kandace Bhagat RN

## 2021-12-16 ENCOUNTER — OFFICE VISIT (OUTPATIENT)
Dept: FAMILY MEDICINE | Facility: CLINIC | Age: 56
End: 2021-12-16
Payer: COMMERCIAL

## 2021-12-16 VITALS
HEART RATE: 90 BPM | TEMPERATURE: 98.6 F | RESPIRATION RATE: 17 BRPM | OXYGEN SATURATION: 96 % | WEIGHT: 237.4 LBS | SYSTOLIC BLOOD PRESSURE: 112 MMHG | DIASTOLIC BLOOD PRESSURE: 62 MMHG | BODY MASS INDEX: 35.06 KG/M2

## 2021-12-16 DIAGNOSIS — M54.42 ACUTE LEFT-SIDED LOW BACK PAIN WITH LEFT-SIDED SCIATICA: Primary | ICD-10-CM

## 2021-12-16 PROCEDURE — 99214 OFFICE O/P EST MOD 30 MIN: CPT | Performed by: PHYSICIAN ASSISTANT

## 2021-12-16 RX ORDER — CYCLOBENZAPRINE HCL 5 MG
5-10 TABLET ORAL 3 TIMES DAILY PRN
Qty: 90 TABLET | Refills: 0 | Status: ON HOLD | OUTPATIENT
Start: 2021-12-16 | End: 2022-03-23

## 2021-12-16 ASSESSMENT — PAIN SCALES - GENERAL: PAINLEVEL: SEVERE PAIN (6)

## 2021-12-16 ASSESSMENT — ENCOUNTER SYMPTOMS: BACK PAIN: 1

## 2021-12-16 NOTE — PROGRESS NOTES
"  Assessment & Plan     Acute left-sided low back pain with left-sided sciatica  Referral to physical therapy.  Flexeril for sleep at night and pain relief.  Ice/heat recommended.  - cyclobenzaprine (FLEXERIL) 5 MG tablet; Take 1-2 tablets (5-10 mg) by mouth 3 times daily as needed for muscle spasms  - [facility]; Future       BMI:   Estimated body mass index is 35.06 kg/m  as calculated from the following:    Height as of 8/13/21: 1.753 m (5' 9\").    Weight as of this encounter: 107.7 kg (237 lb 6.4 oz).   Weight management plan: Discussed healthy diet and exercise guidelines        Return in about 1 month (around 1/16/2022) for if symptoms worsen or fail to improve.    Chun Akins PA-C  [facility] TEVIN Arita is a 56 year old who presents for the following health issues     Back Pain     History of Present Illness       Back Pain:  He presents for follow up of back pain. Patient's back pain is a recurring problem.  Location of back pain:  Left lower back, left buttock and left hip  Description of back pain: burning, cramping, sharp and stabbing  Back pain spreads: left buttocks and left thigh    Since patient first noticed back pain, pain is: gradually worsening  Does back pain interfere with his job:  Yes      He eats 0-1 servings of fruits and vegetables daily.He consumes 3 sweetened beverage(s) daily.He exercises with enough effort to increase his heart rate 30 to 60 minutes per day.  He exercises with enough effort to increase his heart rate 5 days per week.   He is taking medications regularly.       Was on prednisone 20 mg  2 tabs daily for 5 days and finished yesterday   Left lower back.  Hurt it about 20 years ago.  Did physical therapy and it got better.  Will go out every so often but get better with rest and some exercises.  This time he was exercising and leaned forward to put down a weight and felt it \"go out\".  Laying is best.  Sitting is " painful.  Some radiation to groin and left thigh    Using heat also, ibuprofen until started prednisone.  Not sure if prednisone worked or not.      Review of Systems   Musculoskeletal: Positive for back pain.      Constitutional, HEENT, cardiovascular, pulmonary, gi and gu systems are negative, except as otherwise noted.      Objective    /62   Pulse 90   Temp 98.6  F (37  C) (Oral)   Resp 17   Wt 107.7 kg (237 lb 6.4 oz)   SpO2 96%   BMI 35.06 kg/m    Body mass index is 35.06 kg/m .  Physical Exam   GENERAL: healthy, alert and no distress  MS: no gross musculoskeletal defects noted, no edema  SKIN: no suspicious lesions or rashes  NEURO: Normal strength and tone, mentation intact and speech normal  Comprehensive back pain exam:  Tenderness of left paraspinal lumbar region, Range of motion not limited by pain, Lower extremity strength functional and equal on both sides and Straight leg raise negative bilaterally  PSYCH: mentation appears normal, affect normal/bright

## 2021-12-17 ENCOUNTER — THERAPY VISIT (OUTPATIENT)
Dept: PHYSICAL THERAPY | Facility: CLINIC | Age: 56
End: 2021-12-17
Attending: PHYSICIAN ASSISTANT
Payer: COMMERCIAL

## 2021-12-17 DIAGNOSIS — M54.16 LUMBAR RADICULOPATHY: ICD-10-CM

## 2021-12-17 DIAGNOSIS — M54.42 ACUTE LEFT-SIDED LOW BACK PAIN WITH LEFT-SIDED SCIATICA: ICD-10-CM

## 2021-12-17 PROCEDURE — 97162 PT EVAL MOD COMPLEX 30 MIN: CPT | Mod: GP | Performed by: PHYSICAL THERAPIST

## 2021-12-17 PROCEDURE — 97110 THERAPEUTIC EXERCISES: CPT | Mod: GP | Performed by: PHYSICAL THERAPIST

## 2021-12-17 NOTE — PROGRESS NOTES
Physical Therapy Initial Evaluation  Subjective:  The history is provided by the patient. No  was used.   Patient Health History  Everton Linn being seen for Low back pain .     Problem began: 11/18/2021.   Problem occurred: setting a weight down on a rack at the gym    Pain is reported as 5/10 (8/10 ) on pain scale.  General health as reported by patient is good.  Pertinent medical history includes: depression, diabetes, migraines/headaches, numbness/tingling, rheumatoid arthritis and sleep disorder/apnea.   Red flags:  None as reported by patient.  Medical allergies: none.   Surgeries include:  Orthopedic surgery and other. Other surgery history details: 3 knee surgeries, cervical fusion 2013, hernia surgery 2021, Gallbladder removal.    Current medications:  Anti-inflammatory, steroids and muscle relaxants.    Current occupation is / Analyst.   Primary job tasks include:  Computer work and prolonged sitting.                  Therapist Generated HPI Evaluation  Problem details: Patient states he originally injured his back 25 years ago (herniated disk). States he does have flare ups off and on in the back but has always resolved with rest, stretching and heat or ice. States this pain has not resolved and has gotten worse. States he has tried chiropractic 3 times, a massage and prednisone and nothing has helped the pain. .         Type of problem:  Lumbar.    This is a new condition.  Condition occurred with:  Bending and lifting.  Where condition occurred: during recreation/sport.  Patient reports pain:  Lumbar spine left.  Pain is described as aching, sharp and shooting   Radiates to: left lateral hip, left anterior thigh and groin  Pain is worse in the A.M..  Since onset symptoms are unchanged.  Associated symptoms:  Loss of motion, loss of strength and tingling. Symptoms are exacerbated by bending, lifting, sitting and walking  Relieved by: lying down or standing      Previous treatment includes chiropractic and other. There was none improvement following previous treatment.  Work activity restrictions: working normal jobs with lots of breaks.  Barriers include:  None as reported by patient.                        Objective:  System         Lumbar/SI Evaluation  ROM:    AROM Lumbar:   Flexion:          Bend to the knee  Ext:                    Max loss   Side Bend:        Left:     Right:   Rotation:           Left:  Mod loss    Right:  Mod loss  Side Glide:        Left:     Right:           Lumbar Myotomes:    T12-L3 (Hip Flex):  Left: 4    Right: 5  L2-4 (Quads):  Left:  4+    Right:  5  L4 (Ankle DF):  Left:  5    Right:  5  L5 (Great Toe Ext): Left: 5    Right: 5         Lumbar Dermtomes:  normal                Neural Tension/Mobility:    Left side:  SLR; Femoral Nerve and Slump positive.   Right side:   SLR positive.  Right side:   Femoral Nerve or Slump  negative.                                                        General     ROS    Assessment/Plan:    Patient is a 56 year old male with lumbar complaints.    Patient has the following significant findings with corresponding treatment plan.                Diagnosis 1:  Lumbar radiculopathy  Pain -  hot/cold therapy, electric stimulation, manual therapy, self management, education, directional preference exercise and home program  Decreased ROM/flexibility - manual therapy, therapeutic exercise, therapeutic activity and home program  Decreased strength - therapeutic exercise, therapeutic activities and home program  Impaired muscle performance - neuro re-education and home program  Decreased function - therapeutic activities and home program    Therapy Evaluation Codes:   1) History comprised of:   Personal factors that impact the plan of care:      Past/current experiences, Profession and Time since onset of symptoms.    Comorbidity factors that impact the plan of care are:      Diabetes, Depression,  Migraines/headaches, Numbness/tingling, Rheumatoid arthritis and Sleep disorder/apnea.     Medications impacting care: Anti-inflammatory, Muscle relaxant and Steroids.  2) Examination of Body Systems comprised of:   Body structures and functions that impact the plan of care:      Hip and Lumbar spine.   Activity limitations that impact the plan of care are:      Bathing, Bending, Cooking, Driving, Dressing, Lifting, Reading/Computer work, Sitting, Stairs and Walking.  3) Clinical presentation characteristics are:   Evolving/Changing.  4) Decision-Making    Moderate complexity using standardized patient assessment instrument and/or measureable assessment of functional outcome.  Cumulative Therapy Evaluation is: Moderate complexity.    Previous and current functional limitations:  (See Goal Flow Sheet for this information)    Short term and Long term goals: (See Goal Flow Sheet for this information)     Communication ability:  Patient appears to be able to clearly communicate and understand verbal and written communication and follow directions correctly.  Treatment Explanation - The following has been discussed with the patient:   RX ordered/plan of care  Anticipated outcomes  Possible risks and side effects  This patient would benefit from PT intervention to resume normal activities.   Rehab potential is good.    Frequency:  2 X week, once daily  Duration:  for 2 weeks tapering to 1 X a week over 4 weeks  Discharge Plan:  Achieve all LTG.  Independent in home treatment program.  Reach maximal therapeutic benefit.    Please refer to the daily flowsheet for treatment today, total treatment time and time spent performing 1:1 timed codes.

## 2021-12-21 ENCOUNTER — THERAPY VISIT (OUTPATIENT)
Dept: PHYSICAL THERAPY | Facility: CLINIC | Age: 56
End: 2021-12-21
Payer: COMMERCIAL

## 2021-12-21 DIAGNOSIS — M54.16 LUMBAR RADICULOPATHY: ICD-10-CM

## 2021-12-21 PROCEDURE — 97110 THERAPEUTIC EXERCISES: CPT | Mod: GP | Performed by: PHYSICAL THERAPIST

## 2021-12-21 PROCEDURE — 97014 ELECTRIC STIMULATION THERAPY: CPT | Mod: GP | Performed by: PHYSICAL THERAPIST

## 2021-12-23 ENCOUNTER — THERAPY VISIT (OUTPATIENT)
Dept: PHYSICAL THERAPY | Facility: CLINIC | Age: 56
End: 2021-12-23
Payer: COMMERCIAL

## 2021-12-23 DIAGNOSIS — M54.16 LUMBAR RADICULOPATHY: ICD-10-CM

## 2021-12-23 PROCEDURE — 97010 HOT OR COLD PACKS THERAPY: CPT | Mod: GP | Performed by: PHYSICAL THERAPIST

## 2021-12-23 PROCEDURE — 97140 MANUAL THERAPY 1/> REGIONS: CPT | Mod: GP | Performed by: PHYSICAL THERAPIST

## 2021-12-23 PROCEDURE — 97110 THERAPEUTIC EXERCISES: CPT | Mod: GP | Performed by: PHYSICAL THERAPIST

## 2021-12-23 PROCEDURE — 97014 ELECTRIC STIMULATION THERAPY: CPT | Mod: GP | Performed by: PHYSICAL THERAPIST

## 2021-12-29 ENCOUNTER — TELEPHONE (OUTPATIENT)
Dept: FAMILY MEDICINE | Facility: CLINIC | Age: 56
End: 2021-12-29

## 2021-12-29 ENCOUNTER — THERAPY VISIT (OUTPATIENT)
Dept: PHYSICAL THERAPY | Facility: CLINIC | Age: 56
End: 2021-12-29
Payer: COMMERCIAL

## 2021-12-29 DIAGNOSIS — M54.42 ACUTE LEFT-SIDED LOW BACK PAIN WITH LEFT-SIDED SCIATICA: Primary | ICD-10-CM

## 2021-12-29 DIAGNOSIS — M54.16 LUMBAR RADICULOPATHY: ICD-10-CM

## 2021-12-29 PROCEDURE — 97014 ELECTRIC STIMULATION THERAPY: CPT | Mod: GP | Performed by: PHYSICAL THERAPIST

## 2021-12-29 PROCEDURE — 97140 MANUAL THERAPY 1/> REGIONS: CPT | Mod: GP | Performed by: PHYSICAL THERAPIST

## 2021-12-29 PROCEDURE — 97010 HOT OR COLD PACKS THERAPY: CPT | Mod: GP | Performed by: PHYSICAL THERAPIST

## 2021-12-29 PROCEDURE — 97110 THERAPEUTIC EXERCISES: CPT | Mod: GP | Performed by: PHYSICAL THERAPIST

## 2021-12-29 NOTE — PROGRESS NOTES
Subjective:  HPI  Physical Exam                    Objective:  System    Physical Exam    General     ROS    Assessment/Plan:    PROGRESS  REPORT    Progress reporting period is from 12/17/21 to 12/29/21.       SUBJECTIVE  Subjective changes noted by patient: Patient states he has had a little mroe time where the pain has been less intense, but when the pain is bad it is really bad and that intensity has not changed. States the mornings are the worst and has been having some difficulty working this week.     Current Pain level: 5/10.     Initial Pain level: 8/10.   Changes in function:  Yes (See Goal flowsheet attached for changes in current functional level)  Adverse reaction to treatment or activity: None    OBJECTIVE  Changes noted in objective findings:  None  Objective: lumbar flexion to his knees with pain and max loss of lumbar extension      ASSESSMENT/PLAN  Updated problem list and treatment plan: Diagnosis 1:  Lumbar Radiculopathy   Pain -  hot/cold therapy, electric stimulation, mechanical traction, manual therapy, self management, education, directional preference exercise and home program  Decreased ROM/flexibility - manual therapy, therapeutic exercise, therapeutic activity and home program  Decreased joint mobility - manual therapy, therapeutic exercise, therapeutic activity and home program  Decreased strength - therapeutic exercise, therapeutic activities and home program  Impaired muscle performance - neuro re-education and home program  Decreased function - therapeutic activities and home program  Impaired posture - neuro re-education, therapeutic activities and home program  STG/LTGs have been met or progress has been made towards goals:  None  Assessment of Progress: The patient's condition is unchanged.  Self Management Plans:  Patient has been instructed in a home treatment program.  Patient  has been instructed in self management of symptoms.  I have re-evaluated this patient and find that  the nature, scope, duration and intensity of the therapy is appropriate for the medical condition of the patient.  Everton continues to require the following intervention to meet STG and LTG's:  PT    Recommendations:  This patient would benefit from further evaluation.    Please refer to the daily flowsheet for treatment today, total treatment time and time spent performing 1:1 timed codes.

## 2021-12-29 NOTE — TELEPHONE ENCOUNTER
Reason for call:  Other   Patient called regarding (reason for call): call back  Additional comments:please refer patient to an orthopedic specialist. PT is no longer an option.    Phone number to reach patient:  Cell number on file:    Telephone Information:   Mobile [phone]       Best Time: any    Can we leave a detailed message on this number?  YES    Travel screening: Not Applicable

## 2021-12-31 ENCOUNTER — THERAPY VISIT (OUTPATIENT)
Dept: PHYSICAL THERAPY | Facility: CLINIC | Age: 56
End: 2021-12-31
Payer: COMMERCIAL

## 2021-12-31 DIAGNOSIS — M54.16 LUMBAR RADICULOPATHY: ICD-10-CM

## 2021-12-31 PROCEDURE — 97140 MANUAL THERAPY 1/> REGIONS: CPT | Mod: GP | Performed by: PHYSICAL THERAPIST

## 2021-12-31 PROCEDURE — 97014 ELECTRIC STIMULATION THERAPY: CPT | Mod: GP | Performed by: PHYSICAL THERAPIST

## 2021-12-31 PROCEDURE — 97010 HOT OR COLD PACKS THERAPY: CPT | Mod: GP | Performed by: PHYSICAL THERAPIST

## 2021-12-31 NOTE — TELEPHONE ENCOUNTER
Everton came into the clinic checking on the ortho referral. Patient would like to know if Chun Akins would be willing to do a referral. PT is not helping and he would like to do an injection. Please call at [phone] and okay to leave a message.    Anne

## 2022-01-03 NOTE — TELEPHONE ENCOUNTER
I am just seeing this on my first day back in clinic.  I can place a referral now.  I think they should be calling him to help set things up.    Chun

## 2022-01-04 ENCOUNTER — OFFICE VISIT (OUTPATIENT)
Dept: NEUROSURGERY | Facility: CLINIC | Age: 57
End: 2022-01-04
Attending: PHYSICIAN ASSISTANT
Payer: COMMERCIAL

## 2022-01-04 VITALS
BODY MASS INDEX: 35.1 KG/M2 | OXYGEN SATURATION: 98 % | DIASTOLIC BLOOD PRESSURE: 78 MMHG | HEART RATE: 92 BPM | SYSTOLIC BLOOD PRESSURE: 118 MMHG | WEIGHT: 237 LBS | HEIGHT: 69 IN

## 2022-01-04 DIAGNOSIS — M54.42 ACUTE LEFT-SIDED LOW BACK PAIN WITH LEFT-SIDED SCIATICA: Primary | ICD-10-CM

## 2022-01-04 PROCEDURE — G0463 HOSPITAL OUTPT CLINIC VISIT: HCPCS

## 2022-01-04 PROCEDURE — 99203 OFFICE O/P NEW LOW 30 MIN: CPT | Performed by: PHYSICIAN ASSISTANT

## 2022-01-04 ASSESSMENT — PAIN SCALES - GENERAL: PAINLEVEL: MODERATE PAIN (5)

## 2022-01-04 ASSESSMENT — MIFFLIN-ST. JEOR: SCORE: 1905.4

## 2022-01-04 NOTE — LETTER
1/4/2022         RE: Everton Linn  [address] [facility] [address]        Dear Colleague,    Thank you for referring your patient, Everton Linn, to the [facility] NEUROSURGERY [facility]INIC [place]. Please see a copy of my visit note below.    NEUROSURGERY CLINIC CONSULT NOTE     DATE OF VISIT: 1/4/2022     SUBJECTIVE:     Everton Linn is a pleasant 56 year old male who presents to the clinic today for consultation on lumbar spine pain with left lower extremity symptoms. He is referred to the Neurosurgery Clinic by Dr. Akins in Primary Care.   Today, he reports a multi-yea history of symptoms. He describes constant, sharp, aching, burning pain that initiates in the midline low lumbar region and radiates distally in what sounds like the left L3 distribution. This pain is accompanied by paresthesia, numbness and perceived weakness in the left L5 distribution. Prolonged sitting, lying down and going from sitting to standing seems to aggravate the symptoms, while alleviation is obtained by mobilization. Lifting weights is associated with the onset of the pain. There are no bowel or bladder changes. He denies saddle anesthesia. He denies changes in gait, instability, or falling episodes.     He has participated in conservative therapies to include physical therapy, traction, chiropractic care, NSAIDs, a Medrol Dosepak and ultrasound. None of these modalities have provided any significant long term relief.         Current Outpatient Medications:      blood glucose monitoring (ONE TOUCH DELICA) lancets, Use to test blood sugar 2-3 times daily or as directed. May substitute per insurance coverage., Disp: 100 each, Rfl: 11     blood glucose monitoring (ONETOUCH VERIO IQ) test strip, Use to test blood sugar 2-3 times daily or as directed.May substitute per insurance coverage., Disp: 100 each, Rfl: 11     Blood Glucose Monitoring Suppl (ONETOUCH VERIO FLEX SYSTEM) w/Device KIT, 1  each 3 times daily as needed May substitute per insurance coverage., Disp: 1 kit, Rfl: 0     cyclobenzaprine (FLEXERIL) 5 MG tablet, Take 1-2 tablets (5-10 mg) by mouth 3 times daily as needed for muscle spasms, Disp: 90 tablet, Rfl: 0     doxycycline monohydrate (MONODOX) 100 MG capsule, 1 CAPSULE BY MOUTH DAILY, Disp: , Rfl:      fexofenadine (ALLEGRA) 180 MG tablet, Take 180 mg by mouth every morning, Disp: , Rfl:      glipiZIDE (GLUCOTROL XL) 10 MG 24 hr tablet, TAKE 2 TABLETS BY MOUTH EVERY DAY, Disp: 180 tablet, Rfl: 1     metFORMIN (GLUCOPHAGE-XR) 500 MG 24 hr tablet, TAKE 2 TABLETS BY MOUTH TWICE A DAY WITH MEALS, Disp: 360 tablet, Rfl: 0     sildenafil (VIAGRA) 100 MG tablet, Take 1 tablet (100 mg) by mouth daily as needed (as needed), Disp: 30 tablet, Rfl: 1     simvastatin (ZOCOR) 40 MG tablet, TAKE 1 TABLET BY MOUTH EVERYDAY AT BEDTIME, Disp: 90 tablet, Rfl: 1     SOOLANTRA 1 % cream, 1 APPLICATION AT BEDTIME TOPICALLY TO WHOLE FACE, Disp: , Rfl:      TRULICITY 1.5 MG/0.5ML pen, INJECT 1.5 MG SUBCUTANEOUSLY EVERY 7 DAYS, Disp: 6 mL, Rfl: 0     Allergies   Allergen Reactions     Seasonal Allergies Other (See Comments)     Runny Nose/itchy eyes        Past Medical History:   Diagnosis Date     Diabetes (H)      Sleep apnea     CPAP        ROS: 10 point review of symptoms are negative other than the symptoms noted above in the HPI.     Family History has been reviewed with the patient, there are no pertinent findings to presenting concern.     Past Surgical History:   Procedure Laterality Date     ARTHROSCOPY KNEE RT/LT  2007    left knee     ARTHROSCOPY KNEE RT/LT  2005    right     ENT SURGERY      [place] [facility].     HERNIORRHAPHY INGUINAL Right 8/13/2021    Procedure: HERNIORRHAPHY, INGUINAL, OPEN with mesh;  Surgeon: Jerry Bello MD;  Location: [facility] OR     LAPAROSCOPIC CHOLECYSTECTOMY WITH CHOLANGIOGRAMS N/A 5/28/2016    Procedure: LAPAROSCOPIC CHOLECYSTECTOMY WITH CHOLANGIOGRAMS;  Surgeon:  "Jerry Bello MD;  Location: [facility] OR     ORTHOPEDIC SURGERY      Cerv. Fusion 2013: plate         Social History     Tobacco Use     Smoking status: Never Smoker     Smokeless tobacco: Never Used   Substance Use Topics     Alcohol use: Yes     Comment: 2 -3 beers every few weeks     Drug use: No        OBJECTIVE:   /78   Pulse 92   Ht 5' 9\" (1.753 m)   Wt 237 lb (107.5 kg)   SpO2 98%   BMI 35.00 kg/m     Body mass index is 35 kg/m .       Exam:     Patient appears comfortable, conversational, and in no apparent distress.   Head: Normocephalic, without obvious abnormality, atraumatic, no facial asymmetry.   Eyes: conjunctivae/corneas clear. PERRL, EOM's intact.   Throat: lips, mucosa, and tongue normal; teeth and gums normal.   Neck: supple, symmetrical, trachea midline, no adenopathy and thyroid: not enlarged, symmetric, no tenderness/mass/nodules.   Lungs: clear to auscultation bilaterally.   Heart: regular rate and rhythm.   Abdomen: soft, non-tender; bowel sounds normal; no masses, no organomegaly.   Pulses: 2+ and symmetric.   Skin: Skin color, texture, turgor normal. No rashes or lesions.     CN II-XII grossly intact, alert and appropriate with conversation and following commands.   Gait is non-antalgic. Able to tandem walk. Able to walk on toes and heels without difficulty.   Cervical spine is non tender to palpation. Appropriate range of motion of neck, not concerning for lhermitte's phenomenon.   Bilateral bicep 2/4 and tricep reflexes 1/4. Sensation intact throughout upper extremities.     UE muscle strength  Right  Left    Deltoid  5/5  5/5    Biceps  5/5  5/5    Triceps  5/5  5/5    Hand intrinsics  5/5  5/5    Hand grasp  5/5  5/5    Luz signs  neg  neg      Lumbar spine is non tender to palpation.  Diminished sensation throughout left lower extremities.   Bilateral patellar 0/4 and achilles reflex 1/4. Negative for pain with straight leg raise.     LE muscle strength  Right  Left  "   Iliopsoas (hip flexion)  5/5  5/5    Quad (knee extension)  5/5  5/5    Hamstring (knee flexion)  5/5  5/5    Gastrocnemius (PF)  5/5  5/5    Tibialis Ant. (DF)  5/5  5/5    EHL  5/5  5/5      Negative Babinski bilaterally. Negative for clonus.   Calves are soft and non-tender bilaterally.     ASSESSMENT/PLAN:     Everton Linn is a 56 year old male who presents to the clinic for consultation on a constant, sharp, aching, burning pain that initiates in the midline low lumbar region and radiates distally in what sounds like the left L3 distribution. This pain is accompanied by paresthesia, numbness and perceived weakness in the left L5 distribution. There is no imaging to review. On exam, he is noted to have diminished strength and sensation. He has attempted conservative management with physical therapy, traction, chiropractic care, NSAIDs, a Medrol Dosepak and ultrasound, without resolution of symptoms.     Based on his physical exam, we do feel that it would be in his best interest to obtain a lumbar MRI to further assess our concern for lumbar stenosis, a bulging / herniated disk or other concerning pathology. Once the images have been obtained he has agreed to call our office back at [phone] to further discuss possible surgical interventions or other conservative therapies.      We also discussed signs of a worsening problem that he should seek being evaluated.     We briefly discussed an epidural steroid injection as the next possible option.      Respectfully,     BRADFORD Munson PA-C  [facility] Neurosurgery  [facility]  Tel: [phone]      Exam, imaging, and plan reviewed by Dr. Gillespie.       Again, thank you for allowing me to participate in the care of your patient.        Sincerely,        Slick Adams PA-C

## 2022-01-04 NOTE — PROGRESS NOTES
NEUROSURGERY CLINIC CONSULT NOTE     DATE OF VISIT: 1/4/2022     SUBJECTIVE:     Everton Linn is a pleasant 56 year old male who presents to the clinic today for consultation on lumbar spine pain with left lower extremity symptoms. He is referred to the Neurosurgery Clinic by Dr. Akins in Primary Care.   Today, he reports a multi-yea history of symptoms. He describes constant, sharp, aching, burning pain that initiates in the midline low lumbar region and radiates distally in what sounds like the left L3 distribution. This pain is accompanied by paresthesia, numbness and perceived weakness in the left L5 distribution. Prolonged sitting, lying down and going from sitting to standing seems to aggravate the symptoms, while alleviation is obtained by mobilization. Lifting weights is associated with the onset of the pain. There are no bowel or bladder changes. He denies saddle anesthesia. He denies changes in gait, instability, or falling episodes.     He has participated in conservative therapies to include physical therapy, traction, chiropractic care, NSAIDs, a Medrol Dosepak and ultrasound. None of these modalities have provided any significant long term relief.         Current Outpatient Medications:      blood glucose monitoring (ONE TOUCH DELICA) lancets, Use to test blood sugar 2-3 times daily or as directed. May substitute per insurance coverage., Disp: 100 each, Rfl: 11     blood glucose monitoring (ONETOUCH VERIO IQ) test strip, Use to test blood sugar 2-3 times daily or as directed.May substitute per insurance coverage., Disp: 100 each, Rfl: 11     Blood Glucose Monitoring Suppl (ONETOUCH VERIO FLEX SYSTEM) w/Device KIT, 1 each 3 times daily as needed May substitute per insurance coverage., Disp: 1 kit, Rfl: 0     cyclobenzaprine (FLEXERIL) 5 MG tablet, Take 1-2 tablets (5-10 mg) by mouth 3 times daily as needed for muscle spasms, Disp: 90 tablet, Rfl: 0     doxycycline monohydrate (MONODOX) 100 MG  capsule, 1 CAPSULE BY MOUTH DAILY, Disp: , Rfl:      fexofenadine (ALLEGRA) 180 MG tablet, Take 180 mg by mouth every morning, Disp: , Rfl:      glipiZIDE (GLUCOTROL XL) 10 MG 24 hr tablet, TAKE 2 TABLETS BY MOUTH EVERY DAY, Disp: 180 tablet, Rfl: 1     metFORMIN (GLUCOPHAGE-XR) 500 MG 24 hr tablet, TAKE 2 TABLETS BY MOUTH TWICE A DAY WITH MEALS, Disp: 360 tablet, Rfl: 0     sildenafil (VIAGRA) 100 MG tablet, Take 1 tablet (100 mg) by mouth daily as needed (as needed), Disp: 30 tablet, Rfl: 1     simvastatin (ZOCOR) 40 MG tablet, TAKE 1 TABLET BY MOUTH EVERYDAY AT BEDTIME, Disp: 90 tablet, Rfl: 1     SOOLANTRA 1 % cream, 1 APPLICATION AT BEDTIME TOPICALLY TO WHOLE FACE, Disp: , Rfl:      TRULICITY 1.5 MG/0.5ML pen, INJECT 1.5 MG SUBCUTANEOUSLY EVERY 7 DAYS, Disp: 6 mL, Rfl: 0     Allergies   Allergen Reactions     Seasonal Allergies Other (See Comments)     Runny Nose/itchy eyes        Past Medical History:   Diagnosis Date     Diabetes (H)      Sleep apnea     CPAP        ROS: 10 point review of symptoms are negative other than the symptoms noted above in the HPI.     Family History has been reviewed with the patient, there are no pertinent findings to presenting concern.     Past Surgical History:   Procedure Laterality Date     ARTHROSCOPY KNEE RT/LT  2007    left knee     ARTHROSCOPY KNEE RT/LT  2005    right     ENT SURGERY      [place] [facility].     HERNIORRHAPHY INGUINAL Right 8/13/2021    Procedure: HERNIORRHAPHY, INGUINAL, OPEN with mesh;  Surgeon: Jerry Bello MD;  Location: [facility] OR     LAPAROSCOPIC CHOLECYSTECTOMY WITH CHOLANGIOGRAMS N/A 5/28/2016    Procedure: LAPAROSCOPIC CHOLECYSTECTOMY WITH CHOLANGIOGRAMS;  Surgeon: Jerry Bello MD;  Location: [facility] OR     ORTHOPEDIC SURGERY      Cerv. Fusion 2013: plate         Social History     Tobacco Use     Smoking status: Never Smoker     Smokeless tobacco: Never Used   Substance Use Topics     Alcohol use: Yes     Comment: 2 -3 beers every few  "weeks     Drug use: No        OBJECTIVE:   /78   Pulse 92   Ht 5' 9\" (1.753 m)   Wt 237 lb (107.5 kg)   SpO2 98%   BMI 35.00 kg/m     Body mass index is 35 kg/m .       Exam:     Patient appears comfortable, conversational, and in no apparent distress.   Head: Normocephalic, without obvious abnormality, atraumatic, no facial asymmetry.   Eyes: conjunctivae/corneas clear. PERRL, EOM's intact.   Throat: lips, mucosa, and tongue normal; teeth and gums normal.   Neck: supple, symmetrical, trachea midline, no adenopathy and thyroid: not enlarged, symmetric, no tenderness/mass/nodules.   Lungs: clear to auscultation bilaterally.   Heart: regular rate and rhythm.   Abdomen: soft, non-tender; bowel sounds normal; no masses, no organomegaly.   Pulses: 2+ and symmetric.   Skin: Skin color, texture, turgor normal. No rashes or lesions.     CN II-XII grossly intact, alert and appropriate with conversation and following commands.   Gait is non-antalgic. Able to tandem walk. Able to walk on toes and heels without difficulty.   Cervical spine is non tender to palpation. Appropriate range of motion of neck, not concerning for lhermitte's phenomenon.   Bilateral bicep 2/4 and tricep reflexes 1/4. Sensation intact throughout upper extremities.     UE muscle strength  Right  Left    Deltoid  5/5  5/5    Biceps  5/5  5/5    Triceps  5/5  5/5    Hand intrinsics  5/5  5/5    Hand grasp  5/5  5/5    Luz signs  neg  neg      Lumbar spine is non tender to palpation.  Diminished sensation throughout left lower extremities.   Bilateral patellar 0/4 and achilles reflex 1/4. Negative for pain with straight leg raise.     LE muscle strength  Right  Left    Iliopsoas (hip flexion)  5/5  5/5    Quad (knee extension)  5/5  5/5    Hamstring (knee flexion)  5/5  5/5    Gastrocnemius (PF)  5/5  5/5    Tibialis Ant. (DF)  5/5  5/5    EHL  5/5  5/5      Negative Babinski bilaterally. Negative for clonus.   Calves are soft and non-tender " bilaterally.     ASSESSMENT/PLAN:     Everton Linn is a 56 year old male who presents to the clinic for consultation on a constant, sharp, aching, burning pain that initiates in the midline low lumbar region and radiates distally in what sounds like the left L3 distribution. This pain is accompanied by paresthesia, numbness and perceived weakness in the left L5 distribution. There is no imaging to review. On exam, he is noted to have diminished strength and sensation. He has attempted conservative management with physical therapy, traction, chiropractic care, NSAIDs, a Medrol Dosepak and ultrasound, without resolution of symptoms.     Based on his physical exam, we do feel that it would be in his best interest to obtain a lumbar MRI to further assess our concern for lumbar stenosis, a bulging / herniated disk or other concerning pathology. Once the images have been obtained he has agreed to call our office back at [phone] to further discuss possible surgical interventions or other conservative therapies.      We also discussed signs of a worsening problem that he should seek being evaluated.     We briefly discussed an epidural steroid injection as the next possible option.      Respectfully,     BRADFODR Munson, PAARUNA  [facility] Neurosurgery  [facility]  Tel: [phone]      Exam, imaging, and plan reviewed by Dr. Gillespie.

## 2022-01-04 NOTE — PATIENT INSTRUCTIONS
-Order placed for lumbar MR imaging WO. Please call [facility] at [phone] to schedule the date, time and location that is most convenient for you to obtain your imaging. Once the imaging has been completed, please contact my office the next day to review the images as well as treatment options. You can contact my office at [phone].      BRADFORD Munson, DORA  [facility] Neurosurgery  [facility]     Tel: [phone]  Fax: [phone]

## 2022-01-04 NOTE — NURSING NOTE
"Everton Linn is a 56 year old male who presents for:  Chief Complaint   Patient presents with     Back Pain     left lower back pain with sciatica        Vitals:    Vitals:    01/04/22 1320   BP: 118/78   Pulse: 92   SpO2: 98%   Weight: 237 lb (107.5 kg)   Height: 5' 9\" (1.753 m)       BMI:  Estimated body mass index is 35 kg/m  as calculated from the following:    Height as of this encounter: 5' 9\" (1.753 m).    Weight as of this encounter: 237 lb (107.5 kg).    Pain Score:  Moderate Pain (5)        Amendo Phorn      "

## 2022-01-07 ENCOUNTER — HOSPITAL ENCOUNTER (OUTPATIENT)
Dept: MRI IMAGING | Facility: CLINIC | Age: 57
Discharge: HOME | End: 2022-01-07
Attending: PHYSICIAN ASSISTANT | Admitting: PHYSICIAN ASSISTANT
Payer: COMMERCIAL

## 2022-01-07 DIAGNOSIS — M54.42 ACUTE LEFT-SIDED LOW BACK PAIN WITH LEFT-SIDED SCIATICA: ICD-10-CM

## 2022-01-07 PROCEDURE — 72148 MRI LUMBAR SPINE W/O DYE: CPT

## 2022-01-13 ENCOUNTER — TELEPHONE (OUTPATIENT)
Dept: NEUROSURGERY | Facility: CLINIC | Age: 57
End: 2022-01-13
Payer: COMMERCIAL

## 2022-01-13 ENCOUNTER — MYC MEDICAL ADVICE (OUTPATIENT)
Dept: FAMILY MEDICINE | Facility: CLINIC | Age: 57
End: 2022-01-13
Payer: COMMERCIAL

## 2022-01-13 DIAGNOSIS — M54.42 ACUTE LEFT-SIDED LOW BACK PAIN WITH LEFT-SIDED SCIATICA: Primary | ICD-10-CM

## 2022-01-13 DIAGNOSIS — M54.16 LUMBAR RADICULOPATHY: Primary | ICD-10-CM

## 2022-01-13 NOTE — TELEPHONE ENCOUNTER
Last Visit:  1/4/22    Next Visit:  1/18/22    Name of Provider:  Marshal Adams PA-C    Assessment:    The patient has called to report he has increased pain in the lower back that radiates down the hip and into the left leg. He has an appointment 1/18/22 with Marshal Adams PA-C to discuss an EMERSON. The EMERSON is not ordered yet and he does not feel he will be able to wait until the procedure.   He is asking for something for the nerve pain.    He has tried PT, NSAIDS, prednisone, flexeril and nothing works.   The patient is aware the neurosurgery clinic does not typically prescribe pain medications prior to surgery.  He understands and will reach out to his PCP. If symptoms are worse he will  go to urgent care.   If a prescription was provided he goes to the [facility] on [address] in [place].     Action needed from provider:    Please advise on pain management.

## 2022-01-13 NOTE — TELEPHONE ENCOUNTER
Patient is requesting pain medication, he would like a call back to discuss, he can be reached at [phone].

## 2022-01-14 ENCOUNTER — TELEPHONE (OUTPATIENT)
Dept: PALLIATIVE MEDICINE | Facility: CLINIC | Age: 57
End: 2022-01-14
Payer: COMMERCIAL

## 2022-01-14 RX ORDER — GABAPENTIN 300 MG/1
300 CAPSULE ORAL 3 TIMES DAILY
Qty: 90 CAPSULE | Refills: 0 | Status: SHIPPED | OUTPATIENT
Start: 2022-01-14 | End: 2022-11-30

## 2022-01-14 NOTE — TELEPHONE ENCOUNTER
Screening Questions for Radiology Injections:    Injection to be done at which interventional clinic site? [facility]    Remind [place] Pt's that Covid test is no longer required except for sedation procedure Pt's.    Instruct patient to arrive as directed prior to the scheduled appointment time:    [facility]: 30 minutes before      [place]: 30 minutes before; if IV needed 1 hour before     Procedure ordered by [place]    Procedure ordered? LESI      Transforaminal Cervical EMERSON -  [facility] does NOT have providers that do these- [facility] and [facility] do have providers that do    As a reminder, receiving steroids can decrease your body's ability to fight infection.   Would you still like to move forward with scheduling the injection?  Yes    What insurance would patient like us to bill for this procedure? [employer]      Worker's comp or MVA (motor vehicle accident) -Any injection DO NOT SCHEDULE and route to Arianna Brown.      [employer] insurance - For SI joint injections, DO NOT SCHEDULE and route Arianna Brown.       ALL BCBS, [employer] and [employer]-Route to Arianna for review DO NOT SCHEDULE      IF SCHEDULING IN [place] AND NEEDS A PA, IT IS OKAY TO SCHEDULE. [place] HANDLES THEIR OWN PA'S AFTER THE PATIENT IS SCHEDULED. PLEASE SCHEDULE AT LEAST 1 WEEK OUT SO A PA CAN BE OBTAINED.    Any chance of pregnancy? Not Applicable   If YES, do NOT schedule and route to RN pool    Is an  needed? No     Patient has a drive home? (mandatory) YES: INFORMED    Is patient taking any blood thinners (That is: Coumadin, Warfarin, Jantoven, Pradaxa Xarelto, Eliquis, Edoxaban, Enoxaparin, Lovenox, Heparin, Arixtra, Fondaparinux, or Fragmin? OR Antiplatelet medication such as Plavix, Brilinta, or Effient? )? No   If hold needed, do NOT schedule, route to RN pool     Is patient taking any aspirin products (includes Excedrin and Fiorinal)? No     If more than 325mg/day, OK to schedule; Instruct pt to decrease to less  than 325 mg for 7 days AND route to RN pool    For CERVICAL procedures, hold all aspirin products for 6 days.     Tell pt that if aspirin product is not held for 6 days, the procedure WILL BE cancelled.      Does the patient have a bleeding or clotting disorder? No     If YES, okay to schedule AND route to RN nurse pool    For any patients with platelet count <100, must be forwarded to provider    Any allergies to contrast dye, iodine, shellfish, or numbing and steroid medications? No    If YES, add allergy information to appointment notes AND route to the RN pool     If EMERSON and Contrast Dye / Iodine Allergy? DO NOT SCHEDULE, route to RN pool    Allergies: Seasonal allergies     Is patient diabetic?  Yes  If YES, instruct them to bring their glucometer.    Does patient have an active infection or treated for one within the past week? No     Is patient currently taking any antibiotics?  YES- pt taking  doxycycline monohydrate (MONODOX) 100 MG capsule Daily for Rosacea    For patients on chronic, preventative, or prophylactic antibiotics, procedures may be scheduled.     For patients on antibiotics for active or recent infection:antibiotic course must have been completed for 4 days    Is patient currently taking any steroid medications? (i.e. Prednisone, Medrol)  No     For patients on steroid medications, course must have been completed for 4 days    Is patient actively being treated for cancer or immunocompromised? No  If YES, do NOT schedule and route to RN pool     Are you able to get on and off an exam table with minimal or no assistance? Yes  If NO, do NOT schedule and route to RN pool    Are you able to roll over and lay on your stomach with minimal or no assistance? Yes  If NO, do NOT schedule and route to RN pool     Has the patient had a flu shot or any other vaccinations within 7 days before or after the procedure.  No     Have you recently had a COVID vaccine or have plans to get it in the near future?  No    If yes, explain that for the vaccine to work best they need to:       wait 1 week before and 1 week after getting Vaccine #1    wait 1 week before and 2 weeks after getting Vaccine #2    wait 1 week before and 2 weeks after getting Vaccine BOOSTER    If patient has concerns about the timing, send to RN pool     Does patient have an MRI/CT?  YES: 2022  Check Procedure Scheduling Grid to see if required.      Was the MRI done within the last 3 years?  Yes    If yes, where was the MRI done i.e.[facility], [facility], [facility], [facility] etc? [facility]      If no, do not schedule and route to RN pool    If MRI was not done at [facility], [facility] or [facility] do NOT schedule and route to RN pool.      If pt has an imaging disc, the injection MAY be scheduled but pt has to bring disc to appt.     If they show up without the disc the injection cannot be done    Procedure Specific Instructions:      If celiac plexus block, informed patient NPO for 6 hours and that it is okay to take medications with sips of water, especially blood pressure medications  Not Applicable         If this is for a cervical procedure, informed patient that aspirin needs to be held for 6 days.   Not Applicable      If IV needed:    Do not schedule procedures requiring IV placement in the first appointment of the day or first appointment after lunch. Do NOT schedule at 0745, 0815 or 1245.     Instructed pt to arrive 30 minutes early for IV start if required. (Check Procedure Scheduling Grid)  Not Applicable    Reminders:      If you are started on any steroids or antibiotics between now and your appointment, you must contact us because the procedure may need to be cancelled.  Yes      For all procedures except radiofrequency ablations (RFAs) and spinal cord stimulator (SCS) trials, informed patient:    IV sedation is not provided for this procedure.  If you feel that an oral anti-anxiety medication is needed, you can discuss this further  with your referring provider or primary care provider.  The Pain Clinic provider will discuss specifics of what the procedure includes at your appointment.  Most procedures last 10-20 minutes.  We use numbing medications to help with any discomfort during the procedure.  Not Applicable      For patients 85 or older we recommend having an adult stay w/ them for the remainder of the day.       Does the patient have any questions?  NO  Oriana Pichardo  [facility]

## 2022-01-14 NOTE — TELEPHONE ENCOUNTER
Inez Huang PA-C placed orders for a EMERSON.   Called the patient and he was given the number for Pain Management. If he can get into a different facility earlier he may call to have the order faxed.      Inez Huang PA-C recommended 2 options for the nerve pain:    1. Gabapentin 300 BID      2. Medrol Dosepak- however, this should be ONLY if EMERSON is at least 2 weeks away as most pain providers want OFF steroids x 2 weeks prior to EMERSON. So please ensure he gets EMERSON scheduled and then we can make decision on MDP. OK to start gabapentin      The patient agreed to the gabapentin and will call about the MDP after he has the EMERSON scheduled.    He will call the clinic with any future concerns.        Cancelled the gabapentin twice daily.  His PCP just placed an order

## 2022-01-14 NOTE — TELEPHONE ENCOUNTER
Antibiotic for chronic condition vs acute OK to proceed    Elsy Hui RN, BSN, CMSRN  RN Care Coordinator  [facility]

## 2022-01-18 ENCOUNTER — OFFICE VISIT (OUTPATIENT)
Dept: NEUROSURGERY | Facility: CLINIC | Age: 57
End: 2022-01-18
Attending: PHYSICIAN ASSISTANT
Payer: COMMERCIAL

## 2022-01-18 ENCOUNTER — TELEPHONE (OUTPATIENT)
Dept: PALLIATIVE MEDICINE | Facility: CLINIC | Age: 57
End: 2022-01-18
Payer: COMMERCIAL

## 2022-01-18 VITALS
SYSTOLIC BLOOD PRESSURE: 112 MMHG | HEART RATE: 85 BPM | DIASTOLIC BLOOD PRESSURE: 76 MMHG | OXYGEN SATURATION: 98 % | BODY MASS INDEX: 35 KG/M2 | WEIGHT: 237 LBS

## 2022-01-18 DIAGNOSIS — M54.42 ACUTE LEFT-SIDED LOW BACK PAIN WITH LEFT-SIDED SCIATICA: Primary | ICD-10-CM

## 2022-01-18 PROCEDURE — 99213 OFFICE O/P EST LOW 20 MIN: CPT | Performed by: PHYSICIAN ASSISTANT

## 2022-01-18 PROCEDURE — G0463 HOSPITAL OUTPT CLINIC VISIT: HCPCS

## 2022-01-18 ASSESSMENT — PAIN SCALES - GENERAL: PAINLEVEL: SEVERE PAIN (6)

## 2022-01-18 NOTE — LETTER
1/18/2022         RE: Everton Linn  [address]  [place] [address]        Dear Colleague,    Thank you for referring your patient, Everton Linn, to the [facility] [place]. Please see a copy of my visit note below.    NEUROSURGERY CLINIC PROGRESS NOTE    DATE OF VISIT: 1/18/2022    HPI:     Everton Linn is a pleasant 56 year old male who presents to the clinic today for a  follow-up visit. We last evaluated him on 01/04/2021 for consultation on a constant, sharp, aching, burning pain that initiates in the midline low lumbar region and radiates distally in what sounds like the left L2 distribution. His imaging exhibits, at L2-L3, a left foraminal herniation slightly displaces the exiting L2 nerve. He has since been taking gabapentin and is scheduled to have a L2-3 ILESI tomorrow, 01/18/2021.    Current Outpatient Medications   Medication     blood glucose monitoring (ONE TOUCH DELICA) lancets     blood glucose monitoring (ONETOUCH VERIO IQ) test strip     Blood Glucose Monitoring Suppl (ONETOUCH VERIO FLEX SYSTEM) w/Device KIT     cyclobenzaprine (FLEXERIL) 5 MG tablet     doxycycline monohydrate (MONODOX) 100 MG capsule     fexofenadine (ALLEGRA) 180 MG tablet     gabapentin (NEURONTIN) 300 MG capsule     glipiZIDE (GLUCOTROL XL) 10 MG 24 hr tablet     metFORMIN (GLUCOPHAGE-XR) 500 MG 24 hr tablet     simvastatin (ZOCOR) 40 MG tablet     SOOLANTRA 1 % cream     TRULICITY 1.5 MG/0.5ML pen     sildenafil (VIAGRA) 100 MG tablet     No current facility-administered medications for this visit.       Allergies   Allergen Reactions     Seasonal Allergies Other (See Comments)     Runny Nose/itchy eyes       Past Medical History:   Diagnosis Date     Diabetes (H)      Sleep apnea     CPAP       Review Of Systems    Skin: negative  Eyes: negative  Ears/Nose/Throat: negative  Respiratory: No shortness of breath, dyspnea on exertion, cough, or  hemoptysis  Cardiovascular: negative  Gastrointestinal: negative  Musculoskeletal: back pain  Neurologic: negative  Psychiatric: negative  Hematologic/Lymphatic/Immunologic: negative  Endocrine: negative    OBJECTIVE:    /76   Pulse 85   Wt 237 lb (107.5 kg)   SpO2 98%   BMI 35.00 kg/m      Imaging:    MR LUMBAR SPINE WITHOUT CONTRAST 1/7/2022 10:29 AM    Findings notable for:  1.  At L1-L2, there is a right paracentral herniation that causes  moderate right lateral recess narrowing and contributes to overall  mild to moderate spinal canal stenosis. Correlate for any right L2  radicular symptoms.  2.  At L2-L3, left foraminal herniation slightly displaces the exiting  L2 nerve. Overall mild lateral recess and spinal canal stenosis.  3.  At L3-L4, there is mild spinal canal and foraminal stenosis.  4.  At L4-L5, right foraminal disc osteophyte complex slightly  displaces the exiting L4 nerve.  5.  At L5-S1, there is mild foraminal narrowing.    Radiographic Findings: Full radiological report in chart. I personally reviewed the images with the patient today.    Exam:    Patient appears comfortable and in no apparent distress. Moving all extremities.  Gait is non-antalgic.  CN II-XII grossly intact, alert and appropriate with conversation and following  commands  Bilateral upper extremities with full strength including hand intrinsics and grasp.  Sensation intact throughout.  Bilateral lower extremities 5/5 strength including plantar and dorsiflexion.  Normal sensation throughout bilaterally.    Plan:    Mr. Linn's most recent imaging exhibits, at L2-L3, a left foraminal herniation slightly displaces the exiting L2 nerve which certainly correlate with his subjective concerns and objective findings on his physical exam. However, we do not recommend surgical intervention at this time.     Based on his physical exam, imaging review, and past treatments, we feel that it would be in his best interest to  continue a conservative approach by participating in a physical therapy program. We also discussed obtaining an epidural steroid injection which he is scheduled to obtain tomorrow, 01/19/2022.     We would like to see him back as needed to further discuss possible surgical interventions or other conservative therapies.     We did review the images together and we explained with an anatomical model his condition and the recommended treatment. We also discussed signs of a worsening problem that he should seek being evaluated.     Should he fail to obtain adequate alleviation of his symptoms utilizing non-operative measures, we briefly discussed a MIS decompression.     The patient gave verbal understanding and is in agreement with the above plan. He  will call or return to the clinic for any worsening or changes in symptoms.    Respectfully,     BRADFORD Antonio PA-C      Again, thank you for allowing me to participate in the care of your patient.        Sincerely,        Slick Adams PA-C

## 2022-01-18 NOTE — PROGRESS NOTES
NEUROSURGERY CLINIC PROGRESS NOTE    DATE OF VISIT: 1/18/2022    HPI:     Everton Linn is a pleasant 56 year old male who presents to the clinic today for a  follow-up visit. We last evaluated him on 01/04/2021 for consultation on a constant, sharp, aching, burning pain that initiates in the midline low lumbar region and radiates distally in what sounds like the left L2 distribution. His imaging exhibits, at L2-L3, a left foraminal herniation slightly displaces the exiting L2 nerve. He has since been taking gabapentin and is scheduled to have a L2-3 ILESI tomorrow, 01/18/2021.    Current Outpatient Medications   Medication     blood glucose monitoring (ONE TOUCH DELICA) lancets     blood glucose monitoring (ONETOUCH VERIO IQ) test strip     Blood Glucose Monitoring Suppl (ONETOUCH VERIO FLEX SYSTEM) w/Device KIT     cyclobenzaprine (FLEXERIL) 5 MG tablet     doxycycline monohydrate (MONODOX) 100 MG capsule     fexofenadine (ALLEGRA) 180 MG tablet     gabapentin (NEURONTIN) 300 MG capsule     glipiZIDE (GLUCOTROL XL) 10 MG 24 hr tablet     metFORMIN (GLUCOPHAGE-XR) 500 MG 24 hr tablet     simvastatin (ZOCOR) 40 MG tablet     SOOLANTRA 1 % cream     TRULICITY 1.5 MG/0.5ML pen     sildenafil (VIAGRA) 100 MG tablet     No current facility-administered medications for this visit.       Allergies   Allergen Reactions     Seasonal Allergies Other (See Comments)     Runny Nose/itchy eyes       Past Medical History:   Diagnosis Date     Diabetes (H)      Sleep apnea     CPAP       Review Of Systems    Skin: negative  Eyes: negative  Ears/Nose/Throat: negative  Respiratory: No shortness of breath, dyspnea on exertion, cough, or hemoptysis  Cardiovascular: negative  Gastrointestinal: negative  Musculoskeletal: back pain  Neurologic: negative  Psychiatric: negative  Hematologic/Lymphatic/Immunologic: negative  Endocrine: negative    OBJECTIVE:    /76   Pulse 85   Wt 237 lb (107.5 kg)   SpO2 98%   BMI 35.00 kg/m       Imaging:    MR LUMBAR SPINE WITHOUT CONTRAST 1/7/2022 10:29 AM    Findings notable for:  1.  At L1-L2, there is a right paracentral herniation that causes  moderate right lateral recess narrowing and contributes to overall  mild to moderate spinal canal stenosis. Correlate for any right L2  radicular symptoms.  2.  At L2-L3, left foraminal herniation slightly displaces the exiting  L2 nerve. Overall mild lateral recess and spinal canal stenosis.  3.  At L3-L4, there is mild spinal canal and foraminal stenosis.  4.  At L4-L5, right foraminal disc osteophyte complex slightly  displaces the exiting L4 nerve.  5.  At L5-S1, there is mild foraminal narrowing.    Radiographic Findings: Full radiological report in chart. I personally reviewed the images with the patient today.    Exam:    Patient appears comfortable and in no apparent distress. Moving all extremities.  Gait is non-antalgic.  CN II-XII grossly intact, alert and appropriate with conversation and following  commands  Bilateral upper extremities with full strength including hand intrinsics and grasp.  Sensation intact throughout.  Bilateral lower extremities 5/5 strength including plantar and dorsiflexion.  Normal sensation throughout bilaterally.    Plan:    Mr. Linn's most recent imaging exhibits, at L2-L3, a left foraminal herniation slightly displaces the exiting L2 nerve which certainly correlate with his subjective concerns and objective findings on his physical exam. However, we do not recommend surgical intervention at this time.     Based on his physical exam, imaging review, and past treatments, we feel that it would be in his best interest to continue a conservative approach by participating in a physical therapy program. We also discussed obtaining an epidural steroid injection which he is scheduled to obtain tomorrow, 01/19/2022.     We would like to see him back as needed to further discuss possible surgical interventions or other  conservative therapies.     We did review the images together and we explained with an anatomical model his condition and the recommended treatment. We also discussed signs of a worsening problem that he should seek being evaluated.     Should he fail to obtain adequate alleviation of his symptoms utilizing non-operative measures, we briefly discussed a MIS decompression.     The patient gave verbal understanding and is in agreement with the above plan. He  will call or return to the clinic for any worsening or changes in symptoms.    Respectfully,     BRADFORD Antonio PA-C

## 2022-01-18 NOTE — TELEPHONE ENCOUNTER
Spoke to patient, reminded patient of date, time and location of appointment.     Asked patient if they have received anti-biotics or vaccines in the past 7 days?     Reminded patient to eat and drink as usual.    Remained to hold any blood thinners or pain medications that they were asked to hold per nurse.     Reminded patient they will need a  for this appointment.      Reminded patient that both patient and  must wear a mask during their visit.     ---------------    Patient stated he was on an antibiotic for rosacea, last dose was 01/14/21 . Pateient wants to clarify he was okay to come in for his appointment .      Spoke to Nurse, Nurse stated that patient is okay to come in if he does not have an active infection.      Duyen Ortiz MA  [facility]

## 2022-01-19 ENCOUNTER — RADIOLOGY INJECTION OFFICE VISIT (OUTPATIENT)
Dept: PALLIATIVE MEDICINE | Facility: CLINIC | Age: 57
End: 2022-01-19
Payer: COMMERCIAL

## 2022-01-19 VITALS — OXYGEN SATURATION: 98 % | HEART RATE: 86 BPM | DIASTOLIC BLOOD PRESSURE: 77 MMHG | SYSTOLIC BLOOD PRESSURE: 138 MMHG

## 2022-01-19 DIAGNOSIS — M54.16 LUMBAR RADICULOPATHY: Primary | ICD-10-CM

## 2022-01-19 DIAGNOSIS — M54.42 ACUTE LEFT-SIDED LOW BACK PAIN WITH LEFT-SIDED SCIATICA: ICD-10-CM

## 2022-01-19 PROCEDURE — 62323 NJX INTERLAMINAR LMBR/SAC: CPT | Performed by: PHYSICAL MEDICINE & REHABILITATION

## 2022-01-19 RX ORDER — TRIAMCINOLONE ACETONIDE 40 MG/ML
40 INJECTION, SUSPENSION INTRA-ARTICULAR; INTRAMUSCULAR ONCE
Status: COMPLETED | OUTPATIENT
Start: 2022-01-19 | End: 2022-01-19

## 2022-01-19 RX ADMIN — TRIAMCINOLONE ACETONIDE 40 MG: 40 INJECTION, SUSPENSION INTRA-ARTICULAR; INTRAMUSCULAR at 14:41

## 2022-01-19 NOTE — PROGRESS NOTES
[facility] - Procedure Note    Date of Visit: 1/19/2022    Procedure performed: Lumbar 2-3 interlaminar epidural steroid injection  Diagnosis: Lumbar spondylosis; Lumbar radiculitis/radiculopathy  : Uriah Anderson DO   Anesthesia: none    Indications: Everton Linn is a 56 year old male who is seen at the request of Inez Huang PA-C for a lumbar epidural steroid injection. The patient describes low back pain that radiates into the right thigh and groin in an L2/3 distribution. The patient has been exhibiting symptoms consistent with lumbar intraspinal inflammation and radiculopathy. Symptoms have been persistent, disabling, and intermittently severe. The patient reports minimal improvement with conservative treatment, including medications and PT.    Lumbar MRI was done on 1/7/22 which showed   L1-L2: Slight loss of disc height and signal. Mild disc bulge with  right paracentral extrusion. Mild facet arthropathy. Moderate right  and mild left lateral recess narrowing. Mild to moderate spinal canal  stenosis. Adequate foramina.     L2-L3: Slight loss of disc signal. Normal disc height. Mild disc bulge  with left foraminal extrusion and small right paracentral and  foraminal protrusions. Mild lateral recess and spinal canal stenosis.  Mild right foraminal narrowing. Left foraminal extrusion contacts and  slightly displaces the exiting L2 nerve.     L3-L4: Slight loss of disc height and signal. Mild disc bulge and  facet arthropathy. Mild lateral recess, spinal canal and neural  foraminal stenosis.     L4-L5: Mild loss of disc signal. Slight loss of disc height. Mild disc  bulge. Mild-to-moderate facet arthropathy. Mild lateral recess  narrowing. Adequate central canal. Right foraminal disc osteophyte  complex contacts and slightly displaces the exiting L4 nerve.  Mild-to-moderate left foraminal narrowing.     L5-S1: Normal disc height and signal. Small central  protrusion.  Mild-to-moderate facet arthropathy. Adequate central canal. Mild  foraminal narrowing.                                                                      IMPRESSION:  1.  At L1-L2, there is a right paracentral herniation that causes  moderate right lateral recess narrowing and contributes to overall  mild to moderate spinal canal stenosis. Correlate for any right L2  radicular symptoms.  2.  At L2-L3, left foraminal herniation slightly displaces the exiting  L2 nerve. Overall mild lateral recess and spinal canal stenosis.  3.  At L3-L4, there is mild spinal canal and foraminal stenosis.  4.  At L4-L5, right foraminal disc osteophyte complex slightly  displaces the exiting L4 nerve.  5.  At L5-S1, there is mild foraminal narrowing.     MANSI POP MD          Allergies:      Allergies   Allergen Reactions     Seasonal Allergies Other (See Comments)     Runny Nose/itchy eyes        Vitals:  /77   Pulse 90   SpO2 98%     Review of Systems: The patient denies recent fever, chills, illness, use of antibiotics or anticoagulants. All other 10-point review of systems negative.     Procedure: The procedure and risks were explained, and informed written consent was obtained from the patient. Risks include but are not limited to: infection, bleeding, increased pain, and damage to soft tissue, nerve, muscle, and vasculature structures. After getting informed consent, patient was brought into the procedure suite and was placed in a prone position on the procedure table. A Pause for the Cause was performed. Patient was prepped and draped in sterile fashion.     The L2-3 interspace was identified with use of fluoroscopy in AP view. A 25-gauge, 1.5 inch needle was used to anesthetize the skin and subcutaneous tissue entry site with a total of 4 ml of 1% lidocaine. Under fluoroscopic visualization, a 22-gauge, 3.5 inch Tuohy epidural needle was slowly advanced towards the epidural space a few  millimeters left-sided of midline. The latter part of the needle advancement was guided with fluoroscopy in the lateral view. The epidural space was identified using loss of resistance technique. After negative aspiration for heme and cerebrospinal fluid, a total of 1 mL of non-ionic contrast was injected to confirm needle placement with 9 mL of contrast wasted. Epidurogram confirmed spread within the posterior epidural space. 1 ml of 40mg/ml of triamcinolone, 1 ml of 1% lidocaine, and 3 ml of preservative free saline was injected. The needle was removed.  Images were saved to PACS.    The patient tolerated the procedure well, and there was no evidence of procedural complications. No new sensory or motor deficits were noted following the procedure. The patient was stable and able to ambulate on discharge home. Post-procedure instructions were provided.     Pre-procedure pain score: 7/10 in the back, 7/10 in the leg  Post-procedure pain score: 5/10 in the back, 5/10 in the leg    Assessment/Plan: Everton Linn is a 56 year old male s/p lumbar interlaminar epidural steroid injection today for lumbar spondylosis and radiculitis/radiculopathy.     1. Following today's procedure, the patient was advised to contact the [facility] for any of the following:   Fever, chills, or night sweats   New onset of pain, numbness, or weakness   Any questions/concerns regarding the procedure  If unable to contact the Pain Center, the patient was instructed to go to a local Emergency Room for any complications.   2. Follow-up with the referring provider in 2-4 weeks for post-procedure evaluation.        Uriah Anderson DO  [facility] [place]

## 2022-01-19 NOTE — PATIENT INSTRUCTIONS
[facility]   Procedure Discharge Instructions    Today you saw:   Dr. Uriah Anderson        You had an:  Epidural steroid injection       Medications used:  Lidocaine Omnipaque Kenalog Normal saline          Be cautious when walking. Numbness and/or weakness in the lower extremities may occur for up to 6-8 hours after the procedure due to effect of the local anesthetic    Do not drive for 6 hours. The effect of the local anesthetic could slow your reflexes.     You may resume your regular activities after 24 hours    Avoid strenuous activity for the first 24 hours    You may shower, however avoid swimming, tub baths or hot tubs for 24 hours following your procedure    You may have a mild to moderate increase in pain for several days following the injection.    It may take up to 14 days for the steroid medication to start working although you may feel the effect as early as a few days after the procedure.       You may use ice packs for 10-15 minutes, 3 to 4 times a day at the injection site for comfort    Do not use heat to painful areas for 6 to 8 hours. This will give the local anesthetic time to wear off and prevent you from accidentally burning your skin.     Unless you have been directed to avoid the use of anti-inflammatory medications (NSAIDS), you may use medications such as ibuprofen, Aleve or Tylenol for pain control if needed.     Possible side effects of steroids that you may experience include flushing, elevated blood pressure, increased appetite, mild headaches and restlessness.  All of these symptoms will get better with time.    If you experience any of the following, call the Pain Clinic during work hours (Mon-Friday 8-4:30 pm) at [phone] or the Provider Line after hours at [phone]:  -Fever over 100 degree F  -Swelling, bleeding, redness, drainage, warmth at the injection site  -Progressive weakness or numbness in your legs or arms  -Loss of bowel or  bladder function  -Unusual headache that is not relieved by Tylenol or other pain reliever  -Unusual new onset of pain that is not improving

## 2022-01-19 NOTE — Clinical Note
L2-3 lumbar epidural steroid injection completed.    Thanks,    Uriah Anderson [facility]

## 2022-01-19 NOTE — NURSING NOTE
Discharge Information    IV Discontiued Time:  NA    Amount of Fluid Infused:  NA    Discharge Criteria = When patient returns to baseline or as per MD order    Consciousness:  Pt is fully awake    Circulation:  BP +/- 20% of pre-procedure level    Respiration:  Patient is able to breathe deeply    O2 Sat:  Patient is able to maintain O2 Sat >92% on room air    Activity:  Moves 4 extremities on command    Ambulation:  Patient is able to stand and walk or stand and pivot into wheelchair    Dressing:  Clean/dry or No Dressing    Notes:   Discharge instructions and AVS given to patient    Patient meets criteria for discharge?  YES    Admitted to PCU?  No    Responsible adult present to accompany patient home?  Yes    Signature/Title:    Jaqui Emery RN  RN Care Coordinator  [facility] [place]

## 2022-01-19 NOTE — NURSING NOTE
Pre-procedure Intake  If YES to any questions or NO to having a   Please complete laminated checklist and leave on the computer keyboard for Provider, verbally inform provider if able.    For SCS Trial, RFA's or any sedation procedure:  Have you been fasting? NA    If yes, for how long?     Are you taking any any blood thinners such as Coumadin, Warfarin, Jantoven, Pradaxa Xarelto, Eliquis, Edoxaban, Enoxaparin, Lovenox, Heparin, Arixtra, Fondaparinux, or Fragmin? OR Antiplatelet medication such as Plavix, Brilinta, or Effient?   No     If yes, when did you take your last dose?     Do you take aspirin?  No    If cervical procedure, have you held aspirin for 6 days?   NA    Do you have any allergies to contrast dye, iodine, steroid and/or numbing medications?  NO    Are you currently taking antibiotics or have an active infection?  NO    Have you had a fever/elevated temperature within the past week? NO    Are you currently taking oral steroids? NO    Do you have a ? Yes    Are you pregnant or breastfeeding?  Not Applicable    Have you received the COVID-19 vaccine? Yes    If yes, was it your 1st, 2nd or only dose needed? 3rd    Date of most recent vaccine: 10/14/21    Notify provider and RNs if systolic BP >170, diastolic BP >100, P >100 or O2 sats < 90%      Duyen Ortiz MA  [facility] [place]

## 2022-01-25 DIAGNOSIS — E11.9 TYPE 2 DIABETES MELLITUS WITHOUT COMPLICATION, WITHOUT LONG-TERM CURRENT USE OF INSULIN (H): ICD-10-CM

## 2022-01-25 NOTE — LETTER
February 3, 2022      Everton Linn  [address] [place] [address]        Dear Mr. Everton Linn,    We recently received a call from your pharmacy requesting a refill of your medication Metformin.    We are contacting you today to notify you that you are due for a medication/diabetic check for further refills.    We have authorized one refill of your medication to allow time for you to schedule your appointment.    This appointment can be in clinic or virtual by either telephone, video.    Please call [phone] to schedule an appointment or if you have MyChart you can schedule with your provider as well.    Taking care of your health is important to us, an ongoing visits with your provider are vital to your care. We look forward to seeing you in the near future.    Thank you for using [facility] for your Medical Needs.          Sincerely,     Lindsay Arroyo, CLAUDIA, RN

## 2022-01-26 RX ORDER — METFORMIN HCL 500 MG
TABLET, EXTENDED RELEASE 24 HR ORAL
Qty: 360 TABLET | Refills: 0 | Status: SHIPPED | OUTPATIENT
Start: 2022-01-26 | End: 2022-04-27

## 2022-01-26 NOTE — TELEPHONE ENCOUNTER
Medication is being filled for 1 time refill only due to:  Patient needs to be seen because he is due for his 6 month diabetes check with lab work. Please contact patient and schedule diabetes check with lab work for further refills.     Kandace Bhagat RN

## 2022-02-04 ENCOUNTER — THERAPY VISIT (OUTPATIENT)
Dept: PHYSICAL THERAPY | Facility: CLINIC | Age: 57
End: 2022-02-04
Payer: COMMERCIAL

## 2022-02-04 DIAGNOSIS — M54.16 LUMBAR RADICULOPATHY: ICD-10-CM

## 2022-02-04 PROCEDURE — 97110 THERAPEUTIC EXERCISES: CPT | Mod: GP | Performed by: PHYSICAL THERAPIST

## 2022-02-04 NOTE — PROGRESS NOTES
Subjective:  HPI  Physical Exam                    Objective:  System    Physical Exam    General     ROS    Assessment/Plan:    PROGRESS  REPORT    Progress reporting period is from 12/29/21 to 2/4/22.       SUBJECTIVE  Subjective changes noted by patient:States the pain had gotten really bad that he had to walk with a cane as the leg was giving out. Had injecton 1/19/22 and states that it has helped and is no longer using a cane to walk. Still has the numbness in the anterior left thigh and groin, Pain is still constant but less intense than it was right before the injection.  Currenlty 4/10 pain and in the last week the pain has gotten up to 6/10 at worst.     Current Pain level: 4/10.     Initial Pain level: 8/10.   Changes in function:  Yes (See Goal flowsheet attached for changes in current functional level)  Adverse reaction to treatment or activity: None    OBJECTIVE  Changes noted in objective findings:  Yes,   Objective: AROM lumbar flexion min loss no pain, extension min loss no pain.  MMT L hip flexion 4/5 and knee extension +4/5.  Prone on elbows resolved pain in groin and hip and decreased it in the low back.      ASSESSMENT/PLAN  Updated problem list and treatment plan: Diagnosis 1:  Lumbar Radiculopathy  Pain -  hot/cold therapy, electric stimulation, manual therapy, self management, education, directional preference exercise and home program  Decreased ROM/flexibility - manual therapy, therapeutic exercise, therapeutic activity and home program  Decreased joint mobility - manual therapy, therapeutic exercise, therapeutic activity and home program  Decreased strength - therapeutic exercise, therapeutic activities and home program  Impaired muscle performance - neuro re-education and home program  Decreased function - therapeutic activities and home program  STG/LTGs have been met or progress has been made towards goals:  Yes (See Goal flow sheet completed today.)  Assessment of Progress: The  patient's condition is improving.  Self Management Plans:  Patient has been instructed in a home treatment program.  Patient  has been instructed in self management of symptoms.  I have re-evaluated this patient and find that the nature, scope, duration and intensity of the therapy is appropriate for the medical condition of the patient.  Everton continues to require the following intervention to meet STG and LTG's:  PT    Recommendations:  This patient would benefit from continued therapy.     Frequency:  1 X week, once daily  Duration:  for 6 weeks        Please refer to the daily flowsheet for treatment today, total treatment time and time spent performing 1:1 timed codes.

## 2022-02-07 DIAGNOSIS — E78.5 HYPERLIPIDEMIA LDL GOAL <100: ICD-10-CM

## 2022-02-08 RX ORDER — SIMVASTATIN 40 MG
TABLET ORAL
Qty: 90 TABLET | Refills: 1 | Status: SHIPPED | OUTPATIENT
Start: 2022-02-08 | End: 2022-08-10

## 2022-02-09 ENCOUNTER — THERAPY VISIT (OUTPATIENT)
Dept: PHYSICAL THERAPY | Facility: CLINIC | Age: 57
End: 2022-02-09
Payer: COMMERCIAL

## 2022-02-09 DIAGNOSIS — M54.16 LUMBAR RADICULOPATHY: ICD-10-CM

## 2022-02-09 PROCEDURE — 97110 THERAPEUTIC EXERCISES: CPT | Mod: GP | Performed by: PHYSICAL THERAPIST

## 2022-02-09 PROCEDURE — 97140 MANUAL THERAPY 1/> REGIONS: CPT | Mod: GP | Performed by: PHYSICAL THERAPIST

## 2022-02-11 ENCOUNTER — TRANSFERRED RECORDS (OUTPATIENT)
Dept: HEALTH INFORMATION MANAGEMENT | Facility: CLINIC | Age: 57
End: 2022-02-11
Payer: COMMERCIAL

## 2022-02-15 ENCOUNTER — OFFICE VISIT (OUTPATIENT)
Dept: NEUROSURGERY | Facility: CLINIC | Age: 57
End: 2022-02-15
Attending: PHYSICIAN ASSISTANT
Payer: COMMERCIAL

## 2022-02-15 VITALS
OXYGEN SATURATION: 98 % | BODY MASS INDEX: 35 KG/M2 | WEIGHT: 237 LBS | HEART RATE: 89 BPM | SYSTOLIC BLOOD PRESSURE: 128 MMHG | DIASTOLIC BLOOD PRESSURE: 80 MMHG

## 2022-02-15 DIAGNOSIS — M51.26 HERNIATED NUCLEUS PULPOSUS, L2-3 LEFT: Primary | ICD-10-CM

## 2022-02-15 PROCEDURE — G0463 HOSPITAL OUTPT CLINIC VISIT: HCPCS

## 2022-02-15 PROCEDURE — 99213 OFFICE O/P EST LOW 20 MIN: CPT | Performed by: PHYSICIAN ASSISTANT

## 2022-02-15 ASSESSMENT — PAIN SCALES - GENERAL: PAINLEVEL: SEVERE PAIN (6)

## 2022-02-15 NOTE — LETTER
2/15/2022         RE: Everton Linn  [address]  [place] [address]        Dear Colleague,    Thank you for referring your patient, Everton Linn, to the [facility]UROS[facility] [place]. Please see a copy of my visit note below.    NEUROSURGERY CLINIC PROGRESS NOTE    DATE OF VISIT: 2/15/2022    HPI:     Everton Linn is a pleasant 56 year old male who presents to the clinic today for a follow-up visit for lumbar spine pain and left leg radiculpathy. We initially evaluated him on 01/18/2022. At that time we noted his lumbar MRI exhibited, at L2-L3, a left foraminal herniation slightly displaces the exiting L2 nerve. We suggested a conservative approach by participating in a physical therapy program. We was already scheduled to obtain a lumbar 2-3 interlaminar epidural steroid injection the following day. He did obtain this injection and obtained approximately two weeks of relief. Today, he would like to further discuss surgical options as his symptoms of pain, numbness, paresthesia, and weakness have returned. He has also attempted physical therapy, traction, chiropractic care, NSAIDs, a Medrol Dosepak and ultrasound.    Current Outpatient Medications   Medication     blood glucose monitoring (ONE TOUCH DELICA) lancets     blood glucose monitoring (ONETOUCH VERIO IQ) test strip     Blood Glucose Monitoring Suppl (ONETOUCH VERIO FLEX SYSTEM) w/Device KIT     cyclobenzaprine (FLEXERIL) 5 MG tablet     doxycycline monohydrate (MONODOX) 100 MG capsule     fexofenadine (ALLEGRA) 180 MG tablet     gabapentin (NEURONTIN) 300 MG capsule     glipiZIDE (GLUCOTROL XL) 10 MG 24 hr tablet     metFORMIN (GLUCOPHAGE-XR) 500 MG 24 hr tablet     simvastatin (ZOCOR) 40 MG tablet     SOOLANTRA 1 % cream     TRULICITY 1.5 MG/0.5ML pen     sildenafil (VIAGRA) 100 MG tablet     No current facility-administered medications for this visit.       Allergies   Allergen Reactions     Seasonal  Allergies Other (See Comments)     Runny Nose/itchy eyes       Past Medical History:   Diagnosis Date     Diabetes (H)      Sleep apnea     CPAP       Review Of Systems    Skin: negative  Eyes: negative  Ears/Nose/Throat: negative  Respiratory: No shortness of breath, dyspnea on exertion, cough, or hemoptysis  Cardiovascular: negative  Gastrointestinal: negative  Musculoskeletal: back pain and muscular weakness  Neurologic: numbness or tingling of feet  Psychiatric: negative  Hematologic/Lymphatic/Immunologic: negative  Endocrine: negative    OBJECTIVE:    /80   Pulse 89   Wt 237 lb (107.5 kg)   SpO2 98%   BMI 35.00 kg/m      Imaging:    MR LUMBAR SPINE WITHOUT CONTRAST 1/7/2022 10:29 AM    Findings notable for:  1.  At L1-L2, there is a right paracentral herniation that causes  moderate right lateral recess narrowing and contributes to overall  mild to moderate spinal canal stenosis. Correlate for any right L2  radicular symptoms.  2.  At L2-L3, left foraminal herniation slightly displaces the exiting  L2 nerve. Overall mild lateral recess and spinal canal stenosis.  3.  At L3-L4, there is mild spinal canal and foraminal stenosis.  4.  At L4-L5, right foraminal disc osteophyte complex slightly  displaces the exiting L4 nerve.  5.  At L5-S1, there is mild foraminal narrowing.    Radiographic Findings: Full radiological report in chart. I personally reviewed the images with the patient today.    Exam:    Patient appears comfortable and in no apparent distress. Moving all extremities.  Gait is non-antalgic.  CN II-XII grossly intact, alert and appropriate with conversation and following  commands  Bilateral upper extremities with full strength including hand intrinsics and grasp.  Sensation intact throughout.  Bilateral lower extremities 5/5 strength including plantar and dorsiflexion.  Normal sensation throughout bilaterally.    PLAN:    Mr. Linn exhibits, at L2-L3, a left foraminal herniation slightly  displaces the exiting L2 nerve on his most recent MRI. He has failed conservative management, and therefore, we feel that he would be best amendable to surgical intervention. We discussed surgical options that included a left lateral recess decompression. We also discussed continuing with non-surgical options, although the risk of permanent nerve damage is greater with continued delay of relieving the pressure from the nerve due to the stenosis.   We reviewed the surgical risks including nerve damage, infection, bleeding, residual or recurrent disk / symptoms and spinal fluid leak. This will also be performed utilizing general anesthesia which can pose both cardiovascular and respiratory risks as well.     We described the procedure as being a minimally invasive same day surgery to be performed at [facility]. We also discussed the normal postoperative recovery that would include not lifting anything greater than 5-10 pounds, avoid excessive bending, twisting, and turning and to make frequent position changes about every 30 minutes going from sitting, standing and walking for six weeks approximately. We also discussed the timing of post-operative follow-up appointments in the Neurosurgery Clinic.     We did review the images together and explained with an anatomical model his condition and the recommended treatment. We also discussed signs of a worsening problem that he should seek being evaluated. He appeared to have a good understanding of the situation, asked appropriate questions which we answered, and wished to proceed as we had previously recommended.       Respectfully,     BRADFORD Atnonio PA-C      Again, thank you for allowing me to participate in the care of your patient.        Sincerely,        Slick Adams PA-C

## 2022-02-15 NOTE — PROGRESS NOTES
NEUROSURGERY CLINIC PROGRESS NOTE    DATE OF VISIT: 2/15/2022    HPI:     Everton Linn is a pleasant 56 year old male who presents to the clinic today for a follow-up visit for lumbar spine pain and left leg radiculpathy. We initially evaluated him on 01/18/2022. At that time we noted his lumbar MRI exhibited, at L2-L3, a left foraminal herniation slightly displaces the exiting L2 nerve. We suggested a conservative approach by participating in a physical therapy program. We was already scheduled to obtain a lumbar 2-3 interlaminar epidural steroid injection the following day. He did obtain this injection and obtained approximately two weeks of relief. Today, he would like to further discuss surgical options as his symptoms of pain, numbness, paresthesia, and weakness have returned. He has also attempted physical therapy, traction, chiropractic care, NSAIDs, a Medrol Dosepak and ultrasound.    Current Outpatient Medications   Medication     blood glucose monitoring (ONE TOUCH DELICA) lancets     blood glucose monitoring (ONETOUCH VERIO IQ) test strip     Blood Glucose Monitoring Suppl (ONETOUCH VERIO FLEX SYSTEM) w/Device KIT     cyclobenzaprine (FLEXERIL) 5 MG tablet     doxycycline monohydrate (MONODOX) 100 MG capsule     fexofenadine (ALLEGRA) 180 MG tablet     gabapentin (NEURONTIN) 300 MG capsule     glipiZIDE (GLUCOTROL XL) 10 MG 24 hr tablet     metFORMIN (GLUCOPHAGE-XR) 500 MG 24 hr tablet     simvastatin (ZOCOR) 40 MG tablet     SOOLANTRA 1 % cream     TRULICITY 1.5 MG/0.5ML pen     sildenafil (VIAGRA) 100 MG tablet     No current facility-administered medications for this visit.       Allergies   Allergen Reactions     Seasonal Allergies Other (See Comments)     Runny Nose/itchy eyes       Past Medical History:   Diagnosis Date     Diabetes (H)      Sleep apnea     CPAP       Review Of Systems    Skin: negative  Eyes: negative  Ears/Nose/Throat: negative  Respiratory: No shortness of breath, dyspnea on  exertion, cough, or hemoptysis  Cardiovascular: negative  Gastrointestinal: negative  Musculoskeletal: back pain and muscular weakness  Neurologic: numbness or tingling of feet  Psychiatric: negative  Hematologic/Lymphatic/Immunologic: negative  Endocrine: negative    OBJECTIVE:    /80   Pulse 89   Wt 237 lb (107.5 kg)   SpO2 98%   BMI 35.00 kg/m      Imaging:    MR LUMBAR SPINE WITHOUT CONTRAST 1/7/2022 10:29 AM    Findings notable for:  1.  At L1-L2, there is a right paracentral herniation that causes  moderate right lateral recess narrowing and contributes to overall  mild to moderate spinal canal stenosis. Correlate for any right L2  radicular symptoms.  2.  At L2-L3, left foraminal herniation slightly displaces the exiting  L2 nerve. Overall mild lateral recess and spinal canal stenosis.  3.  At L3-L4, there is mild spinal canal and foraminal stenosis.  4.  At L4-L5, right foraminal disc osteophyte complex slightly  displaces the exiting L4 nerve.  5.  At L5-S1, there is mild foraminal narrowing.    Radiographic Findings: Full radiological report in chart. I personally reviewed the images with the patient today.    Exam:    Patient appears comfortable and in no apparent distress. Moving all extremities.  Gait is non-antalgic.  CN II-XII grossly intact, alert and appropriate with conversation and following  commands  Bilateral upper extremities with full strength including hand intrinsics and grasp.  Sensation intact throughout.  Bilateral lower extremities 5/5 strength including plantar and dorsiflexion.  Normal sensation throughout bilaterally.    PLAN:    Mr. Linn exhibits, at L2-L3, a left foraminal herniation slightly displaces the exiting L2 nerve on his most recent MRI. He has failed conservative management, and therefore, we feel that he would be best amendable to surgical intervention. We discussed surgical options that included a left lateral recess decompression. We also discussed  continuing with non-surgical options, although the risk of permanent nerve damage is greater with continued delay of relieving the pressure from the nerve due to the stenosis.   We reviewed the surgical risks including nerve damage, infection, bleeding, residual or recurrent disk / symptoms and spinal fluid leak. This will also be performed utilizing general anesthesia which can pose both cardiovascular and respiratory risks as well.     We described the procedure as being a minimally invasive same day surgery to be performed at [facility]. We also discussed the normal postoperative recovery that would include not lifting anything greater than 5-10 pounds, avoid excessive bending, twisting, and turning and to make frequent position changes about every 30 minutes going from sitting, standing and walking for six weeks approximately. We also discussed the timing of post-operative follow-up appointments in the Neurosurgery Clinic.     We did review the images together and explained with an anatomical model his condition and the recommended treatment. We also discussed signs of a worsening problem that he should seek being evaluated. He appeared to have a good understanding of the situation, asked appropriate questions which we answered, and wished to proceed as we had previously recommended.       Respectfully,     RBADFORD Antonio PA-C

## 2022-02-18 ENCOUNTER — PREP FOR PROCEDURE (OUTPATIENT)
Dept: NEUROLOGY | Facility: CLINIC | Age: 57
End: 2022-02-18
Payer: COMMERCIAL

## 2022-02-18 ENCOUNTER — TELEPHONE (OUTPATIENT)
Dept: NEUROSURGERY | Facility: CLINIC | Age: 57
End: 2022-02-18
Payer: COMMERCIAL

## 2022-02-18 DIAGNOSIS — M51.16 HERNIATION OF LUMBAR INTERVERTEBRAL DISC WITH RADICULOPATHY: Primary | ICD-10-CM

## 2022-02-18 DIAGNOSIS — Z11.59 ENCOUNTER FOR SCREENING FOR OTHER VIRAL DISEASES: Primary | ICD-10-CM

## 2022-02-18 NOTE — TELEPHONE ENCOUNTER
Reviewed pre- and post-operative instructions as outlined in the After Visit Summary/Patient Instructions with patient.   Surgery folder was mailed to patient     Patient Education Topic: Procedure with Dr. Gillespie     Learner(s): Patient    Knowledge Level: Basic    Readiness to Learn: Ready    Method:  Verbal explanation and Written material     Outcome: Able to verbalize instructions    Barriers to Learning: No barrier    Covid Testing: patient educated they will need to have a test for the novel Coronavirus, COVID-19.The test needs to be completed within 4 days (96) hours of surgery. Shcheduled    Scheduling Number: [phone]    NDI/YAIR:  Confirmation of completion within last 6 months      Patient had the opportunity for questions to be answered. Advised Patient to call clinic with any questions/concerns. Gave patient antibacterial soap for pre-surgery skin preparation.

## 2022-02-18 NOTE — PATIENT INSTRUCTIONS
Patient Instructions    Surgery scheduled at [facility] for Left Lumbar 2 to Lumbar 3 minimally invasive far lateral microdiscectomy with Dr. Gillespie     Pre-Operative    Surgical risks: blood clots in the leg or lung, problems urinating, nerve damage, drainage from the incision, infection,stiffness    Pre-operative physical with primary care physician within 30 days of surgical date. This is already scheduled 3/2/22    Likely same day procedure with discharge home day of surgery, may stay for 23 hour observation hospitalization for monitoring.       Shower procedure  o Please shower with antimicrobial soap the night before surgery and morning of surgery. Please refer to showering instruction sheet in folder.    Eating/Drinking  o Stop all solid foods 8 hours before surgery.  o Keep drinking clear liquids until 4 hours before surgery  - Clear liquids include water, clear juice, black coffee, or clear tea without milk, Gatorade, clear soda.     Medications  o Hold Aspirin, NSAIDs (Advil/Ibuprofen, Indocin, Naproxen,Nuprin,Relafen/Nabumetone, Diclofenac,Meloxicam, Aleve, Celebrex) x 7 days prior to surgical date  o You can take Tylenol (Acetaminophen) for pain, 1000 mg  - Do not exceed 3,000 mg per day   o Any other medications prescribed, please discuss with your primary care provider at your pre-operative physical     As part of preparation for your upcoming procedure you are required to have a test for the novel Coronavirus, COVID-19  o Please call the drive-up testing number to schedule your appointment. The test needs to be completed within 4 days (96) hours of surgery. This is already scheduled 3/18/22 in the [facility] lab   o Scheduling Number: [phone]   o You may NOT receive the COVID-19 vaccine 72 hours before or after surgery.    Pain Management    You will have post-operative incisional pain which may require pain medications and muscle relaxants. You will receive  medication upon discharge.    You may resume taking NSAIDs (ex. Ibuprofen, aleve, naproxen) immediately post-op     Do NOT drive while taking narcotic pain medication    Do NOT drink alcohol while using any pain medication    You can utilize ice as needed (no longer than 20 minutes at one time)    Incision Care    No submerging incision in water such as pools, hot tubs, baths for at least 8 weeks or until incision is healed    It is okay to shower, just pat the incision dry     Remove dressing as instructed upon discharge    Watch for signs of infection  o Redness, swelling, warmth, drainage, and fever of 101 degrees or higher  o Notify clinic [phone].    Activity Restrictions    For the first 6-8 weeks, no lifting > 10 pounds, limited bending, twisting, or overhead reaching.    Take stairs in moderation     Ok to walk as tolerated, take short frequent walks. You may gradually increase the distance as tolerated.     Avoid bed rest and prolonged sitting for longer than 30 minutes (change positions frequently while awake)    No contact sports until after follow up visit    No high impact activities such as; running/jogging, snowmobile or 4 brantley riding or any other recreational vehicles    Please call the clinic if you develop any of the following symptoms:  o Swelling and/or warmth in one or both legs  o Pain or tenderness in your leg, ankle, foot, or arm   o Red or discolored skin     Post-Op Follow Up Appointments    2 week incision check with RN    6 week post op follow up visit with Physician Assistant     3 months post op with Dr. Gillespie     Please call to schedule follow up appointment at [phone]    Resources    If you are currently employed, you will need to be off work for 2-4 weeks for post op recovery and healing.    Please fax any FMLA/short term disability paperwork to [phone]    You may call our clinic when you'd like to return to work and we can provide a work letter    To allow staff  adequate time to complete paperwork, please send as soon as possible

## 2022-02-19 ENCOUNTER — HEALTH MAINTENANCE LETTER (OUTPATIENT)
Age: 57
End: 2022-02-19

## 2022-03-02 ENCOUNTER — TELEPHONE (OUTPATIENT)
Dept: NEUROSURGERY | Facility: CLINIC | Age: 57
End: 2022-03-02

## 2022-03-02 ENCOUNTER — OFFICE VISIT (OUTPATIENT)
Dept: FAMILY MEDICINE | Facility: CLINIC | Age: 57
End: 2022-03-02
Payer: COMMERCIAL

## 2022-03-02 VITALS
HEIGHT: 69 IN | DIASTOLIC BLOOD PRESSURE: 58 MMHG | BODY MASS INDEX: 33.92 KG/M2 | SYSTOLIC BLOOD PRESSURE: 126 MMHG | HEART RATE: 88 BPM | TEMPERATURE: 98 F | OXYGEN SATURATION: 97 % | RESPIRATION RATE: 16 BRPM | WEIGHT: 229 LBS

## 2022-03-02 DIAGNOSIS — Z01.818 PREOP GENERAL PHYSICAL EXAM: Primary | ICD-10-CM

## 2022-03-02 DIAGNOSIS — E11.628 TYPE 2 DIABETES MELLITUS WITH OTHER SKIN COMPLICATIONS (H): ICD-10-CM

## 2022-03-02 DIAGNOSIS — E66.01 MORBID OBESITY (H): ICD-10-CM

## 2022-03-02 DIAGNOSIS — F33.42 MAJOR DEPRESSIVE DISORDER, RECURRENT, IN FULL REMISSION (H): ICD-10-CM

## 2022-03-02 PROBLEM — K40.90 RIGHT INGUINAL HERNIA: Status: RESOLVED | Noted: 2021-06-18 | Resolved: 2022-03-02

## 2022-03-02 PROBLEM — Z86.0100 HISTORY OF COLONIC POLYPS: Status: ACTIVE | Noted: 2022-02-15

## 2022-03-02 LAB
ANION GAP SERPL CALCULATED.3IONS-SCNC: 9 MMOL/L (ref 3–14)
BUN SERPL-MCNC: 10 MG/DL (ref 7–30)
CALCIUM SERPL-MCNC: 9.2 MG/DL (ref 8.5–10.1)
CHLORIDE BLD-SCNC: 100 MMOL/L (ref 94–109)
CHOLEST SERPL-MCNC: 105 MG/DL
CO2 SERPL-SCNC: 25 MMOL/L (ref 20–32)
CREAT SERPL-MCNC: 0.97 MG/DL (ref 0.66–1.25)
CREAT UR-MCNC: 181 MG/DL
FASTING STATUS PATIENT QL REPORTED: NO
GFR SERPL CREATININE-BSD FRML MDRD: >90 ML/MIN/1.73M2
GLUCOSE BLD-MCNC: 280 MG/DL (ref 70–99)
HBA1C MFR BLD: 8.3 % (ref 0–5.6)
HDLC SERPL-MCNC: 35 MG/DL
HGB BLD-MCNC: 14 G/DL (ref 13.3–17.7)
HOLD SPECIMEN: NORMAL
LDLC SERPL CALC-MCNC: 29 MG/DL
MICROALBUMIN UR-MCNC: 7 MG/L
MICROALBUMIN/CREAT UR: 3.87 MG/G CR (ref 0–17)
NONHDLC SERPL-MCNC: 70 MG/DL
POTASSIUM BLD-SCNC: 4.4 MMOL/L (ref 3.4–5.3)
SODIUM SERPL-SCNC: 134 MMOL/L (ref 133–144)
TRIGL SERPL-MCNC: 203 MG/DL

## 2022-03-02 PROCEDURE — 80048 BASIC METABOLIC PNL TOTAL CA: CPT | Performed by: NURSE PRACTITIONER

## 2022-03-02 PROCEDURE — 36415 COLL VENOUS BLD VENIPUNCTURE: CPT | Performed by: NURSE PRACTITIONER

## 2022-03-02 PROCEDURE — 83036 HEMOGLOBIN GLYCOSYLATED A1C: CPT | Performed by: NURSE PRACTITIONER

## 2022-03-02 PROCEDURE — 85018 HEMOGLOBIN: CPT | Performed by: NURSE PRACTITIONER

## 2022-03-02 PROCEDURE — 99214 OFFICE O/P EST MOD 30 MIN: CPT | Performed by: NURSE PRACTITIONER

## 2022-03-02 PROCEDURE — 80061 LIPID PANEL: CPT | Performed by: NURSE PRACTITIONER

## 2022-03-02 PROCEDURE — 82043 UR ALBUMIN QUANTITATIVE: CPT | Performed by: NURSE PRACTITIONER

## 2022-03-02 NOTE — TELEPHONE ENCOUNTER
The patient is having his pre op physical today.   His A1c is elevated at 8.3   Lindsay Arroyo CNP would like to alert the surgical team.

## 2022-03-02 NOTE — H&P (VIEW-ONLY)
[facility]  [address] [facility] [address]  Phone: [phone]  Primary Provider: Lindsay Arroyo       :[id number]}  PREOPERATIVE EVALUATION:  Today's date: 3/2/2022    Everton Linn is a 56 year old male who presents for a preoperative evaluation.    Surgical Information:  Surgery/Procedure: Left Lumbar 2 to Lumbar 3 minimally invasive far lateral microdiscectomy  Surgery Location: [facility]   Surgeon: Eduin Gillespie MD  Surgery Date: 3/23/2022  Time of Surgery: 9:35 am   Where patient plans to recover: At home alone  Fax number for surgical facility: Note does not need to be faxed, will be available electronically in Epic.    Type of Anesthesia Anticipated: General    Assessment & Plan     The proposed surgical procedure is considered INTERMEDIATE risk.    Problem List Items Addressed This Visit     Major depressive disorder, recurrent, in full remission (H)     Remains in remission despite managing back pain at this time.         Morbid obesity (H)    Relevant Medications    Dulaglutide 3 MG/0.5ML SOPN    Type 2 diabetes mellitus with other skin complications (H)    Relevant Medications    Dulaglutide 3 MG/0.5ML SOPN    blood glucose monitoring (NO BRAND SPECIFIED) meter device kit    blood glucose (NO BRAND SPECIFIED) test strip    blood glucose (NO BRAND SPECIFIED) lancets standard    Other Relevant Orders    Hemoglobin A1c (Completed)      Other Visit Diagnoses     Preop general physical exam    -  Primary    Relevant Orders    Hemoglobin A1c (Completed)    Basic metabolic panel  (Ca, Cl, CO2, Creat, Gluc, K, Na, BUN) (Completed)    Lipid Profile (Chol, Trig, HDL, LDL calc) (Completed)    Albumin Random Urine Quantitative with Creat Ratio (Completed)    Hemoglobin (Completed)        Proceed to surgery with surgeon approval with A1c 8.3%  Increase Dulaglutide to 3mg weekly  Re-start checking home glucoses and attend to diet.  Hold  medications as noted.        Risks and Recommendations:  The patient has the following additional risks and recommendations for perioperative complications:  Diabetes:  - Patient is not on insulin therapy: regular NPO guidelines can be followed.   Obstructive Sleep Apnea:   Uses sleep apnea machine    Medication Instructions:   - metformin: HOLD day of surgery.   - sulfonylurea (e.g. glyburide, glipizide): HOLD day of surgery   - GLP-1 Injectable (exenitide, liraglutide, semaglutide, dulaglutide, etc.): HOLD day of surgery     RECOMMENDATION:  APPROVAL GIVEN to proceed with proposed procedure, without further diagnostic evaluation.      Discussion of management or test interpretation with external physician/other qualified healthcare professional/appropriate source - Discussed A1C of 8.3%  with surgeon who is OK with proceeding to surgery.  Will increase Dulaglutide and patient will begin testing and more closely watching diet between now and surgery.      Subjective     HPI related to upcoming procedure:  History of low back pain and left sided radiculopathy:  Was seen by neurosurgery onon 01/18/2022. lumbar MRI exhibited at L2-L3, a left foraminal herniation slightly displaces the exiting L2 nerve.  Conservative approach tried with physical therapy program.  Lumbar 2-3 interlaminar epidural steroid injection also tried--.with approximately two weeks of relief.  He has also attempted  traction, chiropractic care, NSAIDs, a Medrol Dosepak and ultrasound.  Is seeking surgical intervention for ongoing pain and weakness.    Preop Questions 3/2/2022   1. Have you ever had a heart attack or stroke? No   2. Have you ever had surgery on your heart or blood vessels, such as a stent placement, a coronary artery bypass, or surgery on an artery in your head, neck, heart, or legs? No   3. Do you have chest pain with activity? No   4. Do you have a history of  heart failure? No   5. Do you currently have a cold, bronchitis or  symptoms of other infection? No   6. Do you have a cough, shortness of breath, or wheezing? No   7. Do you or anyone in your family have previous history of blood clots? No   8. Do you or does anyone in your family have a serious bleeding problem such as prolonged bleeding following surgeries or cuts? No   9. Have you ever had problems with anemia or been told to take iron pills? No   10. Have you had any abnormal blood loss such as black, tarry or bloody stools? No   11. Have you ever had a blood transfusion? No   12. Are you willing to have a blood transfusion if it is medically needed before, during, or after your surgery? Yes   13. Have you or any of your relatives ever had problems with anesthesia? No   14. Do you have sleep apnea, excessive snoring or daytime drowsiness? YES - Has CPAP   14a. Do you have a CPAP machine? Yes   15. Do you have any artifical heart valves or other implanted medical devices like a pacemaker, defibrillator, or continuous glucose monitor? No   16. Do you have artificial joints? No   17. Are you allergic to latex? No       Health Care Directive:  Patient does not have a Health Care Directive or Living Will: Discussed advance care planning with patient; however, patient declined at this time.    Preoperative Review of :   reviewed - controlled substances reflected in medication list.      Status of Chronic Conditions:  DEPRESSION - Current symptoms of depression include none--in remission.  Is off medication at this time    DIABETES - Patient has a longstanding history of DiabetesType Type II . Patient is being treated with metformin, Trulicity, and Glipizide  and denies significant side effects. Control has been good previously--but A1c has risen to 8.3.  Discussed with Surgeon's office.  They are OK with patient going to surgery as he is less than 8.5%.  We will increase trulicity and patient will work with diet before surgery. Believe this is good course as delay in surgery  given patients current pain status would be problematic.  Complicating factors include but are not limited to: morbid obesity .     HYPERLIPIDEMIA - Patient has a long history of significant Hyperlipidemia requiring medication for treatment with recent good control. Patient reports no problems or side effects with the medication.         Review of Systems  CONSTITUTIONAL: NEGATIVE for fever, chills, change in weight  INTEGUMENTARY/SKIN: NEGATIVE for worrisome rashes, moles or lesions  EYES: NEGATIVE for vision changes or irritation  ENT/MOUTH: NEGATIVE for ear, mouth and throat problems  RESP: NEGATIVE for significant cough or SOB  CV: NEGATIVE for chest pain, palpitations or peripheral edema  GI: NEGATIVE for nausea, abdominal pain, heartburn, or change in bowel habits  : NEGATIVE for frequency, dysuria, or hematuria  ENDOCRINE: NEGATIVE for temperature intolerance, skin/hair changes  HEME: NEGATIVE for bleeding problems  PSYCHIATRIC: NEGATIVE for changes in mood or affect  Note low back pain and need to walk with cane at this time due to discomfort    Patient Active Problem List    Diagnosis Date Noted     Major depressive disorder, recurrent, in full remission (H) 03/18/2022     Priority: Medium     History of colonic polyps 02/15/2022     Priority: Medium     Lumbar radiculopathy 12/17/2021     Priority: Medium     Hyperlipidemia LDL goal <70 03/22/2019     Priority: Medium     Morbid obesity (H) 02/18/2018     Priority: Medium     Type 2 diabetes mellitus with other skin complications (H) 10/23/2015     Priority: Medium     SANTOS (obstructive sleep apnea) 09/17/2015     Priority: Medium     Cervical stenosis of spine 11/16/2013     Priority: Medium     Ventral hernia 06/14/2013     Priority: Medium     Dermatophytosis of nail 11/03/2005     Priority: Medium      Past Medical History:   Diagnosis Date     Diabetes (H)      SANTOS (obstructive sleep apnea)      Sleep apnea     CPAP     Past Surgical History:    Procedure Laterality Date     ARTHROSCOPY KNEE RT/LT  2007    left knee     ARTHROSCOPY KNEE RT/LT  2005    right     BACK SURGERY      spinal fusion on neck in 2013     ENT SURGERY      [place] [facility].     HERNIORRHAPHY INGUINAL Right 8/13/2021    Procedure: HERNIORRHAPHY, INGUINAL, OPEN with mesh;  Surgeon: Jerry Bello MD;  Location: [facility] OR     LAPAROSCOPIC CHOLECYSTECTOMY WITH CHOLANGIOGRAMS N/A 5/28/2016    Procedure: LAPAROSCOPIC CHOLECYSTECTOMY WITH CHOLANGIOGRAMS;  Surgeon: Jerry Bello MD;  Location: [facility] OR     ORTHOPEDIC SURGERY      Cerv. Fusion 2013: plate      Current Outpatient Medications   Medication Sig Dispense Refill     blood glucose (NO BRAND SPECIFIED) lancets standard Use to test blood sugar 1-3 times daily or as directed. 90 each 3     blood glucose (NO BRAND SPECIFIED) test strip Use to test blood sugar 1-3 times daily or as directed. 90 strip 3     blood glucose monitoring (NO BRAND SPECIFIED) meter device kit Use to test blood sugar 1-3 times daily or as directed. 1 kit 0     blood glucose monitoring (ONE TOUCH DELICA) lancets Use to test blood sugar 2-3 times daily or as directed. May substitute per insurance coverage. 100 each 11     blood glucose monitoring (ONETOUCH VERIO IQ) test strip Use to test blood sugar 2-3 times daily or as directed.May substitute per insurance coverage. 100 each 11     Blood Glucose Monitoring Suppl (ONETOUCH VERIO FLEX SYSTEM) w/Device KIT 1 each 3 times daily as needed May substitute per insurance coverage. 1 kit 0     cyclobenzaprine (FLEXERIL) 5 MG tablet Take 1-2 tablets (5-10 mg) by mouth 3 times daily as needed for muscle spasms 90 tablet 0     doxycycline monohydrate (MONODOX) 100 MG capsule 1 CAPSULE BY MOUTH DAILY       Dulaglutide 3 MG/0.5ML SOPN Inject 3 mg Subcutaneous once a week 0.5 mL 11     glipiZIDE (GLUCOTROL XL) 10 MG 24 hr tablet TAKE 2 TABLETS BY MOUTH EVERY  tablet 1     metFORMIN (GLUCOPHAGE-XR) 500 MG 24 hr  "tablet TAKE 2 TABLETS BY MOUTH TWICE A DAY WITH MEALS 360 tablet 0     simvastatin (ZOCOR) 40 MG tablet TAKE 1 TABLET BY MOUTH EVERYDAY AT BEDTIME 90 tablet 1     SOOLANTRA 1 % cream 1 APPLICATION AT BEDTIME TOPICALLY TO WHOLE FACE       blood glucose monitoring (SOFTCLIX) lancets Use to test blood sugar 1-3 times daily or as directed. 100 each 1     fexofenadine (ALLEGRA) 180 MG tablet Take 180 mg by mouth every morning        gabapentin (NEURONTIN) 300 MG capsule Take 1 capsule (300 mg) by mouth 3 times daily 90 capsule 0     sildenafil (VIAGRA) 100 MG tablet Take 1 tablet (100 mg) by mouth daily as needed (as needed) 30 tablet 1       Allergies   Allergen Reactions     Seasonal Allergies Other (See Comments)     Runny Nose/itchy eyes        Social History     Tobacco Use     Smoking status: Never Smoker     Smokeless tobacco: Never Used   Substance Use Topics     Alcohol use: Yes     Comment: 2 -3 beers every few weeks       History   Drug Use No         Objective     /58   Pulse 88   Temp 98  F (36.7  C) (Oral)   Resp 16   Ht 1.753 m (5' 9\")   Wt 103.9 kg (229 lb)   SpO2 97%   BMI 33.82 kg/m      Physical Exam  Constitutional:       Appearance: Normal appearance.      Comments: Is having discomfort with sitting and walking   HENT:      Head: Normocephalic.      Right Ear: Tympanic membrane and ear canal normal.      Left Ear: Tympanic membrane and ear canal normal.      Nose: Nose normal.      Mouth/Throat:      Mouth: Mucous membranes are moist.      Pharynx: Oropharynx is clear.   Cardiovascular:      Rate and Rhythm: Normal rate and regular rhythm.      Heart sounds: Normal heart sounds.   Pulmonary:      Effort: Pulmonary effort is normal.      Breath sounds: Normal breath sounds.   Abdominal:      General: Abdomen is flat. Bowel sounds are normal.      Palpations: Abdomen is soft.   Musculoskeletal:      Cervical back: Normal range of motion and neck supple.   Skin:     General: Skin is warm " and dry.   Neurological:      Mental Status: He is alert.   Psychiatric:         Mood and Affect: Mood normal.         Recent Labs   Lab Test 08/04/21  1644 02/25/21  1014   NA  --  134   POTASSIUM  --  4.6   CR  --  0.99   A1C 7.4* 8.3*        Diagnostics:  Recent Results (from the past 720 hour(s))   Hemoglobin A1c    Collection Time: 03/02/22  8:13 AM   Result Value Ref Range    Hemoglobin A1C 8.3 (H) 0.0 - 5.6 %   Basic metabolic panel  (Ca, Cl, CO2, Creat, Gluc, K, Na, BUN)    Collection Time: 03/02/22  8:13 AM   Result Value Ref Range    Sodium 134 133 - 144 mmol/L    Potassium 4.4 3.4 - 5.3 mmol/L    Chloride 100 94 - 109 mmol/L    Carbon Dioxide (CO2) 25 20 - 32 mmol/L    Anion Gap 9 3 - 14 mmol/L    Urea Nitrogen 10 7 - 30 mg/dL    Creatinine 0.97 0.66 - 1.25 mg/dL    Calcium 9.2 8.5 - 10.1 mg/dL    Glucose 280 (H) 70 - 99 mg/dL    GFR Estimate >90 >60 mL/min/1.73m2   Lipid Profile (Chol, Trig, HDL, LDL calc)    Collection Time: 03/02/22  8:13 AM   Result Value Ref Range    Cholesterol 105 <200 mg/dL    Triglycerides 203 (H) <150 mg/dL    Direct Measure HDL 35 (L) >=40 mg/dL    LDL Cholesterol Calculated 29 <=100 mg/dL    Non HDL Cholesterol 70 <130 mg/dL    Patient Fasting > 8hrs? No    Hemoglobin    Collection Time: 03/02/22  8:13 AM   Result Value Ref Range    Hemoglobin 14.0 13.3 - 17.7 g/dL   Extra Serum Separator Tube (SST)    Collection Time: 03/02/22  8:13 AM   Result Value Ref Range    Hold Specimen JIC    Albumin Random Urine Quantitative with Creat Ratio    Collection Time: 03/02/22  8:16 AM   Result Value Ref Range    Creatinine Urine mg/dL 181 mg/dL    Albumin Urine mg/L 7 mg/L    Albumin Urine mg/g Cr 3.87 0.00 - 17.00 mg/g Cr   Asymptomatic COVID-19 Virus (Coronavirus) by PCR Nose    Collection Time: 03/19/22  9:03 AM    Specimen: Nose; Swab   Result Value Ref Range    SARS CoV2 PCR Negative Negative          Revised Cardiac Risk Index (RCRI):  The patient has the following serious  cardiovascular risks for perioperative complications:   - No serious cardiac risks = 0 points     RCRI Interpretation: 0 points: Class I (very low risk - 0.4% complication rate)           Signed Electronically by: VALDEZ Cowart CNP  Copy of this evaluation report is provided to requesting physician.

## 2022-03-02 NOTE — PATIENT INSTRUCTIONS
Send me your blood sugars from now to over the weekend.  Increase Trulicity to 3 mg  Work on diet            Preparing for Your Surgery  Getting started  A nurse will call you to review your health history and instructions. They will give you an arrival time based on your scheduled surgery time. Please be ready to share:    Your doctor's clinic name and phone number    Your medical, surgical and anesthesia history    A list of allergies and sensitivities    A list of medicines, including herbal treatments and over-the-counter drugs    Whether the patient has a legal guardian (ask how to send us the papers in advance)  Please tell us if you're pregnant--or if there's any chance you might be pregnant. Some surgeries may injure a fetus (unborn baby), so they require a pregnancy test. Surgeries that are safe for a fetus don't always need a test, and you can choose whether to have one.   If you have a child who's having surgery, please ask for a copy of Preparing for Your Child's Surgery.    Preparing for surgery    Within 30 days of surgery: Have a pre-op exam (sometimes called an H&P, or History and Physical). This can be done at a clinic or pre-operative center.  ? If you're having a , you may not need this exam. Talk to your care team.    At your pre-op exam, talk to your care team about all medicines you take. If you need to stop any medicines before surgery, ask when to start taking them again.  ? We do this for your safety. Many medicines can make you bleed too much during surgery. Some change how well surgery (anesthesia) drugs work.    Call your insurance company to let them know you're having surgery. (If you don't have insurance, call [phone].)    Call your clinic if there's any change in your health. This includes signs of a cold or flu (sore throat, runny nose, cough, rash, fever). It also includes a scrape or scratch near the surgery site.    If you have questions on the day of surgery, call  your hospital or surgery center.  COVID testing  You may need to be tested for COVID-19 before having surgery. If so, your surgical team will give you instructions for scheduling this test, separate from your preoperative history and physical.  Eating and drinking guidelines  For your safety: Unless your surgeon tells you otherwise, follow the guidelines below.    Eat and drink as usual until 8 hours before surgery. After that, no food or milk.    Drink clear liquids until 2 hours before surgery. These are liquids you can see through, like water, Gatorade and Propel Water. You may also have black coffee and tea (no cream or milk).    Nothing by mouth within 2 hours of surgery. This includes gum, candy and breath mints.    If you drink alcohol: Stop drinking it the night before surgery.    If your care team tells you to take medicine on the morning of surgery, it's okay to take it with a sip of water.  Preventing infection    Shower or bathe the night before and morning of your surgery. Follow the instructions your clinic gave you. (If no instructions, use regular soap.)    Don't shave or clip hair near your surgery site. We'll remove the hair if needed.    Don't smoke or vape the morning of surgery. You may chew nicotine gum up to 2 hours before surgery. A nicotine patch is okay.  ? Note: Some surgeries require you to completely quit smoking and nicotine. Check with your surgeon.    Your care team will make every effort to keep you safe from infection. We will:  ? Clean our hands often with soap and water (or an alcohol-based hand rub).  ? Clean the skin at your surgery site with a special soap that kills germs.  ? Give you a special gown to keep you warm. (Cold raises the risk of infection.)  ? Wear special hair covers, masks, gowns and gloves during surgery.  ? Give antibiotic medicine, if prescribed. Not all surgeries need antibiotics.  What to bring on the day of surgery    Photo ID and insurance card    Copy of  your health care directive, if you have one    Glasses and hearing aides (bring cases)  ? You can't wear contacts during surgery    Inhaler and eye drops, if you use them (tell us about these when you arrive)    CPAP machine or breathing device, if you use them    A few personal items, if spending the night    If you have . . .  ? A pacemaker, ICD (cardiac defibrillator) or other implant: Bring the ID card.  ? An implanted stimulator: Bring the remote control.  ? A legal guardian: Bring a copy of the certified (court-stamped) guardianship papers.  Please remove any jewelry, including body piercings. Leave jewelry and other valuables at home.  If you're going home the day of surgery    You must have a responsible adult drive you home. They should stay with you overnight as well.    If you don't have someone to stay with you, and you aren't safe to go home alone, we may keep you overnight. Insurance often won't pay for this.  After surgery  If it's hard to control your pain or you need more pain medicine, please call your surgeon's office.  Questions?   If you have any questions for your care team, list them here: _________________________________________________________________________________________________________________________________________________________________________ ____________________________________ ____________________________________ ____________________________________  For informational purposes only. Not to replace the advice of your health care provider. Copyright   2003, 2019 [facility]. All rights reserved. Clinically reviewed by Kianna Child MD. [employer] [id number] - REV 07/21.

## 2022-03-02 NOTE — PROGRESS NOTES
[facility]  [address] [facility] [address]  Phone: [phone]  Primary Provider: Lindsay Arroyo       :[id number]}  PREOPERATIVE EVALUATION:  Today's date: 3/2/2022    Everton Linn is a 56 year old male who presents for a preoperative evaluation.    Surgical Information:  Surgery/Procedure: Left Lumbar 2 to Lumbar 3 minimally invasive far lateral microdiscectomy  Surgery Location: [facility]   Surgeon: Eduin Gillespie MD  Surgery Date: 3/23/2022  Time of Surgery: 9:35 am   Where patient plans to recover: At home alone  Fax number for surgical facility: Note does not need to be faxed, will be available electronically in Epic.    Type of Anesthesia Anticipated: General    Assessment & Plan     The proposed surgical procedure is considered INTERMEDIATE risk.    Problem List Items Addressed This Visit     Major depressive disorder, recurrent, in full remission (H)     Remains in remission despite managing back pain at this time.         Morbid obesity (H)    Relevant Medications    Dulaglutide 3 MG/0.5ML SOPN    Type 2 diabetes mellitus with other skin complications (H)    Relevant Medications    Dulaglutide 3 MG/0.5ML SOPN    blood glucose monitoring (NO BRAND SPECIFIED) meter device kit    blood glucose (NO BRAND SPECIFIED) test strip    blood glucose (NO BRAND SPECIFIED) lancets standard    Other Relevant Orders    Hemoglobin A1c (Completed)      Other Visit Diagnoses     Preop general physical exam    -  Primary    Relevant Orders    Hemoglobin A1c (Completed)    Basic metabolic panel  (Ca, Cl, CO2, Creat, Gluc, K, Na, BUN) (Completed)    Lipid Profile (Chol, Trig, HDL, LDL calc) (Completed)    Albumin Random Urine Quantitative with Creat Ratio (Completed)    Hemoglobin (Completed)        Proceed to surgery with surgeon approval with A1c 8.3%  Increase Dulaglutide to 3mg weekly  Re-start checking home glucoses and attend to diet.  Hold  medications as noted.        Risks and Recommendations:  The patient has the following additional risks and recommendations for perioperative complications:  Diabetes:  - Patient is not on insulin therapy: regular NPO guidelines can be followed.   Obstructive Sleep Apnea:   Uses sleep apnea machine    Medication Instructions:   - metformin: HOLD day of surgery.   - sulfonylurea (e.g. glyburide, glipizide): HOLD day of surgery   - GLP-1 Injectable (exenitide, liraglutide, semaglutide, dulaglutide, etc.): HOLD day of surgery     RECOMMENDATION:  APPROVAL GIVEN to proceed with proposed procedure, without further diagnostic evaluation.      Discussion of management or test interpretation with external physician/other qualified healthcare professional/appropriate source - Discussed A1C of 8.3%  with surgeon who is OK with proceeding to surgery.  Will increase Dulaglutide and patient will begin testing and more closely watching diet between now and surgery.      Subjective     HPI related to upcoming procedure:  History of low back pain and left sided radiculopathy:  Was seen by neurosurgery onon 01/18/2022. lumbar MRI exhibited at L2-L3, a left foraminal herniation slightly displaces the exiting L2 nerve.  Conservative approach tried with physical therapy program.  Lumbar 2-3 interlaminar epidural steroid injection also tried--.with approximately two weeks of relief.  He has also attempted  traction, chiropractic care, NSAIDs, a Medrol Dosepak and ultrasound.  Is seeking surgical intervention for ongoing pain and weakness.    Preop Questions 3/2/2022   1. Have you ever had a heart attack or stroke? No   2. Have you ever had surgery on your heart or blood vessels, such as a stent placement, a coronary artery bypass, or surgery on an artery in your head, neck, heart, or legs? No   3. Do you have chest pain with activity? No   4. Do you have a history of  heart failure? No   5. Do you currently have a cold, bronchitis or  symptoms of other infection? No   6. Do you have a cough, shortness of breath, or wheezing? No   7. Do you or anyone in your family have previous history of blood clots? No   8. Do you or does anyone in your family have a serious bleeding problem such as prolonged bleeding following surgeries or cuts? No   9. Have you ever had problems with anemia or been told to take iron pills? No   10. Have you had any abnormal blood loss such as black, tarry or bloody stools? No   11. Have you ever had a blood transfusion? No   12. Are you willing to have a blood transfusion if it is medically needed before, during, or after your surgery? Yes   13. Have you or any of your relatives ever had problems with anesthesia? No   14. Do you have sleep apnea, excessive snoring or daytime drowsiness? YES - Has CPAP   14a. Do you have a CPAP machine? Yes   15. Do you have any artifical heart valves or other implanted medical devices like a pacemaker, defibrillator, or continuous glucose monitor? No   16. Do you have artificial joints? No   17. Are you allergic to latex? No       Health Care Directive:  Patient does not have a Health Care Directive or Living Will: Discussed advance care planning with patient; however, patient declined at this time.    Preoperative Review of :   reviewed - controlled substances reflected in medication list.      Status of Chronic Conditions:  DEPRESSION - Current symptoms of depression include none--in remission.  Is off medication at this time    DIABETES - Patient has a longstanding history of DiabetesType Type II . Patient is being treated with metformin, Trulicity, and Glipizide  and denies significant side effects. Control has been good previously--but A1c has risen to 8.3.  Discussed with Surgeon's office.  They are OK with patient going to surgery as he is less than 8.5%.  We will increase trulicity and patient will work with diet before surgery. Believe this is good course as delay in surgery  given patients current pain status would be problematic.  Complicating factors include but are not limited to: morbid obesity .     HYPERLIPIDEMIA - Patient has a long history of significant Hyperlipidemia requiring medication for treatment with recent good control. Patient reports no problems or side effects with the medication.         Review of Systems  CONSTITUTIONAL: NEGATIVE for fever, chills, change in weight  INTEGUMENTARY/SKIN: NEGATIVE for worrisome rashes, moles or lesions  EYES: NEGATIVE for vision changes or irritation  ENT/MOUTH: NEGATIVE for ear, mouth and throat problems  RESP: NEGATIVE for significant cough or SOB  CV: NEGATIVE for chest pain, palpitations or peripheral edema  GI: NEGATIVE for nausea, abdominal pain, heartburn, or change in bowel habits  : NEGATIVE for frequency, dysuria, or hematuria  ENDOCRINE: NEGATIVE for temperature intolerance, skin/hair changes  HEME: NEGATIVE for bleeding problems  PSYCHIATRIC: NEGATIVE for changes in mood or affect  Note low back pain and need to walk with cane at this time due to discomfort    Patient Active Problem List    Diagnosis Date Noted     Major depressive disorder, recurrent, in full remission (H) 03/18/2022     Priority: Medium     History of colonic polyps 02/15/2022     Priority: Medium     Lumbar radiculopathy 12/17/2021     Priority: Medium     Hyperlipidemia LDL goal <70 03/22/2019     Priority: Medium     Morbid obesity (H) 02/18/2018     Priority: Medium     Type 2 diabetes mellitus with other skin complications (H) 10/23/2015     Priority: Medium     SANTOS (obstructive sleep apnea) 09/17/2015     Priority: Medium     Cervical stenosis of spine 11/16/2013     Priority: Medium     Ventral hernia 06/14/2013     Priority: Medium     Dermatophytosis of nail 11/03/2005     Priority: Medium      Past Medical History:   Diagnosis Date     Diabetes (H)      SANTOS (obstructive sleep apnea)      Sleep apnea     CPAP     Past Surgical History:    Procedure Laterality Date     ARTHROSCOPY KNEE RT/LT  2007    left knee     ARTHROSCOPY KNEE RT/LT  2005    right     BACK SURGERY      spinal fusion on neck in 2013     ENT SURGERY      [facility].     HERNIORRHAPHY INGUINAL Right 8/13/2021    Procedure: HERNIORRHAPHY, INGUINAL, OPEN with mesh;  Surgeon: Jerry Bello MD;  Location: [facility] OR     LAPAROSCOPIC CHOLECYSTECTOMY WITH CHOLANGIOGRAMS N/A 5/28/2016    Procedure: LAPAROSCOPIC CHOLECYSTECTOMY WITH CHOLANGIOGRAMS;  Surgeon: Jerry Bello MD;  Location: [facility] OR     ORTHOPEDIC SURGERY      Cerv. Fusion 2013: plate      Current Outpatient Medications   Medication Sig Dispense Refill     blood glucose (NO BRAND SPECIFIED) lancets standard Use to test blood sugar 1-3 times daily or as directed. 90 each 3     blood glucose (NO BRAND SPECIFIED) test strip Use to test blood sugar 1-3 times daily or as directed. 90 strip 3     blood glucose monitoring (NO BRAND SPECIFIED) meter device kit Use to test blood sugar 1-3 times daily or as directed. 1 kit 0     blood glucose monitoring (ONE TOUCH DELICA) lancets Use to test blood sugar 2-3 times daily or as directed. May substitute per insurance coverage. 100 each 11     blood glucose monitoring (ONETOUCH VERIO IQ) test strip Use to test blood sugar 2-3 times daily or as directed.May substitute per insurance coverage. 100 each 11     Blood Glucose Monitoring Suppl (ONETOUCH VERIO FLEX SYSTEM) w/Device KIT 1 each 3 times daily as needed May substitute per insurance coverage. 1 kit 0     cyclobenzaprine (FLEXERIL) 5 MG tablet Take 1-2 tablets (5-10 mg) by mouth 3 times daily as needed for muscle spasms 90 tablet 0     doxycycline monohydrate (MONODOX) 100 MG capsule 1 CAPSULE BY MOUTH DAILY       Dulaglutide 3 MG/0.5ML SOPN Inject 3 mg Subcutaneous once a week 0.5 mL 11     glipiZIDE (GLUCOTROL XL) 10 MG 24 hr tablet TAKE 2 TABLETS BY MOUTH EVERY  tablet 1     metFORMIN (GLUCOPHAGE-XR) 500 MG 24 hr  "tablet TAKE 2 TABLETS BY MOUTH TWICE A DAY WITH MEALS 360 tablet 0     simvastatin (ZOCOR) 40 MG tablet TAKE 1 TABLET BY MOUTH EVERYDAY AT BEDTIME 90 tablet 1     SOOLANTRA 1 % cream 1 APPLICATION AT BEDTIME TOPICALLY TO WHOLE FACE       blood glucose monitoring (SOFTCLIX) lancets Use to test blood sugar 1-3 times daily or as directed. 100 each 1     fexofenadine (ALLEGRA) 180 MG tablet Take 180 mg by mouth every morning        gabapentin (NEURONTIN) 300 MG capsule Take 1 capsule (300 mg) by mouth 3 times daily 90 capsule 0     sildenafil (VIAGRA) 100 MG tablet Take 1 tablet (100 mg) by mouth daily as needed (as needed) 30 tablet 1       Allergies   Allergen Reactions     Seasonal Allergies Other (See Comments)     Runny Nose/itchy eyes        Social History     Tobacco Use     Smoking status: Never Smoker     Smokeless tobacco: Never Used   Substance Use Topics     Alcohol use: Yes     Comment: 2 -3 beers every few weeks       History   Drug Use No         Objective     /58   Pulse 88   Temp 98  F (36.7  C) (Oral)   Resp 16   Ht 1.753 m (5' 9\")   Wt 103.9 kg (229 lb)   SpO2 97%   BMI 33.82 kg/m      Physical Exam  Constitutional:       Appearance: Normal appearance.      Comments: Is having discomfort with sitting and walking   HENT:      Head: Normocephalic.      Right Ear: Tympanic membrane and ear canal normal.      Left Ear: Tympanic membrane and ear canal normal.      Nose: Nose normal.      Mouth/Throat:      Mouth: Mucous membranes are moist.      Pharynx: Oropharynx is clear.   Cardiovascular:      Rate and Rhythm: Normal rate and regular rhythm.      Heart sounds: Normal heart sounds.   Pulmonary:      Effort: Pulmonary effort is normal.      Breath sounds: Normal breath sounds.   Abdominal:      General: Abdomen is flat. Bowel sounds are normal.      Palpations: Abdomen is soft.   Musculoskeletal:      Cervical back: Normal range of motion and neck supple.   Skin:     General: Skin is warm " and dry.   Neurological:      Mental Status: He is alert.   Psychiatric:         Mood and Affect: Mood normal.         Recent Labs   Lab Test 08/04/21  1644 02/25/21  1014   NA  --  134   POTASSIUM  --  4.6   CR  --  0.99   A1C 7.4* 8.3*        Diagnostics:  Recent Results (from the past 720 hour(s))   Hemoglobin A1c    Collection Time: 03/02/22  8:13 AM   Result Value Ref Range    Hemoglobin A1C 8.3 (H) 0.0 - 5.6 %   Basic metabolic panel  (Ca, Cl, CO2, Creat, Gluc, K, Na, BUN)    Collection Time: 03/02/22  8:13 AM   Result Value Ref Range    Sodium 134 133 - 144 mmol/L    Potassium 4.4 3.4 - 5.3 mmol/L    Chloride 100 94 - 109 mmol/L    Carbon Dioxide (CO2) 25 20 - 32 mmol/L    Anion Gap 9 3 - 14 mmol/L    Urea Nitrogen 10 7 - 30 mg/dL    Creatinine 0.97 0.66 - 1.25 mg/dL    Calcium 9.2 8.5 - 10.1 mg/dL    Glucose 280 (H) 70 - 99 mg/dL    GFR Estimate >90 >60 mL/min/1.73m2   Lipid Profile (Chol, Trig, HDL, LDL calc)    Collection Time: 03/02/22  8:13 AM   Result Value Ref Range    Cholesterol 105 <200 mg/dL    Triglycerides 203 (H) <150 mg/dL    Direct Measure HDL 35 (L) >=40 mg/dL    LDL Cholesterol Calculated 29 <=100 mg/dL    Non HDL Cholesterol 70 <130 mg/dL    Patient Fasting > 8hrs? No    Hemoglobin    Collection Time: 03/02/22  8:13 AM   Result Value Ref Range    Hemoglobin 14.0 13.3 - 17.7 g/dL   Extra Serum Separator Tube (SST)    Collection Time: 03/02/22  8:13 AM   Result Value Ref Range    Hold Specimen JIC    Albumin Random Urine Quantitative with Creat Ratio    Collection Time: 03/02/22  8:16 AM   Result Value Ref Range    Creatinine Urine mg/dL 181 mg/dL    Albumin Urine mg/L 7 mg/L    Albumin Urine mg/g Cr 3.87 0.00 - 17.00 mg/g Cr   Asymptomatic COVID-19 Virus (Coronavirus) by PCR Nose    Collection Time: 03/19/22  9:03 AM    Specimen: Nose; Swab   Result Value Ref Range    SARS CoV2 PCR Negative Negative          Revised Cardiac Risk Index (RCRI):  The patient has the following serious  cardiovascular risks for perioperative complications:   - No serious cardiac risks = 0 points     RCRI Interpretation: 0 points: Class I (very low risk - 0.4% complication rate)           Signed Electronically by: VALDEZ Cowart CNP  Copy of this evaluation report is provided to requesting physician.

## 2022-03-18 PROBLEM — F33.42 MAJOR DEPRESSIVE DISORDER, RECURRENT, IN FULL REMISSION (H): Status: ACTIVE | Noted: 2022-03-18

## 2022-03-19 ENCOUNTER — LAB (OUTPATIENT)
Dept: LAB | Facility: CLINIC | Age: 57
End: 2022-03-19
Attending: NEUROLOGICAL SURGERY
Payer: COMMERCIAL

## 2022-03-19 DIAGNOSIS — Z11.59 ENCOUNTER FOR SCREENING FOR OTHER VIRAL DISEASES: ICD-10-CM

## 2022-03-19 LAB — SARS-COV-2 RNA RESP QL NAA+PROBE: NEGATIVE

## 2022-03-19 PROCEDURE — U0005 INFEC AGEN DETEC AMPLI PROBE: HCPCS | Mod: 90 | Performed by: PATHOLOGY

## 2022-03-19 PROCEDURE — U0003 INFECTIOUS AGENT DETECTION BY NUCLEIC ACID (DNA OR RNA); SEVERE ACUTE RESPIRATORY SYNDROME CORONAVIRUS 2 (SARS-COV-2) (CORONAVIRUS DISEASE [COVID-19]), AMPLIFIED PROBE TECHNIQUE, MAKING USE OF HIGH THROUGHPUT TECHNOLOGIES AS DESCRIBED BY CMS-2020-01-R: HCPCS | Mod: 90 | Performed by: PATHOLOGY

## 2022-03-19 PROCEDURE — 99000 SPECIMEN HANDLING OFFICE-LAB: CPT | Performed by: PATHOLOGY

## 2022-03-23 ENCOUNTER — ANESTHESIA (OUTPATIENT)
Dept: SURGERY | Facility: CLINIC | Age: 57
End: 2022-03-23
Payer: COMMERCIAL

## 2022-03-23 ENCOUNTER — APPOINTMENT (OUTPATIENT)
Dept: GENERAL RADIOLOGY | Facility: CLINIC | Age: 57
End: 2022-03-23
Attending: NEUROLOGICAL SURGERY
Payer: COMMERCIAL

## 2022-03-23 ENCOUNTER — HOSPITAL ENCOUNTER (OUTPATIENT)
Facility: CLINIC | Age: 57
Discharge: HOME | End: 2022-03-23
Attending: NEUROLOGICAL SURGERY | Admitting: NEUROLOGICAL SURGERY
Payer: COMMERCIAL

## 2022-03-23 ENCOUNTER — ANESTHESIA EVENT (OUTPATIENT)
Dept: SURGERY | Facility: CLINIC | Age: 57
End: 2022-03-23
Payer: COMMERCIAL

## 2022-03-23 VITALS
WEIGHT: 230.2 LBS | HEART RATE: 70 BPM | DIASTOLIC BLOOD PRESSURE: 68 MMHG | BODY MASS INDEX: 34.1 KG/M2 | RESPIRATION RATE: 16 BRPM | HEIGHT: 69 IN | OXYGEN SATURATION: 97 % | TEMPERATURE: 97.8 F | SYSTOLIC BLOOD PRESSURE: 114 MMHG

## 2022-03-23 DIAGNOSIS — Z98.890 HX OF DECOMPRESSIVE LUMBAR LAMINECTOMY: Primary | ICD-10-CM

## 2022-03-23 LAB
GLUCOSE BLDC GLUCOMTR-MCNC: 149 MG/DL (ref 70–99)
GLUCOSE BLDC GLUCOMTR-MCNC: 183 MG/DL (ref 70–99)

## 2022-03-23 PROCEDURE — 250N000005 HC OR RX SURGIFLO HEMOSTATIC MATRIX 10ML 199102S OPNP: Performed by: NEUROLOGICAL SURGERY

## 2022-03-23 PROCEDURE — 250N000013 HC RX MED GY IP 250 OP 250 PS 637: Performed by: ANESTHESIOLOGY

## 2022-03-23 PROCEDURE — 250N000011 HC RX IP 250 OP 636

## 2022-03-23 PROCEDURE — 250N000013 HC RX MED GY IP 250 OP 250 PS 637: Performed by: PHYSICIAN ASSISTANT

## 2022-03-23 PROCEDURE — 250N000009 HC RX 250: Performed by: NEUROLOGICAL SURGERY

## 2022-03-23 PROCEDURE — 999N000179 XR SURGERY CARM FLUORO LESS THAN 5 MIN W STILLS: Mod: TC

## 2022-03-23 PROCEDURE — 69990 MICROSURGERY ADD-ON: CPT | Mod: 59 | Performed by: NEUROLOGICAL SURGERY

## 2022-03-23 PROCEDURE — 999N000141 HC STATISTIC PRE-PROCEDURE NURSING ASSESSMENT: Performed by: NEUROLOGICAL SURGERY

## 2022-03-23 PROCEDURE — 2894A VOIDCORRECT: CPT | Performed by: PHYSICIAN ASSISTANT

## 2022-03-23 PROCEDURE — 250N000009 HC RX 250

## 2022-03-23 PROCEDURE — 82962 GLUCOSE BLOOD TEST: CPT

## 2022-03-23 PROCEDURE — 710N000009 HC RECOVERY PHASE 1, LEVEL 1, PER MIN: Performed by: NEUROLOGICAL SURGERY

## 2022-03-23 PROCEDURE — 63056 DECOMPRESS SPINAL CORD LMBR: CPT | Performed by: NEUROLOGICAL SURGERY

## 2022-03-23 PROCEDURE — 250N000011 HC RX IP 250 OP 636: Performed by: PHYSICIAN ASSISTANT

## 2022-03-23 PROCEDURE — 258N000003 HC RX IP 258 OP 636: Performed by: ANESTHESIOLOGY

## 2022-03-23 PROCEDURE — 63056 DECOMPRESS SPINAL CORD LMBR: CPT | Mod: AS | Performed by: PHYSICIAN ASSISTANT

## 2022-03-23 PROCEDURE — 370N000017 HC ANESTHESIA TECHNICAL FEE, PER MIN: Performed by: NEUROLOGICAL SURGERY

## 2022-03-23 PROCEDURE — 360N000084 HC SURGERY LEVEL 4 W/ FLUORO, PER MIN: Performed by: NEUROLOGICAL SURGERY

## 2022-03-23 PROCEDURE — 250N000009 HC RX 250: Performed by: ANESTHESIOLOGY

## 2022-03-23 PROCEDURE — 272N000001 HC OR GENERAL SUPPLY STERILE: Performed by: NEUROLOGICAL SURGERY

## 2022-03-23 PROCEDURE — 710N000012 HC RECOVERY PHASE 2, PER MINUTE: Performed by: NEUROLOGICAL SURGERY

## 2022-03-23 RX ORDER — HYDROMORPHONE HCL IN WATER/PF 6 MG/30 ML
0.4 PATIENT CONTROLLED ANALGESIA SYRINGE INTRAVENOUS EVERY 5 MIN PRN
Status: DISCONTINUED | OUTPATIENT
Start: 2022-03-23 | End: 2022-03-23 | Stop reason: HOSPADM

## 2022-03-23 RX ORDER — METHOCARBAMOL 750 MG/1
750 TABLET, FILM COATED ORAL
Status: COMPLETED | OUTPATIENT
Start: 2022-03-23 | End: 2022-03-23

## 2022-03-23 RX ORDER — GLYCOPYRROLATE 0.2 MG/ML
INJECTION, SOLUTION INTRAMUSCULAR; INTRAVENOUS PRN
Status: DISCONTINUED | OUTPATIENT
Start: 2022-03-23 | End: 2022-03-23

## 2022-03-23 RX ORDER — HYDROXYZINE HYDROCHLORIDE 25 MG/1
25 TABLET, FILM COATED ORAL
Status: DISCONTINUED | OUTPATIENT
Start: 2022-03-23 | End: 2022-03-23 | Stop reason: HOSPADM

## 2022-03-23 RX ORDER — OXYCODONE HYDROCHLORIDE 5 MG/1
5 TABLET ORAL
Status: DISCONTINUED | OUTPATIENT
Start: 2022-03-23 | End: 2022-03-23 | Stop reason: HOSPADM

## 2022-03-23 RX ORDER — SODIUM CHLORIDE, SODIUM LACTATE, POTASSIUM CHLORIDE, CALCIUM CHLORIDE 600; 310; 30; 20 MG/100ML; MG/100ML; MG/100ML; MG/100ML
INJECTION, SOLUTION INTRAVENOUS CONTINUOUS
Status: DISCONTINUED | OUTPATIENT
Start: 2022-03-23 | End: 2022-03-23 | Stop reason: HOSPADM

## 2022-03-23 RX ORDER — METHOCARBAMOL 500 MG/1
500 TABLET, FILM COATED ORAL 4 TIMES DAILY PRN
Qty: 30 TABLET | Refills: 0 | Status: SHIPPED | OUTPATIENT
Start: 2022-03-23 | End: 2022-03-30

## 2022-03-23 RX ORDER — CEFAZOLIN SODIUM/WATER 2 G/20 ML
2 SYRINGE (ML) INTRAVENOUS
Status: COMPLETED | OUTPATIENT
Start: 2022-03-23 | End: 2022-03-23

## 2022-03-23 RX ORDER — NEOSTIGMINE METHYLSULFATE 1 MG/ML
VIAL (ML) INJECTION PRN
Status: DISCONTINUED | OUTPATIENT
Start: 2022-03-23 | End: 2022-03-23

## 2022-03-23 RX ORDER — ONDANSETRON 2 MG/ML
INJECTION INTRAMUSCULAR; INTRAVENOUS PRN
Status: DISCONTINUED | OUTPATIENT
Start: 2022-03-23 | End: 2022-03-23

## 2022-03-23 RX ORDER — CEFAZOLIN SODIUM/WATER 2 G/20 ML
2 SYRINGE (ML) INTRAVENOUS SEE ADMIN INSTRUCTIONS
Status: DISCONTINUED | OUTPATIENT
Start: 2022-03-23 | End: 2022-03-23 | Stop reason: HOSPADM

## 2022-03-23 RX ORDER — LIDOCAINE 40 MG/G
CREAM TOPICAL
Status: DISCONTINUED | OUTPATIENT
Start: 2022-03-23 | End: 2022-03-23 | Stop reason: HOSPADM

## 2022-03-23 RX ORDER — SENNA AND DOCUSATE SODIUM 50; 8.6 MG/1; MG/1
1 TABLET, FILM COATED ORAL AT BEDTIME
Qty: 20 TABLET | Refills: 0 | Status: SHIPPED | OUTPATIENT
Start: 2022-03-23 | End: 2022-11-30

## 2022-03-23 RX ORDER — MAGNESIUM HYDROXIDE 1200 MG/15ML
LIQUID ORAL PRN
Status: DISCONTINUED | OUTPATIENT
Start: 2022-03-23 | End: 2022-03-23 | Stop reason: HOSPADM

## 2022-03-23 RX ORDER — ONDANSETRON 2 MG/ML
4 INJECTION INTRAMUSCULAR; INTRAVENOUS EVERY 30 MIN PRN
Status: DISCONTINUED | OUTPATIENT
Start: 2022-03-23 | End: 2022-03-23 | Stop reason: HOSPADM

## 2022-03-23 RX ORDER — LABETALOL HYDROCHLORIDE 5 MG/ML
10 INJECTION, SOLUTION INTRAVENOUS
Status: DISCONTINUED | OUTPATIENT
Start: 2022-03-23 | End: 2022-03-23 | Stop reason: HOSPADM

## 2022-03-23 RX ORDER — IBUPROFEN 800 MG/1
800 TABLET, FILM COATED ORAL EVERY 8 HOURS PRN
Qty: 42 TABLET | Refills: 0 | Status: SHIPPED | OUTPATIENT
Start: 2022-03-23 | End: 2022-04-06

## 2022-03-23 RX ORDER — PHENYLEPHRINE HYDROCHLORIDE 10 MG/ML
INJECTION INTRAVENOUS PRN
Status: DISCONTINUED | OUTPATIENT
Start: 2022-03-23 | End: 2022-03-23

## 2022-03-23 RX ORDER — BUPIVACAINE HYDROCHLORIDE AND EPINEPHRINE 2.5; 5 MG/ML; UG/ML
INJECTION, SOLUTION INFILTRATION; PERINEURAL PRN
Status: DISCONTINUED | OUTPATIENT
Start: 2022-03-23 | End: 2022-03-23 | Stop reason: HOSPADM

## 2022-03-23 RX ORDER — FENTANYL CITRATE 50 UG/ML
INJECTION, SOLUTION INTRAMUSCULAR; INTRAVENOUS PRN
Status: DISCONTINUED | OUTPATIENT
Start: 2022-03-23 | End: 2022-03-23

## 2022-03-23 RX ORDER — PROPOFOL 10 MG/ML
INJECTION, EMULSION INTRAVENOUS PRN
Status: DISCONTINUED | OUTPATIENT
Start: 2022-03-23 | End: 2022-03-23

## 2022-03-23 RX ORDER — FENTANYL CITRATE 50 UG/ML
50 INJECTION, SOLUTION INTRAMUSCULAR; INTRAVENOUS EVERY 5 MIN PRN
Status: DISCONTINUED | OUTPATIENT
Start: 2022-03-23 | End: 2022-03-23 | Stop reason: HOSPADM

## 2022-03-23 RX ORDER — DEXAMETHASONE SODIUM PHOSPHATE 4 MG/ML
INJECTION, SOLUTION INTRA-ARTICULAR; INTRALESIONAL; INTRAMUSCULAR; INTRAVENOUS; SOFT TISSUE PRN
Status: DISCONTINUED | OUTPATIENT
Start: 2022-03-23 | End: 2022-03-23

## 2022-03-23 RX ORDER — OXYCODONE HYDROCHLORIDE 5 MG/1
5 TABLET ORAL EVERY 6 HOURS PRN
Qty: 40 TABLET | Refills: 0 | Status: SHIPPED | OUTPATIENT
Start: 2022-03-23 | End: 2022-03-30

## 2022-03-23 RX ORDER — HYDRALAZINE HYDROCHLORIDE 20 MG/ML
2.5-5 INJECTION INTRAMUSCULAR; INTRAVENOUS EVERY 10 MIN PRN
Status: DISCONTINUED | OUTPATIENT
Start: 2022-03-23 | End: 2022-03-23 | Stop reason: HOSPADM

## 2022-03-23 RX ORDER — METHYLPREDNISOLONE 4 MG
TABLET, DOSE PACK ORAL
Qty: 21 TABLET | Refills: 0 | Status: SHIPPED | OUTPATIENT
Start: 2022-03-23 | End: 2022-11-30

## 2022-03-23 RX ORDER — OXYCODONE HYDROCHLORIDE 5 MG/1
5 TABLET ORAL EVERY 4 HOURS PRN
Status: DISCONTINUED | OUTPATIENT
Start: 2022-03-23 | End: 2022-03-23 | Stop reason: HOSPADM

## 2022-03-23 RX ORDER — IBUPROFEN 800 MG/1
800 TABLET, FILM COATED ORAL
Status: COMPLETED | OUTPATIENT
Start: 2022-03-23 | End: 2022-03-23

## 2022-03-23 RX ORDER — ONDANSETRON 4 MG/1
4 TABLET, ORALLY DISINTEGRATING ORAL EVERY 30 MIN PRN
Status: DISCONTINUED | OUTPATIENT
Start: 2022-03-23 | End: 2022-03-23 | Stop reason: HOSPADM

## 2022-03-23 RX ORDER — FENTANYL CITRATE 50 UG/ML
50 INJECTION, SOLUTION INTRAMUSCULAR; INTRAVENOUS
Status: DISCONTINUED | OUTPATIENT
Start: 2022-03-23 | End: 2022-03-23 | Stop reason: HOSPADM

## 2022-03-23 RX ORDER — ALBUTEROL SULFATE 0.83 MG/ML
2.5 SOLUTION RESPIRATORY (INHALATION) EVERY 4 HOURS PRN
Status: DISCONTINUED | OUTPATIENT
Start: 2022-03-23 | End: 2022-03-23 | Stop reason: HOSPADM

## 2022-03-23 RX ADMIN — IBUPROFEN 800 MG: 800 TABLET, FILM COATED ORAL at 11:51

## 2022-03-23 RX ADMIN — SODIUM CHLORIDE, POTASSIUM CHLORIDE, SODIUM LACTATE AND CALCIUM CHLORIDE: 600; 310; 30; 20 INJECTION, SOLUTION INTRAVENOUS at 08:48

## 2022-03-23 RX ADMIN — GLYCOPYRROLATE 0.7 MG: 0.2 INJECTION, SOLUTION INTRAMUSCULAR; INTRAVENOUS at 10:52

## 2022-03-23 RX ADMIN — LIDOCAINE HYDROCHLORIDE 5 ML: 10 INJECTION, SOLUTION EPIDURAL; INFILTRATION; INTRACAUDAL; PERINEURAL at 09:31

## 2022-03-23 RX ADMIN — DEXAMETHASONE SODIUM PHOSPHATE 8 MG: 4 INJECTION, SOLUTION INTRA-ARTICULAR; INTRALESIONAL; INTRAMUSCULAR; INTRAVENOUS; SOFT TISSUE at 09:31

## 2022-03-23 RX ADMIN — ONDANSETRON HYDROCHLORIDE 4 MG: 2 INJECTION, SOLUTION INTRAVENOUS at 09:24

## 2022-03-23 RX ADMIN — ROCURONIUM BROMIDE 50 MG: 50 INJECTION, SOLUTION INTRAVENOUS at 09:31

## 2022-03-23 RX ADMIN — PHENYLEPHRINE HYDROCHLORIDE 100 MCG: 10 INJECTION INTRAVENOUS at 10:44

## 2022-03-23 RX ADMIN — OXYCODONE HYDROCHLORIDE 5 MG: 5 TABLET ORAL at 11:51

## 2022-03-23 RX ADMIN — Medication 2 G: at 09:25

## 2022-03-23 RX ADMIN — MIDAZOLAM 2 MG: 1 INJECTION INTRAMUSCULAR; INTRAVENOUS at 09:24

## 2022-03-23 RX ADMIN — ROCURONIUM BROMIDE 10 MG: 50 INJECTION, SOLUTION INTRAVENOUS at 10:09

## 2022-03-23 RX ADMIN — PHENYLEPHRINE HYDROCHLORIDE 100 MCG: 10 INJECTION INTRAVENOUS at 10:21

## 2022-03-23 RX ADMIN — PHENYLEPHRINE HYDROCHLORIDE 50 MCG: 10 INJECTION INTRAVENOUS at 10:09

## 2022-03-23 RX ADMIN — FENTANYL CITRATE 50 MCG: 50 INJECTION, SOLUTION INTRAMUSCULAR; INTRAVENOUS at 09:52

## 2022-03-23 RX ADMIN — FENTANYL CITRATE 200 MCG: 50 INJECTION, SOLUTION INTRAMUSCULAR; INTRAVENOUS at 09:31

## 2022-03-23 RX ADMIN — NEOSTIGMINE METHYLSULFATE 5 MG: 1 INJECTION, SOLUTION INTRAVENOUS at 10:52

## 2022-03-23 RX ADMIN — PROPOFOL 200 MG: 10 INJECTION, EMULSION INTRAVENOUS at 09:31

## 2022-03-23 RX ADMIN — METHOCARBAMOL 750 MG: 750 TABLET ORAL at 11:59

## 2022-03-23 NOTE — DISCHARGE INSTRUCTIONS
GENERAL ANESTHESIA OR SEDATION ADULT DISCHARGE INSTRUCTIONS   SPECIAL PRECAUTIONS FOR 24 HOURS AFTER SURGERY    IT IS NOT UNUSUAL TO FEEL LIGHT-HEADED OR FAINT, UP TO 24 HOURS AFTER SURGERY OR WHILE TAKING PAIN MEDICATION.  IF YOU HAVE THESE SYMPTOMS; SIT FOR A FEW MINUTES BEFORE STANDING AND HAVE SOMEONE ASSIST YOU WHEN YOU GET UP TO WALK OR USE THE BATHROOM.    YOU SHOULD REST AND RELAX FOR THE NEXT 24 HOURS AND YOU MUST MAKE ARRANGEMENTS TO HAVE SOMEONE STAY WITH YOU FOR AT LEAST 24 HOURS AFTER YOUR DISCHARGE.  AVOID HAZARDOUS AND STRENUOUS ACTIVITIES.  DO NOT MAKE IMPORTANT DECISIONS FOR 24 HOURS.    DO NOT DRIVE ANY VEHICLE OR OPERATE MECHANICAL EQUIPMENT FOR 24 HOURS FOLLOWING THE END OF YOUR SURGERY.  EVEN THOUGH YOU MAY FEEL NORMAL, YOUR REACTIONS MAY BE AFFECTED BY THE MEDICATION YOU HAVE RECEIVED.    DO NOT DRINK ALCOHOLIC BEVERAGES FOR 24 HOURS FOLLOWING YOUR SURGERY.    DRINK CLEAR LIQUIDS (APPLE JUICE, GINGER ALE, 7-UP, BROTH, ETC.).  PROGRESS TO YOUR REGULAR DIET AS YOU FEEL ABLE.    YOU MAY HAVE A DRY MOUTH, A SORE THROAT, MUSCLES ACHES OR TROUBLE SLEEPING.  THESE SHOULD GO AWAY AFTER 24 HOURS.    CALL YOUR DOCTOR FOR ANY OF THE FOLLOWING:  SIGNS OF INFECTION (FEVER, GROWING TENDERNESS AT THE SURGERY SITE, A LARGE AMOUNT OF DRAINAGE OR BLEEDING, SEVERE PAIN, FOUL-SMELLING DRAINAGE, REDNESS OR SWELLING.    IT HAS BEEN OVER 8 TO 10 HOURS SINCE SURGERY AND YOU ARE STILL NOT ABLE TO URINATE (PASS WATER).    Maximum acetaminophen (Tylenol) dose from all sources should not exceed 4 grams (4000 mg) per day.    You received  Ibuprofen 800mg at 1150am.  Do not take any Ibuprofen products until 550pm.

## 2022-03-23 NOTE — ANESTHESIA PREPROCEDURE EVALUATION
Anesthesia Pre-Procedure Evaluation    Patient: Everton Linn   MRN: [id number] : 1965        Procedure : Procedure(s):  Left Lumbar 2 to Lumbar 3 minimally invasive far lateral microdiscectomy          Past Medical History:   Diagnosis Date     Diabetes (H)      SANTOS (obstructive sleep apnea)      Sleep apnea     CPAP      Past Surgical History:   Procedure Laterality Date     ARTHROSCOPY KNEE RT/LT      left knee     ARTHROSCOPY KNEE RT/LT      right     BACK SURGERY      spinal fusion on neck in 2013     ENT SURGERY      [facility].     HERNIORRHAPHY INGUINAL Right 2021    Procedure: HERNIORRHAPHY, INGUINAL, OPEN with mesh;  Surgeon: Jerry Bello MD;  Location: [facility] OR     LAPAROSCOPIC CHOLECYSTECTOMY WITH CHOLANGIOGRAMS N/A 2016    Procedure: LAPAROSCOPIC CHOLECYSTECTOMY WITH CHOLANGIOGRAMS;  Surgeon: Jerry Bello MD;  Location: [facility] OR     ORTHOPEDIC SURGERY      Cerv. Fusion 2013: plate       Allergies   Allergen Reactions     Seasonal Allergies Other (See Comments)     Runny Nose/itchy eyes      Social History     Tobacco Use     Smoking status: Never Smoker     Smokeless tobacco: Never Used   Substance Use Topics     Alcohol use: Yes     Comment: 2 -3 beers every few weeks      Wt Readings from Last 1 Encounters:   22 104.4 kg (230 lb 3.2 oz)        Anesthesia Evaluation            ROS/MED HX  ENT/Pulmonary:     (+) sleep apnea,     Neurologic: Comment: History of cervical fusion      Cardiovascular:       METS/Exercise Tolerance:     Hematologic:       Musculoskeletal:       GI/Hepatic:       Renal/Genitourinary:       Endo:     (+) type II DM, Last HgA1c: 8.3, date: 3/2/2022, Obesity (BMI 33),     Psychiatric/Substance Use:       Infectious Disease:       Malignancy:       Other:            Physical Exam    Airway        Mallampati: II   TM distance: > 3 FB   Neck ROM: limited     Respiratory Devices and Support         Dental           Cardiovascular    cardiovascular exam normal          Pulmonary   pulmonary exam normal                OUTSIDE LABS:  CBC:   Lab Results   Component Value Date    WBC 6.9 05/28/2016    WBC 6.4 05/27/2016    HGB 14.0 03/02/2022    HGB 14.5 04/05/2019    HCT 37.4 (L) 05/28/2016    HCT 40.0 05/27/2016     05/28/2016     05/27/2016     BMP:   Lab Results   Component Value Date     03/02/2022     02/25/2021    POTASSIUM 4.4 03/02/2022    POTASSIUM 4.6 02/25/2021    CHLORIDE 100 03/02/2022    CHLORIDE 100 02/25/2021    CO2 25 03/02/2022    CO2 30 02/25/2021    BUN 10 03/02/2022    BUN 13 02/25/2021    CR 0.97 03/02/2022    CR 0.99 02/25/2021     (H) 03/02/2022     (H) 08/13/2021     COAGS: No results found for: PTT, INR, FIBR  POC:   Lab Results   Component Value Date     (H) 05/28/2016     HEPATIC:   Lab Results   Component Value Date    ALBUMIN 4.1 02/25/2021    PROTTOTAL 8.1 02/25/2021    ALT 32 02/25/2021    AST 15 02/25/2021    ALKPHOS 57 02/25/2021    BILITOTAL 0.7 02/25/2021     OTHER:   Lab Results   Component Value Date    A1C 8.3 (H) 03/02/2022    DAMIÁN 9.2 03/02/2022    LIPASE 510 (H) 05/27/2016    TSH 2.69 01/25/2019       Anesthesia Plan    ASA Status:  3   NPO Status:  NPO Appropriate    Anesthesia Type: General.     - Airway: ETT   Induction: Intravenous.   Maintenance: Balanced.        Consents    Anesthesia Plan(s) and associated risks, benefits, and realistic alternatives discussed. Questions answered and patient/representative(s) expressed understanding.    - Discussed:     - Discussed with:  Patient         Postoperative Care    Pain management: IV analgesics, Oral pain medications, Multi-modal analgesia.   PONV prophylaxis: Ondansetron (or other 5HT-3), Dexamethasone or Solumedrol     Comments:                Fabienne Linda MD

## 2022-03-23 NOTE — ANESTHESIA PROCEDURE NOTES
Airway       Patient location during procedure: OR       Procedure Start/Stop Times: 3/23/2022 9:35 AM  Staff -        Anesthesiologist:  Fabienne Linda MD       CRNA: Chaka Dias APRN CRNA       Performed By: CRNA  Consent for Airway        Urgency: elective  Indications and Patient Condition       Indications for airway management: kathrine-procedural       Induction type:intravenous       Mask difficulty assessment: 3 - difficult mask (inadequate, unstable, or two providers) +/- NMBA (pt has SANTOS and Beard)    Final Airway Details       Final airway type: endotracheal airway       Successful airway: ETT - single  Endotracheal Airway Details        ETT size (mm): 8.0       Cuffed: yes       Successful intubation technique: video laryngoscopy       VL Blade Size: Glidescope 4       Grade View of Cords: 1       Adjucts: stylet       Position: Right       Measured from: lips       Secured at (cm): 25       Bite block used: Oral Airway    Post intubation assessment        Placement verified by: capnometry, equal breath sounds and chest rise        Number of attempts at approach: 1       Number of other approaches attempted: 0       Secured with: plastic tape       Ease of procedure: easy       Dentition: Intact and Unchanged

## 2022-03-23 NOTE — OP NOTE
Procedure Date: 03/23/2022    PREOPERATIVE DIAGNOSIS:  Left L2-L3 far lateral herniated disk with radiculopathy.    POSTOPERATIVE DIAGNOSIS:  Left L2-L3 far lateral herniated disk with radiculopathy.    PROCEDURE:  Left L2-L3 minimally invasive far lateral microdiskectomy.    SURGEON:  Eduin Gillespie MD.    ASSISTANT:  Slick Adams PA-C.    ANESTHESIA:  General endotracheal anesthesia.    ESTIMATED BLOOD LOSS:  15 mL.    INDICATIONS FOR PROCEDURE:  The patient is a 56-year-old male who has left groin and thigh pain and a far lateral herniated disk on the left at L2-3 and has failed conservative measures and was brought for a far lateral microdiskectomy. Please note that Slick Adams PA-C's assistance was needed for positioning, retraction, suctioning, and closure.     DESCRIPTION OF PROCEDURE:  The patient was brought to the operating room.  General endotracheal anesthesia was induced.  The patient was rolled in the prone position on the Sanjeev frame.  The back was prepped and draped in sterile fashion.  C-arm fluoroscopy was used to localize the appropriate level, and a left sided paramedian approach was performed using the [employer] Stix tubular dilating system.  The microscope was sterilely draped and brought in the field.  The level was again confirmed with fluoroscopy, and then the high-speed drill was used to remove the lateral aspect of the facet.  This then exposed the foramen and the ligamentum flavum was elevated and resected.  Disk material was noted out in the foramen and laterally and with careful serial dissection using dissectors, curettes and pituitary rongeurs, multiple disk fragments were removed from around the nerve.  Once this was done and hemostasis was achieved, fascia was closed with 0 Vicryl, 2-0 inverted interrupted Vicryl was used for subcutaneous layer, 4-0 subcuticular Monocryl was used for skin and Exofin placed as a dressing.  The patient was then awakened, extubated, and taken to  the recovery room in good condition.    Eduin Gillespie MD        D: 2022   T: 2022   MT: [id number]    Name:     JOHN HANEY  MRN:      [id number]        Account:        [id number]   :      1965           Procedure Date: 2022     Document: [id number]

## 2022-03-23 NOTE — INTERVAL H&P NOTE
"I have reviewed the surgical (or preoperative) H&P that is linked to this encounter, and examined the patient. There are no significant changes    Clinical Conditions Present on Arrival:  SECTIONPRESENTONADMISSIONBEGIN@                  # Diabetes, type II: last A1C 8.3 % (Ref range: 0.0 - 5.6 %)  # Obesity: Estimated body mass index is 33.99 kg/m  as calculated from the following:    Height as of this encounter: 1.753 m (5' 9\").    Weight as of this encounter: 104.4 kg (230 lb 3.2 oz).       "

## 2022-03-23 NOTE — BRIEF OP NOTE
[facility]    Brief Operative Note    Pre-operative diagnosis: Herniation of lumbar intervertebral disc with radiculopathy [M51.16]  Post-operative diagnosis Same as pre-operative diagnosis    Procedure: Procedure(s):  Left Lumbar 2 to Lumbar 3 minimally invasive far lateral microdiscectomy  Surgeon: Surgeon(s) and Role:     * Eduin Gillespie MD - Primary     * Slick Adams PA-C - Assisting  Anesthesia: General   Estimated Blood Loss: 15ml    Drains: None  Specimens: * No specimens in log *  Findings:   None.  Complications: None.  Implants: * No implants in log *    [id number]

## 2022-03-23 NOTE — ANESTHESIA CARE TRANSFER NOTE
Patient: Everton Linn    Procedure: Procedure(s):  Left Lumbar 2 to Lumbar 3 minimally invasive far lateral microdiscectomy       Diagnosis: Herniation of lumbar intervertebral disc with radiculopathy [M51.16]  Diagnosis Additional Information: No value filed.    Anesthesia Type:   General     Note:    Oropharynx: spontaneously breathing  Level of Consciousness: awake  Oxygen Supplementation: face mask  Level of Supplemental Oxygen (L/min / FiO2): 6l  Independent Airway: airway patency satisfactory and stable  Dentition: dentition unchanged  Vital Signs Stable: post-procedure vital signs reviewed and stable  Report to RN Given: handoff report given  Patient transferred to: PACU  Comments: Pt to PACU, VSS, report to RN  Handoff Report: Identifed the Patient, Identified the Reponsible Provider, Reviewed the pertinent medical history, Discussed the surgical course, Reviewed Intra-OP anesthesia mangement and issues during anesthesia, Set expectations for post-procedure period and Allowed opportunity for questions and acknowledgement of understanding      Vitals:  Vitals Value Taken Time   BP 98/65 03/23/22 1110   Temp     Pulse 79 03/23/22 1112   Resp 19 03/23/22 1112   SpO2 95 % 03/23/22 1112   Vitals shown include unvalidated device data.    Electronically Signed By: VALDEZ Gustafson CRNA  March 23, 2022  11:13 AM

## 2022-03-23 NOTE — ANESTHESIA POSTPROCEDURE EVALUATION
Patient: Everton Linn    Procedure: Procedure(s):  Left Lumbar 2 to Lumbar 3 minimally invasive far lateral microdiscectomy       Anesthesia Type:  General    Note:  Disposition: Outpatient   Postop Pain Control: Uneventful            Sign Out: Well controlled pain   PONV: No   Neuro/Psych: Uneventful            Sign Out: Acceptable/Baseline neuro status   Airway/Respiratory: Uneventful            Sign Out: Acceptable/Baseline resp. status   CV/Hemodynamics: Uneventful            Sign Out: Acceptable CV status; No obvious hypovolemia; No obvious fluid overload   Other NRE: NONE   DID A NON-ROUTINE EVENT OCCUR? No           Last vitals:  Vitals Value Taken Time   /78 03/23/22 1205   Temp 97  F (36.1  C) 03/23/22 1200   Pulse 71 03/23/22 1219   Resp 27 03/23/22 1219   SpO2 96 % 03/23/22 1219   Vitals shown include unvalidated device data.    Electronically Signed By: Fabienne Linda MD  March 23, 2022  2:02 PM

## 2022-03-24 ENCOUNTER — TELEPHONE (OUTPATIENT)
Dept: NEUROSURGERY | Facility: CLINIC | Age: 57
End: 2022-03-24
Payer: COMMERCIAL

## 2022-03-24 NOTE — TELEPHONE ENCOUNTER
Post-Op Call    DOS: 3/23/22  Surgery: Left L2-L3 minimally invasive far lateral microdiskectomy  Surgeon: Dr. Gillespie    Called patient for post-operative follow-up.     Sore and stiff.  Medications are managing pain. Following medication schedule with alarms on phone. Discussed weaning from pain medication as pain improves. Provided education about maximum acetaminophen dose in 24 hours from all sources.    Patient is walking without difficulty. Encouraged short, frequent walks as tolerated.     Patient has not had a bowel movement since surgery. Discussed reasons of constipation after surgery and reinforced treatment options.     Patient's dressing has been removed. Patient denies any signs or symptoms of infection. Incision care reinforced.     Post op restrictions reviewed with patient.     Reviewed follow up appointments and clinic contact information. Patient will call with any questions or concerns.

## 2022-03-30 DIAGNOSIS — Z98.890 HX OF DECOMPRESSIVE LUMBAR LAMINECTOMY: ICD-10-CM

## 2022-03-30 PROBLEM — M54.16 LUMBAR RADICULOPATHY: Status: RESOLVED | Noted: 2021-12-17 | Resolved: 2022-03-30

## 2022-03-30 RX ORDER — METHOCARBAMOL 500 MG/1
500 TABLET, FILM COATED ORAL 4 TIMES DAILY PRN
Qty: 30 TABLET | Refills: 0 | Status: SHIPPED | OUTPATIENT
Start: 2022-03-31 | End: 2022-11-30

## 2022-03-30 RX ORDER — OXYCODONE HYDROCHLORIDE 5 MG/1
5 TABLET ORAL EVERY 6 HOURS PRN
Qty: 40 TABLET | Refills: 0 | Status: SHIPPED | OUTPATIENT
Start: 2022-03-31 | End: 2022-11-30

## 2022-03-30 NOTE — TELEPHONE ENCOUNTER
DOS: 3/23/22  Surgery: Left L2-L3 minimally invasive far lateral microdiskectomy  Surgeon: Dr. Gillespie    Next Visit: 4/6/22    Name of Provider:  Dr Gillespie    Assessment:  Calls concerned that he is not as far along in recovery as he should be. Reports pain down leg has significantly decreased. But he is very sore in his back at the incision that moves to his glutes and hamstrings. When he wakes up, he reports being very stiff. He is staying within his restrictions.     Competed MDP   Oxycodone:  1 tablet Every 6 hours  Robaxin: 1 tablet every 6 hours.     Recommendation given: Continue with post op meds. Refill request sent to the team. Instructed to call us or seek urgent evaluation if he has sudden worsening in symptoms. He has about 6 oxycodone's left. Set to fill tomorrow    Action needed from provider: refill meds

## 2022-03-30 NOTE — TELEPHONE ENCOUNTER
Patient states his pain level is significant  A week post operative; thought he would be doing better.  Feels very stiff, taking pain meds as directed to try and stay on top of pain.  This is more than what he was expecting.  Would like someone to call.

## 2022-04-06 ENCOUNTER — OFFICE VISIT (OUTPATIENT)
Dept: NEUROSURGERY | Facility: CLINIC | Age: 57
End: 2022-04-06
Attending: PHYSICIAN ASSISTANT
Payer: COMMERCIAL

## 2022-04-06 VITALS
OXYGEN SATURATION: 97 % | SYSTOLIC BLOOD PRESSURE: 109 MMHG | HEART RATE: 89 BPM | WEIGHT: 230 LBS | HEIGHT: 69 IN | BODY MASS INDEX: 34.07 KG/M2 | DIASTOLIC BLOOD PRESSURE: 70 MMHG

## 2022-04-06 DIAGNOSIS — Z98.890 HX OF DECOMPRESSIVE LUMBAR LAMINECTOMY: Primary | ICD-10-CM

## 2022-04-06 PROCEDURE — 99024 POSTOP FOLLOW-UP VISIT: CPT | Performed by: PHYSICIAN ASSISTANT

## 2022-04-06 PROCEDURE — G0463 HOSPITAL OUTPT CLINIC VISIT: HCPCS

## 2022-04-06 ASSESSMENT — PAIN SCALES - GENERAL: PAINLEVEL: MILD PAIN (3)

## 2022-04-06 NOTE — PROGRESS NOTES
Neurosurgery Two-Week Follow-Up    We evaluated this patient today for his two week post-op follow up visit. He is s/p left L2-L3 far lateral herniated disk with radiculopathy performed on 03/23/2022 by Dr Gillespie. Pain is impreved, continues to have some paresthesia.      Exam:  He appears comfortable and in no apparent distress. He is moving all of his extremities well.   CN II-XII intact, alert and appropriate with conversation. Follows all commands.   Bilateral upper and lower extremities with appropriate strength. Deep tendon reflexes within normal limits throughout. Sensation is also intact throughout. He does have an acceptable post-operative range of motion.   His lumbar incision is absent for concerning edema, erythema, or drainage.     Post-Op Pain Management:   After surgery patient is doing well overall. Reports 3/10 pain. No discomfort at the incision site. No discomfort with swallowing, denies any difficulties with breathing.   Pain is controlled by Robaxin and Roxicodone.  Reminded patient to apply ice to help with pain management as well.     Incision Care:   Patient denies issues or concerns with incision today.   Reminded to watch for s/sx of infection: drainage, redness, swelling, warmth, and fever.   Advised proper incision care: No submerging incision for 6 weeks or until it is fully healed. Okay to shower, use soap, and gently pat dry.   Advised to call clinic with any incision questions or concerns.      Post-Op Activity Restrictions:   Reminded patient to follow activity restrictions for the next 6 weeks: No heavy lifting more than 10lbs, limit repetitive bending/twisting, or overhead reaching.      Nutrition:   Patient has been eating and drinking well.   No issues with bowel or bladder habits.   Advised patient to increase fluid, fiber intake, and use OTC stool softener if needed.      Follow-Up:  Patient scheduled for post-op visits.   Advised to call our clinic with any post-op questions or  concerns: [phone].      Respectfully,    BRADFORD Munson, DORA  [facility] Neurosurgery  [facility]     Tel: [phone]  Pager: [phone]

## 2022-04-06 NOTE — PROGRESS NOTES
"Everton Linn is a 56 year old male who presents for:  Chief Complaint   Patient presents with     Surgical Followup     2 wk f/u Left lumbar to lumbar 3 mini invasive far lateral microdiscectomy         Initial Vitals:  /70   Pulse 89   Ht 5' 9\" (1.753 m)   Wt 230 lb (104.3 kg)   SpO2 97%   BMI 33.97 kg/m   Estimated body mass index is 33.97 kg/m  as calculated from the following:    Height as of this encounter: 5' 9\" (1.753 m).    Weight as of this encounter: 230 lb (104.3 kg).. Body surface area is 2.25 meters squared. BP completed using cuff size: regular  Mild Pain (3)    Nursing Comments:     Kelsey Urias MA    "

## 2022-04-06 NOTE — LETTER
"    4/6/2022         RE: Everton Linn  [address]  [place] [address]        Dear Colleague,    Thank you for referring your patient, Everton Linn, to the [facility] NEUROSURGERY CLINIC [place]. Please see a copy of my visit note below.    Everton Linn is a 56 year old male who presents for:  Chief Complaint   Patient presents with     Surgical Followup     2 wk f/u Left lumbar to lumbar 3 mini invasive far lateral microdiscectomy         Initial Vitals:  /70   Pulse 89   Ht 5' 9\" (1.753 m)   Wt 230 lb (104.3 kg)   SpO2 97%   BMI 33.97 kg/m   Estimated body mass index is 33.97 kg/m  as calculated from the following:    Height as of this encounter: 5' 9\" (1.753 m).    Weight as of this encounter: 230 lb (104.3 kg).. Body surface area is 2.25 meters squared. BP completed using cuff size: regular  Mild Pain (3)    Nursing Comments:     Kelsey Urias MA      Neurosurgery Two-Week Follow-Up    We evaluated this patient today for his two week post-op follow up visit. He is s/p left L2-L3 far lateral herniated disk with radiculopathy performed on 03/23/2022 by Dr Gillespie. Pain is impreved, continues to have some paresthesia.      Exam:  He appears comfortable and in no apparent distress. He is moving all of his extremities well.   CN II-XII intact, alert and appropriate with conversation. Follows all commands.   Bilateral upper and lower extremities with appropriate strength. Deep tendon reflexes within normal limits throughout. Sensation is also intact throughout. He does have an acceptable post-operative range of motion.   His lumbar incision is absent for concerning edema, erythema, or drainage.     Post-Op Pain Management:   After surgery patient is doing well overall. Reports 3/10 pain. No discomfort at the incision site. No discomfort with swallowing, denies any difficulties with breathing.   Pain is controlled by Robaxin and Roxicodone.  Reminded patient to " apply ice to help with pain management as well.     Incision Care:   Patient denies issues or concerns with incision today.   Reminded to watch for s/sx of infection: drainage, redness, swelling, warmth, and fever.   Advised proper incision care: No submerging incision for 6 weeks or until it is fully healed. Okay to shower, use soap, and gently pat dry.   Advised to call clinic with any incision questions or concerns.      Post-Op Activity Restrictions:   Reminded patient to follow activity restrictions for the next 6 weeks: No heavy lifting more than 10lbs, limit repetitive bending/twisting, or overhead reaching.      Nutrition:   Patient has been eating and drinking well.   No issues with bowel or bladder habits.   Advised patient to increase fluid, fiber intake, and use OTC stool softener if needed.      Follow-Up:  Patient scheduled for post-op visits.   Advised to call our clinic with any post-op questions or concerns: [phone].      Respectfully,    BRADFORD Munson PA-C  [facility] Neurosurgery  [facility]     Tel: [phone]  Pager: [phone]          Again, thank you for allowing me to participate in the care of your patient.        Sincerely,        Slick Adams PA-C

## 2022-04-24 DIAGNOSIS — E11.9 TYPE 2 DIABETES MELLITUS WITHOUT COMPLICATION, WITHOUT LONG-TERM CURRENT USE OF INSULIN (H): ICD-10-CM

## 2022-04-27 RX ORDER — METFORMIN HCL 500 MG
TABLET, EXTENDED RELEASE 24 HR ORAL
Qty: 360 TABLET | Refills: 0 | Status: SHIPPED | OUTPATIENT
Start: 2022-04-27 | End: 2022-07-27

## 2022-04-29 ENCOUNTER — OFFICE VISIT (OUTPATIENT)
Dept: NEUROSURGERY | Facility: CLINIC | Age: 57
End: 2022-04-29
Attending: PHYSICIAN ASSISTANT
Payer: COMMERCIAL

## 2022-04-29 VITALS
WEIGHT: 230 LBS | SYSTOLIC BLOOD PRESSURE: 114 MMHG | DIASTOLIC BLOOD PRESSURE: 74 MMHG | BODY MASS INDEX: 34.07 KG/M2 | HEART RATE: 95 BPM | OXYGEN SATURATION: 97 % | HEIGHT: 69 IN

## 2022-04-29 DIAGNOSIS — Z98.890 HX OF DECOMPRESSIVE LUMBAR LAMINECTOMY: Primary | ICD-10-CM

## 2022-04-29 PROCEDURE — 99024 POSTOP FOLLOW-UP VISIT: CPT | Performed by: PHYSICIAN ASSISTANT

## 2022-04-29 PROCEDURE — G0463 HOSPITAL OUTPT CLINIC VISIT: HCPCS

## 2022-04-29 ASSESSMENT — PAIN SCALES - GENERAL: PAINLEVEL: MILD PAIN (3)

## 2022-04-29 NOTE — NURSING NOTE
"Everton Linn is a 56 year old male who presents for:  Chief Complaint   Patient presents with     Surgical Followup     6 week post op follow up        Initial Vitals:  /74   Pulse 95   Ht 5' 9\" (1.753 m)   Wt 230 lb (104.3 kg)   SpO2 97%   BMI 33.97 kg/m   Estimated body mass index is 33.97 kg/m  as calculated from the following:    Height as of this encounter: 5' 9\" (1.753 m).    Weight as of this encounter: 230 lb (104.3 kg).. Body surface area is 2.25 meters squared. BP completed using cuff size: large  Mild Pain (3)    Nursing Comments:    Roc Calabrese MA    "

## 2022-04-29 NOTE — LETTER
4/29/2022         RE: Everton Linn  [address]  [place] [address]        Dear Colleague,    Thank you for referring your patient, Everton Linn, to the [facility] NEUROSURGERY CLINIC [place]. Please see a copy of my visit note below.    NEUROSURGERY CLINIC PROGRESS NOTE    DATE OF VISIT: 4/29/2022    HPI:     Everton Linn is a pleasant 56 year old male who presents to the clinic today for a  six-week post-operative follow-up visit. On 03/23/2022 the patient underwent a left lumbar 2 to lumbar 3 minimally invasive far lateral microdiscectomy with Dr. Gillespie.  Today, the patient reports that overall he is doing quite well and has not concerns.     Current Outpatient Medications   Medication     blood glucose (NO BRAND SPECIFIED) lancets standard     blood glucose (NO BRAND SPECIFIED) test strip     blood glucose monitoring (NO BRAND SPECIFIED) meter device kit     blood glucose monitoring (ONE TOUCH DELICA) lancets     blood glucose monitoring (ONETOUCH VERIO IQ) test strip     blood glucose monitoring (SOFTCLIX) lancets     Blood Glucose Monitoring Suppl (ONETOUCH VERIO FLEX SYSTEM) w/Device KIT     doxycycline monohydrate (MONODOX) 100 MG capsule     Dulaglutide 3 MG/0.5ML SOPN     fexofenadine (ALLEGRA) 180 MG tablet     gabapentin (NEURONTIN) 300 MG capsule     glipiZIDE (GLUCOTROL XL) 10 MG 24 hr tablet     metFORMIN (GLUCOPHAGE-XR) 500 MG 24 hr tablet     methocarbamol (ROBAXIN) 500 MG tablet     methylPREDNISolone (MEDROL DOSEPAK) 4 MG tablet therapy pack     oxyCODONE (ROXICODONE) 5 MG tablet     SENNA-docusate sodium (SENNA S) 8.6-50 MG tablet     sildenafil (VIAGRA) 100 MG tablet     simvastatin (ZOCOR) 40 MG tablet     SOOLANTRA 1 % cream     No current facility-administered medications for this visit.       Allergies   Allergen Reactions     Seasonal Allergies Other (See Comments)     Runny Nose/itchy eyes       Past Medical History:   Diagnosis Date     Diabetes  "(H)      SANTOS (obstructive sleep apnea)      Sleep apnea     CPAP       Review Of Systems    Skin: negative  Eyes: negative  Ears/Nose/Throat: negative  Respiratory: No shortness of breath, dyspnea on exertion, cough, or hemoptysis  Cardiovascular: negative  Gastrointestinal: negative  Musculoskeletal: negative  Neurologic: negative  Psychiatric: negative  Hematologic/Lymphatic/Immunologic: negative  Endocrine: negative    OBJECTIVE:    /74   Pulse 95   Ht 5' 9\" (1.753 m)   Wt 230 lb (104.3 kg)   SpO2 97%   BMI 33.97 kg/m        Exam:    Patient appears comfortable and in no apparent distress. Moving all extremities.  Gait is non-antalgic.  CN II-XII grossly intact, alert and appropriate with conversation and following  commands  Bilateral upper extremities with full strength including hand intrinsics and grasp.  Sensation intact throughout.  Bilateral lower extremities 5/5 strength including plantar and dorsiflexion.  Normal sensation throughout bilaterally.  Lumbar incision edges well approximated. Absent for edema, erythema, or drainage.    ASSESSMENT:    No diagnosis found.    PLAN:    Everton Linn is 6 weeks out from a left lumbar 2 to lumbar 3 minimally invasive far lateral microdiscectomy performed by Dr. Gillespie on 03/23/2022. Today the patient  reports that overall he is doing quite well and has not concerns.    The patient has remained compliant with the post-operative restrictions which included  not lifting anything greater than 5-10 lbs. Today we discussed increasing activity from  10 lbs by 2-5 lbs per week, but encouraged continuing to avoid excessive bending,  twisting, and turning at the waist and to avoid jostling and jarring activities.     Mr. Linn will return to the clinic in 6 weeks.    The patient gave verbal understanding and is in agreement with the above plan. He  will call or return to the clinic for any worsening or changes in symptoms.    Respectfully,     Marshal Adams, " DORA FELDER      Again, thank you for allowing me to participate in the care of your patient.        Sincerely,        Slick Adams PA-C

## 2022-04-29 NOTE — PROGRESS NOTES
NEUROSURGERY CLINIC PROGRESS NOTE    DATE OF VISIT: 4/29/2022    HPI:     Everton Linn is a pleasant 56 year old male who presents to the clinic today for a  six-week post-operative follow-up visit. On 03/23/2022 the patient underwent a left lumbar 2 to lumbar 3 minimally invasive far lateral microdiscectomy with Dr. Gillespie.  Today, the patient reports that overall he is doing quite well and has not concerns.     Current Outpatient Medications   Medication     blood glucose (NO BRAND SPECIFIED) lancets standard     blood glucose (NO BRAND SPECIFIED) test strip     blood glucose monitoring (NO BRAND SPECIFIED) meter device kit     blood glucose monitoring (ONE TOUCH DELICA) lancets     blood glucose monitoring (ONETOUCH VERIO IQ) test strip     blood glucose monitoring (SOFTCLIX) lancets     Blood Glucose Monitoring Suppl (ONETOUCH VERIO FLEX SYSTEM) w/Device KIT     doxycycline monohydrate (MONODOX) 100 MG capsule     Dulaglutide 3 MG/0.5ML SOPN     fexofenadine (ALLEGRA) 180 MG tablet     gabapentin (NEURONTIN) 300 MG capsule     glipiZIDE (GLUCOTROL XL) 10 MG 24 hr tablet     metFORMIN (GLUCOPHAGE-XR) 500 MG 24 hr tablet     methocarbamol (ROBAXIN) 500 MG tablet     methylPREDNISolone (MEDROL DOSEPAK) 4 MG tablet therapy pack     oxyCODONE (ROXICODONE) 5 MG tablet     SENNA-docusate sodium (SENNA S) 8.6-50 MG tablet     sildenafil (VIAGRA) 100 MG tablet     simvastatin (ZOCOR) 40 MG tablet     SOOLANTRA 1 % cream     No current facility-administered medications for this visit.       Allergies   Allergen Reactions     Seasonal Allergies Other (See Comments)     Runny Nose/itchy eyes       Past Medical History:   Diagnosis Date     Diabetes (H)      SANTOS (obstructive sleep apnea)      Sleep apnea     CPAP       Review Of Systems    Skin: negative  Eyes: negative  Ears/Nose/Throat: negative  Respiratory: No shortness of breath, dyspnea on exertion, cough, or hemoptysis  Cardiovascular: negative  Gastrointestinal:  "negative  Musculoskeletal: negative  Neurologic: negative  Psychiatric: negative  Hematologic/Lymphatic/Immunologic: negative  Endocrine: negative    OBJECTIVE:    /74   Pulse 95   Ht 5' 9\" (1.753 m)   Wt 230 lb (104.3 kg)   SpO2 97%   BMI 33.97 kg/m        Exam:    Patient appears comfortable and in no apparent distress. Moving all extremities.  Gait is non-antalgic.  CN II-XII grossly intact, alert and appropriate with conversation and following  commands  Bilateral upper extremities with full strength including hand intrinsics and grasp.  Sensation intact throughout.  Bilateral lower extremities 5/5 strength including plantar and dorsiflexion.  Normal sensation throughout bilaterally.  Lumbar incision edges well approximated. Absent for edema, erythema, or drainage.    ASSESSMENT:    No diagnosis found.    PLAN:    Everton Lnin is 6 weeks out from a left lumbar 2 to lumbar 3 minimally invasive far lateral microdiscectomy performed by Dr. Gillespie on 03/23/2022. Today the patient  reports that overall he is doing quite well and has not concerns.    The patient has remained compliant with the post-operative restrictions which included  not lifting anything greater than 5-10 lbs. Today we discussed increasing activity from  10 lbs by 2-5 lbs per week, but encouraged continuing to avoid excessive bending,  twisting, and turning at the waist and to avoid jostling and jarring activities.     Mr. Linn will return to the clinic in 6 weeks.    The patient gave verbal understanding and is in agreement with the above plan. He  will call or return to the clinic for any worsening or changes in symptoms.    Respectfully,     BRADFORD Antonio, PAARUNA  "

## 2022-05-12 DIAGNOSIS — E11.9 TYPE 2 DIABETES MELLITUS WITHOUT COMPLICATION, WITHOUT LONG-TERM CURRENT USE OF INSULIN (H): ICD-10-CM

## 2022-05-13 ENCOUNTER — LAB (OUTPATIENT)
Dept: LAB | Facility: CLINIC | Age: 57
End: 2022-05-13
Payer: COMMERCIAL

## 2022-05-13 DIAGNOSIS — L71.9 ROSACEA: Primary | ICD-10-CM

## 2022-05-13 DIAGNOSIS — Z79.2 LONG TERM CURRENT USE OF ANTIBIOTICS: ICD-10-CM

## 2022-05-13 DIAGNOSIS — L71.1 RHINOPHYMA: ICD-10-CM

## 2022-05-13 PROCEDURE — 84450 TRANSFERASE (AST) (SGOT): CPT

## 2022-05-13 PROCEDURE — 84460 ALANINE AMINO (ALT) (SGPT): CPT

## 2022-05-13 PROCEDURE — 36415 COLL VENOUS BLD VENIPUNCTURE: CPT

## 2022-05-13 RX ORDER — GLIPIZIDE 10 MG/1
TABLET, FILM COATED, EXTENDED RELEASE ORAL
Qty: 180 TABLET | Refills: 0 | Status: SHIPPED | OUTPATIENT
Start: 2022-05-13 | End: 2022-08-14

## 2022-05-14 LAB
ALT SERPL W P-5'-P-CCNC: 36 U/L (ref 0–70)
AST SERPL W P-5'-P-CCNC: 19 U/L (ref 0–45)

## 2022-06-14 ENCOUNTER — MYC MEDICAL ADVICE (OUTPATIENT)
Dept: FAMILY MEDICINE | Facility: CLINIC | Age: 57
End: 2022-06-14
Payer: COMMERCIAL

## 2022-06-14 ENCOUNTER — TELEPHONE (OUTPATIENT)
Dept: FAMILY MEDICINE | Facility: CLINIC | Age: 57
End: 2022-06-14
Payer: COMMERCIAL

## 2022-06-14 DIAGNOSIS — E11.628 TYPE 2 DIABETES MELLITUS WITH OTHER SKIN COMPLICATIONS (H): ICD-10-CM

## 2022-06-14 NOTE — TELEPHONE ENCOUNTER
Dulaglutide 3 MG/0.5ML SOPN 0.5 mL 11 3/2/2022  --   Sig - Route: Inject 3 mg Subcutaneous once a week - Subcutaneous   Sent to pharmacy as: Dulaglutide 3 MG/0.5ML Subcutaneous Solution Pen-injector   Class: E-Prescribe   Order: [id number]   E-Prescribing Status: Receipt confirmed by pharmacy (3/2/2022  8:46 AM CST)

## 2022-06-14 NOTE — TELEPHONE ENCOUNTER
Prior Authorization Retail Medication Request    Medication/Dose: Trulicity 1.5 mg   ICD code (if different than what is on RX):  E11.628  Previously Tried and Failed:  Been on this medication since 2020  Rationale:  Pt has been on dulaglutide, metformin, and glipizide for > 2 + years    Insurance Name:  [employer] CHOICE   Insurance ID:  [id number]       Pharmacy Information (if different than what is on RX)  Name:  [facility]   Phone:  [phone]    See  message w/ letter from insurance 6/14/2022 if needed.   PA required starting 8/15/22    Yanet HERNANDEZ RN

## 2022-06-14 NOTE — TELEPHONE ENCOUNTER
Routing to Ashley- please adv if pt should continue 3 mg weekly or decrease back down to 1.5 mg. If decreasing will need NEW RX.     Thanks    Yanet HERNANDEZ RN

## 2022-06-14 NOTE — TELEPHONE ENCOUNTER
Ashley called via telephone and gave verbal order for pt to decrease back down to dulaglutide 1.5 mg subcutaneous q 7 days w/ 6 refills.     Sent to pharm   Yanet HERNANDEZ RN       dulaglutide (TRULICITY) 1.5 MG/0.5ML pen 0.5 mL 6 6/14/2022  --   Sig - Route: Inject 1.5 mg Subcutaneous every 7 days - Subcutaneous

## 2022-06-17 NOTE — TELEPHONE ENCOUNTER
Central Prior Authorization Team   Phone: [phone]    PA Initiation    Medication:   Insurance Company:    Pharmacy Filling the Rx: [id number] - [place], [place] - [address]  Filling Pharmacy Phone: [phone]  Filling Pharmacy Fax: [phone]  Start Date: 6/17/2022

## 2022-06-20 NOTE — TELEPHONE ENCOUNTER
Prior Authorization Approval    Authorization Effective Date: 5/18/2022  Authorization Expiration Date: 6/17/2023  Medication: TRULICITY - APPROVED  Approved Dose/Quantity:    Reference #:     Insurance Company:    Expected CoPay:       CoPay Card Available:      Foundation Assistance Needed:    Which Pharmacy is filling the prescription (Not needed for infusion/clinic administered): [id number] - [place], [place] - [address]  Pharmacy Notified: Yes  Patient Notified: Yes  **Instructed pharmacy to notify patient when script is ready to /ship.**

## 2022-08-06 DIAGNOSIS — E78.5 HYPERLIPIDEMIA LDL GOAL <100: ICD-10-CM

## 2022-08-09 DIAGNOSIS — E11.9 TYPE 2 DIABETES MELLITUS WITHOUT COMPLICATION, WITHOUT LONG-TERM CURRENT USE OF INSULIN (H): ICD-10-CM

## 2022-08-10 RX ORDER — SIMVASTATIN 40 MG
TABLET ORAL
Qty: 90 TABLET | Refills: 2 | Status: SHIPPED | OUTPATIENT
Start: 2022-08-10 | End: 2023-03-06

## 2022-08-10 NOTE — TELEPHONE ENCOUNTER
LDL Cholesterol Calculated   Date Value Ref Range Status   03/02/2022 29 <=100 mg/dL Final   02/25/2021 62 <100 mg/dL Final     Comment:     Desirable:       <100 mg/dl     Refilled per [facility] protocol.  Mary Ellen Sahu RN

## 2022-08-14 RX ORDER — GLIPIZIDE 10 MG/1
TABLET, FILM COATED, EXTENDED RELEASE ORAL
Qty: 180 TABLET | Refills: 0 | Status: SHIPPED | OUTPATIENT
Start: 2022-08-14 | End: 2022-11-11

## 2022-08-18 DIAGNOSIS — E11.9 TYPE 2 DIABETES MELLITUS WITHOUT COMPLICATION, WITHOUT LONG-TERM CURRENT USE OF INSULIN (H): ICD-10-CM

## 2022-08-18 RX ORDER — METFORMIN HCL 500 MG
TABLET, EXTENDED RELEASE 24 HR ORAL
Qty: 120 TABLET | Refills: 0 | Status: SHIPPED | OUTPATIENT
Start: 2022-08-18 | End: 2022-09-13

## 2022-08-18 NOTE — TELEPHONE ENCOUNTER
Lab Results   Component Value Date    A1C 8.3 03/02/2022    A1C 7.4 08/04/2021    A1C 8.3 02/25/2021    A1C 8.0 08/28/2020    A1C 7.5 02/28/2020    A1C 6.4 08/02/2019    A1C 6.7 01/25/2019     Needs updated appointment and labs.    Sent message to pharmacy to Please advise patient to schedule an appointment with PCP for further refills.    Refill X 1    Mary Ellen Sahu RN  [facility]

## 2022-09-08 ENCOUNTER — TRANSFERRED RECORDS (OUTPATIENT)
Dept: HEALTH INFORMATION MANAGEMENT | Facility: CLINIC | Age: 57
End: 2022-09-08

## 2022-09-08 LAB — RETINOPATHY: NEGATIVE

## 2022-10-16 ENCOUNTER — HEALTH MAINTENANCE LETTER (OUTPATIENT)
Age: 57
End: 2022-10-16

## 2022-11-02 DIAGNOSIS — E11.9 TYPE 2 DIABETES MELLITUS WITHOUT COMPLICATION, WITHOUT LONG-TERM CURRENT USE OF INSULIN (H): ICD-10-CM

## 2022-11-03 RX ORDER — METFORMIN HCL 500 MG
TABLET, EXTENDED RELEASE 24 HR ORAL
Qty: 120 TABLET | Refills: 1 | OUTPATIENT
Start: 2022-11-03

## 2022-11-03 NOTE — TELEPHONE ENCOUNTER
Too soon - metFORMIN (GLUCOPHAGE XR) 500 MG 24 hr tablet 120 tablet 1 10/10/2022      Sara Samano RN

## 2022-11-30 ENCOUNTER — OFFICE VISIT (OUTPATIENT)
Dept: FAMILY MEDICINE | Facility: CLINIC | Age: 57
End: 2022-11-30
Payer: COMMERCIAL

## 2022-11-30 VITALS
HEART RATE: 78 BPM | DIASTOLIC BLOOD PRESSURE: 80 MMHG | TEMPERATURE: 97.7 F | WEIGHT: 235 LBS | OXYGEN SATURATION: 97 % | SYSTOLIC BLOOD PRESSURE: 136 MMHG | HEIGHT: 69 IN | RESPIRATION RATE: 12 BRPM | BODY MASS INDEX: 34.8 KG/M2

## 2022-11-30 DIAGNOSIS — F41.9 ANXIETY: ICD-10-CM

## 2022-11-30 DIAGNOSIS — S09.90XA CLOSED HEAD INJURY, INITIAL ENCOUNTER: ICD-10-CM

## 2022-11-30 DIAGNOSIS — E11.628 TYPE 2 DIABETES MELLITUS WITH OTHER SKIN COMPLICATIONS (H): ICD-10-CM
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
## 2022-11-30 DIAGNOSIS — M54.2 CERVICALGIA: ICD-10-CM

## 2022-11-30 DIAGNOSIS — F32.2 SEVERE DEPRESSION (H): ICD-10-CM

## 2022-11-30 LAB — HBA1C MFR BLD: 8 % (ref 0–5.6)

## 2022-11-30 PROCEDURE — 36415 COLL VENOUS BLD VENIPUNCTURE: CPT | Performed by: PHYSICIAN ASSISTANT

## 2022-11-30 PROCEDURE — 99215 OFFICE O/P EST HI 40 MIN: CPT | Performed by: PHYSICIAN ASSISTANT

## 2022-11-30 PROCEDURE — 83036 HEMOGLOBIN GLYCOSYLATED A1C: CPT | Performed by: PHYSICIAN ASSISTANT

## 2022-11-30 RX ORDER — CYCLOBENZAPRINE HCL 10 MG
10 TABLET ORAL
Qty: 10 TABLET | Refills: 0 | Status: SHIPPED | OUTPATIENT
Start: 2022-11-30 | End: 2022-12-12

## 2022-11-30 RX ORDER — FLUOXETINE 10 MG/1
10 CAPSULE ORAL DAILY
Qty: 30 CAPSULE | Refills: 1 | Status: ON HOLD | OUTPATIENT
Start: 2022-11-30 | End: 2022-12-07

## 2022-11-30 ASSESSMENT — ANXIETY QUESTIONNAIRES
8. IF YOU CHECKED OFF ANY PROBLEMS, HOW DIFFICULT HAVE THESE MADE IT FOR YOU TO DO YOUR WORK, TAKE CARE OF THINGS AT HOME, OR GET ALONG WITH OTHER PEOPLE?: SOMEWHAT DIFFICULT
GAD7 TOTAL SCORE: 17
7. FEELING AFRAID AS IF SOMETHING AWFUL MIGHT HAPPEN: NEARLY EVERY DAY
IF YOU CHECKED OFF ANY PROBLEMS ON THIS QUESTIONNAIRE, HOW DIFFICULT HAVE THESE PROBLEMS MADE IT FOR YOU TO DO YOUR WORK, TAKE CARE OF THINGS AT HOME, OR GET ALONG WITH OTHER PEOPLE: SOMEWHAT DIFFICULT
2. NOT BEING ABLE TO STOP OR CONTROL WORRYING: NEARLY EVERY DAY
GAD7 TOTAL SCORE: 17
6. BECOMING EASILY ANNOYED OR IRRITABLE: SEVERAL DAYS
7. FEELING AFRAID AS IF SOMETHING AWFUL MIGHT HAPPEN: NEARLY EVERY DAY
4. TROUBLE RELAXING: NEARLY EVERY DAY
3. WORRYING TOO MUCH ABOUT DIFFERENT THINGS: NEARLY EVERY DAY
5. BEING SO RESTLESS THAT IT IS HARD TO SIT STILL: SEVERAL DAYS
1. FEELING NERVOUS, ANXIOUS, OR ON EDGE: NEARLY EVERY DAY
GAD7 TOTAL SCORE: 17

## 2022-11-30 ASSESSMENT — PATIENT HEALTH QUESTIONNAIRE - PHQ9
SUM OF ALL RESPONSES TO PHQ QUESTIONS 1-9: 22
10. IF YOU CHECKED OFF ANY PROBLEMS, HOW DIFFICULT HAVE THESE PROBLEMS MADE IT FOR YOU TO DO YOUR WORK, TAKE CARE OF THINGS AT HOME, OR GET ALONG WITH OTHER PEOPLE: SOMEWHAT DIFFICULT
SUM OF ALL RESPONSES TO PHQ QUESTIONS 1-9: 22

## 2022-11-30 ASSESSMENT — PAIN SCALES - GENERAL: PAINLEVEL: SEVERE PAIN (6)

## 2022-11-30 NOTE — PROGRESS NOTES
Assessment & Plan     Everton was seen today for fall.    Diagnoses and all orders for this visit:    Fall due to slipping on ice or snow, initial encounter  Closed head injury, initial encounter  Cervicalgia  -     Discussed DDx including soft tissue contusion with associated cervical strain vs concussion given endorses some foggy vision, but otherwise feel low risk for intracranial process given no LOC, N/V, neuro changes or red flag HA. Discussed close monitoring and proceed to ER with any interval development of red flags. Encouraged cognitive rest in the interim. Hx complicated by some chronic neck issues at baseline including past cervical fusion in 2013. Discussed additional supportive management with muscle relaxer and pt would like to pursue. Reviewed potential risks and only to take prn.  Pt/ex-wife in agreement with plan.   - Primary Care - Care Coordination Referral; Future  -     cyclobenzaprine (FLEXERIL) 10 MG tablet; Take 1 tablet (10 mg) by mouth nightly as needed for muscle spasms  -     Primary Care - Care Coordination Referral; Future    Severe depression (H)  Anxiety  -     Interval hx complicated by worsening mood which pt reports typically happens around the holidays as is a trigger for him. Endorses passive SI and has thought of plan in past, but denies any active intention or current plan of action. I am able to verbally contract for safety. Discussed resumption of fluoxetine as has taken in past and believes was best tolerated with close follow-up with re-establishing therapy. Care coordination referral also placed to ensure follow-up/check-in with him. Ex-wife also here today and reports she will do the same. Given crisis information as well. Advised follow-up or proceed to ER with any active intent or worsening. Otherwise, recheck in 2 weeks. Also reviewed possibility that concussion can contribute to mood/behavior related change and could also consider concussion clinic referral for  "multifactorial treatment approach if any ongoing symptoms from above at 2 week recheck. Patient in agreement with plan.   - FLUoxetine (PROZAC) 10 MG capsule; Take 1 capsule (10 mg) by mouth daily  -     Adult Mental Health  Referral; Future  -     Primary Care - Care Coordination Referral; Future  -     Adult Mental Health  Referral; Future  -     Primary Care - Care Coordination Referral; Future    Type 2 diabetes mellitus with other skin complications (H)  -     Outside orders from PCP and pt would like to complete while here.  - HEMOGLOBIN A1C      >43 minutes spent on the date of the encounter doing chart review, history and exam, documentation and further activities per the note      Return in about 12 days (around 12/12/2022) for recheck with PCP.    Monique Roe PA-C  [facility]    Nima Arita is a 57 year old, accompanied by ex-wife, Marizol, presenting for the following health issues:  Fall      Fall    History of Present Illness     Reason for visit:  Fell on ice after slipping while shoveling around 10am   Feet came out from underneath him then fell flat on back and struck posterior head.  Did have a hat on, but had small abrasion and a little bleeding.  Doesn't feel he lost consciousness. No nausea or vomiting.   Pain in area of head where he fell. Endorses some HA - throbbing, rates as 6/10. Denies any diplopia, vision loss, first or worst HA. States vision feels a bit \"foggy\".   Endorses some L posterior neck pain that radiates into L upper back around shoulder blade.   Surghx: cervical fusion C5-6 with \"plate\".   Denies any radicular sx or UE weakness. Admits does have some baseline N/T thumb-3rd digit, but pt reports this is his baseline since surgery. Not new or worse since he fell.   Denies any baseline HA issues, but has had migraine in the past and feels HA is not as severe as this.     Symptom onset:  Today  Symptoms include:  Headache , neck " "and back pain   Symptom progression:  Staying the same  What makes it better:  Ice pack     He eats 0-1 servings of fruits and vegetables daily.He consumes 3 sweetened beverage(s) daily.He exercises with enough effort to increase his heart rate 30 to 60 minutes per day.  He exercises with enough effort to increase his heart rate 5 days per week.   He is taking medications regularly.    Today's PHQ-9         PHQ-9 Total Score: 22 - hasn't seen therapist for several years - \"Just dealing with it on my own\". Reports holidays are a trigger for him and so has had mood regression since that time. Feels prior to this had been in a better place the past 2 yrs.  \"Exercise is my best medicine\".   Has tried 3 different anti-depressants in past: prozac in his 20's, wellbutrin and lexapro. Not sure he had issues with any of them, but stopped as didn't feel he needed it anymore or if he found them too emotionally blunting - is not sure which he tolerated best or which was emotionally blunting. Would be amenable to resuming medication and considering therapy again.  PHQ-9 positive for SI - feels he would overdose on medication - Denies specific plan or intent, but has thought about what he would do in the past. Am able to verbally contract for safety and does have ex-wife here today with him who plans to check in on him.  Feels anxiety is a significant component of symptoms as well.     Has visit scheduled with PCP next 2 weeks - has orders for a1c he'd like to do while here today.    PHQ-9 Q9 Thoughts of better off dead/self-harm past 2 weeks :   Several days  Thoughts of suicide or self harm: (P) Yes  Self-harm Plan:   (P) Yes  Self-harm Action:     (P) No  Safety concerns for self or others: (P) No    How difficult have these problems made it for you to do your work, take care of things at home, or get along with other people: Somewhat difficult  Today's CHINMAY-7 Score: 17       Current Outpatient Medications   Medication     " "blood glucose (NO BRAND SPECIFIED) lancets standard     blood glucose (NO BRAND SPECIFIED) test strip     blood glucose monitoring (NO BRAND SPECIFIED) meter device kit     Blood Glucose Monitoring Suppl (ONETOUCH VERIO FLEX SYSTEM) w/Device KIT     doxycycline monohydrate (MONODOX) 100 MG capsule     dulaglutide (TRULICITY) 1.5 MG/0.5ML pen     glipiZIDE (GLUCOTROL XL) 10 MG 24 hr tablet     metFORMIN (GLUCOPHAGE XR) 500 MG 24 hr tablet     simvastatin (ZOCOR) 40 MG tablet     SOOLANTRA 1 % cream     blood glucose monitoring (ONE TOUCH DELICA) lancets     blood glucose monitoring (ONETOUCH VERIO IQ) test strip     blood glucose monitoring (SOFTCLIX) lancets     No current facility-administered medications for this visit.        Allergies   Allergen Reactions     Seasonal Allergies Other (See Comments)     Runny Nose/itchy eyes       Review of Systems   Constitutional, HEENT, cardiovascular, pulmonary, GI, , musculoskeletal, neuro, skin, endocrine and psych systems are negative, except as otherwise noted.      Objective    /80   Pulse 78   Temp 97.7  F (36.5  C) (Tympanic)   Resp 12   Ht 1.753 m (5' 9\")   Wt 106.6 kg (235 lb)   SpO2 97%   BMI 34.70 kg/m    Body mass index is 34.7 kg/m .  Physical Exam   GENERAL: healthy, alert and no distress  EYES: Eyes grossly normal to inspection, PERRL and conjunctivae and sclerae normal. EOMs intact and symmetric.   HENT: ear canals and TM's normal, nose and mouth without ulcers or lesions. No hemotympanum or schroeder signs.  NECK: no adenopathy and no asymmetry, masses, or scars  MSK: decreased ROM of neck secondary to L posterior neck and upper back pain especially with extension, L lateral bending and rotation. No midline cervical or thoracic spinous tenderness, but is tender to palpation of L posterior neck, upper back, periscapular region.   RESP: lungs clear to auscultation - no rales, rhonchi or wheezes  CV: regular rates and rhythm and no murmur, click or " rub  SKIN: linear 1inx1/4in superficial abrasion noted to posterior head without secondary infection or active bleeding. No significant swelling or ecchymosis is noted here.   NEURO: Normal strength and tone, sensory exam grossly normal and mentation intact. Equal and symmetric  strength. Difficulty obtaining bilateral biceps reflex, but symmetric. Normal 2+ brachioradialis and triceps.  PSYCH: mentation appears normal, tearful when discussing worsening mood.

## 2022-12-01 ENCOUNTER — PATIENT OUTREACH (OUTPATIENT)
Dept: CARE COORDINATION | Facility: CLINIC | Age: 57
End: 2022-12-01

## 2022-12-01 NOTE — LETTER
[facility] CARE COORDINATION    December 2, 2022    Everton Linn  [address] [place] [address]      Dear Everton,        I am a clinic care coordinator who works with VALDEZ Cowart CNP with the [facility]. I wanted to thank you for spending the time to talk with me.  Below is a description of clinic care coordination and how I can further assist you.       The clinic care coordination team is made up of a registered nurse, , financial resource worker and community health worker who understand the health care system. The goal of clinic care coordination is to help you manage your health and improve access to the health care system. Our team works alongside your provider to assist you in determining your health and social needs. We can help you obtain health care and community resources, providing you with necessary information and education. We can work with you through any barriers and develop a care plan that helps coordinate and strengthen the communication between you and your care team.    Please feel free to contact me with any questions or concerns regarding care coordination and what we can offer.      We are focused on providing you with the highest-quality healthcare experience possible.    Sincerely,     RENEE Dorsey   Care Coordination Team  [phone]      Enclosed: I have enclosed a copy of the Patient Centered Plan of Care. This has helpful information and goals that we have talked about. Please keep this in an easy to access place to use as needed.

## 2022-12-01 NOTE — LETTER
[facility] [place]  Patient Centered Plan of Care  About Me:        Patient Name:  Everton Linn    YOB: 1965  Age:         57 year old   [place] MRN:    [id number] Telephone Information:  Home Phone [phone]   Mobile [phone]       Address:  [address] [place] [address] Email address:  [email or web address]      Emergency Contact(s)    Name Relationship Lgl Grd Work Phone Home Phone Mobile Phone   1. CARMELITA LINN* Son    [phone]   2. JUDE TOVAR Ex-Spouse    [phone]   3. YOLANDE OROZCO Friend    [phone]   4. ANGELA JOSHUA Friend    [phone]           Primary language:  English     needed? No   [place] [facility]:  [phone] op. 1  Other communication barriers:None    Preferred Method of Communication:  Stephanie  Current living arrangement: I live alone    Mobility Status/ Medical Equipment: Independent        Health Maintenance  Health Maintenance Reviewed: Due/Overdue   Health Maintenance Due   Topic Date Due     ADVANCE CARE PLANNING  Never done     HEPATITIS B IMMUNIZATION (1 of 3 - 3-dose series) Never done     YEARLY PREVENTIVE VISIT  01/28/2011     Pneumococcal Vaccine: Pediatrics (0 to 5 Years) and At-Risk Patients (6 to 64 Years) (2 - PCV) 09/28/2013     DIABETIC FOOT EXAM  09/22/2021     COVID-19 Vaccine (4 - Booster for Pfizer series) 12/09/2021     INFLUENZA VACCINE (1) 09/01/2022     EYE EXAM  09/02/2022         My Access Plan  Medical Emergency 911   Primary Clinic Line [facility] [phone]   24 Hour Appointment Line [phone] or  [phone]) (toll-free)   24 Hour Nurse Line [phone] (toll-free)   Preferred Urgent Care No data recorded   Preferred Hospital [facility]  [phone]     Preferred Pharmacy [id number] - [place], [place] - [address]     Behavioral Health Crisis Line The National Suicide Prevention Lifeline at  1-867-346-3216 or Text/Call 988             My Care Team Members  Patient Care Team       Relationship Specialty Notifications Start End    Lindsay Arroyo APRN CNP PCP - General   11/6/02     Phone: [phone] Fax: [phone]         [address] [facility] [address]    Lindsay Arroyo APRN CNP Assigned PCP   10/4/12     Phone: [phone] Fax: [phone]         [address] [facility] [address]    Denise Sahu, [facility] Pharmacist Pharmacist  10/24/19     Phone: [phone] Fax: [phone] [address] [facility] [address]    Jerry Bello MD Assigned Surgical Provider   6/27/21     Phone: [phone] Fax: [phone]         [address] [facility] [address]    Slick Adams PA-C Assigned Musculoskeletal Provider   1/9/22     Phone: [phone] Pager: [phone] Fax: [phone] [address] [facility] [address]    Carmenza Byers, hospitals Clinic Care Coordinator Primary Care - [facility] Admissions 12/1/22     Phone: [phone]                 My Care Plans  Self Management and Treatment Plan  Care Plan  Care Plan: Mental Health     Problem: Mood/Psychosocial Concerns     Goal: Establish and Maintain Mental Health Support     Start Date: 12/1/2022 Expected End Date: 2/28/2023    Priority: High    Note:     Barriers: Long wait to get in with a [facility] Therapist  Finding a therapist I can connect with.    Strengths: I have supportive friends and family.  I get regular physical activity.  Patient expressed understanding of goal: Yes  Action steps to achieve this goal:  1. I will work with my PCP on medication management  2. I will establish with a therapist if my mood doesn't improve.   3. I will consider other mental health resources such as Intensive Outpatient Therapy if of interest.  4.  I will continue to exercise 3 - 5 times per week.     5.  I will let Care Coordination know if I need additional resources and/or  support.                          Action Plans on File:            Depression          Advance Care Plans/Directives Type:   Honoring Choices    Advance Care Planning and Health Care Directives  When it comes to decisions about your health care, it s important that your voice is heard. You may not always be able to speak for yourself.    We encourage you to have discussions with your loved ones, cultural or spiritual leaders and health care providers about your goals, values, beliefs and choices.    We are a part of [employer] [place] , supporting and promoting the benefits of advance care planning conversations.    Our goals are to:    Help you make informed decisions about your healthcare choices and ensure that those choices are honored    Offer advance care planning discussions with trained staff    Ensure your choices are clearly defined, documented and available in your medical record    Translate your choices into medical orders as needed    Why is Advance Care Planning important?    Know what your health care choices are and decide what is right for you    An unexpected illness or injury could leave you unable to participate in important treatment decisions    When choices are left to others to decide that responsibility can be difficult and stressful    By discussing and outlining your choices, your voice is heard in the care you want to receive    How can I learn more?  We offer free classes at multiple locations, days and times. Our trained facilitators will provide information and guide you through a Health Care Directive document. They can also review, notarize and add your document to your medical record.    Call [employer] at [phone] or toll free at [phone] for assistance.      My Medical and Care Information  Problem List   Patient Active Problem List   Diagnosis     Dermatophytosis of nail     Ventral hernia     SANTOS (obstructive sleep apnea)     Type 2 diabetes  mellitus with other skin complications (H)     Cervical stenosis of spine     Morbid obesity (H)     Hyperlipidemia LDL goal <70     History of colonic polyps     Major depressive disorder, recurrent, in full remission (H)      Current Medications and Allergies:  See printed Medication Report.    Care Coordination Start Date: 11/30/2022   Frequency of Care Coordination: monthly     Form Last Updated: 12/02/2022

## 2022-12-02 ASSESSMENT — ACTIVITIES OF DAILY LIVING (ADL): DEPENDENT_IADLS:: INDEPENDENT

## 2022-12-02 NOTE — PROGRESS NOTES
Clinic Care Coordination Contact    Clinic Care Coordination Contact  OUTREACH    Referral Information:  Referral Source: PCP    Primary Diagnosis: Other (include Comment box)    Chief Complaint   Patient presents with     Clinic Care Coordination - Initial     Mental Health        [place] Utilization:   Clinic Utilization  Difficulty keeping appointments:: No  Compliance Concerns: No  No-Show Concerns: No  No PCP office visit in Past Year: No  Utilization    Hospital Admissions  1             ED Visits  0             No Show Count (past year)  0                Current as of: 12/1/2022  3:59 PM              Clinical Concerns:  Current Medical Concerns:   Patient Active Problem List   Diagnosis     Dermatophytosis of nail     Ventral hernia     SANTOS (obstructive sleep apnea)     Type 2 diabetes mellitus with other skin complications (H)     Cervical stenosis of spine     Morbid obesity (H)     Hyperlipidemia LDL goal <70     History of colonic polyps     Major depressive disorder, recurrent, in full remission (H)    CC referral placed to assist with connecting Juan J with mental naz support.  Juan J shared that he has struggled with depression for much of his life.  He stated that he has worked with many therapists and it is difficult to find one that he feels comfortable with.   He would like to wait to see if the Fluoxetine helps with his mood before scheduling with a therapist.  When Ivett Boss explained that [facility] therapists are scheduled out several months he was agreeable to scheduling an appointment and knowing he can cancel if is not needed, He stated that he prefers to see a [facility] therapist.  Agreed that IVETT Boss will call again in two weeks and check in  if he would like to get in with a therapist sooner through [facility] where you can can usually find appointment availability within a few weeks.    IVETT BOSS shared additional mental health support resources including IOP programs and the [place] Mental health Warm Line.  He  requested that these be sent to him via email.  Writer communicated the risks of unencrypted electronic communication and the patient and/or patient representative has agreed to accept the risks and receive unencrypted communication related to the information or resources we have discussed. We reviewed that no PHI will be included.  Email address verified with the patient.  Will include electronic communication form for patient/caregiver to complete.     Juan J likes his job, has good friend support and enjoys going to the gym up to five times per week.  He had been doing well with his [employer] up until recently.  He reported that he often only sleeps 3 - 4 hours per night.         Current Behavioral Concerns: Depression conncers    Education Provided to patient: CC role, mental health resources.  Emailed the following information to him:  Thanks for talking with me yesterday.  Here are some resources that may be of interest.    Mental Health Resources     Steps to care for yourself     1. If you are currently in counseling, call your counselor for an appointment  2. Call the local crisis resources below if needed.  3. Contact friends or family for support.  4. Get more exercise.  5. Do activities you enjoy.  6. Eat a well-balanced diet and drink plenty of fluids.  7. Rest as needed.  8. Limit alcohol and recreational drugs. These can worsen depression.     Crisis Resources      These hotlines are for both adults and children. The and are open 24 hours a day, 7 days a week unless noted otherwise.     Crisis Response  [facility] Crisis Response Unit (CRU)   provides 24-hour phone and face-to-face crisis intervention and consultation. Call [phone].    National Suicide Prevention Lifeline   4-308-911-HASR (2214)     Crisis Text Line                       www.crisistextline.org  Text HOME to 553136 from anywhere in the [place], anytime, about any type of crisis. A live, trained crisis counselor will  receive the text and respond quickly.      [facility]- crisis center located at [facility]. Open 24/7 with in-person assistance for any mental health crisis.   [email or web address] ([place])    [facility] (free remote counseling)  https://walkin.org/        [place] Warm Line  Looking for support? Need to talk?   We know what it s like to struggle with your mental health, and we re here to listen.  The [place] Warmline is safe, anonymous, confidential, free, and here to help you when you need it.  Open Monday-Saturday, 12 PM to 10 PM  [phone]  Toll Free 855-WARMLINE or [phone]  or text  Support  to 37670  The [place] Warmline takes more than 15,000 calls per year from across the [facility], and provides support and connection with others. For many people, the Warmline is an important tool that helps them before they reach a point of crisis and supports their mental health.  Calls are answered by our team of professionally trained Certified Peer Specialists, who have first hand experience living with a mental health condition. And if a caller is in need of more help, our Warmline can directly connect them to the nearest crisis services.    Intensive Outpatient Therapy Programs  [facility]    Video and telephone visits available    Immediate openings and same-day visits    Call [phone] to schedule   [facility] and [facility] offer full-service, outpatient treatment programs for adults of any age who are facing depression, anxiety, or other mental health concerns. Our experienced, compassionate caregivers help patients find the treatment options that will work best for their unique situation.  We start with a diagnostic assessment to determine which of our programs will best meet the patient s needs. Following admission, our team develops a personalized treatment plan.  Our  group-based programs provide:     A doctor or a certified nurse practitioner for consultation     A multidisciplinary care team of licensed professionals, including a psychotherapist, occupational therapist, and a nurse    A therapeutic environment where patients feel supported by peers who share the desire to regain stability and find peace of mind     A focus on symptom stabilization, stress reduction, wellness and improved day-to-day function    A discharge plan to support the recovery process  Programs available at [facility]    Partial Hospitalization - Program for individuals experiencing a significant change in their ability to function due to symptoms of depression and anxiety. Designed for those new to mental health services to reduce likelihood of hospitalization through intensive programming and psychiatric care.   o 5 days a week, 9:00 a.m. - 3:00 p.m.; Every other Tuesday, 1:00 - 6:00 p.m.    Dual Disorder - Program that specializes in the unique needs of individuals who have a mental health condition that may be complicated by the use of substances.  o 5 days a week, 9:00 a.m. - 12:00 p.m. or 11:00 a.m. - 2:00 p.m.    Adult Day Treatment - Program for individuals who are newly diagnosed or those who are experiencing an increase in symptoms from prolonged illness. Ten specialty tracks designed to meet individual needs of group members.  o 3 days a week, 9:00 a.m. - 12:00 p.m. or 1:00 - 4:00 p.m.    55+ Outpatient Program - The only program of its kind in the [place]. Our expertise is in helping people age 55+ navigate through life transitions. Five specialty tracks designed to meet individual unique needs.   o 2 days a week, 10:00 a.m. - 3:00 p.m.  Programs available at [facility]   Mindfulness - Eight-week group teaches mindfulness skills to reduce depression symptoms.     Dual Disorder - Program specializes in the unique needs of individuals who have a mental health condition that may be  complicated by the use of substances. Meets 4 days per week (MTWF) 9:00 a.m. - 12:00p.m.  [place] Mental Health IOP Programs  Intensive Outpatient - [facility] - [facility] ([email or web address])      Please feel free to call me if I can help with anything.           Pain  Pain (GOAL):: No  Health Maintenance Reviewed: Due/Overdue   Health Maintenance Due   Topic Date Due     ADVANCE CARE PLANNING  Never done     HEPATITIS B IMMUNIZATION (1 of 3 - 3-dose series) Never done     YEARLY PREVENTIVE VISIT  01/28/2011     Pneumococcal Vaccine: Pediatrics (0 to 5 Years) and At-Risk Patients (6 to 64 Years) (2 - PCV) 09/28/2013     DIABETIC FOOT EXAM  09/22/2021     COVID-19 Vaccine (4 - Booster for Pfizer series) 12/09/2021     INFLUENZA VACCINE (1) 09/01/2022     EYE EXAM  09/02/2022     Clinical Pathway: None    Medication Management:  Medication review status: Medications reviewed and no changes reported per patient.             Functional Status:  Dependent ADLs:: Independent  Dependent IADLs:: Independent  Bed or wheelchair confined:: No  Mobility Status: Independent    Living Situation:  Current living arrangement:: I live alone  Type of residence:: Private home - stairs    Lifestyle & Psychosocial Needs:    Social Determinants of Health     Tobacco Use: Low Risk      Smoking Tobacco Use: Never     Smokeless Tobacco Use: Never     Passive Exposure: Not on file   Alcohol Use: Not on file   Financial Resource Strain: Not on file   Food Insecurity: Not on file   Transportation Needs: Not on file   Physical Activity: Not on file   Stress: Not on file   Social Connections: Not on file   Intimate Partner Violence: Not on file   Depression: At risk     PHQ-2 Score: 6   Housing Stability: Not on file     Inadequate nutrition (GOAL):: No  Tube Feeding: No  Inadequate activity/exercise (GOAL):: No  Significant changes in sleep pattern (GOAL): No  Transportation means:: Accessible car     Judaism or  spiritual beliefs that impact treatment:: No  Mental health DX:: Yes  Mental health DX how managed:: Medication  Mental health management concern (GOAL):: Yes  Informal Support system:: Family, Friends             Resources and Interventions:  Current Resources:      Community Resources: None  Supplies Currently Used at Home: Diabetic Supplies  Equipment Currently Used at Home: none  Employment Status: employed full-time         Advance Care Plan/Directive  Advanced Care Plans/Directives on file:: No    Referrals Placed: Mental Health         Care Plan:  Care Plan: Mental Health     Problem: Mood/Psychosocial Concerns     Goal: Establish and Maintain Mental Health Support     Start Date: 12/1/2022 Expected End Date: 2/28/2023    Priority: High    Note:     Barriers: Long wait to get in with a [facility] Therapist  Finding a therapist I can connect with.    Strengths: I have supportive friends and family.  I get regular physical activity.  Patient expressed understanding of goal: Yes  Action steps to achieve this goal:  1. I will work with my PCP on medication management  2. I will establish with a therapist if my mood doesn't improve.   3. I will consider other mental health resources such as Intensive Outpatient Therapy if of interest.  4.  I will continue to exercise 3 - 5 times per week.     5.  I will let Care Coordination know if I need additional resources and/or support.                         Patient/Caregiver understanding: Yes    Outreach Frequency: monthly  Future Appointments              In 1 week Lindsay Arroyo APRN CNP [facility] TEVIN Esparza    In 3 months Susan Aldana LICSW [facility] MARINA    In 3 months Susan Aldana LICSW [facility] & [facility] MARINA          Plan: Juan J has Diabetes check with PCP on 12/12/22.  He will review the  resources that TOY MARTINEZ emails to him.  TOY MARTINEZ will outreach again in 2 weeks.

## 2022-12-04 ENCOUNTER — HOSPITAL ENCOUNTER (EMERGENCY)
Facility: CLINIC | Age: 57
Discharge: PSYCHIATRIC OR SUBSTANCE USE FACILITY | End: 2022-12-05
Attending: EMERGENCY MEDICINE | Admitting: EMERGENCY MEDICINE
Payer: COMMERCIAL

## 2022-12-04 ENCOUNTER — TELEPHONE (OUTPATIENT)
Dept: BEHAVIORAL HEALTH | Facility: CLINIC | Age: 57
End: 2022-12-04

## 2022-12-04 DIAGNOSIS — F10.920 ALCOHOLIC INTOXICATION WITHOUT COMPLICATION (H): ICD-10-CM

## 2022-12-04 DIAGNOSIS — F33.2 SEVERE EPISODE OF RECURRENT MAJOR DEPRESSIVE DISORDER, WITHOUT PSYCHOTIC FEATURES (H): ICD-10-CM

## 2022-12-04 DIAGNOSIS — T50.902A SUICIDE ATTEMPT BY DRUG INGESTION, INITIAL ENCOUNTER (H): ICD-10-CM

## 2022-12-04 DIAGNOSIS — R45.851 SUICIDAL IDEATION: ICD-10-CM

## 2022-12-04 LAB
ALBUMIN SERPL BCG-MCNC: 4.1 G/DL (ref 3.5–5.2)
ALP SERPL-CCNC: 49 U/L (ref 40–129)
ALT SERPL W P-5'-P-CCNC: 22 U/L (ref 10–50)
AMPHETAMINES UR QL SCN: NORMAL
ANION GAP SERPL CALCULATED.3IONS-SCNC: 13 MMOL/L (ref 7–15)
APAP SERPL-MCNC: <5 UG/ML (ref 10–30)
AST SERPL W P-5'-P-CCNC: 23 U/L (ref 10–50)
BARBITURATES UR QL SCN: NORMAL
BASOPHILS # BLD AUTO: 0 10E3/UL (ref 0–0.2)
BASOPHILS NFR BLD AUTO: 1 %
BENZODIAZ UR QL SCN: NORMAL
BILIRUB SERPL-MCNC: 0.4 MG/DL
BUN SERPL-MCNC: 5.2 MG/DL (ref 6–20)
BZE UR QL SCN: NORMAL
CALCIUM SERPL-MCNC: 8.2 MG/DL (ref 8.6–10)
CANNABINOIDS UR QL SCN: NORMAL
CHLORIDE SERPL-SCNC: 104 MMOL/L (ref 98–107)
CREAT SERPL-MCNC: 0.75 MG/DL (ref 0.67–1.17)
DEPRECATED HCO3 PLAS-SCNC: 22 MMOL/L (ref 22–29)
EOSINOPHIL # BLD AUTO: 0.1 10E3/UL (ref 0–0.7)
EOSINOPHIL NFR BLD AUTO: 3 %
ERYTHROCYTE [DISTWIDTH] IN BLOOD BY AUTOMATED COUNT: 12.5 % (ref 10–15)
ETHANOL SERPL-MCNC: 0.1 G/DL
GFR SERPL CREATININE-BSD FRML MDRD: >90 ML/MIN/1.73M2
GLUCOSE SERPL-MCNC: 141 MG/DL (ref 70–99)
HCT VFR BLD AUTO: 37.5 % (ref 40–53)
HGB BLD-MCNC: 12.8 G/DL (ref 13.3–17.7)
IMM GRANULOCYTES # BLD: 0 10E3/UL
IMM GRANULOCYTES NFR BLD: 0 %
LYMPHOCYTES # BLD AUTO: 2.1 10E3/UL (ref 0.8–5.3)
LYMPHOCYTES NFR BLD AUTO: 40 %
MCH RBC QN AUTO: 30.9 PG (ref 26.5–33)
MCHC RBC AUTO-ENTMCNC: 34.1 G/DL (ref 31.5–36.5)
MCV RBC AUTO: 91 FL (ref 78–100)
MONOCYTES # BLD AUTO: 0.3 10E3/UL (ref 0–1.3)
MONOCYTES NFR BLD AUTO: 6 %
NEUTROPHILS # BLD AUTO: 2.6 10E3/UL (ref 1.6–8.3)
NEUTROPHILS NFR BLD AUTO: 50 %
NRBC # BLD AUTO: 0 10E3/UL
NRBC BLD AUTO-RTO: 0 /100
OPIATES UR QL SCN: NORMAL
PLATELET # BLD AUTO: 249 10E3/UL (ref 150–450)
POTASSIUM SERPL-SCNC: 3.3 MMOL/L (ref 3.4–5.3)
PROT SERPL-MCNC: 6.4 G/DL (ref 6.4–8.3)
RBC # BLD AUTO: 4.14 10E6/UL (ref 4.4–5.9)
SALICYLATES SERPL-MCNC: <0.5 MG/DL
SARS-COV-2 RNA RESP QL NAA+PROBE: NEGATIVE
SODIUM SERPL-SCNC: 139 MMOL/L (ref 136–145)
WBC # BLD AUTO: 5.2 10E3/UL (ref 4–11)

## 2022-12-04 PROCEDURE — 36415 COLL VENOUS BLD VENIPUNCTURE: CPT | Performed by: EMERGENCY MEDICINE

## 2022-12-04 PROCEDURE — 99285 EMERGENCY DEPT VISIT HI MDM: CPT | Mod: 25

## 2022-12-04 PROCEDURE — 85014 HEMATOCRIT: CPT | Performed by: EMERGENCY MEDICINE

## 2022-12-04 PROCEDURE — 80143 DRUG ASSAY ACETAMINOPHEN: CPT | Performed by: EMERGENCY MEDICINE

## 2022-12-04 PROCEDURE — 250N000013 HC RX MED GY IP 250 OP 250 PS 637: Performed by: EMERGENCY MEDICINE

## 2022-12-04 PROCEDURE — 82077 ASSAY SPEC XCP UR&BREATH IA: CPT | Performed by: EMERGENCY MEDICINE

## 2022-12-04 PROCEDURE — 80179 DRUG ASSAY SALICYLATE: CPT | Performed by: EMERGENCY MEDICINE

## 2022-12-04 PROCEDURE — 80307 DRUG TEST PRSMV CHEM ANLYZR: CPT | Performed by: EMERGENCY MEDICINE

## 2022-12-04 PROCEDURE — 80053 COMPREHEN METABOLIC PANEL: CPT | Performed by: EMERGENCY MEDICINE

## 2022-12-04 PROCEDURE — U0005 INFEC AGEN DETEC AMPLI PROBE: HCPCS | Performed by: EMERGENCY MEDICINE

## 2022-12-04 PROCEDURE — C9803 HOPD COVID-19 SPEC COLLECT: HCPCS

## 2022-12-04 PROCEDURE — 90791 PSYCH DIAGNOSTIC EVALUATION: CPT

## 2022-12-04 RX ORDER — FLUOXETINE 10 MG/1
10 CAPSULE ORAL DAILY
Status: DISCONTINUED | OUTPATIENT
Start: 2022-12-04 | End: 2022-12-05 | Stop reason: HOSPADM

## 2022-12-04 RX ORDER — IBUPROFEN 600 MG/1
600 TABLET, FILM COATED ORAL EVERY 6 HOURS PRN
Status: DISCONTINUED | OUTPATIENT
Start: 2022-12-04 | End: 2022-12-05 | Stop reason: HOSPADM

## 2022-12-04 RX ORDER — LIDOCAINE 40 MG/G
CREAM TOPICAL
Status: DISCONTINUED | OUTPATIENT
Start: 2022-12-04 | End: 2022-12-04

## 2022-12-04 RX ORDER — HYDROXYZINE HYDROCHLORIDE 25 MG/1
25 TABLET, FILM COATED ORAL EVERY 4 HOURS PRN
Status: DISCONTINUED | OUTPATIENT
Start: 2022-12-04 | End: 2022-12-05 | Stop reason: HOSPADM

## 2022-12-04 RX ORDER — ACETAMINOPHEN 325 MG/1
650 TABLET ORAL EVERY 4 HOURS PRN
Status: DISCONTINUED | OUTPATIENT
Start: 2022-12-04 | End: 2022-12-05 | Stop reason: HOSPADM

## 2022-12-04 RX ORDER — GLIPIZIDE 10 MG/1
20 TABLET, FILM COATED, EXTENDED RELEASE ORAL
Status: DISCONTINUED | OUTPATIENT
Start: 2022-12-04 | End: 2022-12-05 | Stop reason: HOSPADM

## 2022-12-04 RX ORDER — METFORMIN HCL 500 MG
1000 TABLET, EXTENDED RELEASE 24 HR ORAL 2 TIMES DAILY WITH MEALS
Status: DISCONTINUED | OUTPATIENT
Start: 2022-12-04 | End: 2022-12-05 | Stop reason: HOSPADM

## 2022-12-04 RX ORDER — OLANZAPINE 5 MG/1
10 TABLET, ORALLY DISINTEGRATING ORAL 2 TIMES DAILY PRN
Status: DISCONTINUED | OUTPATIENT
Start: 2022-12-04 | End: 2022-12-05 | Stop reason: HOSPADM

## 2022-12-04 RX ADMIN — FLUOXETINE 10 MG: 10 CAPSULE ORAL at 10:56

## 2022-12-04 RX ADMIN — GLIPIZIDE 20 MG: 10 TABLET, EXTENDED RELEASE ORAL at 10:56

## 2022-12-04 RX ADMIN — METFORMIN HYDROCHLORIDE 1000 MG: 500 TABLET, EXTENDED RELEASE ORAL at 17:51

## 2022-12-04 ASSESSMENT — COLUMBIA-SUICIDE SEVERITY RATING SCALE - C-SSRS
5. HAVE YOU STARTED TO WORK OUT OR WORKED OUT THE DETAILS OF HOW TO KILL YOURSELF? DO YOU INTEND TO CARRY OUT THIS PLAN?: NO
4. HAVE YOU HAD THESE THOUGHTS AND HAD SOME INTENTION OF ACTING ON THEM?: YES
LETHALITY/MEDICAL DAMAGE CODE FIRST POTENTIAL ATTEMPT: BEHAVIOR LIKELY TO RESULT IN DEATH DESPITE AVAILABLE MEDICAL CARE
TOTAL  NUMBER OF ABORTED OR SELF INTERRUPTED ATTEMPTS LIFETIME: YES
REASONS FOR IDEATION PAST MONTH: COMPLETELY TO END OR STOP THE PAIN (YOU COULDN'T GO ON LIVING WITH THE PAIN OR HOW YOU WERE FEELING)
TOTAL  NUMBER OF INTERRUPTED ATTEMPTS LIFETIME: 0
LETHALITY/MEDICAL DAMAGE CODE MOST LETHAL POTENTIAL ATTEMPT: BEHAVIOR LIKELY TO RESULT IN DEATH DESPITE AVAILABLE MEDICAL CARE
FIRST ATTEMPT DATE: 66446
MOST RECENT DATE: 66446
TOTAL  NUMBER OF ABORTED OR SELF INTERRUPTED ATTEMPTS SINCE LAST CONTACT: 1
LETHALITY/MEDICAL DAMAGE CODE MOST LETHAL ACTUAL ATTEMPT: DEATH
6. HAVE YOU EVER DONE ANYTHING, STARTED TO DO ANYTHING, OR PREPARED TO DO ANYTHING TO END YOUR LIFE?: NO
5. HAVE YOU STARTED TO WORK OUT OR WORKED OUT THE DETAILS OF HOW TO KILL YOURSELF? DO YOU INTEND TO CARRY OUT THIS PLAN?: YES
LETHALITY/MEDICAL DAMAGE CODE FIRST ACTUAL ATTEMPT: DEATH
2. HAVE YOU ACTUALLY HAD ANY THOUGHTS OF KILLING YOURSELF?: YES
1. HAVE YOU WISHED YOU WERE DEAD OR WISHED YOU COULD GO TO SLEEP AND NOT WAKE UP?: YES
4. HAVE YOU HAD THESE THOUGHTS AND HAD SOME INTENTION OF ACTING ON THEM?: NO
3. HAVE YOU BEEN THINKING ABOUT HOW YOU MIGHT KILL YOURSELF?: YES
LETHALITY/MEDICAL DAMAGE CODE MOST RECENT POTENTIAL ATTEMPT: BEHAVIOR LIKELY TO RESULT IN DEATH DESPITE AVAILABLE MEDICAL CARE
TOTAL  NUMBER OF INTERRUPTED ATTEMPTS PAST 3 MONTHS: NO
TOTAL  NUMBER OF ABORTED OR SELF INTERRUPTED ATTEMPTS PAST 3 MONTHS: YES
LETHALITY/MEDICAL DAMAGE CODE MOST RECENT ACTUAL ATTEMPT: DEATH
TOTAL  NUMBER OF ACTUAL ATTEMPTS PAST 3 MONTHS: 1
TOTAL  NUMBER OF INTERRUPTED ATTEMPTS LIFETIME: YES
ATTEMPT LIFETIME: YES
1. IN THE PAST MONTH, HAVE YOU WISHED YOU WERE DEAD OR WISHED YOU COULD GO TO SLEEP AND NOT WAKE UP?: YES
MOST LETHAL DATE: 66446
REASONS FOR IDEATION LIFETIME: COMPLETELY TO END OR STOP THE PAIN (YOU COULDN'T GO ON LIVING WITH THE PAIN OR HOW YOU WERE FEELING)
2. HAVE YOU ACTUALLY HAD ANY THOUGHTS OF KILLING YOURSELF?: YES
TOTAL  NUMBER OF ABORTED OR SELF INTERRUPTED ATTEMPTS LIFETIME: 1
ATTEMPT PAST THREE MONTHS: YES
TOTAL  NUMBER OF ACTUAL ATTEMPTS LIFETIME: 1

## 2022-12-04 ASSESSMENT — ACTIVITIES OF DAILY LIVING (ADL)
ADLS_ACUITY_SCORE: 35

## 2022-12-04 ASSESSMENT — ENCOUNTER SYMPTOMS: DYSPHORIC MOOD: 1

## 2022-12-04 NOTE — ED NOTES
Intake called by writer, spoke with Adrian, updated on pt's use of CPAP at night so it doesn't become a barrier if placement is obtained. Adrian to update pt's info on worklist.

## 2022-12-04 NOTE — CONSULTS
"Diagnostic Evaluation Consultation  Crisis Assessment    Patient was assessed: Jimbo  Patient location: [facility] ED  Was a release of information signed: Yes. Providers included on the release: [facility]      Referral Data and Chief Complaint  [place] (\"Juan J\") is a 57 year old, who uses he/him pronouns, and presents to the ED via EMS. Patient is referred to the ED by family/friends. Patient is presenting to the ED for the following concerns: Pt reports he had been drinking with friends last night, returned home and continued drinking alone, and proceeded to take several prescription medications (Flexaril, gabapentin, Hydrocodone) in a succinct gesture to end his life. Pt FRANCIS at admit was .10. Pt reports that there was no specific incident or trigger, but that he struggles with increased depression every winter and during holiday season. Pt states that he has had occasional and infrequent suicidal ideations (states he has struggled with depression for \"probably 30 years\"), with a vague plan to overdose, but has never made any gestures and has not required psychiatric hospitalization. Pt adds that he recently was asked to reduce the intensity of his interpersonal relationship with a woman he had been dating, and that he also fell and injured himself last Wednesday. Pt states that last night he was listening to music and would take a pill whenever a new song came on, and intended to die. Pt reports that he remains severely depressed and continues to have suicidal ideations, and recognizes the need for inpatient hospitalization in order to maintain safety.     Informed Consent and Assessment Methods     Patient is his own guardian. Writer met with patient and explained the crisis assessment process, including applicable information disclosures and limits to confidentiality, assessed understanding of the process, and obtained consent to proceed with the assessment. Patient was observed to be able to " "participate in the assessment as evidenced by verbal consent. Assessment methods included conducting a formal interview with patient, review of medical records, collaboration with medical staff, and obtaining relevant collateral information from family and community providers when available..     Over the course of this crisis assessment provided reassurance, offered validation and engaged patient in problem solving and disposition planning. Patient's response to interventions was positive.       Summary of Patient Situation  See above. Pt reports that he was originally diagnosed with Major Depressive Disorder in his 20's, following his first (of three) divorce, and has struggled with depressive symptoms (including suicidal ideations) for \"probably 30 years\". He indicates that his symptoms increase in severity every winter, and are more pronounced around the holidays; he states that he lost both is parents around the holiday season. He shares that in addition to his anticipated increase in depressive symptoms, he was recently asked by a woman he has dated to reduce the intensity of their relationship. He also reports that he fell and injured himself last Wednesday while shoveling snow. He identified no other potential triggers or causes for increase in his suicidal ideation, but does endorse that his plan was to end his life, and that he has always had a vague plan to overdose.    Of note, the pt reports that he has seen various therapists in the past 30 years, and has also been prescribed psychotropic medications (including Prozac, Wellbutrin, and Lexapro), but has avoided any type of treatment or intervention for the past 4-5 years. Following his recent fall, he was seen by a provider that re-started the pt on Prozac (just past Wednesday). He denies any past hospitalizations or other address of his symptoms.       Brief Psychosocial History  Pt resides by himself in [place], [place]. He reports that he has been "  three times; most recent was in 2017, although he remains close with his ex-wife, who incidentally notified LE when pt called her and interrupted his suicide attempt. Pt does have a 22YO son, who resides in [place], [place], but has infrequent contact. He does report having a number of social/interpresonal relationships. He is currently employed, and works from home. He has no legal concerns. He denies the use of any illicit drugs, and only occasionaly uses alcohol. He has never been committed. He has had various outpatient therapy engagements, as well as psychotropic medication management of symptoms, but these have been absent the past 4-5 years, per self-report.         Significant Clinical History  Pt reports that he was originally diagnosed with depression following his first divorce, when he was in his 20's. See above for more detail.         Collateral Information  None available; pt lives alone, and indicated no specific informants of note.     Risk Assessment  [place] Suicide Severity Rating Survey was completed.     Does the patient have thoughts of harming others? No     Is the patient engaging in sexually inappropriate behavior?  No       Current Substance Abuse     Is there recent substance abuse? no     Was a urine drug screen or blood alcohol level obtained: Yes No drugs of abuse; FRANCIS was .10       Mental Status Exam     Affect: Flat   Appearance: Appropriate    Attention Span/Concentration: Attentive  Eye Contact: Variable   Fund of Knowledge: Appropriate    Language /Speech Content: Fluent   Language /Speech Volume: Normal    Language /Speech Rate/Productions: Articulate    Recent Memory: Intact   Remote Memory: Intact   Mood: Depressed, Sad and Other: tearful    Orientation to Person: Yes    Orientation to Place: Yes   Orientation to Time of Day: Yes    Orientation to Date: Yes    Situation (Do they understand why they are here?): Yes    Psychomotor Behavior: Normal    Thought Content:  Suicidal   Thought Form: Intact      History of commitment: No       Medication    Psychotropic medications: Yes. Pt is currently taking Prozac. Medication compliant: Yes. Recent medication changes: Yes started on 11/30/22  Medication changes made in the last two weeks: Yes       Current Care Team    Primary Care Provider: Yes. Name: VALDEZ Cowart, CNP. Location: [facility]. Date of last visit: N/A. Frequency: N/A. Perceived helpfulness: Yes.  Psychiatrist: No  Therapist: No  : No     CTSS or ARMHS: No  ACT Team: No  Other: No      Diagnosis    296.33 (F33.2) Major Depressive Disorder, Recurrent Episode, Severe _ and With anxious distress       Clinical Summary and Substantiation of Recommendations    See above for context of pts emotional state.   Admission to Inpatient Level of Care is indicated due to:    1. Patient risk of severity of behavioral health disorder is appropriate to proposed level of care as indicated by:    Imminent Risk of Harm: Very Recent suicide attempt or deliberate act of serious harm to self WITHOUT relief of factors precipitating the attempt or act  And/or:  Behavioral health disorder is present and appropriate for inpatient care with both of the following:     Severe psychiatric, behavioral or other comorbid conditions are appropriate for management at inpatient mental health as indicated by at least one of the following:   o Impaired impulse control, judgement, or insight    Severe dysfunction in daily living is present as indicated by at least one of the following:   o Other evidence of severe dysfunction    2. Inpatient mental health services are necessary to meet patient needs and at least one of the following:  Specific condition related to admission diagnosis is present and judged likely to further improve at proposed level of care    3. Situation and expectations are appropriate for inpatient care, as indicated by one of the following:   Patient  management/treatment at lower level of care is not feasible or is inappropriate    Disposition    Recommended disposition: Inpatient Mental Health       Reviewed case and recommendations with attending provider. Attending Name: Dr. Jeet Dinero       Attending concurs with disposition: Yes       Patient concurs with disposition: Yes       Guardian concurs with disposition: NA      Final disposition: Inpatient mental health .     Inpatient Details (if applicable):   Is patient admitted voluntarily:Yes      Patient aware of potential for transfer if there is not appropriate placement? Yes       Patient is willing to travel outside of the [facility] for placement? No      Behavioral Intake Notified? Yes: Date: 12/4/22 Time: 7:59am.       Was lethal means counseling provided as a part of aftercare planning? No;       Assessment Details    Patient interview started at: 7:11am and completed at: 7:46am.     Total duration spent on the patient case in minutes: 1.25 hrs      CPT code(s) utilized: 38128 - Psychotherapy for Crisis - 60 (30-74*) min       Clement Silva LP, Psychotherapist  DEC - Triage & Transition Services  Callback: [phone]

## 2022-12-04 NOTE — ED PROVIDER NOTES
Patient signed out to me at 7 AM.  Currently awaiting a DEC assessment for suicide attempt by ingestion.  Currently on KIKI hold.  DEC followed up with the patient and they are in agreement that he is at high risk of suicide.  Plan will be to keep in the ED until we are able to find out the health inpatient treatment.  Patient is medically clear for inpatient psychiatric treatment.  Will place on 72-hour hold for now.      Jeet Dinero MD  12/04/22 0755

## 2022-12-04 NOTE — ED NOTES
Pt's belongings bag and patient's drawstring backpack placed in DEC office. Pt reports his CPAP machine is in the grey drawstring backpack. Pt has his cell phone at bedside.

## 2022-12-04 NOTE — ED TRIAGE NOTES
Pt brought in by EMS for intoxication, suicidal ideation, and purposeful ingestion of 4 flexeril, 4 gabapentin, and 4 hydrocodone. Pt has hx of depression and it is worse around the holidays d/t loss of family members around this time. Pt slipped on the ice Wednesday and went to PCP for head/neck pain and was sent home with rx for flexeril and restarted on Prozac.      Triage Assessment     Row Name 12/04/22 0229       Triage Assessment (Adult)    Airway WDL WDL       Respiratory WDL    Respiratory WDL WDL       Skin Circulation/Temperature WDL    Skin Circulation/Temperature WDL WDL       Cardiac WDL    Cardiac WDL WDL       Peripheral/Neurovascular WDL    Peripheral Neurovascular WDL WDL       Cognitive/Neuro/Behavioral WDL    Cognitive/Neuro/Behavioral WDL X    Level of Consciousness somnolent    Arousal Level arouses to voice;opens eyes spontaneously    Orientation oriented x 4    Speech slow    Mood/Behavior flat affect;hypoactive (quiet, withdrawn);cooperative       [place] Coma Scale    Best Eye Response 4-->(E4) spontaneous    Best Motor Response 6-->(M6) obeys commands    Best Verbal Response 5-->(V5) oriented    [place] Coma Scale Score 15

## 2022-12-04 NOTE — ED PROVIDER NOTES
History     Chief Complaint:  Suicidal and Ingestion     The history is provided by the patient and the EMS personnel.      Everton Linn is a 57 year old male with history of type II diabetes mellitus, hyperlipidemia, and depression who presents via EMS for alcohol intoxication and ingestion of 4 Flexeril, 4 Norco, and 4 Gabapentin tonight. Patient says he took these medications in efforts to kill himself. He denies taking any other medications today. He has a history of depression that is worse during the holidays, as both of his parents passed away in December. Of note, patient slipped on the ice a few days ago and was seen by primary care. He was prescribed Flexeril and restarted on Prozac for his depression at this time.      Review of Systems   Psychiatric/Behavioral: Positive for dysphoric mood and suicidal ideas.   All other systems reviewed and are negative.    Allergies:  Seasonal Allergies    Medications:  Flexeril  Monodox  Trulicity   Prozac  Glucotrol  Glucophage   Zocor     Past Medical History:     Ventral hernia  SANTOS  Type II diabetes mellitus  Cervical stenosis  Morbid obesity   Hyperlipidemia  Colonic polyps  MDD   Seasonal allergic rhinitis   Anxiety   Mild intermittent asthma   Osteoarthritis   Colon polyps     Past Surgical History:    Arthroscopy knee, bilateral  Cervical spinal fusion  Discectomy lumbar minimally invasive one level, left  Inguinal herniorrhaphy, right  Laparoscopic cholecystectomy   [employer] teeth extraction   Removal of pyogenic granuloma of right anterior septum    Family History:    Mother: CAD, CHF, diabetes  Father: HTN, multiple myeloma   Son: Asthma     Social History:  The patient presents to the ED alone via EMS  PCP: Lindsay Arroyo APRN CNP, Family Medicine     Physical Exam     Patient Vitals for the past 24 hrs:   BP Temp Temp src Pulse Resp SpO2   12/04/22 0231 160/85 98.1  F (36.7  C) Oral 93 14 96 %   12/04/22 0225 -- -- -- -- -- 97 %     Physical  Exam  Nursing note and vitals reviewed.  Constitutional: Cooperative. Tired appearing.   HENT:   Mouth/Throat: Mucous membranes are dry.   Cardiovascular: Normal rate, regular rhythm and normal heart sounds.  No murmur.  Pulmonary/Chest: Effort normal and breath sounds normal. No respiratory distress. No wheezes. No rales.   Abdominal: Soft. Normal appearance. There is no tenderness.   Neurological: Alert. Oriented x4.  Skin: Skin is warm and dry.   Psychiatric: Depressed mood and affect with suicidal ideations. No hallucinations.    Emergency Department Course     Laboratory:  Labs Ordered and Resulted from Time of ED Arrival to Time of ED Departure   COMPREHENSIVE METABOLIC PANEL - Abnormal       Result Value    Sodium 139      Potassium 3.3 (*)     Chloride 104      Carbon Dioxide (CO2) 22      Anion Gap 13      Urea Nitrogen 5.2 (*)     Creatinine 0.75      Calcium 8.2 (*)     Glucose 141 (*)     Alkaline Phosphatase 49      AST 23      ALT 22      Protein Total 6.4      Albumin 4.1      Bilirubin Total 0.4      GFR Estimate >90     ACETAMINOPHEN LEVEL - Abnormal    Acetaminophen <5.0 (*)    ETHYL ALCOHOL LEVEL - Abnormal    Alcohol ethyl 0.10 (*)    CBC WITH PLATELETS AND DIFFERENTIAL - Abnormal    WBC Count 5.2      RBC Count 4.14 (*)     Hemoglobin 12.8 (*)     Hematocrit 37.5 (*)     MCV 91      MCH 30.9      MCHC 34.1      RDW 12.5      Platelet Count 249      % Neutrophils 50      % Lymphocytes 40      % Monocytes 6      % Eosinophils 3      % Basophils 1      % Immature Granulocytes 0      NRBCs per 100 WBC 0      Absolute Neutrophils 2.6      Absolute Lymphocytes 2.1      Absolute Monocytes 0.3      Absolute Eosinophils 0.1      Absolute Basophils 0.0      Absolute Immature Granulocytes 0.0      Absolute NRBCs 0.0     SALICYLATE LEVEL - Normal    Salicylate <0.5     DRUG ABUSE SCREEN 1 URINE (ED) - Normal    Amphetamines Urine Screen Negative      Barbituates Urine Screen Negative      Benzodiazepine  Urine Screen Negative      Cannabinoids Urine Screen Negative      Cocaine Urine Screen Negative      Opiates Urine Screen Negative     COVID-19 VIRUS (CORONAVIRUS) BY PCR - pending       Reviewed:  I reviewed nursing notes, vitals, past medical history and Care Everywhere    Assessments:  0224 I obtained history and examined the patient as noted above.   0620 I rechecked the patient and explained findings.     Disposition:  Care of the patient was transferred to my colleague Dr. Dinero pending DEC assessment.     Impression & Plan     Medical Decision Making:  Mr. Kaur is a 57 year old gentleman who presents with depression, suicidal ideation, as well as ingestion of Gabapentin, Flexeril, and Norco. He states this is an attempt at self harm. He is mildly intoxicated with alcohol at this time. He is otherwise cooperative and shows no signs of psychosis. Basic toxicologic/metabolic screening has been reassuring to this point. No co-ingestions on his drug abuse screen. He has metabolized his ingestion at this time. Plan of care will be DEC mental health evaluation later this morning and I anticipate admission to mental health given his actions and ongoing thoughts of suicidal ideation at this time.     Diagnosis:    ICD-10-CM    1. Severe episode of recurrent major depressive disorder, without psychotic features (H)  F33.2       2. Suicidal ideation  R45.851       3. Alcoholic intoxication   F10.920       4. Suicide attempt by drug ingestion (vicodin/gabapentin/flexeril), initial encounter  T50.902A           Scribe Disclosure:  I, Zaria Whalen, am serving as a scribe at 2:21 AM on 12/4/2022 to document services personally performed by Tima Esteves MD based on my observations and the provider's statements to me.            Tima Esteves MD  12/04/22 0635

## 2022-12-04 NOTE — TELEPHONE ENCOUNTER
S: [facility] ED, DEC  Clement calling at 8:01 AM.  57 year old/Male presenting with SI and SA    B: Pt arrived via EMS. Presenting problem: EMS contacted by ex wife due to SI gesture, patient interrupted his own gesture and proceed to call his ex wife who proceeded to call EMS. TESFAYE on arrival was 0.1. Patient endorses a SA taking 4 gabapentin, 4 flexeril, 4 hydrocodone. Patient endorses depression for 30 years, diagnosed in 20s, and his symptoms are worse during the winter and the holidays.     Pt affect in ED: flat, not emotional, tearful  Pt Dx: Major Depressive Disorder  Previous IP hx? No  Hx of suicide attempt? No  Pt endorses SI: has infrequent thoughts of SI   Pt denies SIB.   Pt denies HI.   Pt denies hallucinations.     Hx of aggression/violence, sexual offences, or Epic care plan? Please describe: none  Current concerns for aggression this visit? No    Pt is prescribed medication. Started prozac last 4 days.   Is patient medication compliant? Yes  Pt denies OP services: four days ago  CD concerns: etoh and no concerns for withdrawal  Acute or chronic medical concerns: four days ago pt fell on ice suffered a concussion but it medically cleared, DM II does not use an insulin pump     Does Pt present with any of the following: mobility aids, insulin pump, J/G tube, catheter, CPAP, continuous IV, continuous O2, bariatric needs, ADA needs? Yes, uses a CPAP    Does pt have a history of Civil Commitment? No  Is Pt their own guardian? Yes   Pt is ambulatory  Pt is able to perform ADLs independently      A: Pt meets criteria for review for [facility] admission. Patient on a 72HH. Preferred placement: [facility]-would prefer [facility].   COVID: Negative  Utox: Negative  CMP: Abnormalities: Potassium 3.3, urea nitrogen 5.2, Calcium 8.2, glucose 141  CBC: Abnormalities: RBC 4.14, hemoglobin 12.8, hematocrit 37.5  HCG: N/A    R: Patient cleared and ready for behavioral bed placement: Yes  Pt placed on [facility] worklist? Yes        10:37 AM Intake paged provider for presentation onto [facility] bed on station 30.     10:53 AM Intake received call from Dr. Miller that patient would need an SIO and private room as patient uses a CPAP.     Intake Morning Bed Search (Done at 10:57 AM)    [facility] is posting 0 beds.   [facility] is posting 0 beds.  [facility] is posting 0 beds. 11:59 AM Intake called facility since [facility] access website has not been updated. CPAP is exclusionary for facility.   [facility] is posting 0 beds.  [facility] is posting 0 beds.  [facility] is posting 0 beds.  [facility] is posting 0 beds.  [facility] is posting 0 beds. Low acuity.    [facility] is posting 0 bed. Mixed unit 12+. Low acuity only.   [facility] is posting 0 beds. No aggression.   [facility] is posting 0 beds.   [facility] is posting 0 beds. Low acuity only.  [facility] is posting 0 beds. Low acuity only.   [facility] is posting 0 beds. 0 acute, 0 med psych, 0 mood disorder- very low acuity.   [facility] is posting 3 beds. Low acuity. 11:36 AM Intake called and spoke to Marizol, bed available for patient review.   Pending sale to [facility] is posting 2 beds. Low acuity only. 72 hr hold required.     [facility] is posting 0 beds. Vol only, No Hx of aggression, violence, or assault. No sexual offenders. No 72 hr holds.    [facility] is posting 9 beds. (Must have the cognitive ability to do programming. No aggressive or violent behavior or recent HX in the last 2 yrs. MH must be primary). 12:00 PM Intake called, facility at capacity.   [facility] is posting 0 beds. Low acuity only. Violence and aggression capped.   [facility] is posting 1 bed. Low acuity, Neg Covid. 12:00 PM Intake called facility, no discharges and facility at capacity.   [facility] is posting 0 beds. Covid neg. Vol only. Combined adolescent and adult unit. No aggressive  or violent behavior. No registered sex offenders.   [facility] is posting 6 beds. No Covid needed. Call for details. 12:01 PM Intake called, no beds available, patient is on 72HH and not appropriate for facility.   [facility] is posting 3 beds. Mixed Unit (13+). Low acuity only. Patient would not have ride home from facility, will not review here.      [facility] [place] location at capacity. The pt remains on the waitlist. Intake continues to identify appropriate bed placement.    11:52 AM Intake faxed clinical and labs to [facility] for review.

## 2022-12-04 NOTE — PLAN OF CARE
Everton MATHIS Kaveh  December 4, 2022  Plan of Care Hand-off Note     Patient Care Path: Inpatient Mental Health    Plan for Care:     Pt is being referred for inpatient mental health placement. Pt reports continued suicidal ideation and depressive symptoms. Pt is agreeable to placement at this time, as he recognizes his depression has become unmanageable as evidenced by suicide gesture last night. Pt is calm, cooperative, and not oppositional in any way.    Critical Safety Issues: Suicidal; pt made overt gesture of self-harm by taking numerous prescription drugs in attempt to end life. Pt remains somewhat suicidal, per self-report.    Overview:  This patient is a child/adolescent: No    This patient has additional special visitor precautions: No    Legal Status: Voluntary, but holdable due to overt suicide gesture    Contacts:       Psychiatry Consult:  Adult Psychiatry Consult requested related to ongoing and persistent depression with suicidal ideations. Psychiatry IP Consult Order Placed: No Request that attending medical provider make referral, pending eventual placement    Updated Attending Provider regarding plan of care.    Clement Silva, LP

## 2022-12-04 NOTE — PHARMACY-ADMISSION MEDICATION HISTORY
Admission medication history interview status for this patient is complete. See [facility] admission navigator for allergy information, prior to admission medications and immunization status.     Medication history interview done, indicate source(s): Patient  Medication history resources (including written lists, pill bottles, clinic record):favian  Pharmacy: [id number]    Changes made to PTA medication list:  Added: none  Changed: none  Reported as Not Taking: n/a  Removed: Soolantra cream     Actions taken by pharmacist (provider contacted, etc):Spoke with ED provider     Additional medication history information:     -Looks like fluoxetine was recently started on 11/30  -Trulicity: pt takes on Tuesday.   -Does not appear that the gabapentin and Norco he overdosed on were prescribed to him (not found his refill records, not on PTA med list and pt did not mention taking them regularly).     Medication reconciliation/reorder completed by provider prior to medication history?  N   (Y/N)     Prior to Admission medications    Medication Sig Last Dose Taking? Auth Provider Long Term End Date   cyclobenzaprine (FLEXERIL) 10 MG tablet Take 1 tablet (10 mg) by mouth nightly as needed for muscle spasms 12/3/2022 Yes Monique Roe PA-C No    doxycycline monohydrate (MONODOX) 100 MG capsule 1 CAPSULE BY MOUTH DAILY 12/3/2022 Yes Reported, Patient     FLUoxetine (PROZAC) 10 MG capsule Take 1 capsule (10 mg) by mouth daily 12/3/2022 Yes Monique Roe PA-C Yes    metFORMIN (GLUCOPHAGE XR) 500 MG 24 hr tablet TAKE 2 TABLETS BY MOUTH 2 TIMES DAILY WITH MEALS 12/3/2022 Yes Lindsay Arroyo APRN CNP Yes    simvastatin (ZOCOR) 40 MG tablet TAKE 1 TABLET BY MOUTH EVERYDAY AT BEDTIME 12/3/2022 Yes Lindsay Arroyo APRN CNP Yes    dulaglutide (TRULICITY) 1.5 MG/0.5ML pen Inject 1.5 mg Subcutaneous every 7 days 11/29/2022  Lindsay Arroyo APRN CNP No    glipiZIDE (GLUCOTROL XL) 10 MG 24 hr tablet  TAKE 2 TABLETS BY MOUTH EVERY DAY   Lindsay Arroyo APRN CNP Yes

## 2022-12-04 NOTE — ED NOTES
Bed: ED13  Expected date:   Expected time:   Means of arrival:   Comments:  EMS - 57M  ETOH, SI, Ingestion

## 2022-12-05 ENCOUNTER — HOSPITAL ENCOUNTER (INPATIENT)
Facility: CLINIC | Age: 57
LOS: 2 days | Discharge: HOME | DRG: 885 | End: 2022-12-07
Attending: PSYCHIATRY & NEUROLOGY | Admitting: PSYCHIATRY & NEUROLOGY
Payer: COMMERCIAL

## 2022-12-05 VITALS
SYSTOLIC BLOOD PRESSURE: 137 MMHG | RESPIRATION RATE: 18 BRPM | DIASTOLIC BLOOD PRESSURE: 78 MMHG | OXYGEN SATURATION: 99 % | TEMPERATURE: 98.6 F | HEART RATE: 87 BPM

## 2022-12-05 DIAGNOSIS — F33.2 MDD (MAJOR DEPRESSIVE DISORDER), RECURRENT SEVERE, WITHOUT PSYCHOSIS (H): Primary | ICD-10-CM

## 2022-12-05 DIAGNOSIS — M48.02 CERVICAL STENOSIS OF SPINE: ICD-10-CM

## 2022-12-05 PROCEDURE — 250N000013 HC RX MED GY IP 250 OP 250 PS 637: Performed by: EMERGENCY MEDICINE

## 2022-12-05 PROCEDURE — 250N000013 HC RX MED GY IP 250 OP 250 PS 637: Performed by: PSYCHIATRY & NEUROLOGY

## 2022-12-05 PROCEDURE — 124N000002 HC R&B MH UMMC

## 2022-12-05 RX ORDER — OLANZAPINE 10 MG/2ML
10 INJECTION, POWDER, FOR SOLUTION INTRAMUSCULAR 3 TIMES DAILY PRN
Status: DISCONTINUED | OUTPATIENT
Start: 2022-12-05 | End: 2022-12-06

## 2022-12-05 RX ORDER — METFORMIN HCL 500 MG
1000 TABLET, EXTENDED RELEASE 24 HR ORAL 2 TIMES DAILY WITH MEALS
Status: DISCONTINUED | OUTPATIENT
Start: 2022-12-06 | End: 2022-12-07 | Stop reason: HOSPADM

## 2022-12-05 RX ORDER — TRAZODONE HYDROCHLORIDE 50 MG/1
50 TABLET, FILM COATED ORAL
Status: DISCONTINUED | OUTPATIENT
Start: 2022-12-05 | End: 2022-12-06

## 2022-12-05 RX ORDER — NICOTINE POLACRILEX 4 MG
15-30 LOZENGE BUCCAL
Status: DISCONTINUED | OUTPATIENT
Start: 2022-12-05 | End: 2022-12-07 | Stop reason: HOSPADM

## 2022-12-05 RX ORDER — SIMVASTATIN 40 MG
40 TABLET ORAL AT BEDTIME
Status: DISCONTINUED | OUTPATIENT
Start: 2022-12-05 | End: 2022-12-07 | Stop reason: HOSPADM

## 2022-12-05 RX ORDER — GLIPIZIDE 10 MG/1
20 TABLET, FILM COATED, EXTENDED RELEASE ORAL DAILY
Status: DISCONTINUED | OUTPATIENT
Start: 2022-12-06 | End: 2022-12-07 | Stop reason: HOSPADM

## 2022-12-05 RX ORDER — DOXYCYCLINE 50 MG/1
100 CAPSULE ORAL DAILY
Status: DISCONTINUED | OUTPATIENT
Start: 2022-12-05 | End: 2022-12-05 | Stop reason: RX

## 2022-12-05 RX ORDER — DOXYCYCLINE 100 MG/1
100 CAPSULE ORAL DAILY
Status: DISCONTINUED | OUTPATIENT
Start: 2022-12-05 | End: 2022-12-05 | Stop reason: HOSPADM

## 2022-12-05 RX ORDER — DOXYCYCLINE 100 MG/1
100 CAPSULE ORAL DAILY
Status: DISCONTINUED | OUTPATIENT
Start: 2022-12-06 | End: 2022-12-07 | Stop reason: HOSPADM

## 2022-12-05 RX ORDER — AMOXICILLIN 250 MG
1 CAPSULE ORAL 2 TIMES DAILY PRN
Status: DISCONTINUED | OUTPATIENT
Start: 2022-12-05 | End: 2022-12-07 | Stop reason: HOSPADM

## 2022-12-05 RX ORDER — ACETAMINOPHEN 325 MG/1
650 TABLET ORAL EVERY 4 HOURS PRN
Status: DISCONTINUED | OUTPATIENT
Start: 2022-12-05 | End: 2022-12-07 | Stop reason: HOSPADM

## 2022-12-05 RX ORDER — MAGNESIUM HYDROXIDE/ALUMINUM HYDROXICE/SIMETHICONE 120; 1200; 1200 MG/30ML; MG/30ML; MG/30ML
30 SUSPENSION ORAL EVERY 4 HOURS PRN
Status: DISCONTINUED | OUTPATIENT
Start: 2022-12-05 | End: 2022-12-07 | Stop reason: HOSPADM

## 2022-12-05 RX ORDER — FLUOXETINE 10 MG/1
10 CAPSULE ORAL DAILY
Status: DISCONTINUED | OUTPATIENT
Start: 2022-12-06 | End: 2022-12-06

## 2022-12-05 RX ORDER — OLANZAPINE 10 MG/1
10 TABLET ORAL 3 TIMES DAILY PRN
Status: DISCONTINUED | OUTPATIENT
Start: 2022-12-05 | End: 2022-12-06

## 2022-12-05 RX ORDER — DEXTROSE MONOHYDRATE 25 G/50ML
25-50 INJECTION, SOLUTION INTRAVENOUS
Status: DISCONTINUED | OUTPATIENT
Start: 2022-12-05 | End: 2022-12-07 | Stop reason: HOSPADM

## 2022-12-05 RX ORDER — SIMVASTATIN 20 MG
40 TABLET ORAL AT BEDTIME
Status: DISCONTINUED | OUTPATIENT
Start: 2022-12-05 | End: 2022-12-05 | Stop reason: HOSPADM

## 2022-12-05 RX ORDER — HYDROXYZINE HYDROCHLORIDE 25 MG/1
25 TABLET, FILM COATED ORAL EVERY 4 HOURS PRN
Status: DISCONTINUED | OUTPATIENT
Start: 2022-12-05 | End: 2022-12-07 | Stop reason: HOSPADM

## 2022-12-05 RX ADMIN — METFORMIN HYDROCHLORIDE 1000 MG: 500 TABLET, EXTENDED RELEASE ORAL at 18:54

## 2022-12-05 RX ADMIN — SIMVASTATIN 40 MG: 40 TABLET, FILM COATED ORAL at 22:41

## 2022-12-05 RX ADMIN — FLUOXETINE 10 MG: 10 CAPSULE ORAL at 12:57

## 2022-12-05 RX ADMIN — DOXYCYCLINE HYCLATE 100 MG: 100 CAPSULE ORAL at 12:57

## 2022-12-05 RX ADMIN — METFORMIN HYDROCHLORIDE 1000 MG: 500 TABLET, EXTENDED RELEASE ORAL at 12:57

## 2022-12-05 RX ADMIN — GLIPIZIDE 20 MG: 10 TABLET, EXTENDED RELEASE ORAL at 12:57

## 2022-12-05 ASSESSMENT — ACTIVITIES OF DAILY LIVING (ADL)
ADLS_ACUITY_SCORE: 35
ADLS_ACUITY_SCORE: 30
ADLS_ACUITY_SCORE: 35

## 2022-12-05 ASSESSMENT — LIFESTYLE VARIABLES
AUDIT-C TOTAL SCORE: 5
SKIP TO QUESTIONS 9-10: 0

## 2022-12-05 NOTE — ED NOTES
Patient continues to be cooperative, meal tray ordered for patient and brought to room. Patient requests IV be removed. Writer spoke with provider and removed IV.

## 2022-12-05 NOTE — ED NOTES
Pt remains calm and cooperative for overnight shift. Pt's brother brought pt supper and visited for a short period of time. Pt asked for and used c-pap machine that he brought from home for sleep.

## 2022-12-05 NOTE — TELEPHONE ENCOUNTER
R: Bed Search Update 11:30PM  [place]: No beds available  [facility] Locations - [place], [facility], [facility], [place], [place], [place] - Per phone call with Rosy from patient placement, no beds available for review at any location  [facility]: Per phone call with Gaudencio in assessment, No beds available  [facility]: Per phone call with Chrissie in assessment, No beds available  [facility]: Per phone call with Sara, No beds available  [facility] Locations - [facility], [place], [place], [place], [place], [place]: Per phone call with MUNDO in intake, No beds available  [facility]: Per phone call with Moisés in assessment, No beds available  [place]: Per phone call with Depea, No beds available  [facility], [place], [place]: per phone call with Lan in intake, No beds available  [facility]: Per call with Nedra, No beds available  [facility]: Per Lula, VOL and LOW acuity Pts only, pt declined due to medical Hx.  [facility]: [place], No beds available  [facility]: Per Nellie in BH unit, No beds available  [place]: Per website, No beds available  [facility]: Per phone call with Sena, No beds available  [facility]: Per phone call with Radha in intake, no beds available.    Pt remains on intake work list awaiting appropriate placement.

## 2022-12-05 NOTE — ED NOTES
ED patient signout note    57-year-old male awaiting mental health bed availability and transfer secondary to suicidal ideation and decompensated depression.  Cooperative during my shift.    On a 72-hour hold.    Home meds ordered.    As needed medications and diet orders are in.      ICD-10-CM    1. Severe episode of recurrent major depressive disorder, without psychotic features (H)  F33.2       2. Suicidal ideation  R45.851       3. Alcoholic intoxication   F10.920       4. Suicide attempt by drug ingestion (vicodin/gabapentin/flexeril), initial encounter  T50.902A         MD Benedict Richard Jerome Richard, MD  12/05/22 0004

## 2022-12-06 DIAGNOSIS — E11.9 TYPE 2 DIABETES MELLITUS WITHOUT COMPLICATION, WITHOUT LONG-TERM CURRENT USE OF INSULIN (H): ICD-10-CM

## 2022-12-06 LAB
CHOLEST SERPL-MCNC: 169 MG/DL
GLUCOSE BLDC GLUCOMTR-MCNC: 151 MG/DL (ref 70–99)
GLUCOSE BLDC GLUCOMTR-MCNC: 180 MG/DL (ref 70–99)
HDLC SERPL-MCNC: 42 MG/DL
LDLC SERPL CALC-MCNC: 93 MG/DL
NONHDLC SERPL-MCNC: 127 MG/DL
POTASSIUM BLD-SCNC: 4.4 MMOL/L (ref 3.4–5.3)
TRIGL SERPL-MCNC: 169 MG/DL
TSH SERPL DL<=0.005 MIU/L-ACNC: 1.71 MU/L (ref 0.4–4)

## 2022-12-06 PROCEDURE — 250N000009 HC RX 250: Performed by: PSYCHIATRY & NEUROLOGY

## 2022-12-06 PROCEDURE — G0177 OPPS/PHP; TRAIN & EDUC SERV: HCPCS

## 2022-12-06 PROCEDURE — H2032 ACTIVITY THERAPY, PER 15 MIN: HCPCS

## 2022-12-06 PROCEDURE — 250N000013 HC RX MED GY IP 250 OP 250 PS 637: Performed by: NURSE PRACTITIONER

## 2022-12-06 PROCEDURE — 84443 ASSAY THYROID STIM HORMONE: CPT | Performed by: PSYCHIATRY & NEUROLOGY

## 2022-12-06 PROCEDURE — 84132 ASSAY OF SERUM POTASSIUM: CPT | Performed by: PSYCHIATRY & NEUROLOGY

## 2022-12-06 PROCEDURE — 36415 COLL VENOUS BLD VENIPUNCTURE: CPT | Performed by: PSYCHIATRY & NEUROLOGY

## 2022-12-06 PROCEDURE — 250N000013 HC RX MED GY IP 250 OP 250 PS 637: Performed by: PSYCHIATRY & NEUROLOGY

## 2022-12-06 PROCEDURE — 99223 1ST HOSP IP/OBS HIGH 75: CPT | Mod: AI | Performed by: NURSE PRACTITIONER

## 2022-12-06 PROCEDURE — 124N000002 HC R&B MH UMMC

## 2022-12-06 PROCEDURE — 250N000009 HC RX 250: Performed by: PSYCHOLOGIST

## 2022-12-06 PROCEDURE — 80061 LIPID PANEL: CPT | Performed by: PSYCHIATRY & NEUROLOGY

## 2022-12-06 RX ORDER — RAMELTEON 8 MG/1
8 TABLET ORAL
Status: DISCONTINUED | OUTPATIENT
Start: 2022-12-06 | End: 2022-12-07 | Stop reason: HOSPADM

## 2022-12-06 RX ORDER — IBUPROFEN 600 MG/1
600 TABLET, FILM COATED ORAL EVERY 6 HOURS PRN
Status: DISCONTINUED | OUTPATIENT
Start: 2022-12-06 | End: 2022-12-07 | Stop reason: HOSPADM

## 2022-12-06 RX ORDER — BUPROPION HYDROCHLORIDE 150 MG/1
150 TABLET ORAL DAILY
Status: DISCONTINUED | OUTPATIENT
Start: 2022-12-06 | End: 2022-12-07 | Stop reason: HOSPADM

## 2022-12-06 RX ADMIN — SIMVASTATIN 40 MG: 40 TABLET, FILM COATED ORAL at 20:58

## 2022-12-06 RX ADMIN — GLIPIZIDE 20 MG: 10 TABLET, FILM COATED, EXTENDED RELEASE ORAL at 08:28

## 2022-12-06 RX ADMIN — METFORMIN HYDROCHLORIDE 1000 MG: 500 TABLET, EXTENDED RELEASE ORAL at 18:45

## 2022-12-06 RX ADMIN — FLUOXETINE 10 MG: 10 CAPSULE ORAL at 08:28

## 2022-12-06 RX ADMIN — DOXYCYCLINE 100 MG: 100 CAPSULE ORAL at 08:27

## 2022-12-06 RX ADMIN — DULAGLUTIDE 1.5 MG: 1.5 INJECTION, SOLUTION SUBCUTANEOUS at 01:54

## 2022-12-06 RX ADMIN — METFORMIN HYDROCHLORIDE 1000 MG: 500 TABLET, EXTENDED RELEASE ORAL at 08:27

## 2022-12-06 RX ADMIN — BUPROPION HYDROCHLORIDE 150 MG: 150 TABLET, EXTENDED RELEASE ORAL at 12:06

## 2022-12-06 ASSESSMENT — ACTIVITIES OF DAILY LIVING (ADL)
ORAL_HYGIENE: INDEPENDENT
ADLS_ACUITY_SCORE: 30
ADLS_ACUITY_SCORE: 30
DRESS: SCRUBS (BEHAVIORAL HEALTH);INDEPENDENT
ADLS_ACUITY_SCORE: 30
HYGIENE/GROOMING: INDEPENDENT
ADLS_ACUITY_SCORE: 30
LAUNDRY: WITH SUPERVISION
ADLS_ACUITY_SCORE: 30

## 2022-12-06 NOTE — PLAN OF CARE
Problem: Psychotic Signs/Symptoms  Goal: Improved Psychomotor Symptoms (Psychotic Signs/Symptoms)  Outcome: Adequate for Care Transition     Problem: Suicidal Behavior  Goal: Suicidal Behavior is Absent or Managed  Outcome: Adequate for Care Transition   Goal Outcome Evaluation:

## 2022-12-06 NOTE — ED NOTES
Triage & Transition Services, Extended Care     Everton Linn  2022    Juan J is followed related to Long wait time for admission: 38+ hours. Please see initial DEC Crisis Assessment for complete assessment information. Medical record is reviewed. While patient is in the ED, care team is working towards Learn and Demonstrate at Least One Skill Focused on Crisis Stabilization.     Additional notes include: Met briefly with patient in ED06 (less than 10 minutes), shortly after he had given his dinner order to RN at 5:00PM. Patient confirmed that he is willing for inpatient.  hospitalization. He stated that he prefers to transfer within the [facility] area only. The patient verbalized understanding of his situation, including events that led to police being called to his home for welfare check and his being brought to ED.     As patient states that he is willing for inpatient hospitalization, and he has been calm and cooperative in ED including taking prescribed medications, pre-petition screening for MH commitment not recommended at this time.     Recommend ED care team to continue care coordination with  Intake regarding patient transfer to IP MH unit.      Plan:  Inpatient Mental Health: Continue to recommend IP MH admission for patient safety and stabilization due to suicide attempt via intentional overdose on multiple medications in combination with alcohol. Patient continues to express and display depressed mood and states he is willing for hospitalization. As of time of this note, patient has been accepted by inpatient attending psychiatrist Dr. Miller to [facility]. Time for transport not yet set. Patient is currently on a 72 HH placed by [facility] ED physician yesterday, which would   at 2359.     Plan for Care reviewed with Assigned Medical Provider? Yes. Provider, Dr. Horan, response: concurs with plan.     Extended Care will follow and meet with patient/family/care team as able or  requested.     MARIBEL CERVANTES M.Ed., [facility], [facility]  Licensed Mental Health Professional  Triage and Transition Services - [facility] Care [phone]    [facility] workstation [phone]  [facility] [phone]

## 2022-12-06 NOTE — PLAN OF CARE
12/06/22 1519   Group Therapy Session   Group Attendance attended group session   Time Session Began 1320   Time Session Ended 1400   Total Time (minutes) 40   Total # Attendees 2   Group Type psychotherapeutic   Group Topic Covered balanced lifestyle   Group Session Detail Self-awareness and values   Patient Response/Contribution listened actively;cooperative with task;discussed personal experience with topic   Patient Participation Detail Pt was an active participant. He was able to define and identify his own personal values and identify how they influence his thoughts, feelings and actions. He was able to express how his values impact his symptoms and ways he can enhance behaviors to live in congruence with his values.

## 2022-12-06 NOTE — PHARMACY-ADMISSION MEDICATION HISTORY
Please see Admission Medication History note completed on 12/4/22 under previous encounter for information regarding prior to admission medications.    John Song, PharmD, BCPS, BCPP

## 2022-12-06 NOTE — PLAN OF CARE
Occupational Therapy Group Note:       12/06/22 1322   Group Therapy Session   Group Attendance attended group session   Time Session Began 1125   Time Session Ended 1210   Total Time (minutes) 45   Total # Attendees 6-7   Group Type recreation   Group Topic Covered cognitive activities;leisure exploration/use of leisure time;structured socialization   Group Session Detail OT Leisure Group: Know Nine Game   Patient Response/Contribution cooperative with task;confronted peers appropriately;listened actively;organized   Patient Participation Detail Patient engaged in leisure based occupational therapy group in order to: promote social skills, decrease isolation, address new learning and exercise cognitive skills (initiation, organization, sequencing, attention, and follow-through of task), and reduce stress and foster relaxation. Patient voluntarily entered group space and inquired about joining activity. Education provided that today's group was a leisure group and peers would be playing a game. Patient introduced self and was appropriate with peers as they introduced themselves. Patient was able to follow multi-step verbal instructions of today's game; no questions were asked. Patient was appropriate in responses. Patient was appropriate with peers. Patient was engaged through duration of activity. Affect: blunted/brightened as game went along. Mood: Calm, cooperative. Pleasant. Patient demonstrated safety awareness with use of provided pen (returned at end of session).

## 2022-12-06 NOTE — TELEPHONE ENCOUNTER
I believe Juan J is being admitted today.  We can see what treatment is suggested by the treatment team and move forward from there.

## 2022-12-06 NOTE — PLAN OF CARE
"Occupational Therapy Group Note:        12/06/22 1556   Group Therapy Session   Group Attendance attended group session   Time Session Began 1415   Time Session Ended 1500   Total Time (minutes) 45   Total # Attendees 5-6   Group Type psychoeducation   Group Topic Covered balanced lifestyle;coping skills/lifestyle management;structured socialization   Group Session Detail OT General Health and Coping: A-Z Coping Group   Patient Response/Contribution able to recall/repeat info presented;confronted peers appropriately;cooperative with task;expressed understanding of topic;listened actively;offered helpful suggestions to peers;organized   Patient Participation Detail Patient actively engaged in a therapeutic discussion and brainstorming group with the focus being safe and appropriate coping skills to deal with everyday stressors and triggers. Therapeutic group facilitated to address: healthy coping mechanisms, encourage appropriate social skills, encourage positive change and personal insight, manage behaviors, and lifestyle management. Patient demonstrated ability to complete task with no cueing needed. Patient was able to identify environmental barriers (music) affecting ability to complete task and made reasonable request to stop music so he could better concentrate. Patient verbalized appropriate response to prompt, \"what does coping mean?\" Patient was able to identify there are positive coping strategies and maladaptive. Patient volunteered to write peer's suggestions on white-board and was engaged throughout entirety of group. Affect: bright; noted laughing and joking with peers appropriately. Mood: calm, cooperative.Some responses to coping strategies: art, breathing, diet, giving your time for a cause/volunteering, spending time with kids, journaling, scents/smells, understanding emotion vs logic/reality.     "

## 2022-12-06 NOTE — PLAN OF CARE
Problem: Psychotic Signs/Symptoms  Goal: Improved Mood Symptoms  Outcome: Progressing  Intervention: Optimize Emotion and Mood  Recent Flowsheet Documentation  Taken 12/6/2022 0800 by Torito aBnerjee RN  Diversional Activity: television   Goal Outcome Evaluation:    Plan of Care Reviewed With: patient        Pt present in the milieu most of the shift, minimally socialized with peers due to new environment, watched television, attended and participated in group activities. Pt denies depression, SIB/SI/HI, AVH and contracted for safety. Pt however endorsed anxiety about 3/10 due being in a new environment and did not require intervention per pt. Pt presented with flat affect, calm, polite and coherent and organized thought process. BG this morning 180 and pt ate 100% for both breakfast and lunch. Voiding freely and no BM issue per pt. Vital stable with even breathing pattern. Complied with medications and no side effects observed or reported.

## 2022-12-06 NOTE — H&P
History and Physical    Everton Linn MRN# [id number]   Age: 57 year old YOB: 1965     Date of Admission:  12/5/2022          Contacts:     PCP - VALDEZ Cowart, CNP - [facility]         Diagnoses:     Major depressive disorder, severe, recurrent, without psychosis  Hyperlipidemia  Type 2 diabetes  Obstructive sleep apnea with use of CPAP  Rosacea  Seasonal allergic rhinitis  Morbid obesity  Head injury 11/30/2022         Recommendations:     Admit to Unit: 32N    Attending Physician: Dr. Miller, under the direct care of Angely Nguyen NP    Patient was admitted on a 72 hour mental health hold but agreed to sign in voluntarily.    Routine lab studies have been requested.    Monitor for target symptoms.     Provide a safe environment and therapeutic milieu.     BG checks BID.  HbA1c was 8.0; he has a follow up with his PCP on 12/12.      Discontinue MSSA.    Discontinue SIO.  He can maintain safety with his CPAP.      Medications:  Discontinue Prozac.  Begin Wellbutrin  mg daily.   PRNs of Hydroxyzine and Rozerem are available.  Provider recommended that he talk to his dermatologist about discontinuing his antibiotic for rosacea, as long-term antibiotic use can have a detrimental impact on mood.      Discharge to home when stable, goal for tomorrow.  Recommend a therapy referral.        Attestation:  Patient has been seen and evaluated by me, Liliana Nguyen, VALDEZ CNP  The patient was counseled on nature of illness and treatment plan/options  Care was coordinated with treatment team       Clinical Global Impressions  First:  Considering your total clinical experience with this particular patient population, how severe are the patient's symptoms at this time?: 5 (12/06/22 1404)  Compared to the patient's condition at the START of treatment, this patient's condition is: 4 (12/06/22 1404)  Most recent:  Considering your total clinical experience with this  "particular patient population, how severe are the patient's symptoms at this time?: 5 (12/06/22 1404)  Compared to the patient's condition at the START of treatment, this patient's condition is: 4 (12/06/22 1404)           Chief Complaint:     History is obtained from the patient and electronic health record.    \"I've battled depression for 30 years.  For the past 3-4 years I've been doing without psychiatry or therapy.  I got to a place where I was able to maintain without therapy or meds.  My biggest tool has always been exercise.  The holidays are always the hardest for me, so things are hard again this year.   I lost both my parents around the holidays (3 and 9 years ago).\"          History of Present Illness:        Everton \"Juan J\" Kaveh is a 57-year-old male admitted to [address] on 12/5/2022.  He was admitted on a 72-hour hold from [facility] ER following a suicide attempt by overdose on up to 4 tabs of Flexeril, up to 4 tabs of Norco and up to 4 tabs of Neurontin (Flexeril was a newly prescribed medication and Norco and Neurontin were leftover from a surgery earlier this year).  He estimates he had consumed 8 beers.  He texted his ex-wife, whom he characterizes as supportive, and she called 911.  His depressive symptoms have been worsening since Thanksgiving.  Stressors include falling on the ice 11/30 at which time he struck his head and injured his neck.  UTOX was negative.  BAL was 0.10.  He denies use of illicit substances.  He typically drinks 8 beers with friends 2 times per month.  He had not taken any psychotropic medications for 4-5 years, and his PCP prescribed Prozac on 11/30/2022.         Psychiatric Review of Systems:      His mood was somewhat depressed prior to Thanksgiving and has been more depressed since then.  He reports that suicidal thoughts \"aren't a daily struggle.\"  He says that when he has suicidal thoughts he typically has a plan to overdose.  He " "reports that he hasn't had suicidal thoughts since this past Saturday.  He contracts for safety on the unit, including with his CPAP.  He has \"a little\" anhedonia.  He does continue to work out 5 days per week which is his main coping skill.  His sleep has been \"hit or miss.\"  He has some struggles with initial and middle insomnia.  Energy and motivation have been \"up and down,\" lower since Thansksgiving.  His concentration is \"fine.\"  He feels hopeless \"at times.\"  He generally doesn't struggle with anxiety but lately has noticed he has been worrying more, with tightness in chest.  He feels \"a little\" fidgety.  He has felt more irritable lately.  He has only had one panic attack in his lifetime, about  or .  He was at his parents' bedsides when both of them  and says he thinks about them every day.  \"I kick myself over what I could have done, spent more time with them.\"  He denies symptoms consistent with OCD, psychosis and derrek.  He denies homicidal ideation.  He denies excessive gambling.            Medical Review of Systems:     He reports intermittent headaches and neck pain following his fall on .  He has chronic back pain.  A 10-point review of systems was completed and is otherwise negative with the exception of HPI.           Psychiatric History:     He has a history of depression beginning in his 20's following his first divorce, typically worse during the winter, particularly during the holidays.  Both of his parents  during the holiday season.  He has no history of psychiatric hospitalizations.  He has no history of suicide attempts prior to the overdose preceding this admission.  Past medications include Ativan, Prozac, Wellbutrin and Lexapro.  He cannot recall the effects.  \"My general recollection of the antidepressants is they completely numbed me out\" and caused sexual side effects.             Substance Use History:     He consumes alcohol twice monthly, typically 8 beers with " "friends.  He denies nicotine use.  He denies any history of using illicit substances or abusing prescription medications.  He consumes 3 caffeinated sodas daily.            Past Medical History:     Hyperlipidemia  Type 2 diabetes  Obstructive sleep apnea with use of CPAP   Rosacea  Cervical stenosis of spine  Seasonal allergic rhinitis  Morbid obesity  Colonic polyps  Mild intermittent asthma  Concussion 20+ years ago  Head injury 11/30/2022    No history of seizures.         Past Surgical History:       ARTHROSCOPY KNEE Left 2007     ARTHROSCOPY KNEE Right 2005     Cervical spinal fusion  2013     DISCECTOMY LUMBAR MINIMALLY INVASIVE ONE LEVEL - L2-L3 FAR LEFT MICRODISKECTOMY Left 03/23/2022     HERNIORRHAPHY INGUINAL Right 08/13/2021     LAPAROSCOPIC CHOLECYSTECTOMY WITH CHOLANGIOGRAMS N/A 05/28/2016     Mason teeth extraction            Allergies:     No known allergies           Medications:       cyclobenzaprine (FLEXERIL) 10 MG tablet Take 1 tablet (10 mg) by mouth nightly as needed for muscle spasms     doxycycline monohydrate (MONODOX) 100 MG capsule Take 100 mg by mouth daily     dulaglutide (TRULICITY) 1.5 MG/0.5ML pen Inject 1.5 mg Subcutaneous every 7 days     FLUoxetine (PROZAC) 10 MG capsule Take 1 capsule (10 mg) by mouth daily     glipiZIDE (GLUCOTROL XL) 10 MG 24 hr tablet TAKE 2 TABLETS BY MOUTH EVERY DAY     metFORMIN (GLUCOPHAGE XR) 500 MG 24 hr tablet TAKE 2 TABLETS BY MOUTH 2 TIMES DAILY WITH MEALS     simvastatin (ZOCOR) 40 MG tablet TAKE 1 TABLET BY MOUTH EVERYDAY AT BEDTIME          Social History:     He grew up in [place], [place].  He denies any history of childhood abuse.  His sister and brother and their spouses live in the [facility].  He was  and  3 times, most recently  in 2017.  He remains \"great friends\" with his 3rd wife.  \"I didn't want to get .\"  His second wife was verbally abusive and cheated on him.  He has a 23-year-old son who is  and " lives in the [facility].  He had a stepdaughter who he raised but after  her mother he has not had contact with her.  He has some close friends in his neighborhood.  He was at his parents' bedside when they both .  His mother  9 years ago and his father  3 years ago, both during the holiday season.  He lives alone in [place], [place].  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  He has a gym membership and likes doing cardio and weight lifting; he generally goes 5 times per week.  He is Hinduism.  No history of legal issues.  No  history.           Family History:     Both of his parents had depression.  His sister has a history of depression.  His son has anxiety.  No family history of substance abuse or suicides.           Labs:      Latest Reference Range & Units 22 03:52 22 03:53 22 06:23 22 08:39   Sodium 136 - 145 mmol/L 139      Potassium 3.4 - 5.3 mmol/L    4.4   Potassium 3.4 - 5.3 mmol/L 3.3 (L)      Chloride 98 - 107 mmol/L 104      Carbon Dioxide (CO2) 22 - 29 mmol/L 22      Urea Nitrogen 6.0 - 20.0 mg/dL 5.2 (L)      Creatinine 0.67 - 1.17 mg/dL 0.75      GFR Estimate >60 mL/min/1.73m2 >90      Calcium 8.6 - 10.0 mg/dL 8.2 (L)      Anion Gap 7 - 15 mmol/L 13      Albumin 3.5 - 5.2 g/dL 4.1      Protein Total 6.4 - 8.3 g/dL 6.4      Alkaline Phosphatase 40 - 129 U/L 49      ALT 10 - 50 U/L 22      AST 10 - 50 U/L 23      Bilirubin Total <=1.2 mg/dL 0.4      Cholesterol <200 mg/dL    169   Glucose 70 - 99 mg/dL 141 (H)      HDL Cholesterol >=40 mg/dL    42   LDL Cholesterol Calculated <=100 mg/dL    93   Non HDL Cholesterol <130 mg/dL     127   Triglycerides <150 mg/dL    169 (H)   TSH 0.40 - 4.00 mU/L    1.71   WBC 4.0 - 11.0 10e3/uL 5.2      Hemoglobin 13.3 - 17.7 g/dL 12.8 (L)      Hematocrit 40.0 - 53.0 % 37.5 (L)      Platelet Count 150 - 450 10e3/uL 249      RBC Count 4.40 - 5.90 10e6/uL 4.14 (L)      MCV 78 - 100 fL 91      MCH 26.5 - 33.0 pg 30.9      MCHC 31.5 - 36.5 g/dL 34.1      RDW 10.0 - 15.0 % 12.5      % Neutrophils % 50      % Lymphocytes % 40      % Monocytes % 6      % Eosinophils % 3      % Basophils % 1      Absolute Basophils 0.0 - 0.2 10e3/uL 0.0      Absolute Eosinophils 0.0 - 0.7 10e3/uL 0.1      Absolute Immature Granulocytes <=0.4 10e3/uL 0.0      Absolute Lymphocytes 0.8 - 5.3 10e3/uL 2.1      Absolute Monocytes 0.0 - 1.3 10e3/uL 0.3      % Immature Granulocytes % 0      Absolute Neutrophils 1.6 - 8.3 10e3/uL 2.6      Absolute NRBCs 10e3/uL 0.0      NRBCs per 100 WBC <1 /100 0      SARS CoV2 PCR Negative    Negative    Salicylate mg/dL <0.5      Amphetamine Qual Urine Screen Negative   Screen Negative     Benzodiazepine Urine Screen Negative   Screen Negative     Opiates Qualitative Urine Screen Negative   Screen Negative     Cannabinoids Qual Urine Screen Negative   Screen Negative     Barbiturates Qual Urine Screen Negative   Screen Negative     Alcohol ethyl <=0.01 g/dL 0.10 (H)      Cocaine Urine Screen Negative   Screen Negative     Acetaminophen 10.0 - 30.0 ug/mL <5.0 (L)             Psychiatric Examination:     Appearance:  awake, alert, adequately groomed and dressed in hospital scrubs  Attitude:  cooperative  Eye Contact:  good  Mood:  depressed  Affect:  appropriate and in normal range  Speech:  clear, coherent  Psychomotor Behavior:  no evidence of tardive dyskinesia, dystonia, or tics  Thought Process:  linear and goal oriented  Associations:  no loose associations  Thought Content:  no evidence of suicidal ideation or homicidal ideation and no evidence of psychotic thought  Insight:  good  Judgment:   "intact  Oriented to:  date, time, person, and place  Attention Span and Concentration:  intact  Recent and Remote Memory:  intact  Language:  intact, fluent English  Fund of Knowledge:  appropriate  Muscle Strength and Tone:  normal  Gait and Station:  normal     BP (!) 150/65 (BP Location: Right arm, Patient Position: Sitting, Cuff Size: Adult Regular)   Pulse 91   Temp 98.8  F (37.1  C) (Oral)   Resp 18   Ht 1.753 m (5' 9\")   Wt 106.7 kg (235 lb 4.8 oz)   SpO2 96%   BMI 34.75 kg/m             Physical Exam:     Please refer to the physical exam compelted by Dr. Esteves at [facility] on 12/4/2022:    Constitutional: Cooperative. Tired appearing.   HENT:   Mouth/Throat: Mucous membranes are dry.   Cardiovascular: Normal rate, regular rhythm and normal heart sounds.  No murmur.  Pulmonary/Chest: Effort normal and breath sounds normal. No respiratory distress. No wheezes. No rales.   Abdominal: Soft. Normal appearance. There is no tenderness.   Neurological: Alert. Oriented x4.  Skin: Skin is warm and dry.   Psychiatric: Depressed mood and affect with suicidal ideations. No hallucinations.  "

## 2022-12-06 NOTE — PLAN OF CARE
12/05/22 2320   Patient Belongings   Did you bring any home meds/supplements to the hospital?  No   Patient Belongings remains with patient;locker;sent to security per site process   Patient Belongings Remaining with Patient clothing;medical device   Patient Belongings Put in Hospital Secure Location (Security or Locker, etc.) clothing;medical device;cash/credit card;purse/wallet;cell phone/electronics   Belongings Search Yes   Clothing Search Yes   Second Staff Iker S     With Patient:  Underwear  CPAP Machine (At Night)    Locker:  Shoes, bag of cookies, Pepsi bottle, gray bag, cell phone with case, phone , pair of socks, T-Shirt, sweat pants, jacket, wallet containing insurance cards, store rewards cards, AARP cards    Sent to Security:   's License  Further Visa [id number]  [employer] Redcard [id number]  [employer] Mastercard [id number]  [facility] Visa [id number]  Cash $160.00       A               Admission:  I am responsible for any personal items that are not sent to the safe or pharmacy.  [facility] is not responsible for loss, theft or damage of any property in my possession.    Signature:  _________________________________ Date: _______  Time: _____                                              Staff Signature:  ____________________________ Date: ________  Time: _____      2nd Staff person, if patient is unable/unwilling to sign:    Signature: ________________________________ Date: ________  Time: _____     Discharge:  [facility] has returned all of my personal belongings:    Signature: _________________________________ Date: ________  Time: _____                                          Staff Signature:  ____________________________ Date: ________  Time: _____

## 2022-12-06 NOTE — PLAN OF CARE
Problem: Psychotic Signs/Symptoms  Goal: Improved Behavioral Control (Psychotic Signs/Symptoms)  Outcome: Adequate for Care Transition  Goal: Improved Psychomotor Symptoms (Psychotic Signs/Symptoms)  Outcome: Adequate for Care Transition     Problem: Suicidal Behavior  Goal: Suicidal Behavior is Absent or Managed  Outcome: Adequate for Care Transition   Goal Outcome Evaluation:      Patient came to the unit via EMS from [facility]. Patient stated that he was brought to the hospital due suicidal ideation to harm self by overdose on 4 tablets of Flexeril, 4 tablets of Norco, 4 tablets of Gabapentin. Patient also stated he engaged in alcohol intoxication. Patient could not given the strength of medication overdosed on. Patient was calm, no shaking, no sweating, and cooperative during admission. Patient denied pain, anxiety, SI/HI/AH/VH, and contracted for safety. Patient stated he had previous suicidal attempts in the past. Due to history of suicidal attempts and having CPAP machine, patient was placed on SIO with one staff monitoring. MSSA score 5. Patient verbalized depression 6/10. Patient was given scheduled medication. Patient was oriented to the unit and was made comfortable. Will continue to monitor patient.

## 2022-12-06 NOTE — PLAN OF CARE
Problem: Psychotic Signs/Symptoms  Goal: Optimal Cognitive Function (Psychotic Signs/Symptoms)  Outcome: Progressing     Problem: Suicide Risk  Goal: Absence of Self-Harm  12/6/2022 0137 by Miroslava Blanca RN  Outcome: Progressing  12/6/2022 0136 by Miroslava Blanca RN  Outcome: Progressing     Problem: Sleep Disturbance  Goal: Adequate Sleep/Rest  Outcome: Progressing   Goal Outcome Evaluation:         Pt had an uneventful night. Q 15 minutes safety checks done, pt appears to have slept approximately 5.5 hours with CPAP. No acute distress noted. Respiration wnl. Pt denies SI/HI/SIB, hallucination and delusion. Pt meghann for safety. MSSA score is 4 at 0200 and 0600 respectively.

## 2022-12-06 NOTE — PLAN OF CARE
"Initial Psychosocial Assessment:     I have reviewed the chart, met with the patient, and developed Care Plan.  Information for assessment was obtained from:   Chart review and patient interview      Presenting Problem:  Patient is a 57 year old male who uses he/him who presented to [facility] Emergency Department on 12/4/2022 following a suicide attempt via intentional overdose. Patient was admitted to [facility] on a 72 hour hold placed on 12/4/22 at 0751 on 12/5/2022.    Recent stressors include: Pt reported to DEC  on 12/4/22 that there was no specific trigger, but that he has increased SI and depression during the holidays. He also told the DEC  that \"he recently was asked to reduce the intensity of his interpersonal relationship with a woman he had been dating, and that he also fell and injured himself last Wednesday. Pt states that last night he was listening to music and would take a pill whenever a new song came on, and intended to die. Pt reports that he remains severely depressed and continues to have suicidal ideations, and recognizes the need for inpatient hospitalization in order to maintain safety\". He started on Prozac last Wednesday, 11/30/22.     History of Mental Health and Chemical Dependency:  Mental Health History:  Patient has a historical diagnosis of Major Depressive Disorder which was originally given to him in his 20's. He has no prior suicide attempts however endorses passive suicidal ideation over the past 30 years. He has never been hospitalized psychiatrically before and he has had various  services in the past, but has not engaged in the past 4-5 years. He has never been committed.    Chemical Dependency History:  His TESFAYE via PBT at the ED on 12/4/2022 was 0.10. He reports he only occasionally drinks and uses no other substances. He drinks about 2-3 times per month, he and a couple friends will " walk down to the local bar and have some beers about once or once every other weekend.      Family Description (Constellation, Family Psychiatric History):  Pt was raised by both parents and he has a brother and a sister. Both his parents passed away in the month of December, most recently his father in Dec 2019. He is  3 times, most recently in 2017. He has one adult son with whom he has infrequent contact.     Significant Life Events (Illness, Abuse, Trauma, Death):  Divorces  Parents passing away  Diabetes     Living Situation:  Lives with dog in [place], [place]. Owns his house.     Educational Background:  Undergrad in [employer] science, post bachelor's certificate in [email or web address].     Occupational History:  Employment Status: Works full time from home. [employer].  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     Financial Status:  Health insurance: [employer].   Income source: Wages     Legal Issues:  Legal status during current admission: 72 hour hold. Signed in voluntarily.  Legal guardian: No  Criminal Concerns: No  Other: No     Ethnic/Cultural Considerations:  Primary language: English  Requires : No  Cultural Influences: No, Denies any cultural influences or concerns that need to be considered for treatment     Spiritual Orientation:  None noted      Service History:  No     Social Functioning (organization, interests):  Enjoys spending time with dog, time with friends and family, he is in fan[employer] sports leagues.      Current Treatment Providers are:  Primary Care Provider:  VALDEZ Cowart CNP   [facility]  [address] [place] [address]  Phone: [phone]   Fax: [phone]      Social Service Assessment/Plan:   Patient-Specific:  Important to: Family, stability  Important for: Take medications  Goal(s) for hospitalization: To get back on meds, get set up with therapy  Fears/anxieties: Being stigmatized by  "supports  Preferences: Preferred name \"Juan J\", uses CPAP  Anticipated length of stay (LOS): 1-2 days  Anticipated disposition plan: Return Home  Anticipated services at discharge: Therapy  Transportation methods/concerns: No concerns.     Team-Specific:   Patient will have psychiatric assessment and medication management by the psychiatrist. Medications will be reviewed and adjusted per DO/MD/APRN CNP as indicated. The treatment team will continue to assess and stabilize the patient's mental health symptoms with the use of medications and therapeutic programming. Hospital staff will provide a safe environment and a therapeutic milieu. Staff will continue to assess patient as needed. Patient will participate in unit groups and activities. Patient will receive individual and group support on the unit.      CTC will do individual inpatient treatment planning and after care planning. CTC will discuss options for increasing community supports with the patient. CTC will coordinate with outpatient providers and will place referrals to ensure appropriate follow up care is in place.         "

## 2022-12-07 ENCOUNTER — DOCUMENTATION ONLY (OUTPATIENT)
Dept: BEHAVIORAL HEALTH | Facility: CLINIC | Age: 57
End: 2022-12-07

## 2022-12-07 VITALS
RESPIRATION RATE: 16 BRPM | HEART RATE: 76 BPM | OXYGEN SATURATION: 98 % | WEIGHT: 235 LBS | SYSTOLIC BLOOD PRESSURE: 153 MMHG | HEIGHT: 69 IN | BODY MASS INDEX: 34.8 KG/M2 | TEMPERATURE: 97 F | DIASTOLIC BLOOD PRESSURE: 88 MMHG

## 2022-12-07 LAB — GLUCOSE BLDC GLUCOMTR-MCNC: 205 MG/DL (ref 70–99)

## 2022-12-07 PROCEDURE — 250N000013 HC RX MED GY IP 250 OP 250 PS 637: Performed by: NURSE PRACTITIONER

## 2022-12-07 PROCEDURE — 250N000013 HC RX MED GY IP 250 OP 250 PS 637: Performed by: PSYCHIATRY & NEUROLOGY

## 2022-12-07 PROCEDURE — 99239 HOSP IP/OBS DSCHRG MGMT >30: CPT | Performed by: NURSE PRACTITIONER

## 2022-12-07 RX ORDER — IBUPROFEN 600 MG/1
600 TABLET, FILM COATED ORAL EVERY 6 HOURS PRN
Start: 2022-12-07

## 2022-12-07 RX ORDER — BUPROPION HYDROCHLORIDE 150 MG/1
150 TABLET ORAL DAILY
Qty: 30 TABLET | Refills: 1 | Status: SHIPPED | OUTPATIENT
Start: 2022-12-07 | End: 2022-12-12

## 2022-12-07 RX ORDER — HYDROXYZINE HYDROCHLORIDE 25 MG/1
25 TABLET, FILM COATED ORAL EVERY 4 HOURS PRN
Qty: 60 TABLET | Refills: 1 | Status: SHIPPED | OUTPATIENT
Start: 2022-12-07 | End: 2023-03-06

## 2022-12-07 RX ORDER — RAMELTEON 8 MG/1
8 TABLET ORAL
Qty: 30 TABLET | Refills: 1 | Status: SHIPPED | OUTPATIENT
Start: 2022-12-07 | End: 2023-03-06

## 2022-12-07 RX ADMIN — GLIPIZIDE 20 MG: 10 TABLET, FILM COATED, EXTENDED RELEASE ORAL at 08:44

## 2022-12-07 RX ADMIN — BUPROPION HYDROCHLORIDE 150 MG: 150 TABLET, EXTENDED RELEASE ORAL at 08:44

## 2022-12-07 RX ADMIN — METFORMIN HYDROCHLORIDE 1000 MG: 500 TABLET, EXTENDED RELEASE ORAL at 08:44

## 2022-12-07 RX ADMIN — DOXYCYCLINE 100 MG: 100 CAPSULE ORAL at 08:44

## 2022-12-07 ASSESSMENT — ACTIVITIES OF DAILY LIVING (ADL)
ORAL_HYGIENE: INDEPENDENT
ADLS_ACUITY_SCORE: 30
ADLS_ACUITY_SCORE: 30
DRESS: INDEPENDENT
ADLS_ACUITY_SCORE: 30
HYGIENE/GROOMING: INDEPENDENT
ADLS_ACUITY_SCORE: 30

## 2022-12-07 NOTE — PROGRESS NOTES
Prior Authorization **INITIATED**    Medication: Ramelteon 8mg tabs - qty limit  Insurance Company: MEDICA - Phone [phone] Fax [phone]  Pharmacy Filling the Rx: [place], [place] - [address]  Filling Pharmacy Phone: [phone]  Filling Pharmacy Fax: [phone]  Start Date: 12/7/2022  Reference #: CoverMyMeds Key: [id number] - [place] Case ID: [id number]  Comments:  Plan limit of 15 tablets per fill. Discharge pharmacy sent patient with 15 day supply while auth is pending.     Anne Manzano [facility] Liaison  Pronouns: She/Her/Hers    [facility]  [address]  [address], [place] [address]   [email or web address]  [email or web address]   Phone: [phone]  Pager: [phone]  Fax: [phone]

## 2022-12-07 NOTE — PLAN OF CARE
Patient slept approximately 7 hours during the overnight shift; appeared comfortable with even and unlabored breathing while asleep. No issues or concerns reported or observed. Safety checks completed at least every 15 minutes. Nursing will continue to monitor.

## 2022-12-07 NOTE — PLAN OF CARE
"  Problem: Psychotic Signs/Symptoms  Goal: Improved Mood Symptoms  Intervention: Optimize Emotion and Mood  Recent Flowsheet Documentation  Taken 12/6/2022 2300 by Alaina Ayala RN  Diversional Activity: television   Goal Outcome Evaluation:    Plan of Care Reviewed With: patient    Patient was polite but terse.  He rated depression 1 out of 10, and denied anxiety and SI.  Patient also denied HI, thought problems, medication side effects, and physical problems.  He did not request any PRN medication.  He was present in the milieu all shift and was social with peers who initiated contact.  BG at 1800 was 151.  /75 (BP Location: Left arm, Patient Position: Sitting, Cuff Size: Adult Large)   Pulse 86   Temp 98.6  F (37  C) (Oral)   Resp 17   Ht 1.753 m (5' 9\")   Wt 106.6 kg (235 lb)   SpO2 96%   BMI 34.70 kg/m                   "

## 2022-12-07 NOTE — PROGRESS NOTES
12/06/22 2100   Group Therapy Session   Group Attendance attended group session   Time Session Began 2000   Time Session Ended 2055   Total Time (minutes) 50   Total # Attendees 4   Group Type recreation   Group Topic Covered leisure exploration/use of leisure time   Group Session Detail TR leisure group   Patient Response/Contribution cooperative with task   Patient Participation Detail Pt attended the structured Therapeutic Recreation group, participating in a group activity. Pt participated in group discussion, leisure participation, and social engagement to gain self-esteem, manage behaviors, improve social skills, decrease isolation, and reduce anxiety/depression.   Pt remained focused and engaged throughout group activity.  Pt mood was sociable and was appropriate with interactions, contributing to the clues and descriptions throughout the activity. Pt was a full participant for the duration of the group. Pt had a bright affect, often laughing and joking with peers appropriately.

## 2022-12-07 NOTE — PLAN OF CARE
Assessment/Intervention/Current Symptoms and Care Coordination:  Future Appointments   Date Time Provider Center   12/12/2022  4:00 PM Lindsay Arroyo APRN CNP [facility]   3/9/2023 12:00 PM Susan Aldana [facility]   3/16/2023  5:30 PM Susan Aldana [facility]        Chart Review    Met with team to discuss patient care.    Precautions: No precautions    Patient is not on SIO.    LVMM with [facility] requesting return call to schedule new patient psychotherapy.    Updated AVS    Schedule pt for therapy at [facility]. Pt signed JEFFRY. Updated AVS    Discharge Plan or Goal:  Pt will discharge home today.    Barriers to Discharge:  No barriers identified    Referral Status:  Therapy    Legal Status:  voluntary

## 2022-12-07 NOTE — PROGRESS NOTES
Prior Authorization **APPROVED**    Authorization Effective Date: 11/7/2022  Authorization Expiration Date: 3/7/2023  Medication: Ramelteon 8mg tabs **APPROVED**  Approved Dose/Quantity: 1 tablet daily, 30 tablets per fill  Reference #: CoverMyMeds Key: [id number] - [place] Case ID: [id number], [employer] Case ID: [id number]   Insurance Company: MEDICA - Phone [phone] Fax [phone]  Expected CoPay: $0.00     CoPay Card Available: No    Foundation Assistance Needed: n/a  Which Pharmacy is filling the prescription (Not needed for infusion/clinic administered): [place], [place] - [address]  Pharmacy Notified: Yes  Patient Notified: Yes  Comments:  Plan limited of 15 tablets per fill. Discharge pharmacy sent patient with 15 day supply while auth is pending.         Anne Manzano [facility] Discharge Pharmacy Liaison  Pronouns: She/Her/Hers    [facility]  [address] [facility]  [address] [facility] [address], [place] [address]   [email or web address]  [email or web address]   Phone: [phone]  Pager: [phone]  Fax: [phone]

## 2022-12-07 NOTE — DISCHARGE SUMMARY
"Psychiatric Discharge Summary    Everton Linn MRN# [id number]   Age: 57 year old YOB: 1965     Date of Admission:  12/5/2022  Date of Discharge:  12/7/2022  Admitting Physician:  Ana Miller DO  Discharge Physician:  VALDEZ Barber CNP (Contact: [phone])         Event Leading to Hospitalization:      From H&P 12/6/2022:    \"I've battled depression for 30 years.  For the past 3-4 years I've been doing without psychiatry or therapy.  I got to a place where I was able to maintain without therapy or meds.  My biggest tool has always been exercise.  The holidays are always the hardest for me, so things are hard again this year.   I lost both my parents around the holidays (3 and 9 years ago).\"     Everton \"Juan J\" Kaveh is a 57-year-old male admitted to [address] on 12/5/2022.  He was admitted on a 72-hour hold from [facility] ER following a suicide attempt by overdose on up to 4 tabs of Flexeril, up to 4 tabs of Norco and up to 4 tabs of Neurontin (Flexeril was a newly prescribed medication and Norco and Neurontin were leftover from a surgery earlier this year).  He estimates he had consumed 8 beers.  He texted his ex-wife, whom he characterizes as supportive, and she called 911.  His depressive symptoms have been worsening since Thanksgiving.  Stressors include falling on the ice 11/30 at which time he struck his head and injured his neck.  UTOX was negative.  BAL was 0.10.  He denies use of illicit substances.  He typically drinks 8 beers with friends 2 times per month.  He had not taken any psychotropic medications for 4-5 years, and his PCP prescribed Prozac on 11/30/2022.    His mood was somewhat depressed prior to Thanksgiving and has been more depressed since then.  He reports that suicidal thoughts \"aren't a daily struggle.\"  He says that when he has suicidal thoughts he typically has a plan to overdose.  He reports that he hasn't had " "suicidal thoughts since this past Saturday.  He contracts for safety on the unit, including with his CPAP.  He has \"a little\" anhedonia.  He does continue to work out 5 days per week which is his main coping skill.  His sleep has been \"hit or miss.\"  He has some struggles with initial and middle insomnia.  Energy and motivation have been \"up and down,\" lower since Thansksgiving.  His concentration is \"fine.\"  He feels hopeless \"at times.\"  He generally doesn't struggle with anxiety but lately has noticed he has been worrying more, with tightness in chest.  He feels \"a little\" fidgety.  He has felt more irritable lately.  He has only had one panic attack in his lifetime, about  or .  He was at his parents' bedsides when both of them  and says he thinks about them every day.  \"I kick myself over what I could have done, spent more time with them.\"  He denies symptoms consistent with OCD, psychosis and derrek.  He denies homicidal ideation.  He denies excessive gambling.      See full admission note by Angely Nguyen NP 2022 for details.          Diagnoses:     Major depressive disorder, severe, recurrent, without psychosis  Hyperlipidemia  Type 2 diabetes  Obstructive sleep apnea with use of CPAP  Rosacea  Seasonal allergic rhinitis  Morbid obesity  Head injury 2022         Labs:      Latest Reference Range & Units 22 03:52 22 03:53 22 06:23 22 08:22 22 08:39 22 18:26   Sodium 136 - 145 mmol/L 139        Potassium 3.4 - 5.3 mmol/L     4.4    Potassium 3.4 - 5.3 mmol/L 3.3 (L)        Chloride 98 - 107 mmol/L 104        Carbon Dioxide (CO2) 22 - 29 mmol/L 22        Urea Nitrogen 6.0 - 20.0 mg/dL 5.2 (L)        Creatinine 0.67 - 1.17 mg/dL 0.75        GFR Estimate >60 mL/min/1.73m2 >90        Calcium 8.6 - 10.0 mg/dL 8.2 (L)        Anion Gap 7 - 15 mmol/L 13        Albumin 3.5 - 5.2 g/dL 4.1        Protein Total 6.4 - 8.3 g/dL 6.4        Alkaline Phosphatase 40 - " 129 U/L 49        ALT 10 - 50 U/L 22        AST 10 - 50 U/L 23        Bilirubin Total <=1.2 mg/dL 0.4        Cholesterol <200 mg/dL     169    Glucose 70 - 99 mg/dL 141 (H)        HDL Cholesterol >=40 mg/dL     42    LDL Cholesterol Calculated <=100 mg/dL     93    Non HDL Cholesterol <130 mg/dL     127    Triglycerides <150 mg/dL     169 (H)    TSH 0.40 - 4.00 mU/L     1.71    GLUCOSE BY METER POCT 70 - 99 mg/dL    180 (H)  151 (H)   WBC 4.0 - 11.0 10e3/uL 5.2        Hemoglobin 13.3 - 17.7 g/dL 12.8 (L)        Hematocrit 40.0 - 53.0 % 37.5 (L)        Platelet Count 150 - 450 10e3/uL 249        RBC Count 4.40 - 5.90 10e6/uL 4.14 (L)        MCV 78 - 100 fL 91        MCH 26.5 - 33.0 pg 30.9        MCHC 31.5 - 36.5 g/dL 34.1        RDW 10.0 - 15.0 % 12.5        % Neutrophils % 50        % Lymphocytes % 40        % Monocytes % 6        % Eosinophils % 3        % Basophils % 1        Absolute Basophils 0.0 - 0.2 10e3/uL 0.0        Absolute Eosinophils 0.0 - 0.7 10e3/uL 0.1        Absolute Immature Granulocytes <=0.4 10e3/uL 0.0        Absolute Lymphocytes 0.8 - 5.3 10e3/uL 2.1        Absolute Monocytes 0.0 - 1.3 10e3/uL 0.3        % Immature Granulocytes % 0        Absolute Neutrophils 1.6 - 8.3 10e3/uL 2.6        Absolute NRBCs 10e3/uL 0.0        NRBCs per 100 WBC <1 /100 0        SARS CoV2 PCR Negative    Negative      Salicylate mg/dL <0.5        Amphetamine Qual Urine Screen Negative   Screen Negative       Benzodiazepine Urine Screen Negative   Screen Negative       Opiates Qualitative Urine Screen Negative   Screen Negative       Cannabinoids Qual Urine Screen Negative   Screen Negative       Barbiturates Qual Urine Screen Negative   Screen Negative       Alcohol ethyl <=0.01 g/dL 0.10 (H)        Cocaine Urine Screen Negative   Screen Negative       Acetaminophen 10.0 - 30.0 ug/mL <5.0 (L)               Consults:     No consultations were requested during this admission.         Hospital Course:     Everton MATHIS  Kaveh was admitted to [facility] with attending Ana Miller DO, under the direct care of Angely Nguyen NP as a voluntary patient.  The patient was placed under status 15 (15 minute checks) to ensure patient safety.     Prozac was discontinued and replaced with Wellbutrin  mg daily.  This change was made due to his reports of sexual side effects and affective flattening from previous antidepressants.  PRN Hydroxyzine 25 mg was initiated for anxiety; he did not utilize it during his hospitalization but asked that it be prescribed for discharge.  PRN Rozerem 8 mg was initiated for insomnia; he did not utilize it during his hospitalization but asked that it be prescribed for discharge.      Everton Linn did participate in groups and was visible in the milieu.  Behavior was calm and cooperative.  He appeared motivated for treatment and recovery.     The patient's symptoms of depression improved.  He denied suicidal ideation.  He was hopeful and future-oriented.  Appetite was good.  He slept well.  Anxiety was low to moderate.  There was no evidence of psychosis.     Everton Linn was discharged to home.  At the time of discharge Everton Linn was determined to not be a danger to himself or others.          Discharge Medications:       Dose / Directions   buPROPion 150 MG 24 hr tablet  Commonly known as: WELLBUTRIN XL        Dose: 150 mg  Take 1 tablet (150 mg) by mouth daily  Quantity: 30 tablet  Refills: 1     hydrOXYzine 25 MG tablet  Commonly known as: ATARAX        Dose: 25 mg  Take 1 tablet (25 mg) by mouth every 4 hours as needed for anxiety  Quantity: 60 tablet  Refills: 1     ibuprofen 600 MG tablet  Commonly known as: ADVIL/MOTRIN        Dose: 600 mg  Take 1 tablet (600 mg) by mouth every 6 hours as needed for moderate pain        ramelteon 8 MG tablet  Commonly known as: ROZEREM        Dose: 8 mg  Take 1 tablet (8 mg) by mouth nightly as needed for sleep  Quantity: 30  "tablet  Refills: 1     cyclobenzaprine 10 MG tablet  Commonly known as: FLEXERIL        Dose: 10 mg  Take 1 tablet (10 mg) by mouth nightly as needed for muscle spasms       doxycycline monohydrate 100 MG capsule  Commonly known as: MONODOX      Dose: 100 mg  Take 100 mg by mouth daily       dulaglutide 1.5 MG/0.5ML pen  Commonly known as: TRULICITY        Dose: 1.5 mg  Inject 1.5 mg Subcutaneous every 7 days       glipiZIDE 10 MG 24 hr tablet  Commonly known as: GLUCOTROL XL     TAKE 2 TABLETS BY MOUTH EVERY DAY       metFORMIN 500 MG 24 hr tablet  Commonly known as: GLUCOPHAGE XR   TAKE 2 TABLETS BY MOUTH 2 TIMES DAILY WITH MEALS       simvastatin 40 MG tablet  Commonly known as: ZOCOR     TAKE 1 TABLET BY MOUTH EVERYDAY AT BEDTIME               Psychiatric Examination:     Appearance:  awake, alert, adequately groomed and dressed in hospital scrubs  Attitude:  cooperative  Eye Contact:  good  Mood:  improved, more hopeful  Affect:  appropriate and in normal range  Speech:  clear, coherent  Psychomotor Behavior:  no evidence of tardive dyskinesia, dystonia, or tics  Thought Process:  linear and goal oriented  Associations:  no loose associations  Thought Content:  no evidence of suicidal ideation or homicidal ideation and no evidence of psychotic thought  Insight:  good  Judgment:  intact  Oriented to:  date, time, person, and place  Attention Span and Concentration:  intact  Recent and Remote Memory:  intact  Language:  intact, fluent English  Fund of Knowledge:  appropriate  Muscle Strength and Tone:  normal  Gait and Station:  normal     /75 (BP Location: Left arm, Patient Position: Sitting, Cuff Size: Adult Large)   Pulse 86   Temp 98.6  F (37  C) (Oral)   Resp 17   Ht 1.753 m (5' 9\")   Wt 106.6 kg (235 lb)   SpO2 96%   BMI 34.70 kg/m           Discharge Plan:     Per Discharge AVS:    Summary: You were admitted on 12/5/2022 due to suicidal ideations.  You were treated by VALDEZ Colindres, " "CNP for ongoing psychiatric assessment and medication management.  You had opportunities to participate in therapeutic groups on the unit. You were discharged on 12/7/2022 from [facility] to home.     Health Care Follow-up:   Medication Management/Primary Care:  VALDEZ Cowart CNP    Monday December 12th arrive by 3:40 PM  [facility]  [address] [place] [address]  Phone: [phone]   Fax: [phone]      Therapy:  Pippa \"Julia\" RADHA Jasso  Friday December 8th at 1:00 PM in person  [facility]   [address] [place] [address]  [facility] has sent you an email to [email or web address]. This is the necessary paperwork to complete prior to your appointment.  Simply open the form, complete the required fields, and submit. If you did not receive this, please call and request them prior to you appointment.  They are located at [address] [place] [address]. Enter through the door on the front porch. Closest to [facility]. Have a seat in the waiting area to your right and your therapist will be with you shortly.     MARYANA Erwin  Thursday March 9th at 11:30 AM via [employer]  Thursday March 16th at 5:15 PM via [employer]  [facility]  Phone [phone]  If you no longer want these appointments, your can cancel them in [place]      He was provided with a 30-day supply of Wellbutrin XL, Hydroxyzine and Rozerem.  He was advised to take his medications as prescribed and to abstain from alcohol and illicit substances.       Attestation:  The patient has been seen and evaluated by me, VALDEZ Barber CNP   Discharge time > 30 minutes  "

## 2022-12-07 NOTE — DISCHARGE INSTRUCTIONS
"Behavioral Discharge Planning and Instructions    Summary: You were admitted on 12/5/2022 due to Suicidal Ideations.  You were treated by VALDEZ Colindres CNP for ongoing psychiatric assessment and medication management.  You had opportunities to participate in therapeutic groups on the unit. You were discharged on 12/7/2022 from [facility] station 32[facility] to Home.     Main Diagnosis:   Major depressive disorder, severe, recurrent, without psychosis  Hyperlipidemia  Type 2 diabetes  Obstructive sleep apnea with use of CPAP  Rosacea  Seasonal allergic rhinitis  Morbid obesity  Head injury 11/30/2022    Health Care Follow-up:   Medication Management/Primary Care:  VALDEZ Cowart CNP    Monday December 12th arrive by 3:40 PM  [facility]  [address] [place] [address]  Phone: [phone]   Fax: [phone]      Therapy:  Pippa \"Julia\" RADHA Jasso  Friday December 8th at 1:00 PM in person  [facility]   [address] [place] [address]  [facility] has sent you an email to [email or web address]. This is the necessary paperwork to complete prior to your appointment.  Simply open the form, complete the required fields, and submit. If you did not receive this, please call and request them prior to you appointment.  They are located at [address] [place] [address]. Enter through the door on the front porch. Closest to [address]. Have a seat in the waiting area to your right and your therapist will be with you shortly.     MARYANA Erwin  Thursday March 9th at 11:30 AM via [employer]  Thursday March 16th at 5:15 PM via CounterT[employer]  [facility]  Phone [phone]  If you no longer want these appointments, your can cancel them in [facility]    Attend all scheduled appointments with your outpatient providers. Call at least 24 hours in " advance if you need to reschedule an appointment to ensure continued access to your outpatient providers.     Major Treatments, Procedures and Findings:  You were provided with: a psychiatric assessment, assessed for medical stability, medication evaluation and/or management, group therapy, and milieu management    Symptoms to Report: feeling more aggressive, increased confusion, losing more sleep, mood getting worse, or thoughts of suicide    Early warning signs can include: increased depression or anxiety sleep disturbances increased thoughts or behaviors of suicide or self-harm  increased unusual thinking, such as paranoia or hearing voices    Safety and Wellness:  Take all medicines as directed.  Make no changes unless your doctor suggests them.      Follow treatment recommendations.  Refrain from alcohol and non-prescribed drugs.  If there is a concern for safety, call 911.    Resources:   Mental Health Crisis Resources  Throughout [place]: call **CRISIS (**730466)  Crisis Text Line: is available for free, 24/7 by texting [place] to 614202  Suicide Awareness Voices of Education (SAVE) (www.save.org): 520-045-AUSK (4353)  The National Suicide Prevention Lifeline is now: 988 Suicide and Crisis Lifeline. Call 988 anytime.  National [place] on Mental Illness (www.[place].rula.org): 171.612.8803 or 073-230-8908.  Hpaf8jnbn: text the word LIFE to 04830 for immediate support and crisis intervention  Mental Health Consumer/Survivor Network of [place] (www.mhcsn.net): 509.784.2789 or 476-477-9716  Mental Health Association of [place] (www.mentalhealth.org): 816.259.7373 or 166-341-6936  Peer Support Connection [place] Warmline (PSC) 1-930.298.7388 Available from 5pm - 9am (7 days a week/365 days a year)  [facility] Crisis Response 049-706-9889     General Medication Instructions:   See your medication sheet(s) for instructions.   Take all medicines as directed.  Make no changes unless your doctor suggests them.   Go to all your doctor  visits.  Be sure to have all your required lab tests. This way, your medicines can be refilled on time.  Do not use any drugs not prescribed by your doctor.  Avoid alcohol.    Advance Directives:   Scanned document on file with [facility]? No scanned doc  Is document scanned? No. Copy Requested.  Honoring Choices Your Rights Handout: Informed and given  Was more information offered? Pt declined    The Treatment team has appreciated the opportunity to work with you. If you have any questions or concerns about your recent admission, you can contact the unit which can receive your call 24 hours a day, 7 days a week. They will be able to get in touch with a Provider if needed. The unit number is [phone].

## 2022-12-07 NOTE — PLAN OF CARE
"Goal Outcome Evaluation:     Plan of Care Reviewed With: patient     Patient alert and oriented to person, place, and time, adequately groomed. Pt is pleasant, full range affect, good eye contact, cooperative, medication compliant. No PRN's given. No precautions, no behaviors observed. Pt contracts for safety. Pt engaged in group activities, observed in unit milieu, appropriate with peers and staff. Pt demonstrates ability to communicate needs to staff. No behaviors noted. Will continue to monitor behavior and encourage engagement.   Pt given copy of their discharge instructions and medication administration instructions. All discharge plans and labs were discussed with patient. Pt reports no questions at this time regarding discharge plans, labs or medications. Pt denies any suicidal ideation, plans or intent at this time. Patient discharged home via ride form son. Off unit at 1210.     NURSING ASSESSMENT  Pain: denies  Anxiety: denies  Depression: denies  SI: denies  SIB: denies  HI: denies  AVH: denies, did not appear to be responding to internal stimuli, did not demonstrate delusional thinking.  Mood/Affect: full range affect  Sleep: pt states \"good\"  Medication: compliant, no side effects reported or observed  PRN: none  Appetite: breakfast 100%   ADLs: independent   Visits: none  Vitals: WNL   Medical:  no issues reported or observed.    Problem: Psychotic Signs/Symptoms  Goal: Increased Participation and Engagement (Psychotic Signs/Symptoms)  12/7/2022 1213 by Dona Miller, RN  Outcome: Progressing  Intervention: Facilitate Participation and Engagement  Recent Flowsheet Documentation  Taken 12/7/2022 1204 by Dona Miller, RN  Diversional Activity:    television    reading     Problem: Psychotic Signs/Symptoms  Goal: Improved Mood Symptoms  12/7/2022 1213 by Dona Miller, RN  Outcome: Progressing  Intervention: Optimize Emotion and Mood  Recent Flowsheet Documentation  Taken 12/7/2022 1204 " by Dona Miller RN  Diversional Activity:    television    reading     Problem: Psychotic Signs/Symptoms  Goal: Improved Sleep (Psychotic Signs/Symptoms)  12/7/2022 1213 by Dona Miller RN  Outcome: Progressing     Problem: Psychotic Signs/Symptoms  Goal: Enhanced Social, Occupational or Functional Skills (Psychotic Signs/Symptoms)  12/7/2022 1213 by Dona Miller RN  Outcome: Progressing    Intervention: Promote Social, Occupational and Functional Ability  Recent Flowsheet Documentation  Taken 12/7/2022 1000 by Dona Miller RN  Trust Relationship/Rapport:    care explained    choices provided    emotional support provided    empathic listening provided    questions answered    questions encouraged    reassurance provided    thoughts/feelings acknowledged     Problem: Psychotic Signs/Symptoms  Goal: Enhanced Social, Occupational or Functional Skills (Psychotic Signs/Symptoms)  Intervention: Promote Social, Occupational and Functional Ability  Recent Flowsheet Documentation  Taken 12/7/2022 1000 by Dona Miller RN  Trust Relationship/Rapport:    care explained    choices provided    emotional support provided    empathic listening provided    questions answered    questions encouraged    reassurance provided    thoughts/feelings acknowledged     Problem: Suicide Risk  Goal: Absence of Self-Harm  12/7/2022 1213 by Dona Miller RN  Outcome: Progressing     Problem: Sleep Disturbance  Goal: Adequate Sleep/Rest  12/7/2022 1213 by Dona Miller RN  Outcome: Progressing     Problem: Adult Behavioral Health Plan of Care  Goal: Plan of Care Review  Outcome: Progressing  Flowsheets (Taken 12/7/2022 1000)  Patient Agreement with Plan of Care: agrees     Problem: Adult Behavioral Health Plan of Care  Goal: Adheres to Safety Considerations for Self and Others  Outcome: Progressing  Intervention: Develop and Maintain Individualized Safety Plan  Recent Flowsheet  Documentation  Taken 12/7/2022 1204 by Dona Miller, RN  Safety Measures:    environmental rounds completed    safety rounds completed    suicide assessment completed    suicide check-in completed

## 2022-12-08 ENCOUNTER — PATIENT OUTREACH (OUTPATIENT)
Dept: CARE COORDINATION | Facility: CLINIC | Age: 57
End: 2022-12-08

## 2022-12-08 RX ORDER — GLIPIZIDE 10 MG/1
TABLET, FILM COATED, EXTENDED RELEASE ORAL
Qty: 60 TABLET | Refills: 0 | Status: SHIPPED | OUTPATIENT
Start: 2022-12-08 | End: 2023-01-02

## 2022-12-08 ASSESSMENT — ACTIVITIES OF DAILY LIVING (ADL): DEPENDENT_IADLS:: INDEPENDENT

## 2022-12-08 NOTE — LETTER
[facility] CARE COORDINATION    December 8, 2022        Everton Linn  [address] [place] [address]          Dear Everton,     I am glad to hear that you are feeling better. I cancelled the appointments with the [facility] therapist.     Attached is an updated Patient Centered Plan of Care for your continued enrollment in Care Coordination. Please let me know if you have additional questions, concerns, or goals that I can assist with.    Sincerely,    RENEE Dorsey   Care Coordination Team  [phone]          [facility]  Patient Centered Plan of Care  About Me:        Patient Name:  Everton Linn    YOB: 1965  Age:         57 year old   [place] MRN:    [id number] Telephone Information:  Home Phone [phone]   Mobile [phone]       Address:  [address] [place] [address] Email address:  [email or web address]      Emergency Contact(s)    Name Relationship Lgl Grd Work Phone Home Phone Mobile Phone   1. CARMELITA LINN* Son    [phone]   2. JUDE TOVAR Ex-Spouse    [phone]   3. JEANNINEYOLANDE FELICIANO Friend    [phone]   4. JOSIANEANGELA Friend    [phone]           Primary language:  English     needed? No   [facility] [employer] [facility]:  [phone] op. 1  Other communication barriers:None    Preferred Method of Communication:  Stephanie  Current living arrangement: I live alone    Mobility Status/ Medical Equipment: Independent        Health Maintenance  Health Maintenance Reviewed: Due/Overdue   Health Maintenance Due   Topic Date Due     ADVANCE CARE PLANNING  Never done     HEPATITIS B IMMUNIZATION (1 of 3 - 3-dose series) Never done     YEARLY PREVENTIVE VISIT  01/28/2011     Pneumococcal Vaccine: Pediatrics (0 to 5 Years) and At-Risk Patients (6 to 64 Years) (2 - PCV) 09/28/2013     DIABETIC FOOT EXAM  09/22/2021     COVID-19 Vaccine (4 - Booster for Pfizer series) 12/09/2021     INFLUENZA VACCINE (1) 09/01/2022      EYE EXAM  09/02/2022         My Access Plan  Medical Emergency 911   Primary Clinic Line [facility] - [phone]   24 Hour Appointment Line [phone] or  [phone]) (toll-free)   24 Hour Nurse Line [phone] (toll-free)   Preferred Urgent Care No data recorded   Preferred Hospital [facility]  [phone]     Preferred Pharmacy [id number] - [place], [place] - [address]     Behavioral Health Crisis Line The National Suicide Prevention Lifeline at 1-228.543.3535 or Text/Call 868             My Care Team Members  Patient Care Team       Relationship Specialty Notifications Start End    Lindsay Arroyo APRN CNP PCP - General   11/6/02     Phone: [phone] Fax: [phone] [address] [facility] [address]    Lindsay Arroyo APRN CNP Assigned PCP   10/4/12     Phone: [phone] Fax: [phone] [address] [facility] [address]    Denise Sahu [facility] Pharmacist Pharmacist  10/24/19     Phone: [phone] Fax: [phone] [address] [facility] [address]    Jerry Bello MD Assigned Surgical Provider   6/27/21     Phone: [phone] Fax: [phone]         [address] [address]    Slick Adams PA-C Assigned Musculoskeletal Provider   1/9/22     Phone: [phone] Pager: [phone] Fax: [phone] [address] [place] [address]    Carmenza Byers LSW Clinic Care Coordinator Primary Care - CC Admissions 12/1/22     Phone: [phone]         PROVIDER, BEHAVIORAL DEC    12/4/22     Carmenza Byers LSW Lead Care Coordinator Primary Care - CC Admissions 12/8/22     Phone: [phone]                 My Care Plans  Self Management and Treatment Plan  Care Plan  Care Plan: Mental Health     Problem: Mood/Psychosocial Concerns     Goal: Establish and Maintain Mental Health Support     Start Date:  12/1/2022 Expected End Date: 2/28/2023    Priority: High    Note:     Updated on 12/8/22  Barriers: Long wait to get in with a [facility] [place] Therapist  Finding a therapist I can connect with.    Strengths: I have supportive friends and family.  I get regular physical activity.  Patient expressed understanding of goal: Yes  Action steps to achieve this goal:  1. I will work with my PCP on medication management  2. I will establish with a therapist.    3. I will consider other mental health resources such as Intensive Outpatient Therapy if of interest.  4.  I will continue to exercise 3 - 5 times per week.     5.  I will let Care Coordination know if I need additional resources and/or support.                          Action Plans on File:            Depression          Advance Care Plans/Directives Type:   No data recorded    My Medical and Care Information  Problem List   Patient Active Problem List   Diagnosis     Dermatophytosis of nail     Ventral hernia     SANTOS (obstructive sleep apnea)     Type 2 diabetes mellitus with other skin complications (H)     Cervical stenosis of spine     Morbid obesity (H)     Hyperlipidemia LDL goal <70     History of colonic polyps     Major depressive disorder, recurrent, in full remission (H)      Current Medications and Allergies:  See printed Medication Report.    Care Coordination Start Date: 11/30/2022   Frequency of Care Coordination: monthly     Form Last Updated: 12/08/2022

## 2022-12-08 NOTE — PROGRESS NOTES
Clinic Care Coordination Contact    Clinic Care Coordination Contact  OUTREACH with Post Discharge Assessment    Referral Information:  Referral Source: PCP    Primary Diagnosis: Other (include Comment box)    Chief Complaint   Patient presents with     Clinic Care Coordination - Post Hospital     Suicidal Ideation        Universal Utilization: 61% Admission or ED Risk  Clinic Utilization  Difficulty keeping appointments:: No  Compliance Concerns: No  No-Show Concerns: No  No PCP office visit in Past Year: No  Utilization    Hospital Admissions  2             ED Visits  1             No Show Count (past year)  0                Current as of: 12/8/2022  6:35 AM              Clinical Concerns:  Current Medical Concerns:    Patient Active Problem List   Diagnosis     Dermatophytosis of nail     Ventral hernia     SANTOS (obstructive sleep apnea)     Type 2 diabetes mellitus with other skin complications (H)     Cervical stenosis of spine     Morbid obesity (H)     Hyperlipidemia LDL goal <70     History of colonic polyps     Major depressive disorder, recurrent, in full remission (H)     Juan J states that he is feeling better since being in the hospital .  He has an in person therapy appointment scheduled tomorrow with Julia Jasso at [facility] .  He  started a new mental health medication and he has a  hospital follow up scheduled with his PCP on 12//12/22.      Current Behavioral Concerns: Major Depressive Disorder.      Education Provided to patient: Reviewed his hospital after visit summary including some of the crisis resources.     Pain  Pain (GOAL):: No  Health Maintenance Reviewed: Due/Overdue   Health Maintenance Due   Topic Date Due     ADVANCE CARE PLANNING  Never done     HEPATITIS B IMMUNIZATION (1 of 3 - 3-dose series) Never done     YEARLY PREVENTIVE VISIT  01/28/2011     Pneumococcal Vaccine: Pediatrics (0 to 5 Years) and At-Risk Patients (6 to 64 Years) (2 - PCV) 09/28/2013     DIABETIC FOOT  EXAM  09/22/2021     COVID-19 Vaccine (4 - Booster for Pfizer series) 12/09/2021     INFLUENZA VACCINE (1) 09/01/2022     EYE EXAM  09/02/2022     Clinical Pathway: None    Admission:    Admission Date: 12/05/22   Reason for Admission: Suicidal Ideation  Discharge:   Discharge Date: 12/07/22  Discharge Diagnosis: Major depressive disorder, severe, recurrent, without psychosis  Hyperlipidemia  Type 2 diabetes  Obstructive sleep apnea with use of CPAP  Rosacea  Seasonal allergic rhinitis  Morbid obesity  Head injury 11/30/2022    Enrollment  Outreach Frequency: monthly    Discharge Assessment  How are you doing now that you are home?: Feeling better  How are your symptoms? (Red Flag symptoms escalate to triage hotline per guidelines): Improved  Do you feel your condition is stable enough to be safe at home until your provider visit?: Yes  Does the patient have their discharge instructions? : Yes  Does the patient have questions regarding their discharge instructions? : No  Were you started on any new medications or were there changes to any of your previous medications? : Yes  Does the patient have all of their medications?: Yes  Do you have questions regarding any of your medications? : No  Do you have all of your needed medical supplies or equipment (DME)?  (i.e. oxygen tank, CPAP, cane, etc.): Yes  Discharge follow-up appointment scheduled within 14 calendar days? : Yes  Discharge Follow Up Appointment Date: 12/12/22  Discharge Follow Up Appointment Scheduled with?: Primary Care Provider         Post-op (Clinicians Only)  Urinary Status: voiding without complaint/concerns    Medication Management:  Medication review status: Medications reviewed and no changes reported per patient.             Functional Status:  Dependent ADLs:: Independent  Dependent IADLs:: Independent  Bed or wheelchair confined:: No  Mobility Status: Independent    Living Situation:  Current living arrangement:: I live alone  Type of  residence:: Private [facility]    Lifestyle & Psychosocial Needs:    Social Determinants of Health     Tobacco Use: Low Risk      Smoking Tobacco Use: Never     Smokeless Tobacco Use: Never     Passive Exposure: Not on file   Alcohol Use: Heavy Drinker     Frequency of Alcohol Consumption: 2-4 times a month     Average Number of Drinks: 3 or 4     Frequency of Binge Drinking: Monthly   Financial Resource Strain: Not on file   Food Insecurity: Not on file   Transportation Needs: Not on file   Physical Activity: Not on file   Stress: Not on file   Social Connections: Not on file   Intimate Partner Violence: Not on file   Depression: At risk     PHQ-2 Score: 6   Housing Stability: Not on file     Inadequate nutrition (GOAL):: No  Tube Feeding: No  Inadequate activity/exercise (GOAL):: No  Significant changes in sleep pattern (GOAL): No  Transportation means:: Accessible car     Quaker or spiritual beliefs that impact treatment:: No  Mental health DX:: Yes  Mental health DX how managed:: Medication  Mental health management concern (GOAL):: Yes  Informal Support system:: Family, Friends           Resources and Interventions:  Current Resources:      Community Resources: None  Supplies Currently Used at Home: Diabetic Supplies  Equipment Currently Used at Home: none  Employment Status: employed full-time         Advance Care Plan/Directive  Advanced Care Plans/Directives on file:: No    Referrals Placed: Mental Health     Care Plan:   Care Plan: Mental Health     Problem: Mood/Psychosocial Concerns     Goal: Establish and Maintain Mental Health Support     Start Date: 12/1/2022 Expected End Date: 2/28/2023    Priority: High    Note:     Updated on 12/8/22  Barriers: Long wait to get in with a [facility] Therapist  Finding a therapist I can connect with.    Strengths: I have supportive friends and family.  I get regular physical activity.  Patient expressed understanding of goal: Yes  Action steps to achieve  this goal:  1. I will work with my PCP on medication management  2. I will establish with a therapist.    3. I will consider other mental health resources such as Intensive Outpatient Therapy if of interest.  4.  I will continue to exercise 3 - 5 times per week.     5.  I will let Care Coordination know if I need additional resources and/or support.                         Patient/Caregiver understanding: Yes    Outreach Frequency: monthly  Future Appointments              In 4 days Lindsay Arroyo APRN [facility] [place]          Plan: Juan J plans to go to his therapy appointment on 12/9/22 and his follow up PCP appointment on 12/12/22.  [facility] will outreach again in one month.      RENEE Dorsey Care Coordination Team  [phone]

## 2022-12-08 NOTE — TELEPHONE ENCOUNTER
Refill extended until appointment next week   Prescription approved per [facility] Refill Protocol.  Suzan Cuevas RN, BSN  [facility]

## 2022-12-12 ENCOUNTER — OFFICE VISIT (OUTPATIENT)
Dept: FAMILY MEDICINE | Facility: CLINIC | Age: 57
End: 2022-12-12
Payer: COMMERCIAL

## 2022-12-12 VITALS
DIASTOLIC BLOOD PRESSURE: 74 MMHG | RESPIRATION RATE: 17 BRPM | WEIGHT: 233.4 LBS | TEMPERATURE: 98.1 F | BODY MASS INDEX: 34.57 KG/M2 | SYSTOLIC BLOOD PRESSURE: 128 MMHG | HEIGHT: 69 IN | HEART RATE: 95 BPM | OXYGEN SATURATION: 96 %

## 2022-12-12 DIAGNOSIS — F33.2 MDD (MAJOR DEPRESSIVE DISORDER), RECURRENT SEVERE, WITHOUT PSYCHOSIS (H): ICD-10-CM

## 2022-12-12 DIAGNOSIS — F33.2 SEVERE EPISODE OF RECURRENT MAJOR DEPRESSIVE DISORDER, WITHOUT PSYCHOTIC FEATURES (H): ICD-10-CM

## 2022-12-12 PROCEDURE — 99495 TRANSJ CARE MGMT MOD F2F 14D: CPT | Performed by: NURSE PRACTITIONER

## 2022-12-12 RX ORDER — BUPROPION HYDROCHLORIDE 150 MG/1
150 TABLET ORAL DAILY
Qty: 30 TABLET | Refills: 0 | Status: SHIPPED | OUTPATIENT
Start: 2022-12-12 | End: 2022-12-26 | Stop reason: DRUGHIGH

## 2022-12-12 ASSESSMENT — ENCOUNTER SYMPTOMS
SLEEP DISTURBANCE: 1
CONSTITUTIONAL NEGATIVE: 1
CARDIOVASCULAR NEGATIVE: 1
GASTROINTESTINAL NEGATIVE: 1
RESPIRATORY NEGATIVE: 1

## 2022-12-12 ASSESSMENT — PAIN SCALES - GENERAL: PAINLEVEL: NO PAIN (0)

## 2022-12-12 ASSESSMENT — PATIENT HEALTH QUESTIONNAIRE - PHQ9
SUM OF ALL RESPONSES TO PHQ QUESTIONS 1-9: 17
SUM OF ALL RESPONSES TO PHQ QUESTIONS 1-9: 17
10. IF YOU CHECKED OFF ANY PROBLEMS, HOW DIFFICULT HAVE THESE PROBLEMS MADE IT FOR YOU TO DO YOUR WORK, TAKE CARE OF THINGS AT HOME, OR GET ALONG WITH OTHER PEOPLE: SOMEWHAT DIFFICULT

## 2022-12-12 NOTE — PROGRESS NOTES
"Assessment & Plan     ICD-10-CM    1. MDD (major depressive disorder), recurrent severe, without psychosis (H)  F33.2 buPROPion (WELLBUTRIN XL) 150 MG 24 hr tablet      2. Severe episode of recurrent major depressive disorder, without psychotic features (H)  F33.2         Post hospital discharge followup for suicide attempt.  Suicidal ideation has resolved.  Patient is following plan of care.  Feels that he would like to increase wellbutrin from 150 to 300 mg per day.  States \"the holidays are always a trigger\" and does not feel that the current dose will be adequate.    Discussed the recurrent nature of his depression and need to consider long term plan for management of the same.  He will be working on this as well with his therapist.    Patient Instructions   Increase your bupropion from 150 mg to 300 mg starting on 12/19.      Return in about 2 weeks (around 12/26/2022) for In Clinic Follow Up.    VALDEZ Cowart [facility] RUDYMOVINOD Arita is a 57 year old, presenting for the following health issues:  Hospital F/U      HPI     Post Discharge Outreach 12/8/2022   Admission Date 12/5/2022   Reason for Admission Suicidal Ideation   Discharge Date 12/7/2022   Discharge Diagnosis Major depressive disorder, severe, recurrent, without psychosis  Hyperlipidemia  Type 2 diabetes  Obstructive sleep apnea with use of CPAP  Rosacea  Seasonal allergic rhinitis  Morbid obesity  Head injury 11/30/2022   How are you doing now that you are home? Feeling better   How are your symptoms? (Red Flag symptoms escalate to triage hotline per guidelines) Improved   Do you feel your condition is stable enough to be safe at home until your provider visit? Yes   Does the patient have their discharge instructions?  Yes   Does the patient have questions regarding their discharge instructions?  No   Were you started on any new medications or were there changes to any of your previous medications?  Yes "   Does the patient have all of their medications? Yes   Do you have questions regarding any of your medications?  No   Do you have all of your needed medical supplies or equipment (DME)?  (i.e. oxygen tank, CPAP, cane, etc.) Yes   Discharge follow-up appointment scheduled within 14 calendar days?  Yes   Discharge Follow Up Appointment Date 12/12/2022   Discharge Follow Up Appointment Scheduled with? Primary Care Provider     Hospital Follow-up Visit:    Hospital/Nursing Home/IP Rehab Facility: [facility]  Date of Admission: 12/5  Date of Discharge: 12/7  Reason(s) for Admission: suicide attempt    Was your hospitalization related to COVID-19? No   Problems taking medications regularly:  None.  Has also stopped his doxycycline as recommended as it was felt that perhaps Microbiome changes could be contributing to worsening depression.  Medication changes since discharge: None--does have questions about increasing dose of wellbutrin  Problems adhering to non-medication therapy:  Yes-- has kept appointment with therapist.      Summary of hospitalization:  [facility] discharge summary reviewed  Diagnostic Tests/Treatments reviewed.  Follow up needed: none  Other Healthcare Providers Involved in Patient s Care:         Care Coordination and Community therapist.  Update since discharge: improved.         Friday seeing the therapist again.    Plan of care communicated with patient       CHINMAY-7 SCORE 10/26/2018 1/22/2019 11/30/2022   Total Score - 0 (minimal anxiety) 17 (severe anxiety)   Total Score 1 0 17     PHQ 3/6/2020 11/30/2022 12/12/2022   PHQ-9 Total Score 2 22 17   Q9: Thoughts of better off dead/self-harm past 2 weeks Not at all Several days Several days   F/U: Thoughts of suicide or self-harm - Yes Yes   F/U: Self harm-plan - Yes Yes   F/U: Self-harm action - No Yes   F/U: Safety concerns - No No       Diabetes Follow-up      Is working on diet and  "exercise    No concerns with blood sugars from patient at this time.  Is working on getting A1C down.        BP Readings from Last 2 Encounters:   12/12/22 128/74   12/07/22 (!) 153/88     Hemoglobin A1C (%)   Date Value   11/30/2022 8.0 (H)   03/02/2022 8.3 (H)   02/25/2021 8.3 (H)   08/28/2020 8.0 (H)     LDL Cholesterol Calculated (mg/dL)   Date Value   12/06/2022 93   03/02/2022 29   02/25/2021 62   02/28/2020 58                   Review of Systems   Constitutional: Negative.    HENT: Negative.    Respiratory: Negative.    Cardiovascular: Negative.    Gastrointestinal: Negative.    Neurological:        Improved post concussion and is tolerating increase in activity   Psychiatric/Behavioral: Positive for sleep disturbance. Negative for suicidal ideas.        See History of Present Illness   No side effects from wellbutrin            Objective    /74 (BP Location: Right arm, Patient Position: Sitting, Cuff Size: Adult Large)   Pulse 95   Temp 98.1  F (36.7  C) (Oral)   Resp 17   Ht 1.753 m (5' 9\")   Wt 105.9 kg (233 lb 6.4 oz)   SpO2 96%   BMI 34.47 kg/m    Body mass index is 34.47 kg/m .  Physical Exam  Constitutional:       Appearance: Normal appearance.   Neurological:      Mental Status: He is alert.   Psychiatric:         Mood and Affect: Mood normal.         Behavior: Behavior normal.         Thought Content: Thought content normal.         Judgment: Judgment normal.                    Answers for HPI/ROS submitted by the patient on 12/12/2022  If you checked off any problems, how difficult have these problems made it for you to do your work, take care of things at home, or get along with other people?: Somewhat difficult  PHQ9 TOTAL SCORE: 17      "

## 2022-12-13 NOTE — ASSESSMENT & PLAN NOTE
Hospitalization for suicidal behavior.  Patient was discharged and has had his first appointment with therapist.  Intends to continue with therapist in the future.  Is on wellbutrin 150 mg per day.  Feels this is helping his mood.  Since hospitalization has not had suicidal ideation.  His current PHQ-9 score he did today is based on his endorsement of symptoms he had while in the hospital.  Agrees to stay on the wellbutrin even though he was initially opposed to medication prior to hospitalization.  Also was given hydroxyzine for anxiety and ramelteon for sleep.  Is currently not using the ramelteon.  Overall is beginning to sleep somewhat better at this time.    Has support of friends and some family members.  Is going to return to work tomorrow.    Is returning to workout regimen slowly after concussion and feels that this will also help his mood.

## 2022-12-26 ENCOUNTER — OFFICE VISIT (OUTPATIENT)
Dept: FAMILY MEDICINE | Facility: CLINIC | Age: 57
End: 2022-12-26
Payer: COMMERCIAL

## 2022-12-26 VITALS
SYSTOLIC BLOOD PRESSURE: 98 MMHG | OXYGEN SATURATION: 98 % | DIASTOLIC BLOOD PRESSURE: 64 MMHG | HEART RATE: 90 BPM | TEMPERATURE: 98.1 F | HEIGHT: 69 IN | BODY MASS INDEX: 34.3 KG/M2 | WEIGHT: 231.6 LBS | RESPIRATION RATE: 16 BRPM

## 2022-12-26 DIAGNOSIS — F33.2 SEVERE EPISODE OF RECURRENT MAJOR DEPRESSIVE DISORDER, WITHOUT PSYCHOTIC FEATURES (H): ICD-10-CM

## 2022-12-26 DIAGNOSIS — E11.628 TYPE 2 DIABETES MELLITUS WITH OTHER SKIN COMPLICATIONS (H): Primary | ICD-10-CM

## 2022-12-26 PROBLEM — F33.42 MAJOR DEPRESSIVE DISORDER, RECURRENT, IN FULL REMISSION (H): Status: RESOLVED | Noted: 2022-03-18 | Resolved: 2022-12-26

## 2022-12-26 PROCEDURE — 99213 OFFICE O/P EST LOW 20 MIN: CPT | Performed by: NURSE PRACTITIONER

## 2022-12-26 RX ORDER — BUPROPION HYDROCHLORIDE 300 MG/1
300 TABLET ORAL EVERY MORNING
Qty: 30 TABLET | Refills: 2 | Status: SHIPPED | OUTPATIENT
Start: 2022-12-26 | End: 2023-02-27

## 2022-12-26 ASSESSMENT — PAIN SCALES - GENERAL: PAINLEVEL: SEVERE PAIN (6)

## 2022-12-26 ASSESSMENT — ENCOUNTER SYMPTOMS
AGITATION: 0
ACTIVITY CHANGE: 0
HEADACHES: 0
NERVOUS/ANXIOUS: 0
SEIZURES: 0

## 2022-12-26 ASSESSMENT — ANXIETY QUESTIONNAIRES
GAD7 TOTAL SCORE: 8
GAD7 TOTAL SCORE: 8
2. NOT BEING ABLE TO STOP OR CONTROL WORRYING: SEVERAL DAYS
6. BECOMING EASILY ANNOYED OR IRRITABLE: MORE THAN HALF THE DAYS
1. FEELING NERVOUS, ANXIOUS, OR ON EDGE: SEVERAL DAYS
5. BEING SO RESTLESS THAT IT IS HARD TO SIT STILL: SEVERAL DAYS
3. WORRYING TOO MUCH ABOUT DIFFERENT THINGS: SEVERAL DAYS
7. FEELING AFRAID AS IF SOMETHING AWFUL MIGHT HAPPEN: NOT AT ALL
IF YOU CHECKED OFF ANY PROBLEMS ON THIS QUESTIONNAIRE, HOW DIFFICULT HAVE THESE PROBLEMS MADE IT FOR YOU TO DO YOUR WORK, TAKE CARE OF THINGS AT HOME, OR GET ALONG WITH OTHER PEOPLE: SOMEWHAT DIFFICULT

## 2022-12-26 ASSESSMENT — PATIENT HEALTH QUESTIONNAIRE - PHQ9
SUM OF ALL RESPONSES TO PHQ QUESTIONS 1-9: 12
5. POOR APPETITE OR OVEREATING: MORE THAN HALF THE DAYS

## 2022-12-26 NOTE — PROGRESS NOTES
Assessment & Plan     ICD-10-CM    1. Type 2 diabetes mellitus with other skin complications (H)  E11.628 buPROPion (WELLBUTRIN XL) 300 MG 24 hr tablet      2. Severe episode of recurrent major depressive disorder, without psychotic features (H)  F33.2           Depression is improving, but not fully controlled.  Dsires increase in buproprion to 300 mg daily.      Patient Instructions   Increase the bupoprion to 300 mg daily.    Let us know if you have any side effects.    Follow up in 4-6 weeks.    Return in about 4 weeks (around 1/23/2023) for In Clinic Follow Up.    VALDEZ Cowart [facility] TEVIN Arita is a 57 year old, presenting for the following health issues:  RECHECK      HPI     Depression Followup    How are you doing with your depression since your last visit? Improved Patient states that he did not increase the dose to 300 mgs of Wellbutrin as he would have run out.     Are you having other symptoms that might be associated with depression? No    Have you had a significant life event?  No     Are you feeling anxious or having panic attacks?   No    Do you have any concerns with your use of alcohol or other drugs? No    Social History     Tobacco Use     Smoking status: Never     Passive exposure: Never     Smokeless tobacco: Never   Vaping Use     Vaping Use: Never used   Substance Use Topics     Alcohol use: Yes     Comment: 2 -3 beers every few weeks     Drug use: No     PHQ 11/30/2022 12/12/2022 12/26/2022   PHQ-9 Total Score 22 17 12   Q9: Thoughts of better off dead/self-harm past 2 weeks Several days Several days Not at all   F/U: Thoughts of suicide or self-harm Yes Yes -   F/U: Self harm-plan Yes Yes -   F/U: Self-harm action No Yes -   F/U: Safety concerns No No -     CHINMAY-7 SCORE 1/22/2019 11/30/2022 12/26/2022   Total Score 0 (minimal anxiety) 17 (severe anxiety) -   Total Score 0 17 8         Suicide Assessment Five-step Evaluation and Treatment  "(SAFE-T)      How many servings of fruits and vegetables do you eat daily?  0-1    On average, how many sweetened beverages do you drink each day (Examples: soda, juice, sweet tea, etc.  Do NOT count diet or artificially sweetened beverages)?   3    How many days per week do you exercise enough to make your heart beat faster? 6    How many minutes a day do you exercise enough to make your heart beat faster? 30 - 60    How many days per week do you miss taking your medication? 0        Review of Systems   Constitutional: Negative for activity change.   Neurological: Negative for seizures and headaches.   Psychiatric/Behavioral: Negative for agitation and suicidal ideas. The patient is not nervous/anxious.         Feels his mood is improving, but not at baseline.  Working on assuring he is well connected with friends for support            Objective    BP 98/64 (BP Location: Right arm, Patient Position: Sitting, Cuff Size: Adult Large)   Pulse 90   Temp 98.1  F (36.7  C) (Oral)   Resp 16   Ht 1.753 m (5' 9\")   Wt 105.1 kg (231 lb 9.6 oz)   SpO2 98%   BMI 34.20 kg/m    Body mass index is 34.2 kg/m .  Physical Exam  Constitutional:       Appearance: Normal appearance.   Neurological:      Mental Status: He is alert.   Psychiatric:         Thought Content: Thought content normal.         Judgment: Judgment normal.      Comments: Eye contact is improved, mood appears improved.                            "

## 2022-12-27 NOTE — PATIENT INSTRUCTIONS
Increase the bupoprion to 300 mg daily.    Let us know if you have any side effects.    Follow up in 4-6 weeks.

## 2022-12-31 DIAGNOSIS — E11.9 TYPE 2 DIABETES MELLITUS WITHOUT COMPLICATION, WITHOUT LONG-TERM CURRENT USE OF INSULIN (H): ICD-10-CM

## 2023-01-01 DIAGNOSIS — E11.628 TYPE 2 DIABETES MELLITUS WITH OTHER SKIN COMPLICATIONS (H): ICD-10-CM

## 2023-01-02 RX ORDER — DULAGLUTIDE 1.5 MG/.5ML
INJECTION, SOLUTION SUBCUTANEOUS
Qty: 2 ML | Refills: 5 | Status: SHIPPED | OUTPATIENT
Start: 2023-01-02 | End: 2023-03-06

## 2023-01-02 RX ORDER — GLIPIZIDE 10 MG/1
TABLET, FILM COATED, EXTENDED RELEASE ORAL
Qty: 180 TABLET | Refills: 1 | Status: SHIPPED | OUTPATIENT
Start: 2023-01-02 | End: 2023-03-06

## 2023-01-02 NOTE — TELEPHONE ENCOUNTER
Prescription approved per [facility] Refill Protocol.  Manish CARRILLO RN 1/2/2023 at 2:32 PM

## 2023-01-02 NOTE — TELEPHONE ENCOUNTER
Lab Results   Component Value Date    A1C 8.0 11/30/2022   See recent visit  Prescription approved per [facility] Refill Protocol.  Suzan Cuevas RN, BSN  [facility]

## 2023-01-11 ENCOUNTER — PATIENT OUTREACH (OUTPATIENT)
Dept: CARE COORDINATION | Facility: CLINIC | Age: 58
End: 2023-01-11

## 2023-01-11 ASSESSMENT — ACTIVITIES OF DAILY LIVING (ADL): DEPENDENT_IADLS:: INDEPENDENT

## 2023-01-11 NOTE — PROGRESS NOTES
"Clinic Care Coordination Contact    Follow Up Progress Note      Assessment: Juan J states that his mood has \"substantially\" approved.  He has been seeing a therapist weekly (from [facility].) He thinks the medication has also been helping.  TOY Boss asked if he is interested in grief support resources and he said he would consider it.  TOY BOSS will email him information about new group starting the end of the month.    Care Gaps:    Health Maintenance Due   Topic Date Due     ADVANCE CARE PLANNING  Never done     HEPATITIS B IMMUNIZATION (1 of 3 - 3-dose series) Never done     YEARLY PREVENTIVE VISIT  01/28/2011     Pneumococcal Vaccine: Pediatrics (0 to 5 Years) and At-Risk Patients (6 to 64 Years) (2 - PCV) 09/28/2013     DIABETIC FOOT EXAM  09/22/2021     COVID-19 Vaccine (4 - Booster for Pfizer series) 12/09/2021     INFLUENZA VACCINE (1) 09/01/2022   HM not discussed today.        Care Plans  Care Plan: Mental Health     Problem: Mood/Psychosocial Concerns     Goal: Establish and Maintain Mental Health Support     Start Date: 12/1/2022 Expected End Date: 2/28/2023    Priority: High    Note:     Updated on 12/8/22  Barriers: Long wait to get in with a [facility] Therapist  Finding a therapist I can connect with.    Strengths: I have supportive friends and family.  I get regular physical activity.  Patient expressed understanding of goal: Yes  Action steps to achieve this goal:  1. I will work with my PCP on medication management  2. I will establish with a therapist.    3. I will consider other mental health resources such as Intensive Outpatient Therapy if of interest.  4.  I will continue to exercise 3 - 5 times per week.     5.  I will let Care Coordination know if I need additional resources and/or support.                         Intervention/Education provided during outreach: Na     Outreach Frequency: monthly        Plan:   Juan J will continue working with his therapist. He will be placed on " maintenance at this time.     Care Coordinator will follow up in two months.    RENEE Dorsey   Care Coordination Team  [phone]

## 2023-01-17 ENCOUNTER — NURSE TRIAGE (OUTPATIENT)
Dept: NURSING | Facility: CLINIC | Age: 58
End: 2023-01-17
Payer: COMMERCIAL

## 2023-01-17 ENCOUNTER — TELEPHONE (OUTPATIENT)
Dept: FAMILY MEDICINE | Facility: CLINIC | Age: 58
End: 2023-01-17
Payer: COMMERCIAL

## 2023-01-17 NOTE — TELEPHONE ENCOUNTER
Pt calling with request for PT orders;    States he pulled a muscle or did something to back (right side mid back area)  on or about 10 days ago (1/7/23)  Has not been seen for this but hoping that PCP will order PT for him.  Advised an assessment is needed.    Reports that he was working out at the gym on a rowing machine (?)  Has been sleeping on his side with pillow between his legs.  Is able to be remain with daily activity (not working out though)  Pt says he is familiar with 'back issues'   Reports intermittent stabbing pain on right mid back area that can be as great as 9/10 at times.    Pt Denies;  Numbness/tingling/weakness  Shooting pain into leg  Loss of bladder/bowel control    According to the protocol, patient should see a physician or HCP today.  Care advice given/when to call back. Patient verbalizes understanding but states that he is going to wait to see a provider in clinic and not pay to go to ED or UC.    DAGMAR Mancini, RN, Nurse Advisor 4:12 PM 1/17/2023  Reason for Disposition    Back pain from overuse (work, exercise, gardening) OR from twisting, lifting, or bending injury    SEVERE back pain (e.g., excruciating, unable to do any normal activities) and not improved after pain medicine and CARE ADVICE    Additional Information    Negative: Dangerous mechanism of injury (e.g., MVA, contact sports, trampoline, diving, fall > 10 feet or 3 meters)  (Exception: Back pain began > 1 hour after injury.)    Negative: Weakness (i.e., paralysis, loss of muscle strength) of the leg(s) or foot and sudden onset after back injury    Negative: Numbness (i.e., loss of sensation) of the leg(s) or foot and sudden onset after back injury    Negative: Major bleeding (actively dripping or spurting) that can't be stopped    Negative: Bullet, knife or other serious penetrating wound    Negative: Shock suspected (e.g., cold/pale/clammy skin, too weak to stand, low BP, rapid pulse)    Negative: Sounds like a  life-threatening emergency to the triager    Negative: Back pain not from an injury    Negative: Passed out (i.e., fainted, collapsed and was not responding)    Negative: Shock suspected (e.g., cold/pale/clammy skin, too weak to stand, low BP, rapid pulse)    Negative: Sounds like a life-threatening emergency to the triager    Negative: Major injury to the back (e.g., MVA, fall > 10 feet or 3 meters, penetrating injury, etc.)    Negative: Pain in the upper back over the ribs (rib cage) that radiates (travels) into the chest    Negative: Pain in the upper back over the ribs (rib cage) and worsened by coughing (or clearly increases with breathing)    Negative: Back pain during pregnancy    Negative: SEVERE back pain of sudden onset and age > 60 years    Negative: SEVERE abdominal pain (e.g., excruciating)    Negative: Abdominal pain and age > 60 years    Negative: Unable to urinate (or only a few drops) and bladder feels very full    Negative: Loss of bladder or bowel control (urine or bowel incontinence; wetting self, leaking stool) of new-onset    Negative: Numbness (loss of sensation) in groin or rectal area    Negative: Pain radiates into groin, scrotum    Negative: Blood in urine (red, pink, or tea-colored)    Negative: Vomiting and pain over lower ribs of back (i.e., flank - kidney area)    Negative: Weakness of a leg or foot (e.g., unable to bear weight, dragging foot)    Negative: Patient sounds very sick or weak to the triager    Negative: Fever > 100.4 F (38.0 C) and flank pain    Negative: Pain or burning with passing urine (urination)    Protocols used: BACK INJURY-A-OH, BACK PAIN-A-OH

## 2023-01-17 NOTE — TELEPHONE ENCOUNTER
Reason for Call:  Other appointment    Detailed comments: The nurse line triaged him to be seen by tomorrow. I had no open appointments any where close. He has been having back pain. He would like to get in asap     Phone Number Patient can be reached at: Cell number on file:    Telephone Information:   Mobile [phone]       Best Time: anytime     Can we leave a detailed message on this number? YES    Call taken on 1/17/2023 at 3:11 PM by Sandra Torres

## 2023-01-18 ENCOUNTER — OFFICE VISIT (OUTPATIENT)
Dept: FAMILY MEDICINE | Facility: CLINIC | Age: 58
End: 2023-01-18
Payer: COMMERCIAL

## 2023-01-18 VITALS
HEART RATE: 80 BPM | TEMPERATURE: 98.5 F | OXYGEN SATURATION: 97 % | WEIGHT: 235.5 LBS | HEIGHT: 69 IN | DIASTOLIC BLOOD PRESSURE: 52 MMHG | SYSTOLIC BLOOD PRESSURE: 110 MMHG | RESPIRATION RATE: 18 BRPM | BODY MASS INDEX: 34.88 KG/M2

## 2023-01-18 DIAGNOSIS — M54.6 ACUTE RIGHT-SIDED THORACIC BACK PAIN: Primary | ICD-10-CM

## 2023-01-18 DIAGNOSIS — E11.628 TYPE 2 DIABETES MELLITUS WITH OTHER SKIN COMPLICATIONS (H): ICD-10-CM

## 2023-01-18 PROCEDURE — 99213 OFFICE O/P EST LOW 20 MIN: CPT | Performed by: NURSE PRACTITIONER

## 2023-01-18 RX ORDER — CYCLOBENZAPRINE HCL 5 MG
5 TABLET ORAL 3 TIMES DAILY PRN
Qty: 30 TABLET | Refills: 0 | Status: SHIPPED | OUTPATIENT
Start: 2023-01-18 | End: 2023-03-06

## 2023-01-18 ASSESSMENT — PAIN SCALES - GENERAL: PAINLEVEL: MODERATE PAIN (4)

## 2023-01-18 ASSESSMENT — ENCOUNTER SYMPTOMS: BACK PAIN: 1

## 2023-01-18 NOTE — PROGRESS NOTES
"  Assessment & Plan     Acute right-sided thoracic back pain  Acute; based on h/p likely strained muscle. discussed RICE, ibuprofen OR naproxen daily for inflammation, flexeril at bedtime and during the day as needed to help with muscle spasms. PT scheduled tomorrow morning. Discussed continuing to move but also being cautious not to cause further irritation. Encouraged stretching as much as tolerable. Patient has scheduled chiropractor today. Follow up if symptoms do not improve and/or worsen at any time.     - cyclobenzaprine (FLEXERIL) 5 MG tablet; Take 1 tablet (5 mg) by mouth 3 times daily as needed for muscle spasms Take 1-2 tablets as needed.  - Physical Therapy Referral; Future       BMI:   Estimated body mass index is 34.78 kg/m  as calculated from the following:    Height as of this encounter: 1.753 m (5' 9\").    Weight as of this encounter: 106.8 kg (235 lb 8 oz).   Weight management plan: Discussed healthy diet and exercise guidelines      Return in about 2 weeks (around 2/1/2023) for if symptoms do not improve and/or worsen.    VALDEZ Valdez CNP  [facility] TEVIN Arita is a 57 year old, presenting for the following health issues:  Back Pain      Back Pain     History of Present Illness       Back Pain:  He presents for follow up of back pain. Patient's back pain is a new problem.    Original cause of back pain: lifting  First noticed back pain: 1-4 weeks ago  Patient feels back pain: comes and goesLocation of back pain:  Right lower back  Description of back pain: stabbing  Back pain spreads: nowhere    Since patient first noticed back pain, pain is: gradually worsening  Does back pain interfere with his job:  Yes  On a scale of 1-10 (10 being the worst), patient describes pain as:  8  What makes back pain worse: bending, coughing, certain positions, standing and twisting  Acupuncture: not tried  Acetaminophen: not helpful  Activity or exercise: not " "helpful  Chiropractor:  Not helpful  Cold: not tried  Heat: helpful  Massage: helpful  Muscle relaxants: not helpful  NSAIDS: not helpful  Opioids: not tried  Physical Therapy: not tried  Rest: not helpful  Steroid Injection: not tried  Stretching: not helpful  Surgery: not tried  TENS unit: not tried  Topical pain relievers: not helpful  Other healthcare providers patient is seeing for back pain: Chiropractor    He eats 0-1 servings of fruits and vegetables daily.He consumes 3 sweetened beverage(s) daily.He exercises with enough effort to increase his heart rate 30 to 60 minutes per day.  He exercises with enough effort to increase his heart rate 5 days per week.   He is taking medications regularly.       Patient works out at gym was doing seated rows last week; the next day woke up with back pain; denies numbness and tingling; states it feels like a knife to the back; has been using ibuprofen, heating pad, and home chair massager which has been helping loosen up his back. Has scheduled chiropractor today hoping the TENS unit will provide some relief.  Denies any shortness of breath or feeling like he cannot fully expand his diaphragm.     Taking ibuprofen 4 tablets 3x/day.     Review of Systems   Musculoskeletal: Positive for back pain.          Objective    /52 (BP Location: Right arm, Patient Position: Sitting, Cuff Size: Adult Large)   Pulse 80   Temp 98.5  F (36.9  C) (Oral)   Resp 18   Ht 1.753 m (5' 9\")   Wt 106.8 kg (235 lb 8 oz)   SpO2 97%   BMI 34.78 kg/m    Body mass index is 34.78 kg/m .  Physical Exam   GENERAL: healthy, alert and no distress  RESP: lungs clear to auscultation - no rales, rhonchi or wheezes  CV: regular rate and rhythm, normal S1 S2, no S3 or S4, no murmur, click or rub, no peripheral edema and peripheral pulses strong  MS: normal muscle tone, decreased range of motion of back, no edema and tenderness to palpation mid right back              "

## 2023-01-19 ENCOUNTER — THERAPY VISIT (OUTPATIENT)
Dept: PHYSICAL THERAPY | Facility: CLINIC | Age: 58
End: 2023-01-19
Payer: COMMERCIAL

## 2023-01-19 DIAGNOSIS — M54.50 CHRONIC RIGHT-SIDED LOW BACK PAIN WITHOUT SCIATICA: ICD-10-CM

## 2023-01-19 DIAGNOSIS — M54.6 ACUTE RIGHT-SIDED THORACIC BACK PAIN: ICD-10-CM

## 2023-01-19 DIAGNOSIS — G89.29 CHRONIC RIGHT-SIDED LOW BACK PAIN WITHOUT SCIATICA: ICD-10-CM

## 2023-01-19 PROCEDURE — 97161 PT EVAL LOW COMPLEX 20 MIN: CPT | Mod: GP | Performed by: PHYSICAL THERAPIST

## 2023-01-19 PROCEDURE — 97014 ELECTRIC STIMULATION THERAPY: CPT | Mod: GP | Performed by: PHYSICAL THERAPIST

## 2023-01-19 PROCEDURE — 97110 THERAPEUTIC EXERCISES: CPT | Mod: GP | Performed by: PHYSICAL THERAPIST

## 2023-01-19 RX ORDER — BUPROPION HYDROCHLORIDE 300 MG/1
TABLET ORAL
Qty: 30 TABLET | Refills: 2 | OUTPATIENT
Start: 2023-01-19

## 2023-01-19 NOTE — PROGRESS NOTES
"Physical Therapy Initial Evaluation  Subjective:  The history is provided by the patient. No  was used.   Patient Health History  Everton Linn being seen for LBP.     Problem began: 1/7/2023.   Problem occurred: lifting weights   Pain is reported as 5/10 on pain scale.  General health as reported by patient is good.  Pertinent medical history includes: cancer, concussions/dizziness, depression, diabetes, numbness/tingling and sleep disorder/apnea.        Surgeries include:  Orthopedic surgery. Other surgery history details: knees, Back, neck fusion.    Current medications:  Anti-depressants, anti-inflammatory, muscle relaxants and pain medication.    Current occupation is .   Primary job tasks include:  Computer work and prolonged sitting.                  Therapist Generated HPI Evaluation  Problem details: Pt. complains of right LBP that has been present since lifting weights on 1/7/23 after lifting weights doing a \"back day\".  History of LB surgery in March 2022 to perform laminectomy that resolved original sx's.  PT order dated 1/18/23.      .         Type of problem:  Lumbar.    This is a new condition.  Occurance: lifting weights.  Where condition occurred: in the community and during recreation/sport.  Patient reports pain:  Lumbar spine right.  Pain is described as aching and sharp and is intermittent.  Pain radiates to:  No radiation. Pain is worse during the day.  Since onset symptoms are unchanged.  Associated symptoms:  Loss of motion/stiffness and loss of motion. Symptoms are exacerbated by standing, sitting, twisting and certain positions  and relieved by rest, heat, ice and analgesics.    Previous treatment includes physical therapy, chiropractic and surgery. There was moderate and mild improvement following previous treatment.  Restrictions due to condition include:  Working in normal job without restrictions.  Barriers include:  None as reported by " patient.                        Objective:  Standing Alignment:        Lumbar:  Lordosis decr  Pelvic:  Normal              Flexibility/Screens:   Positive screens:  LumbarNegative screens: Hip or SI Joint                    Lumbar/SI Evaluation  ROM:    AROM Lumbar:   Flexion:          75% with ERP  Ext:                    25% with ERP   Side Bend:        Left:  25% with ERP    Right:  25% with ERP  Rotation:           Left:     Right:   Side Glide:        Left:     Right:           Lumbar Myotomes:  normal                Lumbar Dermtomes:  normal                  Lumbar Palpation:      Tenderness not present at Left:    Quadratus Lumborum; Erector Spinae; Piriformis; PSIS; Iliac Crest; Gluteus Medius; Greater Trochanter or Vertebral  Tenderness present at Right: Quadratus Lumborum and Erector Spinae  Tenderness not present at Right:  Piriformis; PSIS; Iliac Crest; Gluteus Medius; Greater Trochanter or Vertebral      Spinal Segmental Conclusions:     Level: Hypo noted at S1, L5, L4, L3, L2 and L1                                                   General     ROS    Assessment/Plan:    Patient is a 57 year old male with lumbar complaints.    Patient has the following significant findings with corresponding treatment plan.                Diagnosis 1:  Right LBP  Pain -  self management, education, directional preference exercise and home program  Decreased ROM/flexibility - manual therapy and therapeutic exercise  Decreased joint mobility - manual therapy and therapeutic exercise  Decreased strength - therapeutic exercise and therapeutic activities  Impaired muscle performance - neuro re-education  Decreased function - therapeutic activities    Therapy Evaluation Codes:       1) Clinical presentation characteristics are:   Stable/Uncomplicated.  2) Decision-Making    Low complexity using standardized patient assessment instrument and/or measureable assessment of functional outcome.  Cumulative Therapy Evaluation is:  Low complexity.    Previous and current functional limitations:  (See Goal Flow Sheet for this information)    Short term and Long term goals: (See Goal Flow Sheet for this information)     Communication ability:  Patient appears to be able to clearly communicate and understand verbal and written communication and follow directions correctly.  Treatment Explanation - The following has been discussed with the patient:   RX ordered/plan of care  Anticipated outcomes  Possible risks and side effects  This patient would benefit from PT intervention to resume normal activities.   Rehab potential is fair.    Frequency:  1 X week, once daily  Duration:  for 8 weeks  Discharge Plan:  Achieve all LTG.  Independent in home treatment program.  Reach maximal therapeutic benefit.    Please refer to the daily flowsheet for treatment today, total treatment time and time spent performing 1:1 timed codes.

## 2023-01-26 ENCOUNTER — THERAPY VISIT (OUTPATIENT)
Dept: PHYSICAL THERAPY | Facility: CLINIC | Age: 58
End: 2023-01-26
Payer: COMMERCIAL

## 2023-01-26 DIAGNOSIS — M54.50 CHRONIC RIGHT-SIDED LOW BACK PAIN WITHOUT SCIATICA: Primary | ICD-10-CM

## 2023-01-26 DIAGNOSIS — G89.29 CHRONIC RIGHT-SIDED LOW BACK PAIN WITHOUT SCIATICA: Primary | ICD-10-CM

## 2023-01-26 PROCEDURE — 97014 ELECTRIC STIMULATION THERAPY: CPT | Mod: GP | Performed by: PHYSICAL THERAPIST

## 2023-01-26 PROCEDURE — 97110 THERAPEUTIC EXERCISES: CPT | Mod: GP | Performed by: PHYSICAL THERAPIST

## 2023-02-02 ENCOUNTER — THERAPY VISIT (OUTPATIENT)
Dept: PHYSICAL THERAPY | Facility: CLINIC | Age: 58
End: 2023-02-02
Payer: COMMERCIAL

## 2023-02-02 DIAGNOSIS — M54.50 CHRONIC RIGHT-SIDED LOW BACK PAIN WITHOUT SCIATICA: Primary | ICD-10-CM

## 2023-02-02 DIAGNOSIS — G89.29 CHRONIC RIGHT-SIDED LOW BACK PAIN WITHOUT SCIATICA: Primary | ICD-10-CM

## 2023-02-02 PROCEDURE — 97014 ELECTRIC STIMULATION THERAPY: CPT | Mod: GP | Performed by: PHYSICAL THERAPIST

## 2023-02-02 PROCEDURE — 97110 THERAPEUTIC EXERCISES: CPT | Mod: GP | Performed by: PHYSICAL THERAPIST

## 2023-02-08 ENCOUNTER — THERAPY VISIT (OUTPATIENT)
Dept: PHYSICAL THERAPY | Facility: CLINIC | Age: 58
End: 2023-02-08
Payer: COMMERCIAL

## 2023-02-08 DIAGNOSIS — M54.50 CHRONIC RIGHT-SIDED LOW BACK PAIN WITHOUT SCIATICA: Primary | ICD-10-CM

## 2023-02-08 DIAGNOSIS — G89.29 CHRONIC RIGHT-SIDED LOW BACK PAIN WITHOUT SCIATICA: Primary | ICD-10-CM

## 2023-02-08 PROCEDURE — 97035 APP MDLTY 1+ULTRASOUND EA 15: CPT | Mod: GP | Performed by: PHYSICAL THERAPIST

## 2023-02-08 PROCEDURE — 97110 THERAPEUTIC EXERCISES: CPT | Mod: GP | Performed by: PHYSICAL THERAPIST

## 2023-02-16 ENCOUNTER — THERAPY VISIT (OUTPATIENT)
Dept: PHYSICAL THERAPY | Facility: CLINIC | Age: 58
End: 2023-02-16
Payer: COMMERCIAL

## 2023-02-16 DIAGNOSIS — M54.50 CHRONIC RIGHT-SIDED LOW BACK PAIN WITHOUT SCIATICA: Primary | ICD-10-CM

## 2023-02-16 DIAGNOSIS — G89.29 CHRONIC RIGHT-SIDED LOW BACK PAIN WITHOUT SCIATICA: Primary | ICD-10-CM

## 2023-02-16 PROCEDURE — 97110 THERAPEUTIC EXERCISES: CPT | Mod: GP | Performed by: PHYSICAL THERAPIST

## 2023-02-16 PROCEDURE — 97014 ELECTRIC STIMULATION THERAPY: CPT | Mod: GP | Performed by: PHYSICAL THERAPIST

## 2023-03-06 ENCOUNTER — OFFICE VISIT (OUTPATIENT)
Dept: FAMILY MEDICINE | Facility: CLINIC | Age: 58
End: 2023-03-06
Payer: COMMERCIAL

## 2023-03-06 VITALS
HEIGHT: 68 IN | SYSTOLIC BLOOD PRESSURE: 104 MMHG | TEMPERATURE: 97.9 F | OXYGEN SATURATION: 97 % | BODY MASS INDEX: 35.16 KG/M2 | RESPIRATION RATE: 16 BRPM | WEIGHT: 232 LBS | HEART RATE: 90 BPM | DIASTOLIC BLOOD PRESSURE: 64 MMHG

## 2023-03-06 DIAGNOSIS — M48.02 CERVICAL STENOSIS OF SPINE: ICD-10-CM

## 2023-03-06 DIAGNOSIS — E78.5 HYPERLIPIDEMIA LDL GOAL <100: ICD-10-CM

## 2023-03-06 DIAGNOSIS — F33.2 SEVERE EPISODE OF RECURRENT MAJOR DEPRESSIVE DISORDER, WITHOUT PSYCHOTIC FEATURES (H): ICD-10-CM

## 2023-03-06 DIAGNOSIS — E66.01 MORBID OBESITY (H): ICD-10-CM

## 2023-03-06 DIAGNOSIS — G89.29 CHRONIC RIGHT-SIDED LOW BACK PAIN WITHOUT SCIATICA: ICD-10-CM

## 2023-03-06 DIAGNOSIS — E11.9 TYPE 2 DIABETES MELLITUS WITHOUT COMPLICATION, WITHOUT LONG-TERM CURRENT USE OF INSULIN (H): ICD-10-CM

## 2023-03-06 DIAGNOSIS — E11.628 TYPE 2 DIABETES MELLITUS WITH OTHER SKIN COMPLICATIONS (H): Primary | ICD-10-CM

## 2023-03-06 DIAGNOSIS — F33.2 MDD (MAJOR DEPRESSIVE DISORDER), RECURRENT SEVERE, WITHOUT PSYCHOSIS (H): ICD-10-CM

## 2023-03-06 DIAGNOSIS — M54.50 CHRONIC RIGHT-SIDED LOW BACK PAIN WITHOUT SCIATICA: ICD-10-CM

## 2023-03-06 PROCEDURE — 99214 OFFICE O/P EST MOD 30 MIN: CPT | Mod: 25 | Performed by: NURSE PRACTITIONER

## 2023-03-06 PROCEDURE — 96127 BRIEF EMOTIONAL/BEHAV ASSMT: CPT | Performed by: NURSE PRACTITIONER

## 2023-03-06 PROCEDURE — 90471 IMMUNIZATION ADMIN: CPT | Performed by: NURSE PRACTITIONER

## 2023-03-06 PROCEDURE — 90677 PCV20 VACCINE IM: CPT | Performed by: NURSE PRACTITIONER

## 2023-03-06 RX ORDER — DULAGLUTIDE 1.5 MG/.5ML
1.5 INJECTION, SOLUTION SUBCUTANEOUS
Qty: 2 ML | Refills: 3 | Status: SHIPPED | OUTPATIENT
Start: 2023-03-06 | End: 2023-06-12 | Stop reason: DRUGHIGH

## 2023-03-06 RX ORDER — SIMVASTATIN 40 MG
40 TABLET ORAL AT BEDTIME
Qty: 90 TABLET | Refills: 3 | Status: SHIPPED | OUTPATIENT
Start: 2023-03-06 | End: 2023-10-09

## 2023-03-06 RX ORDER — METFORMIN HCL 500 MG
1000 TABLET, EXTENDED RELEASE 24 HR ORAL 2 TIMES DAILY WITH MEALS
Qty: 120 TABLET | Refills: 1 | Status: SHIPPED | OUTPATIENT
Start: 2023-03-06 | End: 2023-06-09

## 2023-03-06 RX ORDER — TADALAFIL 10 MG/1
10 TABLET ORAL DAILY PRN
COMMUNITY

## 2023-03-06 RX ORDER — GLIPIZIDE 10 MG/1
20 TABLET, FILM COATED, EXTENDED RELEASE ORAL DAILY
Qty: 180 TABLET | Refills: 1 | Status: SHIPPED | OUTPATIENT
Start: 2023-03-06 | End: 2023-10-09

## 2023-03-06 RX ORDER — BUPROPION HYDROCHLORIDE 300 MG/1
300 TABLET ORAL EVERY MORNING
Qty: 30 TABLET | Refills: 0 | Status: CANCELLED | OUTPATIENT
Start: 2023-03-06

## 2023-03-06 RX ORDER — BUPROPION HYDROCHLORIDE 150 MG/1
150 TABLET ORAL EVERY MORNING
Qty: 90 TABLET | Refills: 3 | Status: SHIPPED | OUTPATIENT
Start: 2023-03-06 | End: 2023-10-09 | Stop reason: DRUGHIGH

## 2023-03-06 RX ORDER — RAMELTEON 8 MG/1
8 TABLET ORAL
Qty: 30 TABLET | Refills: 1 | Status: CANCELLED | OUTPATIENT
Start: 2023-03-06

## 2023-03-06 ASSESSMENT — PATIENT HEALTH QUESTIONNAIRE - PHQ9
SUM OF ALL RESPONSES TO PHQ QUESTIONS 1-9: 1
SUM OF ALL RESPONSES TO PHQ QUESTIONS 1-9: 1
10. IF YOU CHECKED OFF ANY PROBLEMS, HOW DIFFICULT HAVE THESE PROBLEMS MADE IT FOR YOU TO DO YOUR WORK, TAKE CARE OF THINGS AT HOME, OR GET ALONG WITH OTHER PEOPLE: NOT DIFFICULT AT ALL

## 2023-03-06 ASSESSMENT — PAIN SCALES - GENERAL: PAINLEVEL: MODERATE PAIN (4)

## 2023-03-06 ASSESSMENT — ENCOUNTER SYMPTOMS
WEAKNESS: 0
RESPIRATORY NEGATIVE: 1
CARDIOVASCULAR NEGATIVE: 1
CONSTITUTIONAL NEGATIVE: 1
GASTROINTESTINAL NEGATIVE: 1

## 2023-03-06 NOTE — PROGRESS NOTES
Assessment & Plan     ICD-10-CM    1. Type 2 diabetes mellitus with other skin complications (H)  E11.628 dulaglutide (TRULICITY) 1.5 MG/0.5ML pen     CANCELED: Albumin Random Urine Quantitative with Creat Ratio      2. Hyperlipidemia LDL goal <100  E78.5 simvastatin (ZOCOR) 40 MG tablet      3. MDD (major depressive disorder), recurrent severe, without psychosis (H)  F33.2       4. Type 2 diabetes mellitus without complication, without long-term current use of insulin (H)  E11.9 metFORMIN (GLUCOPHAGE XR) 500 MG 24 hr tablet     glipiZIDE (GLUCOTROL XL) 10 MG 24 hr tablet      5. Severe episode of recurrent major depressive disorder, without psychotic features (H)  F33.2 buPROPion (WELLBUTRIN XL) 150 MG 24 hr tablet      6. Morbid obesity (H)  E66.01       7. Chronic right-sided low back pain without sciatica  M54.50     G89.29       8. Cervical stenosis of spine  M48.02         Depression--improved.  Discussed management of chronic recurrent depression.  Pt willing to continue lower dose of bupropion despite feeling better.      Low back pain and cervical stenosis of spine.  Right forearm pain and left hand symptoms could be related to cervical stenosis of peripheral nerve issues such as carpal tunnel. However right arm is improving and left hand symptoms only last intermittently for 10 seconds and are not associated with weakness or pain.  If not continuing to improve or if worsening patient agrees to seek re-evaluation    Diabetes--patient declines A1c today.  Does not wish to see results as he has been unable to exercise.  Stressed importance of follow up and recheck in June.        Patient Instructions   Follow up for your A1c in May and have a follow  up appointment.    If your arm symptoms get worse please see physical therapy for further follow up    Return in about 2 months (around 5/6/2023) for In Clinic Follow Up, Lab Work.    VALDEZ Cowart [facility] TEVIN      Subjective    Juan J is a 57 year old, presenting for the following health issues:  Diabetes, Lipids, and Depression      Morbid obesity (H)  Has had to slow down on exercise routine due to recent back pain problems.  However is continuing to work on weight loss and is looking forward to getting back to his workout regimen.    Wt Readings from Last 4 Encounters:   03/06/23 105.2 kg (232 lb)   01/18/23 106.8 kg (235 lb 8 oz)   12/26/22 105.1 kg (231 lb 9.6 oz)   12/12/22 105.9 kg (233 lb 6.4 oz)         Severe episode of recurrent major depressive disorder (H)  Feels symptoms are in remission.  Would like to reduce bupropion from 300 mg per day to 150 mg per day.  He is quite happy that he is now in a dating relationship.  Notes that he is finished with counseling.        Type 2 diabetes mellitus with other skin complications (H)  Diabetes is improving/stable/worsening: control is uncertain.  Patient has been unable to exercise due to recent back pain flare.  Declines A1c measurement today as he would like to do more work on lifestyle before the A1c is checked..  Continue current treatment regimen.  Diabetes will be reassessed in 3 months.    Chronic right-sided low back pain without sciatica  Improved with physical therapy.  Has completed course of PT and is satisfied with results.    Cervical stenosis of spine  Does complain of some left sided numbness in hand the lasts about 10 seconds periodically in the day with no other symptoms.  Advised if this worsens to see further evaluation or treatment with physical therapy.        History of Present Illness       Mental Health Follow-up:  Patient presents to follow-up on Depression.Patient's depression since last visit has been:  Good  The patient is not having other symptoms associated with depression.      Any significant life events: No  Patient is not feeling anxious or having panic attacks.  Patient has no concerns about alcohol or drug use.    Diabetes:   He presents for follow  up of diabetes.  He is not checking blood glucose. He has no concerns regarding his diabetes at this time.  He is not experiencing numbness or burning in feet, excessive thirst, blurry vision, weight changes or redness, sores or blisters on feet.         He eats 0-1 servings of fruits and vegetables daily.He consumes 3 sweetened beverage(s) daily.He exercises with enough effort to increase his heart rate 30 to 60 minutes per day.  He exercises with enough effort to increase his heart rate 4 days per week.   He is taking medications regularly.    Today's PHQ-9         PHQ-9 Total Score: 1    PHQ-9 Q9 Thoughts of better off dead/self-harm past 2 weeks :   Not at all    How difficult have these problems made it for you to do your work, take care of things at home, or get along with other people: Not difficult at all             Hyperlipidemia Follow-Up      Are you regularly taking any medication or supplement to lower your cholesterol?   Yes- . simvastatin    Are you having muscle aches or other side effects that you think could be caused by your cholesterol lowering medication?  No    BP Readings from Last 2 Encounters:   03/06/23 104/64   01/18/23 110/52     Hemoglobin A1C (%)   Date Value   11/30/2022 8.0 (H)   03/02/2022 8.3 (H)   02/25/2021 8.3 (H)   08/28/2020 8.0 (H)     LDL Cholesterol Calculated (mg/dL)   Date Value   12/06/2022 93   03/02/2022 29   02/25/2021 62   02/28/2020 58     Social History     Tobacco Use     Smoking status: Never     Passive exposure: Never     Smokeless tobacco: Never   Vaping Use     Vaping Use: Never used   Substance Use Topics     Alcohol use: Yes     Comment: 2 -3 beers every few weeks     Drug use: No     PHQ 12/12/2022 12/26/2022 3/6/2023   PHQ-9 Total Score 17 12 1   Q9: Thoughts of better off dead/self-harm past 2 weeks Several days Not at all Not at all   F/U: Thoughts of suicide or self-harm Yes - -   F/U: Self harm-plan Yes - -   F/U: Self-harm action Yes - -   F/U:  "Safety concerns No - -     CHINMAY-7 SCORE 1/22/2019 11/30/2022 12/26/2022   Total Score 0 (minimal anxiety) 17 (severe anxiety) -   Total Score 0 17 8     :[id number]}        Review of Systems   Constitutional: Negative.    Respiratory: Negative.    Cardiovascular: Negative.    Gastrointestinal: Negative.    Neurological: Negative for weakness.        Left hand numbness/tingling as noted in HPI.  No significant low back pain or cervical spine pain at this time    Does also have dorsal right forearm pain just below elbow but feels this is improving and relates it to workout regimen            Objective    /64 (BP Location: Right arm, Cuff Size: Adult Large)   Pulse 90   Temp 97.9  F (36.6  C) (Tympanic)   Resp 16   Ht 1.725 m (5' 7.9\")   Wt 105.2 kg (232 lb)   SpO2 97%   BMI 35.38 kg/m    Body mass index is 35.38 kg/m .  Physical Exam  Constitutional:       Appearance: Normal appearance.   Pulmonary:      Effort: Pulmonary effort is normal.   Skin:     General: Skin is warm and dry.   Neurological:      Mental Status: He is alert and oriented to person, place, and time.   Psychiatric:         Mood and Affect: Mood normal.         Behavior: Behavior normal.         Thought Content: Thought content normal.                            "

## 2023-03-06 NOTE — PATIENT INSTRUCTIONS
Follow up for your A1c in May and have a follow  up appointment.    If your arm symptoms get worse please see physical therapy for further follow up

## 2023-03-07 NOTE — ASSESSMENT & PLAN NOTE
Does complain of some left sided numbness in hand the lasts about 10 seconds periodically in the day with no other symptoms.  Advised if this worsens to see further evaluation or treatment with physical therapy.

## 2023-03-07 NOTE — ASSESSMENT & PLAN NOTE
Diabetes is improving/stable/worsening: control is uncertain.  Patient has been unable to exercise due to recent back pain flare.  Declines A1c measurement today as he would like to do more work on lifestyle before the A1c is checked..  Continue current treatment regimen.  Diabetes will be reassessed in 3 months.

## 2023-03-07 NOTE — ASSESSMENT & PLAN NOTE
Feels symptoms are in remission.  Would like to reduce bupropion from 300 mg per day to 150 mg per day.  He is quite happy that he is now in a dating relationship.  Notes that he is finished with counseling.

## 2023-03-07 NOTE — ASSESSMENT & PLAN NOTE
Has had to slow down on exercise routine due to recent back pain problems.  However is continuing to work on weight loss and is looking forward to getting back to his workout regimen.    Wt Readings from Last 4 Encounters:   03/06/23 105.2 kg (232 lb)   01/18/23 106.8 kg (235 lb 8 oz)   12/26/22 105.1 kg (231 lb 9.6 oz)   12/12/22 105.9 kg (233 lb 6.4 oz)

## 2023-03-15 ENCOUNTER — PATIENT OUTREACH (OUTPATIENT)
Dept: CARE COORDINATION | Facility: CLINIC | Age: 58
End: 2023-03-15
Payer: COMMERCIAL

## 2023-03-15 ASSESSMENT — ACTIVITIES OF DAILY LIVING (ADL): DEPENDENT_IADLS:: INDEPENDENT

## 2023-03-15 NOTE — PROGRESS NOTES
Clinic Care Coordination Contact  [facility]/Voicemail    Referral Source: PCP  Clinical Data: Care Coordinator Outreach  Outreach attempted x 1.  Left message on patient's voicemail with call back information and requested return call.  Plan: Care Coordinator will try to reach patient again in 10 business days.    RENEE Dorsey   Care Coordination Team  [phone]

## 2023-03-30 DIAGNOSIS — E11.9 TYPE 2 DIABETES MELLITUS WITHOUT COMPLICATION, WITHOUT LONG-TERM CURRENT USE OF INSULIN (H): ICD-10-CM

## 2023-03-30 NOTE — PROGRESS NOTES
Discharge Note    Progress reporting period is from initial eval to Feb 16, 2023.     Everton failed to return for next follow up visit and current status is unknown.  Please see information below for last relevant information on current status.  Patient seen for Rxs Used: 5 visits.  SUBJECTIVE  Subjective changes noted by patient:  Subjective: Re-aggrivated sx's 2 days ago.  Functioning at 70% of where he wants to be.  .  Current pain level is  .     Previous pain level was  Initial Pain level: 5/10.   Changes in function:  Yes (See Goal flowsheet attached for changes in current functional level)  Adverse reaction to treatment or activity: None    OBJECTIVE  Changes noted in objective findings:       ASSESSMENT/PLAN  Diagnosis: LBP   DIAGP:  The encounter diagnosis was Chronic right-sided low back pain without sciatica.  Updated problem list and treatment plan:   Decreased strength - HEP  STG/LTGs have been met or progress has been made towards goals:  Yes, please see goal flowsheet for most current information  Assessment of Progress: current status is unknown.  Last current status:     Self Management Plans:  HEP  I have re-evaluated this patient and find that the nature, scope, duration and intensity of the therapy is appropriate for the medical condition of the patient.  Everton continues to require the following intervention to meet STG and LTG's:  HEP.    Recommendations:  Discharge with current home program.  Patient to follow up with MD as needed.    Please refer to the daily flowsheet for treatment today, total treatment time and time spent performing 1:1 timed codes.

## 2023-03-31 RX ORDER — METFORMIN HCL 500 MG
TABLET, EXTENDED RELEASE 24 HR ORAL
Qty: 120 TABLET | Refills: 1 | OUTPATIENT
Start: 2023-03-31

## 2023-03-31 NOTE — TELEPHONE ENCOUNTER
Should have a refill on file. Sent 3/06/23 for 120 with 1 refill. Not due for refill until May.  Pippa QUINTANILLA RN, BSN

## 2023-04-01 ENCOUNTER — HEALTH MAINTENANCE LETTER (OUTPATIENT)
Age: 58
End: 2023-04-01

## 2023-04-14 ENCOUNTER — PATIENT OUTREACH (OUTPATIENT)
Dept: CARE COORDINATION | Facility: CLINIC | Age: 58
End: 2023-04-14
Payer: COMMERCIAL

## 2023-04-14 ASSESSMENT — ACTIVITIES OF DAILY LIVING (ADL): DEPENDENT_IADLS:: INDEPENDENT

## 2023-04-14 NOTE — LETTER
[facility] CARE COORDINATION    April 14, 2023    Everton Linn  [address]  [facility] [place] [facility] [address]    Dear Everton,  Your Care Team congratulates you on your journey to maintain wellness. This document will help guide you on your journey to maintain a healthy lifestyle.  You can use this to help you overcome any barriers you may encounter.  If you should have any questions or concerns, you can contact the members of your Care Team or contact your Primary Care Clinic for assistance.     Health Maintenance  Health Maintenance Reviewed:    Health Maintenance Due   Topic Date Due    ADVANCE CARE PLANNING  Never done    HEPATITIS B IMMUNIZATION (1 of 3 - 3-dose series) Never done    YEARLY PREVENTIVE VISIT  01/28/2011    DIABETIC FOOT EXAM  09/22/2021    COVID-19 Vaccine (4 - Booster for Pfizer series) 12/09/2021    INFLUENZA VACCINE (1) 09/01/2022    MICROALBUMIN  03/02/2023        My Access Plan  Medical Emergency 911   Primary Clinic Line [facility] - [phone]   24 Hour Appointment Line [phone] or  [phone]) (toll-free)   24 Hour Nurse Line [phone] (toll-free)   Preferred Urgent Care     Preferred Hospital [facility]  [phone]   Preferred Pharmacy [id number] - [place], [place] - [address]     Behavioral Health Crisis Line The National Suicide Prevention Lifeline at 1-341.118.2904 or 911     My Care Team Members  Patient Care Team         Relationship Specialty Notifications Start End    Lindsay Arroyo APRN CNP PCP - General   11/6/02     Phone: [phone] Fax: [phone] [address] [facility] [address]    Lindsay Arroyo APRN CNP Assigned PCP   10/4/12     Phone: [phone] Fax: [phone] [address] [facility] [address]    Denise Sahu [facility] Pharmacist Pharmacist  10/24/19     Phone: [phone] Fax: [phone] [address]  [facility] [address]    Slick Adams PA-C Assigned Musculoskeletal Provider   1/9/22     Phone: [phone] Pager: [phone] Fax: [phone] [address] [place] [address]    Carmenza Byers, [facility] Clinic Care Coordinator Primary Care - CC Admissions 12/1/22 4/14/23    Phone: [phone]         PROVIDER, BEHAVIORAL DEC    12/4/22     Julia Jasso MA Licensed Mental Health Professional   12/9/22     Phone: [phone] Fax: [phone]        [facility] [address] [place] [address]                 Goals    None          Advance Care Plans/Directives Type:      We notice that you do not have an Advance Directive on file. Enclosed with this letter we have provided you information for our [facility] team. They can assist you in establishing a Health Care Directive and answer any questions you may have.  [facility]    Advance Care Planning and Health Care Directives  When it comes to decisions about your health care, it s important that your voice is heard. You may not always be able to speak for yourself.    We encourage you to have discussions with your loved ones, cultural or spiritual leaders and health care providers about your goals, values, beliefs and choices.    We are a part of [facility] , supporting and promoting the benefits of advance care planning conversations.    Our goals are to:  Help you make informed decisions about your healthcare choices and ensure that those choices are honored  Offer advance care planning discussions with trained staff  Ensure your choices are clearly defined, documented and available in your medical record  Translate your choices into medical orders as needed    Why is Advance Care Planning important?  Know what your health care choices are and decide what is right for you  An unexpected illness or injury could leave you unable to participate in important treatment  decisions  When choices are left to others to decide that responsibility can be difficult and stressful  By discussing and outlining your choices, your voice is heard in the care you want to receive    How can I learn more?  We offer free classes at multiple locations, days and times. Our trained facilitators will provide information and guide you through a Health Care Directive document. They can also review, notarize and add your document to your medical record.    Call Related Content Database (RCDb) at [phone] or toll free at [phone] for assistance.     It has been your Clinic Care Team's pleasure to work with you on your goals.    Regards,  Your Clinic Care Team

## 2023-04-14 NOTE — PROGRESS NOTES
Clinic Care Coordination Contact    Assessment: Care Coordinator contacted patient for 2 month follow up and was unable to reach him after two phone attempts.. Per chart review  Patient appears to have continued to follow the plan of care and assessment is negative for any new needs or concerns.    Enrollment status: Graduated.      Plan: No further outreaches at this time.  Patient will continue to follow the plan of care.  If new needs arise a new Care Coordination referral may be placed.  FYI to PCP    RENEE Dorsey   Care Coordination Team  [phone]

## 2023-06-06 ENCOUNTER — LAB (OUTPATIENT)
Dept: LAB | Facility: CLINIC | Age: 58
End: 2023-06-06
Payer: COMMERCIAL

## 2023-06-06 DIAGNOSIS — E11.628 TYPE 2 DIABETES MELLITUS WITH OTHER SKIN COMPLICATIONS (H): ICD-10-CM

## 2023-06-06 DIAGNOSIS — E11.9 TYPE 2 DIABETES MELLITUS WITHOUT COMPLICATION, WITHOUT LONG-TERM CURRENT USE OF INSULIN (H): ICD-10-CM

## 2023-06-06 LAB
CREAT UR-MCNC: 218 MG/DL
HBA1C MFR BLD: 9.3 % (ref 0–5.6)
MICROALBUMIN UR-MCNC: <12 MG/L
MICROALBUMIN/CREAT UR: NORMAL MG/G{CREAT}

## 2023-06-06 PROCEDURE — 82570 ASSAY OF URINE CREATININE: CPT

## 2023-06-06 PROCEDURE — 83036 HEMOGLOBIN GLYCOSYLATED A1C: CPT

## 2023-06-06 PROCEDURE — 36415 COLL VENOUS BLD VENIPUNCTURE: CPT

## 2023-06-06 PROCEDURE — 82043 UR ALBUMIN QUANTITATIVE: CPT

## 2023-06-09 RX ORDER — METFORMIN HCL 500 MG
TABLET, EXTENDED RELEASE 24 HR ORAL
Qty: 120 TABLET | Refills: 2 | Status: SHIPPED | OUTPATIENT
Start: 2023-06-09 | End: 2023-09-15

## 2023-06-12 ENCOUNTER — OFFICE VISIT (OUTPATIENT)
Dept: FAMILY MEDICINE | Facility: CLINIC | Age: 58
End: 2023-06-12
Payer: COMMERCIAL

## 2023-06-12 VITALS
HEIGHT: 68 IN | TEMPERATURE: 98 F | WEIGHT: 235 LBS | OXYGEN SATURATION: 96 % | HEART RATE: 90 BPM | SYSTOLIC BLOOD PRESSURE: 110 MMHG | BODY MASS INDEX: 35.61 KG/M2 | RESPIRATION RATE: 19 BRPM | DIASTOLIC BLOOD PRESSURE: 71 MMHG

## 2023-06-12 DIAGNOSIS — Z11.3 SCREEN FOR STD (SEXUALLY TRANSMITTED DISEASE): ICD-10-CM

## 2023-06-12 DIAGNOSIS — F33.2 SEVERE EPISODE OF RECURRENT MAJOR DEPRESSIVE DISORDER, WITHOUT PSYCHOTIC FEATURES (H): ICD-10-CM

## 2023-06-12 DIAGNOSIS — E66.01 MORBID OBESITY (H): ICD-10-CM

## 2023-06-12 DIAGNOSIS — E11.628 TYPE 2 DIABETES MELLITUS WITH OTHER SKIN COMPLICATIONS (H): Primary | ICD-10-CM

## 2023-06-12 PROCEDURE — 87591 N.GONORRHOEAE DNA AMP PROB: CPT | Performed by: NURSE PRACTITIONER

## 2023-06-12 PROCEDURE — 99214 OFFICE O/P EST MOD 30 MIN: CPT | Performed by: NURSE PRACTITIONER

## 2023-06-12 PROCEDURE — 87491 CHLMYD TRACH DNA AMP PROBE: CPT | Performed by: NURSE PRACTITIONER

## 2023-06-12 RX ORDER — DULAGLUTIDE 3 MG/.5ML
3 INJECTION, SOLUTION SUBCUTANEOUS WEEKLY
Qty: 6 ML | Refills: 1 | Status: SHIPPED | OUTPATIENT
Start: 2023-06-12 | End: 2023-10-09

## 2023-06-12 ASSESSMENT — ENCOUNTER SYMPTOMS
FATIGUE: 0
POLYDIPSIA: 0
CARDIOVASCULAR NEGATIVE: 1
GASTROINTESTINAL NEGATIVE: 1
POLYPHAGIA: 0
NERVOUS/ANXIOUS: 0
RESPIRATORY NEGATIVE: 1
UNEXPECTED WEIGHT CHANGE: 0

## 2023-06-12 ASSESSMENT — PAIN SCALES - GENERAL: PAINLEVEL: NO PAIN (0)

## 2023-06-12 NOTE — PROGRESS NOTES
Assessment & Plan     ICD-10-CM    1. Type 2 diabetes mellitus with other skin complications (H)  E11.628 Dulaglutide (TRULICITY) 3 MG/0.5ML SOPN      2. Screen for STD (sexually transmitted disease)  Z11.3 NEISSERIA GONORRHOEA PCR     CHLAMYDIA TRACHOMATIS PCR    Requests GC Chlamydia testing post breakup.  No symptoms at this time.        3. Severe episode of recurrent major depressive disorder, without psychotic features (H)  F33.2       4. Morbid obesity (H)  E66.01         Diabetes poorly controlled due to lifestyle changes as well as need for medication optimization.  Increase dose of Trulicity and make lifestyle changes.      There are no Patient Instructions on file for this visit.  No follow-ups on file.      VALDEZ Cowart [facility] TEVIN Arita is a 58 year old, presenting for the following health issues:  Diabetes        6/12/2023     4:02 PM   Additional Questions   Roomed by cole peterson         6/12/2023     4:02 PM   Patient Reported Additional Medications   Patient reports taking the following new medications none       Severe episode of recurrent major depressive disorder (H)  Mood is stable and patient is still interested in increase in antidepressant therapy late fall before the holidays.  Has gone through a breakup with current relationship, but is in a positive mood about this change.    Morbid obesity (H)  Increase calorie intake recently with current relationship which is ending.  Is back to working out and watching carbs.    Wt Readings from Last 4 Encounters:   06/12/23 106.6 kg (235 lb)   03/06/23 105.2 kg (232 lb)   01/18/23 106.8 kg (235 lb 8 oz)   12/26/22 105.1 kg (231 lb 9.6 oz)         Type 2 diabetes mellitus with other skin complications (H)    Diabetes is improving/stable/worsening: worsening.  Increase trulicity from 1.5 to 3.0 mg weekly.  Lifestyle changes   Diabetes will be reassessed in 3 months.      History of Present Illness  "      Diabetes:   He presents for follow up of diabetes.  He is checking home blood glucose a few times a month. He checks blood glucose before meals.  Blood glucose is never over 200 and never under 70. He is aware of hypoglycemia symptoms including shakiness and dizziness. He is concerned about other.  He is not experiencing numbness or burning in feet, excessive thirst, blurry vision, weight changes or redness, sores or blisters on feet.         He eats 0-1 servings of fruits and vegetables daily.He consumes 4 sweetened beverage(s) daily.He exercises with enough effort to increase his heart rate 30 to 60 minutes per day.  He exercises with enough effort to increase his heart rate 4 days per week.   He is taking medications regularly.               Review of Systems   Constitutional: Negative for fatigue and unexpected weight change.   HENT: Negative.    Respiratory: Negative.    Cardiovascular: Negative.    Gastrointestinal: Negative.    Endocrine: Negative for polydipsia, polyphagia and polyuria.   Psychiatric/Behavioral: Negative for mood changes. The patient is not nervous/anxious.             Objective    /71 (BP Location: Right arm, Patient Position: Sitting, Cuff Size: Adult Large)   Pulse 90   Temp 98  F (36.7  C) (Tympanic)   Resp 19   Ht 1.721 m (5' 7.75\")   Wt 106.6 kg (235 lb)   SpO2 96%   BMI 36.00 kg/m    Body mass index is 36 kg/m .  Physical Exam  Constitutional:       Appearance: Normal appearance.   Neurological:      Mental Status: He is alert and oriented to person, place, and time.   Psychiatric:         Mood and Affect: Mood normal.         Behavior: Behavior normal.         Thought Content: Thought content normal.         Judgment: Judgment normal.                            "

## 2023-06-13 NOTE — ASSESSMENT & PLAN NOTE
Diabetes is improving/stable/worsening: worsening.  Increase trulicity from 1.5 to 3.0 mg weekly.  Lifestyle changes   Diabetes will be reassessed in 3 months.

## 2023-06-13 NOTE — ASSESSMENT & PLAN NOTE
Mood is stable and patient is still interested in increase in antidepressant therapy late fall before the holidays.  Has gone through a breakup with current relationship, but is in a positive mood about this change.

## 2023-06-13 NOTE — ASSESSMENT & PLAN NOTE
Increase calorie intake recently with current relationship which is ending.  Is back to working out and watching [employer].    Wt Readings from Last 4 Encounters:   06/12/23 106.6 kg (235 lb)   03/06/23 105.2 kg (232 lb)   01/18/23 106.8 kg (235 lb 8 oz)   12/26/22 105.1 kg (231 lb 9.6 oz)

## 2023-06-14 LAB
C TRACH DNA SPEC QL NAA+PROBE: NEGATIVE
N GONORRHOEA DNA SPEC QL NAA+PROBE: NEGATIVE

## 2023-09-14 ENCOUNTER — TRANSFERRED RECORDS (OUTPATIENT)
Dept: HEALTH INFORMATION MANAGEMENT | Facility: CLINIC | Age: 58
End: 2023-09-14
Payer: COMMERCIAL

## 2023-09-14 DIAGNOSIS — E11.9 TYPE 2 DIABETES MELLITUS WITHOUT COMPLICATION, WITHOUT LONG-TERM CURRENT USE OF INSULIN (H): ICD-10-CM

## 2023-09-14 LAB — RETINOPATHY: NEGATIVE

## 2023-09-14 NOTE — TELEPHONE ENCOUNTER
Pharmacy changed quantity to 360.  Is 3 month RX appropriate?     Routing refill request to provider for review/approval because:  Labs not current:  A1C    Roxanna Duffy RN

## 2023-09-15 ENCOUNTER — MYC MEDICAL ADVICE (OUTPATIENT)
Dept: FAMILY MEDICINE | Facility: CLINIC | Age: 58
End: 2023-09-15
Payer: COMMERCIAL

## 2023-09-15 RX ORDER — METFORMIN HCL 500 MG
1000 TABLET, EXTENDED RELEASE 24 HR ORAL 2 TIMES DAILY WITH MEALS
Qty: 360 TABLET | Refills: 0 | Status: SHIPPED | OUTPATIENT
Start: 2023-09-15 | End: 2023-10-09

## 2023-09-15 NOTE — TELEPHONE ENCOUNTER
Ashley Arroyo out of office.  Routed to POD, Fabienne Walker.      Juan J Linn   to P Rm Triage (supporting Lindsay Arroyo, VALDEZ TAYLOR)    9/15/23 10:06 AM  Hello, I am completely out of my Metformin and need a refill approved. I believe [facility] might have reached out to get approval yesterday, but I haven't heard anything back. Is there any way I could get a quick approval since I have none left? Sorry, I didn't realize I had no refills left. They automatically refill them at [facility] so I don't have to track it normally. I tried to request a refill using MyChart, but it wouldn't let me request it.    Sandy Landry RN, BSN  [facility]

## 2023-09-21 ENCOUNTER — DOCUMENTATION ONLY (OUTPATIENT)
Dept: FAMILY MEDICINE | Facility: CLINIC | Age: 58
End: 2023-09-21
Payer: COMMERCIAL

## 2023-09-21 DIAGNOSIS — E11.628 TYPE 2 DIABETES MELLITUS WITH OTHER SKIN COMPLICATIONS (H): Primary | ICD-10-CM

## 2023-09-21 NOTE — PROGRESS NOTES
"Patient has lab only appt 10/5/23 for \"A1c lab\", no orders in chart.  Please place FUTURE orders in Epic if appropriate.    Thanks,   lab  "

## 2023-10-05 ENCOUNTER — LAB (OUTPATIENT)
Dept: LAB | Facility: CLINIC | Age: 58
End: 2023-10-05
Payer: COMMERCIAL

## 2023-10-05 DIAGNOSIS — F33.2 SEVERE EPISODE OF RECURRENT MAJOR DEPRESSIVE DISORDER, WITHOUT PSYCHOTIC FEATURES (H): ICD-10-CM

## 2023-10-05 DIAGNOSIS — E11.628 TYPE 2 DIABETES MELLITUS WITH OTHER SKIN COMPLICATIONS (H): ICD-10-CM

## 2023-10-05 LAB
HBA1C MFR BLD: 6.6 % (ref 0–5.6)
HOLD SPECIMEN: NORMAL

## 2023-10-05 PROCEDURE — 83036 HEMOGLOBIN GLYCOSYLATED A1C: CPT

## 2023-10-05 PROCEDURE — 84403 ASSAY OF TOTAL TESTOSTERONE: CPT

## 2023-10-05 PROCEDURE — 84270 ASSAY OF SEX HORMONE GLOBUL: CPT

## 2023-10-05 PROCEDURE — 36415 COLL VENOUS BLD VENIPUNCTURE: CPT

## 2023-10-09 ENCOUNTER — OFFICE VISIT (OUTPATIENT)
Dept: FAMILY MEDICINE | Facility: CLINIC | Age: 58
End: 2023-10-09
Attending: NURSE PRACTITIONER
Payer: COMMERCIAL

## 2023-10-09 VITALS
BODY MASS INDEX: 33.89 KG/M2 | WEIGHT: 223.6 LBS | SYSTOLIC BLOOD PRESSURE: 104 MMHG | DIASTOLIC BLOOD PRESSURE: 69 MMHG | TEMPERATURE: 98.1 F | OXYGEN SATURATION: 95 % | HEIGHT: 68 IN | RESPIRATION RATE: 20 BRPM | HEART RATE: 92 BPM

## 2023-10-09 DIAGNOSIS — E11.9 TYPE 2 DIABETES MELLITUS WITHOUT COMPLICATION, WITHOUT LONG-TERM CURRENT USE OF INSULIN (H): ICD-10-CM

## 2023-10-09 DIAGNOSIS — E78.5 HYPERLIPIDEMIA LDL GOAL <100: ICD-10-CM

## 2023-10-09 DIAGNOSIS — G89.29 CHRONIC PAIN OF LEFT KNEE: Primary | ICD-10-CM

## 2023-10-09 DIAGNOSIS — E66.01 MORBID OBESITY (H): ICD-10-CM

## 2023-10-09 DIAGNOSIS — F33.2 SEVERE EPISODE OF RECURRENT MAJOR DEPRESSIVE DISORDER, WITHOUT PSYCHOTIC FEATURES (H): ICD-10-CM

## 2023-10-09 DIAGNOSIS — M25.562 CHRONIC PAIN OF LEFT KNEE: Primary | ICD-10-CM

## 2023-10-09 DIAGNOSIS — E11.628 TYPE 2 DIABETES MELLITUS WITH OTHER SKIN COMPLICATIONS (H): ICD-10-CM

## 2023-10-09 PROCEDURE — 99214 OFFICE O/P EST MOD 30 MIN: CPT | Performed by: NURSE PRACTITIONER

## 2023-10-09 RX ORDER — SIMVASTATIN 40 MG
40 TABLET ORAL AT BEDTIME
Qty: 90 TABLET | Refills: 3 | Status: SHIPPED | OUTPATIENT
Start: 2023-10-09

## 2023-10-09 RX ORDER — BUPROPION HYDROCHLORIDE 300 MG/1
300 TABLET ORAL EVERY MORNING
Qty: 90 TABLET | Refills: 3 | Status: SHIPPED | OUTPATIENT
Start: 2023-10-09 | End: 2024-04-30

## 2023-10-09 RX ORDER — GLIPIZIDE 10 MG/1
20 TABLET, FILM COATED, EXTENDED RELEASE ORAL DAILY
Qty: 180 TABLET | Refills: 1 | Status: SHIPPED | OUTPATIENT
Start: 2023-10-09 | End: 2023-12-11

## 2023-10-09 RX ORDER — METFORMIN HCL 500 MG
1000 TABLET, EXTENDED RELEASE 24 HR ORAL 2 TIMES DAILY WITH MEALS
Qty: 360 TABLET | Refills: 0 | Status: SHIPPED | OUTPATIENT
Start: 2023-10-09 | End: 2023-12-11

## 2023-10-09 RX ORDER — DULAGLUTIDE 3 MG/.5ML
3 INJECTION, SOLUTION SUBCUTANEOUS WEEKLY
Qty: 6 ML | Refills: 1 | Status: SHIPPED | OUTPATIENT
Start: 2023-10-09 | End: 2024-01-19

## 2023-10-09 ASSESSMENT — PATIENT HEALTH QUESTIONNAIRE - PHQ9
SUM OF ALL RESPONSES TO PHQ QUESTIONS 1-9: 4
10. IF YOU CHECKED OFF ANY PROBLEMS, HOW DIFFICULT HAVE THESE PROBLEMS MADE IT FOR YOU TO DO YOUR WORK, TAKE CARE OF THINGS AT HOME, OR GET ALONG WITH OTHER PEOPLE: SOMEWHAT DIFFICULT
SUM OF ALL RESPONSES TO PHQ QUESTIONS 1-9: 4

## 2023-10-09 ASSESSMENT — PAIN SCALES - GENERAL: PAINLEVEL: MODERATE PAIN (4)

## 2023-10-09 NOTE — PROGRESS NOTES
Assessment & Plan     ICD-10-CM    1. Chronic pain of left knee  M25.562 Orthopedic  Referral    G89.29       2. Type 2 diabetes mellitus with other skin complications (H)  E11.628 Dulaglutide (TRULICITY) 3 MG/0.5ML SOPN     TSH with free T4 reflex      3. Type 2 diabetes mellitus without complication, without long-term current use of insulin (H)  E11.9 glipiZIDE (GLUCOTROL XL) 10 MG 24 hr tablet     metFORMIN (GLUCOPHAGE XR) 500 MG 24 hr tablet      4. Hyperlipidemia LDL goal <100  E78.5 simvastatin (ZOCOR) 40 MG tablet      5. Severe episode of recurrent major depressive disorder, without psychotic features (H)  F33.2 buPROPion (WELLBUTRIN XL) 300 MG 24 hr tablet     Testosterone Free and Total     TSH with free T4 reflex      6. Morbid obesity (H)  E66.01             Diabetes improved, Obesity improved, depression in remission.  Increase bupropion as noted given seasonal nature of pts depression symptoms.  Follow up in 2 months for recheck    Refer to ortho for knee pain.      VALDEZ Cowart [facility] RUDYMOVINOD Arita is a 58 year old, presenting for the following health issues:  Diabetes and Depression        10/9/2023     3:57 PM   Additional Questions   Roomed by Rasheeda CRAWFORD MA   Accompanied by Self         10/9/2023     3:57 PM   Patient Reported Additional Medications   Patient reports taking the following new medications None     Morbid obesity (H)  He is working on diet and exercise by walking  Obesity contributes to these other medical conditions diabetes and low back pain and knee pain    Wt Readings from Last 4 Encounters:   10/09/23 101.4 kg (223 lb 9.6 oz)   06/12/23 106.6 kg (235 lb)   03/06/23 105.2 kg (232 lb)   01/18/23 106.8 kg (235 lb 8 oz)          Severe episode of recurrent major depressive disorder (H)  Feels depression is in remission, but has seasonal worsening.  Wishes to proactively increase Bupropion to 300 mg daily    Type 2 diabetes  "mellitus with other skin complications (H)  Diabetes is improving/stable/worsening: improving with treatment.  Continue current meds--trulicity, 3 mg weekly, metformin 1000mg bid, and glipizide 20 mg daily  Diabetes will be reassessed in 3 months.    History of Present Illness       Mental Health Follow-up:  Patient presents to follow-up on Depression.Patient's depression since last visit has been:  No change  The patient is not having other symptoms associated with depression.      Any significant life events: No  Patient is not feeling anxious or having panic attacks.  Patient has no concerns about alcohol or drug use.    Diabetes:   He presents for follow up of diabetes.    He is not checking blood glucose.         He has no concerns regarding his diabetes at this time.   He is not experiencing numbness or burning in feet, excessive thirst, blurry vision, weight changes or redness, sores or blisters on feet.           He eats 0-1 servings of fruits and vegetables daily.He consumes 3 sweetened beverage(s) daily.He exercises with enough effort to increase his heart rate 60 or more minutes per day.  He exercises with enough effort to increase his heart rate 6 days per week.   He is taking medications regularly.         Review of Systems   Constitutional: Negative.    Respiratory: Negative.     Cardiovascular: Negative.    Endocrine: Negative.    Musculoskeletal:         Knee pain left worsening.  Is able to walk distance and some jogging.  Wishes referral to ortho for consideration of steroid injection            Objective    /69 (BP Location: Right arm, Patient Position: Sitting, Cuff Size: Adult Large)   Pulse 92   Temp 98.1  F (36.7  C) (Oral)   Resp 20   Ht 1.721 m (5' 7.76\")   Wt 101.4 kg (223 lb 9.6 oz)   SpO2 95%   BMI 34.24 kg/m    Body mass index is 34.24 kg/m .  Physical Exam  Constitutional:       Appearance: Normal appearance.   Skin:     General: Skin is warm and dry.      Findings: No " rash.   Neurological:      Mental Status: He is alert.   Psychiatric:         Mood and Affect: Mood normal.         Behavior: Behavior normal.         Thought Content: Thought content normal.         Judgment: Judgment normal.

## 2023-10-10 LAB — SHBG SERPL-SCNC: 22 NMOL/L (ref 11–80)

## 2023-10-10 ASSESSMENT — ENCOUNTER SYMPTOMS
ENDOCRINE NEGATIVE: 1
CARDIOVASCULAR NEGATIVE: 1
CONSTITUTIONAL NEGATIVE: 1
RESPIRATORY NEGATIVE: 1

## 2023-10-10 NOTE — ASSESSMENT & PLAN NOTE
Diabetes is improving/stable/worsening: improving with treatment.  Continue current meds--trulicity, 3 mg weekly, metformin 1000mg bid, and glipizide 20 mg daily  Diabetes will be reassessed in 3 months.

## 2023-10-10 NOTE — ASSESSMENT & PLAN NOTE
Feels depression is in remission, but has seasonal worsening.  Wishes to proactively increase Bupropion to 300 mg daily

## 2023-10-10 NOTE — ASSESSMENT & PLAN NOTE
He is working on diet and exercise by walking  Obesity contributes to these other medical conditions diabetes and low back pain and knee pain    Wt Readings from Last 4 Encounters:   10/09/23 101.4 kg (223 lb 9.6 oz)   06/12/23 106.6 kg (235 lb)   03/06/23 105.2 kg (232 lb)   01/18/23 106.8 kg (235 lb 8 oz)

## 2023-10-13 LAB
TESTOST FREE SERPL-MCNC: 9.76 NG/DL
TESTOST SERPL-MCNC: 389 NG/DL (ref 240–950)

## 2023-10-21 ENCOUNTER — ANCILLARY PROCEDURE (OUTPATIENT)
Dept: GENERAL RADIOLOGY | Facility: CLINIC | Age: 58
End: 2023-10-21
Payer: COMMERCIAL

## 2023-10-21 ENCOUNTER — OFFICE VISIT (OUTPATIENT)
Dept: ORTHOPEDICS | Facility: CLINIC | Age: 58
End: 2023-10-21
Attending: NURSE PRACTITIONER
Payer: COMMERCIAL

## 2023-10-21 VITALS — SYSTOLIC BLOOD PRESSURE: 105 MMHG | DIASTOLIC BLOOD PRESSURE: 65 MMHG

## 2023-10-21 DIAGNOSIS — M25.562 CHRONIC PAIN OF LEFT KNEE: ICD-10-CM

## 2023-10-21 DIAGNOSIS — M17.12 PRIMARY OSTEOARTHRITIS OF LEFT KNEE: Primary | ICD-10-CM

## 2023-10-21 DIAGNOSIS — G89.29 CHRONIC PAIN OF LEFT KNEE: ICD-10-CM

## 2023-10-21 PROCEDURE — 73562 X-RAY EXAM OF KNEE 3: CPT | Mod: TC | Performed by: RADIOLOGY

## 2023-10-21 PROCEDURE — 99213 OFFICE O/P EST LOW 20 MIN: CPT | Performed by: STUDENT IN AN ORGANIZED HEALTH CARE EDUCATION/TRAINING PROGRAM

## 2023-10-21 NOTE — PROGRESS NOTES
Sports Medicine Clinic           ASSESSMENT and PLAN:     Juan J was seen today for pain.    Diagnoses and all orders for this visit:    Chronic pain of left knee  Left Knee OA  Persistent left knee pain with history of meniscus surgery (unsure if repair or not) and ACL injury previously. Mild medial and PF OA with previously only 2-3 months benefit from CSI. Has been more active recently and so has have more medial knee pain recently as she has been walking and trying to lose weight. He wants to remain active as possible. We discussed treatment options including repeating CSI vs HA. Given he only has mild degenerative changes and didn't have very long lasting improvement from CSI previously will submit a PA for HA.   -     Orthopedic  Referral  -     XR Knee Standing AP [place] Bilat Lat Left; Future  -     (PRE-AUTH REQUEST) 48 mg hylan (SYNVISC ONE) injection 48 mg/6mL-ONCE  -     follow up in 2 weeks for HA injection if approved, if not will discuss other options at that point    Return sooner if develops new or worsening symptoms.    Options for treatment and/or follow-up care were reviewed with the patient was actively involved in the decision making process. Patient verbalized understanding and was in agreement with the plan.    Nu Condon MD, CAM  Primary Care Sports Medicine           SUBJECTIVE       Everton Linn is a 58 year old male presenting to clinic today with a chief complaint of left knee pain, referred by Dina Montoya CNP .    Onset: 5 years(s) ago. Reports insidious onset without acute precipitating event.  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 Wanted to get in better shape and improved his health overall. It has been going well but will have pain and swelling in his knee. Sometimes will start after 2 blocks, sometimes 2 miles. He has always had pain in this knee it has just become more pronounced with his increased activity. He also notices pain when he is going up and down the stairs.     Takes ibuprofen 4-5 times per week.      Last surgery was in 2013 and was a medial meniscus surgery.     Had a CSI in 2018 and got maybe 2-3 months of relief from it.     PMH, Medications and Allergies were reviewed and updated as needed.    ROS:  As noted above otherwise negative.    Patient Active Problem List   Diagnosis    Dermatophytosis of nail    Ventral hernia    SANTOS (obstructive sleep apnea)    Type 2 diabetes mellitus with other skin complications (H)    Cervical stenosis of spine    Morbid obesity (H)    Hyperlipidemia LDL goal <70    History of colonic polyps    Severe episode of recurrent major depressive disorder (H)    Chronic right-sided low back pain without sciatica       Current Outpatient Medications   Medication Sig Dispense Refill    buPROPion (WELLBUTRIN XL) 300 MG 24 hr tablet Take 1 tablet (300 mg) by mouth every morning 90 tablet 3    Dulaglutide (TRULICITY) 3 MG/0.5ML SOPN Inject 3 mg Subcutaneous once a week 6 mL 1    glipiZIDE (GLUCOTROL XL) 10 MG 24 hr tablet Take 2 tablets (20 mg) by mouth daily 180 tablet 1    ibuprofen (ADVIL/MOTRIN) 600 MG tablet Take 1 tablet (600 mg) by mouth every 6 hours as needed for moderate pain (4-6)      metFORMIN (GLUCOPHAGE XR) 500 MG 24 hr tablet Take 2 tablets (1,000 mg) by mouth 2 times daily (with meals) 360 tablet 0    simvastatin (ZOCOR) 40 MG tablet Take 1 tablet (40 mg) by mouth at bedtime 90  tablet 3    tadalafil (CIALIS) 10 MG tablet Take 10 mg by mouth daily as needed              OBJECTIVE:       Vitals:   Vitals:    10/21/23 1024   BP: 105/65     BMI: There is no height or weight on file to calculate BMI.    Gen:  Well nourished and in no acute distress  HEENT: Extraocular movement intact  Pulm:  Breathing Comfortably. No increased respiratory effort.  Psych: Euthymic. Appropriately answers questions    MSK:   LEFT KNEE  Inspection:    Normal alignment; edema, erythema, or ecchymosis present  Palpation:    Tender about the medial joint line. Remainder of bony and ligamentous landmarks are nontender.    Small effusion is present    Patellofemoral crepitus is Present  Range of Motion:     50 extension to 1200 flexion  Strength:    Quadriceps 5/5 and hamstrings 5/5    Extensor mechanism intact  Special Tests:    Negative: MCL/valgus stress (0 & 30 deg), LCL/varus stress (0 & 30 deg), Lachman's, Luh's     Imaging was personally reviewed and interpreted by me.   EXAM: XR KNEE STANDING AP SUNRISE BILAT LAT LEFT  LOCATION: [facility] [place]  DATE: 10/21/2023     INDICATION:  Chronic pain of left knee, Chronic pain of left knee  COMPARISON: None.                                                                      IMPRESSION:   LEFT KNEE: No fracture or effusion. Mild degenerative changes in the medial compartment.     RIGHT KNEE: Negative.

## 2023-10-21 NOTE — LETTER
10/21/2023         RE: Everton Linn  [address]  [place] [address]        Dear Colleague,    Thank you for referring your patient, Everton Linn, to the [facility] [place]. Please see a copy of my visit note below.    Sports Medicine Clinic           ASSESSMENT and PLAN:     Juan J was seen today for pain.    Diagnoses and all orders for this visit:    Chronic pain of left knee  Left Knee OA  Persistent left knee pain with history of meniscus surgery (unsure if repair or not) and ACL injury previously. Mild medial and PF OA with previously only 2-3 months benefit from CSI. Has been more active recently and so has have more medial knee pain recently as she has been walking and trying to lose weight. He wants to remain active as possible. We discussed treatment options including repeating CSI vs HA. Given he only has mild degenerative changes and didn't have very long lasting improvement from CSI previously will submit a PA for HA.   -     Orthopedic  Referral  -     XR Knee Standing AP [place] Bilat Lat Left; Future  -     (PRE-AUTH REQUEST) 48 mg hylan (SYNVISC ONE) injection 48 mg/6mL-ONCE  -     follow up in 2 weeks for HA injection if approved, if not will discuss other options at that point    Return sooner if develops new or worsening symptoms.    Options for treatment and/or follow-up care were reviewed with the patient was actively involved in the decision making process. Patient verbalized understanding and was in agreement with the plan.    Nu Condon MD, CAM  Primary Care Sports Medicine           SUBJECTIVE       Everton Linn is a 58 year old male presenting to clinic today with a chief complaint of left knee pain, referred by Dina Montoya CNP .    Onset: 5 years(s) ago. Reports insidious onset without acute precipitating event.  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 Wanted to get in better shape and improved his health overall. It has been going well but will have pain and swelling in his knee. Sometimes will start after 2 blocks, sometimes 2 miles. He has always had pain in this knee it has just become more pronounced with his increased activity. He also notices pain when he is going up and down the stairs.     Takes ibuprofen 4-5 times per week.      Last surgery was in 2013 and was a medial meniscus surgery.     Had a CSI in 2018 and got maybe 2-3 months of relief from it.     PMH, Medications and Allergies were reviewed and updated as needed.    ROS:  As noted above otherwise negative.    Patient Active Problem List   Diagnosis     Dermatophytosis of nail     Ventral hernia     SANTOS (obstructive sleep apnea)     Type 2 diabetes mellitus with other skin complications (H)     Cervical stenosis of spine     Morbid obesity (H)     Hyperlipidemia LDL goal <70     History of colonic polyps     Severe episode of recurrent major depressive disorder (H)     Chronic right-sided low back pain without sciatica       Current Outpatient Medications   Medication Sig Dispense Refill     buPROPion (WELLBUTRIN XL) 300 MG 24 hr tablet Take 1 tablet (300 mg) by mouth every morning 90 tablet 3     Dulaglutide (TRULICITY) 3 MG/0.5ML SOPN Inject 3 mg Subcutaneous once a week 6 mL 1     glipiZIDE (GLUCOTROL XL) 10 MG 24 hr tablet Take 2 tablets (20 mg) by mouth daily 180 tablet 1     ibuprofen (ADVIL/MOTRIN) 600 MG tablet Take 1  tablet (600 mg) by mouth every 6 hours as needed for moderate pain (4-6)       metFORMIN (GLUCOPHAGE XR) 500 MG 24 hr tablet Take 2 tablets (1,000 mg) by mouth 2 times daily (with meals) 360 tablet 0     simvastatin (ZOCOR) 40 MG tablet Take 1 tablet (40 mg) by mouth at bedtime 90 tablet 3     tadalafil (CIALIS) 10 MG tablet Take 10 mg by mouth daily as needed              OBJECTIVE:       Vitals:   Vitals:    10/21/23 1024   BP: 105/65     BMI: There is no height or weight on file to calculate BMI.    Gen:  Well nourished and in no acute distress  HEENT: Extraocular movement intact  Pulm:  Breathing Comfortably. No increased respiratory effort.  Psych: Euthymic. Appropriately answers questions    MSK:   LEFT KNEE  Inspection:    Normal alignment; edema, erythema, or ecchymosis present  Palpation:    Tender about the medial joint line. Remainder of bony and ligamentous landmarks are nontender.    Small effusion is present    Patellofemoral crepitus is Present  Range of Motion:     50 extension to 1200 flexion  Strength:    Quadriceps 5/5 and hamstrings 5/5    Extensor mechanism intact  Special Tests:    Negative: MCL/valgus stress (0 & 30 deg), LCL/varus stress (0 & 30 deg), Lachman's, Luh's     Imaging was personally reviewed and interpreted by me.   EXAM: XR KNEE STANDING AP SUNRISE BILAT LAT LEFT  LOCATION: [facility] [place]  DATE: 10/21/2023     INDICATION:  Chronic pain of left knee, Chronic pain of left knee  COMPARISON: None.                                                                      IMPRESSION:   LEFT KNEE: No fracture or effusion. Mild degenerative changes in the medial compartment.     RIGHT KNEE: Negative.          Again, thank you for allowing me to participate in the care of your patient.        Sincerely,        Nu Condon MD

## 2023-10-31 ENCOUNTER — TELEPHONE (OUTPATIENT)
Dept: ORTHOPEDICS | Facility: CLINIC | Age: 58
End: 2023-10-31
Payer: COMMERCIAL

## 2023-10-31 DIAGNOSIS — M17.12 PRIMARY OSTEOARTHRITIS OF LEFT KNEE: Primary | ICD-10-CM

## 2023-10-31 NOTE — TELEPHONE ENCOUNTER
Per [employer], need to re-order Synvisc with appropriate diagnosis for PA request.     Patient scheduled for appointment on 11/4/23 @ [facility] for discussion of viscosupplementation injection vs steroid injection of left knee.        SynviscOne injection last completed none       Patient has failed trial of OTC NSAIDs/Pain Medication (ibuprofen, tylenol, naproxen,...):  Yes       Patient has completed trial of physical therapy: No    Prior authorization referral for SynviscOne injection pended.    Please advise    SOCORRO Lau RN        .

## 2023-11-02 NOTE — TELEPHONE ENCOUNTER
Injection has been approved. Appointment note updated. No further action needed.     Arely Knowles, ATC

## 2023-11-04 ENCOUNTER — OFFICE VISIT (OUTPATIENT)
Dept: ORTHOPEDICS | Facility: CLINIC | Age: 58
End: 2023-11-04
Payer: COMMERCIAL

## 2023-11-04 DIAGNOSIS — M17.12 PRIMARY OSTEOARTHRITIS OF LEFT KNEE: Primary | ICD-10-CM

## 2023-11-04 PROCEDURE — 99207 PR DROP WITH A PROCEDURE: CPT | Performed by: STUDENT IN AN ORGANIZED HEALTH CARE EDUCATION/TRAINING PROGRAM

## 2023-11-04 PROCEDURE — 20610 DRAIN/INJ JOINT/BURSA W/O US: CPT | Mod: LT | Performed by: STUDENT IN AN ORGANIZED HEALTH CARE EDUCATION/TRAINING PROGRAM

## 2023-11-04 NOTE — PROGRESS NOTES
Sports Medicine Clinic  PROCEDURE NOTE    Reason for visit: Left knee HA injection     Indication: Left knee OA    Last injection: Previous CSI in 2018, no previous HA injections      Large Joint Injection/Arthocentesis: L knee joint    Date/Time: 11/4/2023 10:04 AM    Performed by: Nu Condon MD  Authorized by: Nu Condon MD    Indications:  Pain and osteoarthritis  Needle Size:  22 G  Guidance: landmark guided    Approach:  Anterolateral  Location:  Knee      Medications:  48 mg hylan 48 MG/6ML  Outcome:  Tolerated well, no immediate complications  Procedure discussed: discussed risks, benefits, and alternatives    Consent Given by:  Patient  Timeout: timeout called immediately prior to procedure    Prep: patient was prepped and draped in usual sterile fashion

## 2023-11-04 NOTE — LETTER
11/4/2023         RE: Everton Linn  [address]  [place] [address]        Dear Colleague,    Thank you for referring your patient, Everton Linn, to the [facility] [place]. Please see a copy of my visit note below.    [facility] Medicine Clinic  PROCEDURE NOTE    Reason for visit: Left knee HA injection     Indication: Left knee OA    Last injection: Previous CSI in 2018, no previous HA injections      Large Joint Injection/Arthocentesis: L knee joint    Date/Time: 11/4/2023 10:04 AM    Performed by: Nu Condon MD  Authorized by: Nu Condon MD    Indications:  Pain and osteoarthritis  Needle Size:  22 G  Guidance: landmark guided    Approach:  Anterolateral  Location:  Knee      Medications:  48 mg hylan 48 MG/6ML  Outcome:  Tolerated well, no immediate complications  Procedure discussed: discussed risks, benefits, and alternatives    Consent Given by:  Patient  Timeout: timeout called immediately prior to procedure    Prep: patient was prepped and draped in usual sterile fashion               Again, thank you for allowing me to participate in the care of your patient.        Sincerely,        Nu Condon MD

## 2023-12-11 ENCOUNTER — OFFICE VISIT (OUTPATIENT)
Dept: FAMILY MEDICINE | Facility: CLINIC | Age: 58
End: 2023-12-11
Attending: NURSE PRACTITIONER
Payer: COMMERCIAL

## 2023-12-11 VITALS
TEMPERATURE: 98.3 F | SYSTOLIC BLOOD PRESSURE: 114 MMHG | HEART RATE: 92 BPM | DIASTOLIC BLOOD PRESSURE: 69 MMHG | BODY MASS INDEX: 34.91 KG/M2 | WEIGHT: 222.4 LBS | HEIGHT: 67 IN | RESPIRATION RATE: 20 BRPM | OXYGEN SATURATION: 96 %

## 2023-12-11 DIAGNOSIS — F33.2 SEVERE EPISODE OF RECURRENT MAJOR DEPRESSIVE DISORDER, WITHOUT PSYCHOTIC FEATURES (H): ICD-10-CM

## 2023-12-11 DIAGNOSIS — E11.9 TYPE 2 DIABETES MELLITUS WITHOUT COMPLICATION, WITHOUT LONG-TERM CURRENT USE OF INSULIN (H): Primary | ICD-10-CM

## 2023-12-11 PROCEDURE — 80048 BASIC METABOLIC PNL TOTAL CA: CPT | Performed by: NURSE PRACTITIONER

## 2023-12-11 PROCEDURE — 36415 COLL VENOUS BLD VENIPUNCTURE: CPT | Performed by: NURSE PRACTITIONER

## 2023-12-11 PROCEDURE — 90471 IMMUNIZATION ADMIN: CPT | Performed by: NURSE PRACTITIONER

## 2023-12-11 PROCEDURE — 80061 LIPID PANEL: CPT | Performed by: NURSE PRACTITIONER

## 2023-12-11 PROCEDURE — 99214 OFFICE O/P EST MOD 30 MIN: CPT | Mod: 25 | Performed by: NURSE PRACTITIONER

## 2023-12-11 PROCEDURE — 96127 BRIEF EMOTIONAL/BEHAV ASSMT: CPT | Performed by: NURSE PRACTITIONER

## 2023-12-11 PROCEDURE — 84443 ASSAY THYROID STIM HORMONE: CPT | Performed by: NURSE PRACTITIONER

## 2023-12-11 PROCEDURE — 90746 HEPB VACCINE 3 DOSE ADULT IM: CPT | Performed by: NURSE PRACTITIONER

## 2023-12-11 RX ORDER — METFORMIN HCL 500 MG
1000 TABLET, EXTENDED RELEASE 24 HR ORAL 2 TIMES DAILY WITH MEALS
Qty: 360 TABLET | Refills: 3 | Status: SHIPPED | OUTPATIENT
Start: 2023-12-11

## 2023-12-11 RX ORDER — GLIPIZIDE 10 MG/1
20 TABLET, FILM COATED, EXTENDED RELEASE ORAL DAILY
Qty: 180 TABLET | Refills: 3 | Status: SHIPPED | OUTPATIENT
Start: 2023-12-11

## 2023-12-11 ASSESSMENT — ENCOUNTER SYMPTOMS
ENDOCRINE NEGATIVE: 1
GASTROINTESTINAL NEGATIVE: 1
PSYCHIATRIC NEGATIVE: 1
CONSTITUTIONAL NEGATIVE: 1
RESPIRATORY NEGATIVE: 1
CARDIOVASCULAR NEGATIVE: 1

## 2023-12-11 ASSESSMENT — ANXIETY QUESTIONNAIRES
GAD7 TOTAL SCORE: 4
1. FEELING NERVOUS, ANXIOUS, OR ON EDGE: NOT AT ALL
3. WORRYING TOO MUCH ABOUT DIFFERENT THINGS: NOT AT ALL
5. BEING SO RESTLESS THAT IT IS HARD TO SIT STILL: SEVERAL DAYS
2. NOT BEING ABLE TO STOP OR CONTROL WORRYING: NOT AT ALL
IF YOU CHECKED OFF ANY PROBLEMS ON THIS QUESTIONNAIRE, HOW DIFFICULT HAVE THESE PROBLEMS MADE IT FOR YOU TO DO YOUR WORK, TAKE CARE OF THINGS AT HOME, OR GET ALONG WITH OTHER PEOPLE: NOT DIFFICULT AT ALL
7. FEELING AFRAID AS IF SOMETHING AWFUL MIGHT HAPPEN: NOT AT ALL
6. BECOMING EASILY ANNOYED OR IRRITABLE: NOT AT ALL
GAD7 TOTAL SCORE: 4

## 2023-12-11 ASSESSMENT — PAIN SCALES - GENERAL: PAINLEVEL: NO PAIN (0)

## 2023-12-11 ASSESSMENT — PATIENT HEALTH QUESTIONNAIRE - PHQ9
5. POOR APPETITE OR OVEREATING: NEARLY EVERY DAY
SUM OF ALL RESPONSES TO PHQ QUESTIONS 1-9: 6

## 2023-12-11 NOTE — PROGRESS NOTES
Assessment & Plan     ICD-10-CM    1. Type 2 diabetes mellitus without complication, without long-term current use of insulin (H)  E11.9 metFORMIN (GLUCOPHAGE XR) 500 MG 24 hr tablet     glipiZIDE (GLUCOTROL XL) 10 MG 24 hr tablet      2. Severe episode of recurrent major depressive disorder, without psychotic features (H)  F33.2 TSH with free T4 reflex        TSH recheck  Continue current medications  Monitor home blood sugar at least once per week  Follow up if mood is worsening.  Diabetes recheck in 2-3 months and VALDEZ Fischer CNP [facility] [place]  ============================================    Subjective   Juan J is a 58 year old, presenting for the following health issues:  Recheck Medication        12/11/2023     3:54 PM   Additional Questions   Roomed by Santo CHILDERS   Accompanied by No one         12/11/2023     3:54 PM   Patient Reported Additional Medications   Patient reports taking the following new medications none     Severe episode of recurrent major depressive disorder (H)  Depressive symptoms are stable--despite this being the time of the year he has increased symptoms of sadness.  Patient is using medication at this time--Bupropion  mg per day.    No side effects of medication.  Is not currently in therapy at this time. Sleep is adequate.   Has support of friends who are taking extra time to engage him this month as they know this is a difficult time.  Feels his exercise and diet routine are also helping his mood.      11/30/2022     1:21 PM 12/26/2022     5:23 PM 12/11/2023     3:59 PM   CHINMAY-7 SCORE   Total Score 17 (severe anxiety)     Total Score 17 8 4         3/6/2023     1:05 PM 10/9/2023     3:35 PM 12/11/2023     3:59 PM   PHQ   PHQ-9 Total Score 1 4 6   Q9: Thoughts of better off dead/self-harm past 2 weeks Not at all Not at all Not at all         Type 2 diabetes mellitus with other skin complications (H)  Last A1c two months ago and was in control.   Follow up in 2-3 months for diabetes recheck.  Is not checking sugars at home, but not weight loss, pt has had symptoms of occasional low blood sugar.  Recommend checking sugars at least once per week and may be able to wean glipizide.    Wt Readings from Last 4 Encounters:   12/11/23 100.9 kg (222 lb 6.4 oz)   10/09/23 101.4 kg (223 lb 9.6 oz)   06/12/23 106.6 kg (235 lb)   03/06/23 105.2 kg (232 lb)         History of Present Illness       Mental Health Follow-up:  Patient presents to follow-up on Depression.Patient's depression since last visit has been:  Medium  The patient is having other symptoms associated with depression.      Any significant life events: No  Patient is not feeling anxious or having panic attacks.  Patient has no concerns about alcohol or drug use.    He eats 0-1 servings of fruits and vegetables daily.He consumes 3 sweetened beverage(s) daily.He exercises with enough effort to increase his heart rate 30 to 60 minutes per day.  He exercises with enough effort to increase his heart rate 5 days per week.   He is taking medications regularly.         Depression Followup  How are you doing with your depression since your last visit? Doing ok but this is the worst time of the year  Are you having other symptoms that might be associated with depression? Yes:  super tired and wanting to sleep a lot  Have you had a significant life event?  No   Are you feeling anxious or having panic attacks?   No  Do you have any concerns with your use of alcohol or other drugs? No    Social History     Tobacco Use    Smoking status: Never     Passive exposure: Never    Smokeless tobacco: Never   Vaping Use    Vaping Use: Never used   Substance Use Topics    Alcohol use: Yes     Comment: 2 -3 beers every few weeks    Drug use: No         3/6/2023     1:05 PM 10/9/2023     3:35 PM 12/11/2023     3:59 PM   PHQ   PHQ-9 Total Score 1 4 6   Q9: Thoughts of better off dead/self-harm past 2 weeks Not at all Not at all  "Not at all         11/30/2022     1:21 PM 12/26/2022     5:23 PM 12/11/2023     3:59 PM   CHINMAY-7 SCORE   Total Score 17 (severe anxiety)     Total Score 17 8 4         12/11/2023     3:59 PM   Last PHQ-9   1.  Little interest or pleasure in doing things 1   2.  Feeling down, depressed, or hopeless 1   3.  Trouble falling or staying asleep, or sleeping too much 3   4.  Feeling tired or having little energy 1   5.  Poor appetite or overeating 0   6.  Feeling bad about yourself 0   7.  Trouble concentrating 0   8.  Moving slowly or restless 0   Q9: Thoughts of better off dead/self-harm past 2 weeks 0   PHQ-9 Total Score 6   Difficulty at work, home, or with people Not difficult at all         12/11/2023     3:59 PM   CHINMAY-7    1. Feeling nervous, anxious, or on edge 0   2. Not being able to stop or control worrying 0   3. Worrying too much about different things 0   4. Trouble relaxing 3   5. Being so restless that it is hard to sit still 1   6. Becoming easily annoyed or irritable 0   7. Feeling afraid, as if something awful might happen 0   CHINMAY-7 Total Score 4   If you checked any problems, how difficult have they made it for you to do your work, take care of things at home, or get along with other people? Not difficult at all         Review of Systems   Constitutional: Negative.    Respiratory: Negative.     Cardiovascular: Negative.    Gastrointestinal: Negative.    Endocrine: Negative.    Psychiatric/Behavioral: Negative.              Objective    /69 (BP Location: Right arm, Patient Position: Sitting, Cuff Size: Adult Large)   Pulse 92   Temp 98.3  F (36.8  C) (Oral)   Resp 20   Ht 1.702 m (5' 7\")   Wt 100.9 kg (222 lb 6.4 oz)   SpO2 96%   BMI 34.83 kg/m    Body mass index is 34.83 kg/m .  Physical Exam  Constitutional:       Appearance: Normal appearance.   Pulmonary:      Effort: Pulmonary effort is normal.   Neurological:      Mental Status: He is alert.   Psychiatric:         Mood and Affect: Mood " normal.         Behavior: Behavior normal.         Thought Content: Thought content normal.         Judgment: Judgment normal.

## 2023-12-12 LAB
ANION GAP SERPL CALCULATED.3IONS-SCNC: 11 MMOL/L (ref 7–15)
BUN SERPL-MCNC: 9.2 MG/DL (ref 6–20)
CALCIUM SERPL-MCNC: 9.9 MG/DL (ref 8.6–10)
CHLORIDE SERPL-SCNC: 100 MMOL/L (ref 98–107)
CHOLEST SERPL-MCNC: 126 MG/DL
CREAT SERPL-MCNC: 0.96 MG/DL (ref 0.67–1.17)
DEPRECATED HCO3 PLAS-SCNC: 28 MMOL/L (ref 22–29)
EGFRCR SERPLBLD CKD-EPI 2021: >90 ML/MIN/1.73M2
FASTING STATUS PATIENT QL REPORTED: NO
GLUCOSE SERPL-MCNC: 108 MG/DL (ref 70–99)
HDLC SERPL-MCNC: 43 MG/DL
LDLC SERPL CALC-MCNC: 50 MG/DL
NONHDLC SERPL-MCNC: 83 MG/DL
POTASSIUM SERPL-SCNC: 4.7 MMOL/L (ref 3.4–5.3)
SODIUM SERPL-SCNC: 139 MMOL/L (ref 135–145)
TRIGL SERPL-MCNC: 163 MG/DL
TSH SERPL DL<=0.005 MIU/L-ACNC: 1.83 UIU/ML (ref 0.3–4.2)

## 2023-12-12 NOTE — PATIENT INSTRUCTIONS
Monitor home blood sugar at least once per week  Follow up if mood is worsening.  Diabetes recheck in 2-3 months and as needed

## 2023-12-12 NOTE — ASSESSMENT & PLAN NOTE
Depressive symptoms are stable--despite this being the time of the year he has increased symptoms of sadness.  Patient is using medication at this time--Bupropion  mg per day.    No side effects of medication.  Is not currently in therapy at this time. Sleep is adequate.   Has support of friends who are taking extra time to engage him this month as they know this is a difficult time.  Feels his exercise and diet routine are also helping his mood.      11/30/2022     1:21 PM 12/26/2022     5:23 PM 12/11/2023     3:59 PM   CHINMAY-7 SCORE   Total Score 17 (severe anxiety)     Total Score 17 8 4         3/6/2023     1:05 PM 10/9/2023     3:35 PM 12/11/2023     3:59 PM   PHQ   PHQ-9 Total Score 1 4 6   Q9: Thoughts of better off dead/self-harm past 2 weeks Not at all Not at all Not at all

## 2023-12-12 NOTE — ASSESSMENT & PLAN NOTE
Last A1c two months ago and was in control.  Follow up in 2-3 months for diabetes recheck.  Is not checking sugars at home, but not weight loss, pt has had symptoms of occasional low blood sugar.  Recommend checking sugars at least once per week and may be able to wean glipizide.    Wt Readings from Last 4 Encounters:   12/11/23 100.9 kg (222 lb 6.4 oz)   10/09/23 101.4 kg (223 lb 9.6 oz)   06/12/23 106.6 kg (235 lb)   03/06/23 105.2 kg (232 lb)

## 2024-01-11 ENCOUNTER — MYC REFILL (OUTPATIENT)
Dept: FAMILY MEDICINE | Facility: CLINIC | Age: 59
End: 2024-01-11
Payer: COMMERCIAL

## 2024-01-11 ENCOUNTER — MYC MEDICAL ADVICE (OUTPATIENT)
Dept: FAMILY MEDICINE | Facility: CLINIC | Age: 59
End: 2024-01-11
Payer: COMMERCIAL

## 2024-01-11 DIAGNOSIS — E11.628 TYPE 2 DIABETES MELLITUS WITH OTHER SKIN COMPLICATIONS (H): ICD-10-CM

## 2024-01-11 RX ORDER — DULAGLUTIDE 3 MG/.5ML
3 INJECTION, SOLUTION SUBCUTANEOUS WEEKLY
Qty: 6 ML | Refills: 1 | OUTPATIENT
Start: 2024-01-11

## 2024-01-19 DIAGNOSIS — E11.628 TYPE 2 DIABETES MELLITUS WITH OTHER SKIN COMPLICATIONS (H): ICD-10-CM

## 2024-01-19 RX ORDER — DULAGLUTIDE 3 MG/.5ML
INJECTION, SOLUTION SUBCUTANEOUS
Refills: 1 | OUTPATIENT
Start: 2024-01-19

## 2024-01-19 NOTE — TELEPHONE ENCOUNTER
Contacted [facility] pharmacy (per chart review refill was sent today):     Disp Refills Start End GIBRAN   Dulaglutide (TRULICITY) 3 MG/0.5ML SOPN 6 mL 1 1/19/2024 -- No   Sig - Route: Inject 3 mg Subcutaneous once a week - Subcutaneous   Sent to pharmacy as: Trulicity 3 MG/0.5ML Subcutaneous Solution Pen-injector (Dulaglutide)   Class: E-Prescribe   Order: [id number]   E-Prescribing Status: Receipt confirmed by pharmacy (1/19/2024 11:24 AM CST)   No prior authorization was found for this prescription.   Found prior authorization for another prescription for the same medication: Approved     Pharmacy states that when attempting to process through patient's insurance medication is being denied due to refill too soon. Pharmacy states that they will need to contact patient's insurance to find out what is going on. Will inform patient via [employer] message.    Gertrude ZARAGOZA RN, BSN, PHN

## 2024-01-19 NOTE — TELEPHONE ENCOUNTER
Per pharmacy note new PA needed, started in other TE.    Sim Love RN   [facility]

## 2024-01-24 ENCOUNTER — ALLIED HEALTH/NURSE VISIT (OUTPATIENT)
Dept: FAMILY MEDICINE | Facility: CLINIC | Age: 59
End: 2024-01-24
Payer: COMMERCIAL

## 2024-01-24 DIAGNOSIS — Z23 NEED FOR VACCINATION: Primary | ICD-10-CM

## 2024-01-24 PROCEDURE — 99207 PR NO CHARGE NURSE ONLY: CPT

## 2024-01-24 PROCEDURE — 90471 IMMUNIZATION ADMIN: CPT

## 2024-01-24 PROCEDURE — 90746 HEPB VACCINE 3 DOSE ADULT IM: CPT

## 2024-02-15 ENCOUNTER — DOCUMENTATION ONLY (OUTPATIENT)
Dept: FAMILY MEDICINE | Facility: CLINIC | Age: 59
End: 2024-02-15
Payer: COMMERCIAL

## 2024-02-15 DIAGNOSIS — E11.628 TYPE 2 DIABETES MELLITUS WITH OTHER SKIN COMPLICATIONS (H): Primary | ICD-10-CM

## 2024-02-15 NOTE — PROGRESS NOTES
"Patient has lab only appt 3/7/24 for \"A1C lab\", no orders in chart.  Please place FUTURE orders in Epic if appropriate.    Thanks,   lab    "

## 2024-03-07 ENCOUNTER — LAB (OUTPATIENT)
Dept: LAB | Facility: CLINIC | Age: 59
End: 2024-03-07
Payer: COMMERCIAL

## 2024-03-07 DIAGNOSIS — E11.628 TYPE 2 DIABETES MELLITUS WITH OTHER SKIN COMPLICATIONS (H): ICD-10-CM

## 2024-03-07 LAB — HBA1C MFR BLD: 7.7 % (ref 0–5.6)

## 2024-03-07 PROCEDURE — 82570 ASSAY OF URINE CREATININE: CPT

## 2024-03-07 PROCEDURE — 36415 COLL VENOUS BLD VENIPUNCTURE: CPT

## 2024-03-07 PROCEDURE — 83036 HEMOGLOBIN GLYCOSYLATED A1C: CPT

## 2024-03-07 PROCEDURE — 82043 UR ALBUMIN QUANTITATIVE: CPT

## 2024-03-08 LAB
CREAT UR-MCNC: 205 MG/DL
MICROALBUMIN UR-MCNC: <12 MG/L
MICROALBUMIN/CREAT UR: NORMAL MG/G{CREAT}

## 2024-03-11 ENCOUNTER — OFFICE VISIT (OUTPATIENT)
Dept: FAMILY MEDICINE | Facility: CLINIC | Age: 59
End: 2024-03-11
Attending: NURSE PRACTITIONER
Payer: COMMERCIAL

## 2024-03-11 ENCOUNTER — MYC MEDICAL ADVICE (OUTPATIENT)
Dept: FAMILY MEDICINE | Facility: CLINIC | Age: 59
End: 2024-03-11

## 2024-03-11 VITALS
RESPIRATION RATE: 20 BRPM | OXYGEN SATURATION: 97 % | HEART RATE: 94 BPM | DIASTOLIC BLOOD PRESSURE: 70 MMHG | TEMPERATURE: 99 F | SYSTOLIC BLOOD PRESSURE: 107 MMHG

## 2024-03-11 DIAGNOSIS — Z13.89 SCREENING FOR DIABETIC PERIPHERAL NEUROPATHY: ICD-10-CM

## 2024-03-11 DIAGNOSIS — B35.1 DERMATOPHYTOSIS OF NAIL: ICD-10-CM

## 2024-03-11 DIAGNOSIS — E11.628 TYPE 2 DIABETES MELLITUS WITH OTHER SKIN COMPLICATIONS (H): Primary | ICD-10-CM

## 2024-03-11 DIAGNOSIS — F33.2 SEVERE EPISODE OF RECURRENT MAJOR DEPRESSIVE DISORDER, WITHOUT PSYCHOTIC FEATURES (H): ICD-10-CM

## 2024-03-11 DIAGNOSIS — E66.01 MORBID OBESITY (H): ICD-10-CM

## 2024-03-11 PROCEDURE — 99214 OFFICE O/P EST MOD 30 MIN: CPT | Performed by: NURSE PRACTITIONER

## 2024-03-11 PROCEDURE — 96127 BRIEF EMOTIONAL/BEHAV ASSMT: CPT | Performed by: NURSE PRACTITIONER

## 2024-03-11 PROCEDURE — 99207 PR FOOT EXAM NO CHARGE: CPT | Performed by: NURSE PRACTITIONER

## 2024-03-11 ASSESSMENT — PATIENT HEALTH QUESTIONNAIRE - PHQ9
SUM OF ALL RESPONSES TO PHQ QUESTIONS 1-9: 2
SUM OF ALL RESPONSES TO PHQ QUESTIONS 1-9: 2
10. IF YOU CHECKED OFF ANY PROBLEMS, HOW DIFFICULT HAVE THESE PROBLEMS MADE IT FOR YOU TO DO YOUR WORK, TAKE CARE OF THINGS AT HOME, OR GET ALONG WITH OTHER PEOPLE: NOT DIFFICULT AT ALL

## 2024-03-11 NOTE — PROGRESS NOTES
Assessment & Plan     ICD-10-CM    1. Type 2 diabetes mellitus with other skin complications (H)  E11.628 Stable--but up to 7.7.  consider increase in trulicity or shift to mounjaro.  Pt to consider and to validate trulicity dosing      2. Morbid obesity (H)  E66.01 May benefit from increased weight loss with mounjaro      3. Severe episode of recurrent major depressive disorder, without psychotic features (H)  F33.2 Stable.  Has seasonal component.  Can wean bupropion to 150 for summer if desired      4. Dermatophytosis of nail  B35.1 Declines further treatment            VALDEZ Cowart CNP  [facility] [place]  ============================================  Subjective  HPI    Morbid obesity (H)  Wt Readings from Last 4 Encounters:   12/11/23 100.9 kg (222 lb 6.4 oz)   10/09/23 101.4 kg (223 lb 9.6 oz)   06/12/23 106.6 kg (235 lb)   03/06/23 105.2 kg (232 lb)     Patient declined today      Type 2 diabetes mellitus with other skin complications (H)  Diabetes is stable.  Discussed possible increase in trulicity, and wean of glipizide but pt is uncertain his current dose.      DERMATOPHYTOSIS OF NAIL  Working on managing nails at home.  Does not wish further antifungals.    Severe episode of recurrent major depressive disorder (H)  Stable at this time.  Would like to wean to 150 mg wellbutrin daily and has medication at home for this dosing.  Will let us know if he decides to do this for the summer so med list can be updated.    Review of Systems   Constitutional: Negative.    Respiratory: Negative.     Cardiovascular: Negative.    Gastrointestinal: Negative.    Musculoskeletal: Negative.      ============================================  Objective    /70 (BP Location: Right arm, Patient Position: Sitting, Cuff Size: Adult Large)   Pulse 94   Temp 99  F (37.2  C) (Oral)   Resp 20   SpO2 97%   Physical Exam  Constitutional:       Appearance: Normal appearance.   Eyes:       Conjunctiva/sclera: Conjunctivae normal.   Cardiovascular:      Rate and Rhythm: Normal rate and regular rhythm.      Heart sounds: Normal heart sounds.   Pulmonary:      Effort: Pulmonary effort is normal.      Breath sounds: Normal breath sounds.   Musculoskeletal:      Comments: Foot exam - both sides normal; no swelling, tenderness or skin or vascular lesions. Color and temperature is normal. Sensation is intact. Peripheral pulses are palpable. Toenails with onychomycosis on all nails bilaterally.  Monofilament exam shows sensation intact.     Skin:     General: Skin is warm and dry.      Findings: No rash.   Neurological:      Mental Status: He is alert.   Psychiatric:         Mood and Affect: Mood normal.        Lab Results   Component Value Date    A1C 7.7 03/07/2024    A1C 6.6 10/05/2023    A1C 9.3 06/06/2023    A1C 8.0 11/30/2022    A1C 8.3 03/02/2022    A1C 8.3 02/25/2021    A1C 8.0 08/28/2020    A1C 7.5 02/28/2020    A1C 6.4 08/02/2019    A1C 6.7 01/25/2019       ============================================  VALDEZ Cowart CNP  Signed Electronically by: VALDEZ Cowart CNP

## 2024-03-11 NOTE — PATIENT INSTRUCTIONS
Check on the trulicity dosing.  Are you taking 1.5 mg or 3.0 mg weekly?    OK to wean the bupropion from 300 mg to 150 mg.  Just let me know when you do this and if you need refills.

## 2024-03-15 DIAGNOSIS — E11.628 TYPE 2 DIABETES MELLITUS WITH OTHER SKIN COMPLICATIONS (H): ICD-10-CM

## 2024-03-15 RX ORDER — DULAGLUTIDE 1.5 MG/.5ML
INJECTION, SOLUTION SUBCUTANEOUS
Refills: 0 | OUTPATIENT
Start: 2024-03-15

## 2024-03-15 ASSESSMENT — ENCOUNTER SYMPTOMS
MUSCULOSKELETAL NEGATIVE: 1
RESPIRATORY NEGATIVE: 1
CONSTITUTIONAL NEGATIVE: 1
CARDIOVASCULAR NEGATIVE: 1
GASTROINTESTINAL NEGATIVE: 1

## 2024-03-15 NOTE — ASSESSMENT & PLAN NOTE
Diabetes is stable.  Discussed possible increase in trulicity, and wean of glipizide but pt is uncertain his current dose.

## 2024-03-15 NOTE — ASSESSMENT & PLAN NOTE
Stable at this time.  Would like to wean to 150 mg wellbutrin daily and has medication at home for this dosing.  Will let us know if he decides to do this for the summer so med list can be updated.

## 2024-03-15 NOTE — ASSESSMENT & PLAN NOTE
Wt Readings from Last 4 Encounters:   12/11/23 100.9 kg (222 lb 6.4 oz)   10/09/23 101.4 kg (223 lb 9.6 oz)   06/12/23 106.6 kg (235 lb)   03/06/23 105.2 kg (232 lb)     Patient declined today

## 2024-04-30 ENCOUNTER — OFFICE VISIT (OUTPATIENT)
Dept: FAMILY MEDICINE | Facility: CLINIC | Age: 59
End: 2024-04-30
Payer: COMMERCIAL

## 2024-04-30 VITALS
HEIGHT: 67 IN | DIASTOLIC BLOOD PRESSURE: 74 MMHG | OXYGEN SATURATION: 96 % | TEMPERATURE: 97.8 F | RESPIRATION RATE: 16 BRPM | SYSTOLIC BLOOD PRESSURE: 114 MMHG | HEART RATE: 76 BPM | BODY MASS INDEX: 34.83 KG/M2

## 2024-04-30 DIAGNOSIS — S06.0X0A CONCUSSION WITHOUT LOSS OF CONSCIOUSNESS, INITIAL ENCOUNTER: Primary | ICD-10-CM

## 2024-04-30 PROCEDURE — 99213 OFFICE O/P EST LOW 20 MIN: CPT | Performed by: PHYSICIAN ASSISTANT

## 2024-04-30 ASSESSMENT — PAIN SCALES - GENERAL: PAINLEVEL: NO PAIN (0)

## 2024-04-30 NOTE — PROGRESS NOTES
"Assessment and Plan:     (S06.0X0A) Concussion without loss of consciousness, initial encounter  (primary encounter diagnosis)  Comment: hit in head by frisbee 6 days ago, has had headaches which are improving, no vomiting, no LOC, not on blood thinners, neuro exam normal  Plan: tylenol as need, discussed concussion care, follow-up with pcp        Catalina Thakur PA-C      Subjective   Juan J is a 58 year old, presenting for the following health issues:  No chief complaint on file.    History of Present Illness       Reason for visit:  Concussion  Symptom onset:  3-7 days ago  Symptoms include:  Headache, foggy thinking, tired  Symptom intensity:  Moderate  Symptom progression:  Improving  Had these symptoms before:  Yes  Has tried/received treatment for these symptoms:  No  What makes it better:  Rest and medication    He eats 0-1 servings of fruits and vegetables daily.He consumes 3 sweetened beverage(s) daily.He exercises with enough effort to increase his heart rate 30 to 60 minutes per day.  He exercises with enough effort to increase his heart rate 5 days per week.   He is taking medications regularly.     Juan J was walking on 4/24/24 days ago when he was hit in the head but a golf frisbee   He did not have a LOC  He continues to have daily headaches, his headaches have improved   He had nausea for first 24 hours but hasn't had any vomiting  He is not on blood thinners         Objective      /74 (BP Location: Right arm, Patient Position: Sitting, Cuff Size: Adult Large)   Pulse 76   Temp 97.8  F (36.6  C) (Temporal)   Resp 16   Ht 1.702 m (5' 7\")   SpO2 96%   BMI 34.83 kg/m        Physical Exam     GENERAL: healthy, alert and no distress  ENT: NC/AT, no hemotympanum, no Vidales's sign, no c spine tenderness, OP clear  RESP: lungs clear to auscultation - no rales, no rhonchi, no wheezes  CV: regular rates and rhythm, normal S1 S2, no S3 or S4 and no murmur, no click or rub   MS: extremities- no gross " deformities noted, no edema  NEUROLOGICAL EXAM: normal speech, tongue is midline,  CY, EOMI, equal strength bilaterally w/shoulder shrug   strength equal/intact  Elbow flexion and extension is equal/intact  Gait is steady, no ataxia        Signed Electronically by: Catalina Thakur PA-C

## 2024-05-15 ENCOUNTER — TELEPHONE (OUTPATIENT)
Dept: ORTHOPEDICS | Facility: CLINIC | Age: 59
End: 2024-05-15
Payer: COMMERCIAL

## 2024-05-15 NOTE — TELEPHONE ENCOUNTER
Medication Question or concern regarding medication   Prescription Clarification  Name of Medication: Synvisc   Prescribing Provider: Nu Condon     Pharmacy: at clinic   What on the order needs clarification? Pt is wanting another synvisc injection, please get a prior auth.      Could we send this information to you in [email or web address] or would you prefer to receive a phone call?:   Patient would like to be contacted via [email or web address]

## 2024-05-16 NOTE — TELEPHONE ENCOUNTER
Sent patient [employer] message per his request below. Closing this encounter as patient would like to communicate via [employer].    Nedra Pope ATC

## 2024-06-01 ENCOUNTER — HEALTH MAINTENANCE LETTER (OUTPATIENT)
Age: 59
End: 2024-06-01

## 2024-06-06 ENCOUNTER — MYC MEDICAL ADVICE (OUTPATIENT)
Dept: FAMILY MEDICINE | Facility: CLINIC | Age: 59
End: 2024-06-06
Payer: COMMERCIAL

## 2024-06-06 DIAGNOSIS — E11.628 TYPE 2 DIABETES MELLITUS WITH OTHER SKIN COMPLICATIONS (H): Primary | ICD-10-CM

## 2024-06-07 NOTE — TELEPHONE ENCOUNTER
See patient's MyChart message   - Patient states that he has completed 4 doses of the semaglutide (OZEMPIC) 2 MG/3ML pen and has tolerated it well   - Patient requesting to increase the dose     semaglutide (OZEMPIC) 2 MG/3ML pen 3 mL 0 5/16/2024 -- No   Sig - Route: Inject 0.5 mg Subcutaneous every 7 days - Subcutaneous     VALDEZ Cowart CNP, please review and advise.     Evangelina ARANDA RN   [facility]

## 2024-06-10 NOTE — TELEPHONE ENCOUNTER
"Brief chart review as PCP is out of clinic.    Per [facility] Medical Advice encounter 5/15/24 with PCP:    \"I will start the ozempic for you at 0.5 mg for a month, and we will go up to 1.0 mg in a month. This will assure that you are less likely to have any significant side effects of the medication. Just message me again in 3-4 weeks with how you are tolerating the ozempic and we can increase the dose once we know you are tolerating it well.\"    Will send 1.0 mg dose per PCP recommendations.  He has visit scheduled with PCP 7/18/24    Fabienne Walker PA-C   "

## 2024-06-14 ENCOUNTER — ANCILLARY PROCEDURE (OUTPATIENT)
Dept: GENERAL RADIOLOGY | Facility: CLINIC | Age: 59
End: 2024-06-14
Payer: COMMERCIAL

## 2024-06-14 ENCOUNTER — OFFICE VISIT (OUTPATIENT)
Dept: ORTHOPEDICS | Facility: CLINIC | Age: 59
End: 2024-06-14
Payer: COMMERCIAL

## 2024-06-14 VITALS
SYSTOLIC BLOOD PRESSURE: 112 MMHG | HEIGHT: 67 IN | WEIGHT: 222 LBS | DIASTOLIC BLOOD PRESSURE: 70 MMHG | BODY MASS INDEX: 34.84 KG/M2

## 2024-06-14 DIAGNOSIS — M17.12 PRIMARY OSTEOARTHRITIS OF LEFT KNEE: ICD-10-CM

## 2024-06-14 DIAGNOSIS — M25.512 LEFT SHOULDER PAIN: ICD-10-CM

## 2024-06-14 DIAGNOSIS — M25.512 ACUTE PAIN OF LEFT SHOULDER: Primary | ICD-10-CM

## 2024-06-14 PROCEDURE — 73030 X-RAY EXAM OF SHOULDER: CPT | Mod: TC | Performed by: RADIOLOGY

## 2024-06-14 PROCEDURE — 20610 DRAIN/INJ JOINT/BURSA W/O US: CPT | Mod: LT | Performed by: STUDENT IN AN ORGANIZED HEALTH CARE EDUCATION/TRAINING PROGRAM

## 2024-06-14 PROCEDURE — 99214 OFFICE O/P EST MOD 30 MIN: CPT | Mod: 25 | Performed by: STUDENT IN AN ORGANIZED HEALTH CARE EDUCATION/TRAINING PROGRAM

## 2024-06-14 RX ORDER — DICLOFENAC SODIUM 75 MG/1
75 TABLET, DELAYED RELEASE ORAL 2 TIMES DAILY PRN
Qty: 60 TABLET | Refills: 0 | Status: SHIPPED | OUTPATIENT
Start: 2024-06-14

## 2024-06-14 NOTE — LETTER
6/14/2024      Everton MATHIS Kaveh  [address]  [place] [address]      Dear Colleague,    Thank you for referring your patient, Everton Linn, to the [facility] [place]. Please see a copy of my visit note below.    Sports Medicine Clinic           ASSESSMENT and PLAN:     Juan J was seen today for procedure.    Diagnoses and all orders for this visit:    Acute pain of left shoulder  Patient scheduled for HA injection of knee as below however mentioned that he has been unable to move his left shoulder actively for the past 2 days. No injury but does have a history of chronic RC tendinopathy with evidence of this on his xray with irregularity at his RC insertion. His passive range of motion is preserved but he has signficant weakness and inability to abduct concerning for a high grade RC tear. Xrays with only mild OA of the GH joint. Will proceed with MRI.    -     XR Shoulder Left G/E 3 Views; Future  -     MR Shoulder Left w/o Contrast; Future  -     diclofenac (VOLTAREN) 75 MG EC tablet; Take 1 tablet (75 mg) by mouth 2 times daily as needed for moderate pain  -     will send MRI results over [facility], I did inform patient I will likely be away when his MRI results but that note will be sent by covering partner and that I will follow up with the patient when I return    Primary osteoarthritis of left knee  Did well previously with synvisc however had a insurance change, therefore Durolane was injected today and tolerated well   -     Large Joint Injection/Arthocentesis: L knee joint  -     can repeat HA every 6 months      Return sooner if develops new or worsening symptoms.    Options for treatment and/or follow-up care were reviewed with the patient was actively involved in the decision making process. Patient verbalized understanding and was in agreement with the plan.    Nu Condon MD, CAQSM  Primary Care Sports Medicine           SUBJECTIVE       Everton MATHIS Kaveh  is a 59 year old male presenting to clinic today with a chief complaint left shoulder pain and left knee pain.     Onset: 3 day(s) ago. Reports acute onset without acute precipitating event, woke up and could not raise his arm above his head. The pain is anterior over his proximal biceps tendon but he is having trouble even raising his arm enough to put deodorant on. He is right handed. There was no increase in activity leading up to the onset of pain.     Also here for left knee Duralone injection. Got about 6 months of relief from Synvisc but then his insurance changed and Synvisc was denied.     PMH, Medications and Allergies were reviewed and updated as needed.    ROS:  As noted above otherwise negative.    Patient Active Problem List   Diagnosis     Dermatophytosis of nail     Ventral hernia     SANTOS (obstructive sleep apnea)     Type 2 diabetes mellitus with other skin complications (H)     Morbid obesity (H)     Hyperlipidemia LDL goal <70     History of colonic polyps     Severe episode of recurrent major depressive disorder (H)     Chronic right-sided low back pain without sciatica       Current Outpatient Medications   Medication Sig Dispense Refill     diclofenac (VOLTAREN) 75 MG EC tablet Take 1 tablet (75 mg) by mouth 2 times daily as needed for moderate pain 60 tablet 0     glipiZIDE (GLUCOTROL XL) 10 MG 24 hr tablet Take 2 tablets (20 mg) by mouth daily 180 tablet 3     ibuprofen (ADVIL/MOTRIN) 600 MG tablet Take 1 tablet (600 mg) by mouth every 6 hours as needed for moderate pain (4-6)       metFORMIN (GLUCOPHAGE XR) 500 MG 24 hr tablet Take 2 tablets (1,000 mg) by mouth 2 times daily (with meals) 360 tablet 3     Semaglutide, 1 MG/DOSE, (OZEMPIC) 4 MG/3ML pen Inject 1 mg Subcutaneous every 7 days 3 mL 0     simvastatin (ZOCOR) 40 MG tablet Take 1 tablet (40 mg) by mouth at bedtime 90 tablet 3     tadalafil (CIALIS) 10 MG tablet Take 10 mg by mouth daily as needed              OBJECTIVE:       Vitals:  "  Vitals:    06/14/24 0939   BP: 112/70   Weight: 100.7 kg (222 lb)   Height: 1.702 m (5' 7\")     BMI: Body mass index is 34.77 kg/m .    Gen:  Well nourished and in no acute distress  HEENT: Extraocular movement intact  Neck: Supple  Pulm:  Breathing Comfortably. No increased respiratory effort.  Psych: Euthymic. Appropriately answers questions    MSK:   LEFT SHOULDER  Inspection:    no swelling, bruising, discoloration, or obvious deformity or asymmetry  Palpation:    Tender about the proximal biceps tendon. Remainder of bony and tendinous landmarks are nontender.  Active Range of Motion:     Abduction 300 /  / ER normal0 / IR L4.  Opposite arm abduction/flexion/ER full, IR L4    Passive range of motion can get to abduction and FF of 120 degrees  Strength:    Scapular plane abduction unable to maintain against gravity / ER 5-/5 / IR 5/5 / biceps 5-/5, painful / triceps 5/5  Special Tests:    Testing signficantly limited as unable to hold arm in abduction or forward flexion without support    Imaging was personally reviewed and interpreted by me.   LEFT SHOULDER THREE OR MORE VIEWS   6/14/2024 10:37 AM      HISTORY:  Left shoulder pain.     COMPARISON: None.                                                                      IMPRESSION: Mild osteoarthrosis of the AC joint and glenohumeral  joint. No fracture. No subluxation or dislocation. Otherwise  unremarkable.    Large Joint Injection/Arthocentesis: L knee joint    Date/Time: 6/14/2024 11:14 AM    Performed by: Nu Condon MD  Authorized by: Nu Condon MD    Indications:  Osteoarthritis and pain  Needle Size:  21 G  Guidance: landmark guided    Approach:  Lateral  Location:  Knee      Medications:  60 mg sodium hyaluronate 60 MG/3ML  Outcome:  Tolerated well, no immediate complications  Procedure discussed: discussed risks, benefits, and alternatives    Consent Given by:  Patient  Timeout: timeout called immediately prior to procedure    Prep: " patient was prepped and draped in usual sterile fashion              Again, thank you for allowing me to participate in the care of your patient.        Sincerely,        Nu Condon MD

## 2024-06-14 NOTE — PROGRESS NOTES
Sports Medicine Clinic           ASSESSMENT and PLAN:     Juan J was seen today for procedure.    Diagnoses and all orders for this visit:    Acute pain of left shoulder  Patient scheduled for HA injection of knee as below however mentioned that he has been unable to move his left shoulder actively for the past 2 days. No injury but does have a history of chronic RC tendinopathy with evidence of this on his xray with irregularity at his RC insertion. His passive range of motion is preserved but he has signficant weakness and inability to abduct concerning for a high grade RC tear. Xrays with only mild OA of the GH joint. Will proceed with MRI.    -     XR Shoulder Left G/E 3 Views; Future  -     MR Shoulder Left w/o Contrast; Future  -     diclofenac (VOLTAREN) 75 MG EC tablet; Take 1 tablet (75 mg) by mouth 2 times daily as needed for moderate pain  -     will send MRI results over [facility], I did inform patient I will likely be away when his MRI results but that note will be sent by covering partner and that I will follow up with the patient when I return    Primary osteoarthritis of left knee  Did well previously with synvisc however had a insurance change, therefore Durolane was injected today and tolerated well   -     Large Joint Injection/Arthocentesis: L knee joint  -     can repeat HA every 6 months      Return sooner if develops new or worsening symptoms.    Options for treatment and/or follow-up care were reviewed with the patient was actively involved in the decision making process. Patient verbalized understanding and was in agreement with the plan.    Nu Condon MD, [facility]  Primary Care Sports Medicine           SUBJECTIVE       Everton Linn is a 59 year old male presenting to clinic today with a chief complaint left shoulder pain and left knee pain.     Onset: 3 day(s) ago. Reports acute onset without acute precipitating event, woke up and could not raise his arm above his head. The pain is  "anterior over his proximal biceps tendon but he is having trouble even raising his arm enough to put deodorant on. He is right handed. There was no increase in activity leading up to the onset of pain.     Also here for left knee Duralone injection. Got about 6 months of relief from Synvisc but then his insurance changed and Synvisc was denied.     PMH, Medications and Allergies were reviewed and updated as needed.    ROS:  As noted above otherwise negative.    Patient Active Problem List   Diagnosis    Dermatophytosis of nail    Ventral hernia    SANTOS (obstructive sleep apnea)    Type 2 diabetes mellitus with other skin complications (H)    Morbid obesity (H)    Hyperlipidemia LDL goal <70    History of colonic polyps    Severe episode of recurrent major depressive disorder (H)    Chronic right-sided low back pain without sciatica       Current Outpatient Medications   Medication Sig Dispense Refill    diclofenac (VOLTAREN) 75 MG EC tablet Take 1 tablet (75 mg) by mouth 2 times daily as needed for moderate pain 60 tablet 0    glipiZIDE (GLUCOTROL XL) 10 MG 24 hr tablet Take 2 tablets (20 mg) by mouth daily 180 tablet 3    ibuprofen (ADVIL/MOTRIN) 600 MG tablet Take 1 tablet (600 mg) by mouth every 6 hours as needed for moderate pain (4-6)      metFORMIN (GLUCOPHAGE XR) 500 MG 24 hr tablet Take 2 tablets (1,000 mg) by mouth 2 times daily (with meals) 360 tablet 3    Semaglutide, 1 MG/DOSE, (OZEMPIC) 4 MG/3ML pen Inject 1 mg Subcutaneous every 7 days 3 mL 0    simvastatin (ZOCOR) 40 MG tablet Take 1 tablet (40 mg) by mouth at bedtime 90 tablet 3    tadalafil (CIALIS) 10 MG tablet Take 10 mg by mouth daily as needed              OBJECTIVE:       Vitals:   Vitals:    06/14/24 0939   BP: 112/70   Weight: 100.7 kg (222 lb)   Height: 1.702 m (5' 7\")     BMI: Body mass index is 34.77 kg/m .    Gen:  Well nourished and in no acute distress  HEENT: Extraocular movement intact  Neck: Supple  Pulm:  Breathing Comfortably. No " increased respiratory effort.  Psych: Euthymic. Appropriately answers questions    MSK:   LEFT SHOULDER  Inspection:    no swelling, bruising, discoloration, or obvious deformity or asymmetry  Palpation:    Tender about the proximal biceps tendon. Remainder of bony and tendinous landmarks are nontender.  Active Range of Motion:     Abduction 300 /  / ER normal0 / IR L4.  Opposite arm abduction/flexion/ER full, IR L4    Passive range of motion can get to abduction and FF of 120 degrees  Strength:    Scapular plane abduction unable to maintain against gravity / ER 5-/5 / IR 5/5 / biceps 5-/5, painful / triceps 5/5  Special Tests:    Testing signficantly limited as unable to hold arm in abduction or forward flexion without support    Imaging was personally reviewed and interpreted by me.   LEFT SHOULDER THREE OR MORE VIEWS   6/14/2024 10:37 AM      HISTORY:  Left shoulder pain.     COMPARISON: None.                                                                      IMPRESSION: Mild osteoarthrosis of the AC joint and glenohumeral  joint. No fracture. No subluxation or dislocation. Otherwise  unremarkable.    Large Joint Injection/Arthocentesis: L knee joint    Date/Time: 6/14/2024 11:14 AM    Performed by: Nu Condon MD  Authorized by: Nu Condon MD    Indications:  Osteoarthritis and pain  Needle Size:  21 G  Guidance: landmark guided    Approach:  Lateral  Location:  Knee      Medications:  60 mg sodium hyaluronate 60 MG/3ML  Outcome:  Tolerated well, no immediate complications  Procedure discussed: discussed risks, benefits, and alternatives    Consent Given by:  Patient  Timeout: timeout called immediately prior to procedure    Prep: patient was prepped and draped in usual sterile fashion

## 2024-07-06 DIAGNOSIS — E11.628 TYPE 2 DIABETES MELLITUS WITH OTHER SKIN COMPLICATIONS (H): ICD-10-CM

## 2024-07-08 RX ORDER — SEMAGLUTIDE 1.34 MG/ML
1 INJECTION, SOLUTION SUBCUTANEOUS
Qty: 3 ML | Refills: 0 | Status: SHIPPED | OUTPATIENT
Start: 2024-07-08 | End: 2024-08-02

## 2024-07-18 ENCOUNTER — ALLIED HEALTH/NURSE VISIT (OUTPATIENT)
Dept: FAMILY MEDICINE | Facility: CLINIC | Age: 59
End: 2024-07-18
Payer: COMMERCIAL

## 2024-07-18 DIAGNOSIS — Z23 ENCOUNTER FOR IMMUNIZATION: Primary | ICD-10-CM

## 2024-07-18 PROCEDURE — 90746 HEPB VACCINE 3 DOSE ADULT IM: CPT

## 2024-07-18 PROCEDURE — 90471 IMMUNIZATION ADMIN: CPT

## 2024-07-18 PROCEDURE — 99207 PR NO CHARGE NURSE ONLY: CPT

## 2024-08-02 DIAGNOSIS — E11.628 TYPE 2 DIABETES MELLITUS WITH OTHER SKIN COMPLICATIONS (H): ICD-10-CM

## 2024-08-02 RX ORDER — SEMAGLUTIDE 1.34 MG/ML
1 INJECTION, SOLUTION SUBCUTANEOUS
Qty: 3 ML | Refills: 0 | Status: SHIPPED | OUTPATIENT
Start: 2024-08-02 | End: 2024-08-27

## 2024-08-27 DIAGNOSIS — E11.628 TYPE 2 DIABETES MELLITUS WITH OTHER SKIN COMPLICATIONS (H): ICD-10-CM

## 2024-08-27 RX ORDER — SEMAGLUTIDE 1.34 MG/ML
INJECTION, SOLUTION SUBCUTANEOUS
Qty: 3 ML | Refills: 0 | Status: SHIPPED | OUTPATIENT
Start: 2024-08-27 | End: 2024-09-05

## 2024-09-05 ENCOUNTER — MYC REFILL (OUTPATIENT)
Dept: FAMILY MEDICINE | Facility: CLINIC | Age: 59
End: 2024-09-05
Payer: COMMERCIAL

## 2024-09-05 DIAGNOSIS — E11.628 TYPE 2 DIABETES MELLITUS WITH OTHER SKIN COMPLICATIONS (H): ICD-10-CM

## 2024-09-05 RX ORDER — SEMAGLUTIDE 1.34 MG/ML
1 INJECTION, SOLUTION SUBCUTANEOUS
Qty: 3 ML | Refills: 0 | Status: SHIPPED | OUTPATIENT
Start: 2024-09-05

## 2024-10-10 ENCOUNTER — TRANSFERRED RECORDS (OUTPATIENT)
Dept: MULTI SPECIALTY CLINIC | Facility: CLINIC | Age: 59
End: 2024-10-10
Payer: COMMERCIAL

## 2024-10-10 LAB — RETINOPATHY: NEGATIVE

## 2024-10-11 ENCOUNTER — LAB (OUTPATIENT)
Dept: LAB | Facility: CLINIC | Age: 59
End: 2024-10-11
Payer: COMMERCIAL

## 2024-10-11 DIAGNOSIS — E11.628 TYPE 2 DIABETES MELLITUS WITH OTHER SKIN COMPLICATIONS (H): Primary | ICD-10-CM

## 2024-10-11 LAB
EST. AVERAGE GLUCOSE BLD GHB EST-MCNC: 180 MG/DL
HBA1C MFR BLD: 7.9 % (ref 0–5.6)

## 2024-10-11 PROCEDURE — 83036 HEMOGLOBIN GLYCOSYLATED A1C: CPT

## 2024-10-11 PROCEDURE — 36415 COLL VENOUS BLD VENIPUNCTURE: CPT

## 2024-10-17 ENCOUNTER — OFFICE VISIT (OUTPATIENT)
Dept: FAMILY MEDICINE | Facility: CLINIC | Age: 59
End: 2024-10-17
Payer: COMMERCIAL

## 2024-10-17 VITALS
TEMPERATURE: 98.6 F | OXYGEN SATURATION: 96 % | HEART RATE: 68 BPM | DIASTOLIC BLOOD PRESSURE: 83 MMHG | SYSTOLIC BLOOD PRESSURE: 121 MMHG | RESPIRATION RATE: 16 BRPM | HEIGHT: 67 IN | BODY MASS INDEX: 34.77 KG/M2

## 2024-10-17 DIAGNOSIS — E78.5 HYPERLIPIDEMIA LDL GOAL <100: ICD-10-CM

## 2024-10-17 DIAGNOSIS — E11.628 TYPE 2 DIABETES MELLITUS WITH OTHER SKIN COMPLICATIONS (H): Primary | ICD-10-CM

## 2024-10-17 DIAGNOSIS — F33.2 SEVERE EPISODE OF RECURRENT MAJOR DEPRESSIVE DISORDER, WITHOUT PSYCHOTIC FEATURES (H): ICD-10-CM

## 2024-10-17 DIAGNOSIS — E11.9 TYPE 2 DIABETES MELLITUS WITHOUT COMPLICATION, WITHOUT LONG-TERM CURRENT USE OF INSULIN (H): ICD-10-CM

## 2024-10-17 PROCEDURE — 90471 IMMUNIZATION ADMIN: CPT | Performed by: NURSE PRACTITIONER

## 2024-10-17 PROCEDURE — 90673 RIV3 VACCINE NO PRESERV IM: CPT | Performed by: NURSE PRACTITIONER

## 2024-10-17 PROCEDURE — 99214 OFFICE O/P EST MOD 30 MIN: CPT | Mod: 25 | Performed by: NURSE PRACTITIONER

## 2024-10-17 PROCEDURE — 91320 SARSCV2 VAC 30MCG TRS-SUC IM: CPT | Performed by: NURSE PRACTITIONER

## 2024-10-17 PROCEDURE — 90480 ADMN SARSCOV2 VAC 1/ONLY CMP: CPT | Performed by: NURSE PRACTITIONER

## 2024-10-17 PROCEDURE — 96127 BRIEF EMOTIONAL/BEHAV ASSMT: CPT | Performed by: NURSE PRACTITIONER

## 2024-10-17 RX ORDER — METFORMIN HYDROCHLORIDE 500 MG/1
1000 TABLET, EXTENDED RELEASE ORAL 2 TIMES DAILY WITH MEALS
Qty: 360 TABLET | Refills: 3 | Status: SHIPPED | OUTPATIENT
Start: 2024-10-17

## 2024-10-17 RX ORDER — SEMAGLUTIDE 1.34 MG/ML
1 INJECTION, SOLUTION SUBCUTANEOUS
Qty: 3 ML | Refills: 0 | Status: SHIPPED | OUTPATIENT
Start: 2024-10-17

## 2024-10-17 RX ORDER — SIMVASTATIN 40 MG
40 TABLET ORAL AT BEDTIME
Qty: 90 TABLET | Refills: 3 | Status: SHIPPED | OUTPATIENT
Start: 2024-10-17

## 2024-10-17 RX ORDER — BUPROPION HYDROCHLORIDE 300 MG/1
300 TABLET ORAL EVERY MORNING
Qty: 90 TABLET | Refills: 1 | Status: SHIPPED | OUTPATIENT
Start: 2024-10-17

## 2024-10-17 ASSESSMENT — PATIENT HEALTH QUESTIONNAIRE - PHQ9
SUM OF ALL RESPONSES TO PHQ QUESTIONS 1-9: 6
10. IF YOU CHECKED OFF ANY PROBLEMS, HOW DIFFICULT HAVE THESE PROBLEMS MADE IT FOR YOU TO DO YOUR WORK, TAKE CARE OF THINGS AT HOME, OR GET ALONG WITH OTHER PEOPLE: NOT DIFFICULT AT ALL
SUM OF ALL RESPONSES TO PHQ QUESTIONS 1-9: 6

## 2024-10-17 ASSESSMENT — PAIN SCALES - GENERAL: PAINLEVEL: MODERATE PAIN (4)

## 2024-10-17 NOTE — PATIENT INSTRUCTIONS
One month on ozempic 1 mg weekly  Once you get into the next month of ozempic you will be on 2 mg weekly.    Get your A1c done sometime in that secon month and we will begin to wean down your glipizide.  (First to one tab per day, then down to off).    OK if you begin to feel low blood sugars to wean to one glipizide a day on your own and let me know you did it.  Bupropion--start 150 mg daily for the rest of October and then go to the 300 mg dose that you have at home.

## 2024-10-17 NOTE — PROGRESS NOTES
Assessment & Plan   Type 2 diabetes mellitus with other skin complications (H)  Titrate Ozempic up to 2 mg weekly.  Plan for wean of glipizide.  Repeat A1c in approximately 2 months to assure that it is coming down and it would be okay to wean down glipizide.  - Semaglutide, 1 MG/DOSE, (OZEMPIC, 1 MG/DOSE,) 4 MG/3ML pen  Dispense: 3 mL; Refill: 0  - Semaglutide, 2 MG/DOSE, (OZEMPIC) 8 MG/3ML pen  Dispense: 9 mL; Refill: 3  - Hemoglobin A1c    Type 2 diabetes mellitus without complication, without long-term current use of insulin (H)  Refills on metformin  - metFORMIN (GLUCOPHAGE XR) 500 MG 24 hr tablet  Dispense: 360 tablet; Refill: 3    Hyperlipidemia LDL goal <100  Refills on simvastatin  - simvastatin (ZOCOR) 40 MG tablet  Dispense: 90 tablet; Refill: 3    Severe episode of recurrent major depressive disorder, without psychotic features (H)  History of recurrent depression, no acute distress today.  Restart bupropion.  Follow-up as needed worsening.  Plan further follow-up in clinic in 3 months.  - buPROPion (WELLBUTRIN XL) 300 MG 24 hr tablet  Dispense: 90 tablet; Refill: 1    No follow-ups on file.  Patient Instructions   One month on ozempic 1 mg weekly  Once you get into the next month of ozempic you will be on 2 mg weekly.    Get your A1c done sometime in that secon month and we will begin to wean down your glipizide.  (First to one tab per day, then down to off).    OK if you begin to feel low blood sugars to wean to one glipizide a day on your own and let me know you did it.  Bupropion--start 150 mg daily for the rest of October and then go to the 300 mg dose that you have at home.    The longitudinal plan of care for the diagnosis(es)/condition(s) as documented were addressed during this visit. Due to the added complexity in care, I will continue to support Juan J in the subsequent management and with ongoing continuity of care.    VALDEZ Cowart [facility]  [place]  ============================================  Subjective  Type 2 diabetes mellitus with other skin complications (H)  Has had substantial difficulty keeping his supply of semaglutide available for management of his diabetes.  This is due to his pharmacy closing down.  He is very frustrated with this.  We discussed dosing on semaglutide we will restart him at 1 mg dose and then increase to 2 mg dose in 4 weeks, in order to more fully manage his A1c which is 7.7 today.  I would also like to wean his glipizide while we increase his Ozempic.  Patient has not checked blood glucoses for many years.  And I am concerned that he may start having hypoglycemia with improved Ozempic dosing.  This was discussed at length with patient.  Plan to recheck A1c in approximately 2 months to assure that he is ready for dose reduction on glipizide.    Lab Results   Component Value Date    A1C 7.9 10/11/2024    A1C 7.7 03/07/2024    A1C 6.6 10/05/2023    A1C 9.3 06/06/2023    A1C 8.0 11/30/2022    A1C 8.3 02/25/2021    A1C 8.0 08/28/2020    A1C 7.5 02/28/2020    A1C 6.4 08/02/2019    A1C 6.7 01/25/2019         Severe episode of recurrent major depressive disorder (H)  History of recurrent severe depression.  Currently he is off bupropion.  Discussed restart of this medication.  He has both the 150 mg dose at home as well as a 300 mg dose of home.  Plan to start 150 mg for approximately 2 weeks and as tolerated would increase to 300 mg daily.  He does have a seasonal component to his depression and has some issues with the holiday season and the anniversary of his mother's death.  No acute depressive symptoms today, however patient does feel overwhelmed at work and believes it is time to restart his medication.  History of Present Illness       Mental Health Follow-up:  Patient presents to follow-up on Depression.Patient's depression since last visit has been:  Medium  The patient is not having other symptoms associated with  "depression.      Any significant life events: other  Patient is not feeling anxious or having panic attacks.  Patient has no concerns about alcohol or drug use.    Diabetes:   He presents for follow up of diabetes.    He is not checking blood glucose.        He is concerned about other.    He is not experiencing numbness or burning in feet, excessive thirst, blurry vision, weight changes or redness, sores or blisters on feet. The patient has had a diabetic eye exam in the last 12 months. Eye exam performed on October 10, 2024. Location of last eye exam [facility].        He eats 0-1 servings of fruits and vegetables daily.He consumes 3 sweetened beverage(s) daily.He exercises with enough effort to increase his heart rate 20 to 29 minutes per day.  He exercises with enough effort to increase his heart rate 5 days per week. He is missing 2 dose(s) of medications per week.  He is not taking prescribed medications regularly due to other.    Reviewed and updated as needed this visit by Provider   Tobacco  Allergies  Meds  Problems  Med Hx  Surg Hx  Fam Hx         Review of Systems   Constitutional: Negative.    Respiratory: Negative.     Cardiovascular: Negative.    Gastrointestinal: Negative.    Psychiatric/Behavioral:  Positive for mood changes. The patient is nervous/anxious.      ============================================  Objective    /83 (BP Location: Right arm, Patient Position: Sitting, Cuff Size: Adult Large)   Pulse 68   Temp 98.6  F (37  C) (Oral)   Resp 16   Ht 1.702 m (5' 7\")   SpO2 96%   BMI 34.77 kg/m    Physical Exam  Constitutional:       Appearance: Normal appearance.   Cardiovascular:      Rate and Rhythm: Normal rate.   Pulmonary:      Effort: Pulmonary effort is normal.   Neurological:      Mental Status: He is alert.   Psychiatric:         Mood and Affect: Mood normal.        ============================================  VALDEZ Cowart CNP  Signed Electronically " by: VALDEZ Cowart CNP

## 2024-10-18 ASSESSMENT — ENCOUNTER SYMPTOMS
CONSTITUTIONAL NEGATIVE: 1
RESPIRATORY NEGATIVE: 1
GASTROINTESTINAL NEGATIVE: 1
NERVOUS/ANXIOUS: 1
CARDIOVASCULAR NEGATIVE: 1

## 2024-10-18 NOTE — ASSESSMENT & PLAN NOTE
Has had substantial difficulty keeping his supply of semaglutide available for management of his diabetes.  This is due to his pharmacy closing down.  He is very frustrated with this.  We discussed dosing on semaglutide we will restart him at 1 mg dose and then increase to 2 mg dose in 4 weeks, in order to more fully manage his A1c which is 7.7 today.  I would also like to wean his glipizide while we increase his Ozempic.  Patient has not checked blood glucoses for many years.  And I am concerned that he may start having hypoglycemia with improved Ozempic dosing.  This was discussed at length with patient.  Plan to recheck A1c in approximately 2 months to assure that he is ready for dose reduction on glipizide.    Lab Results   Component Value Date    A1C 7.9 10/11/2024    A1C 7.7 03/07/2024    A1C 6.6 10/05/2023    A1C 9.3 06/06/2023    A1C 8.0 11/30/2022    A1C 8.3 02/25/2021    A1C 8.0 08/28/2020    A1C 7.5 02/28/2020    A1C 6.4 08/02/2019    A1C 6.7 01/25/2019

## 2024-10-18 NOTE — ASSESSMENT & PLAN NOTE
History of recurrent severe depression.  Currently he is off bupropion.  Discussed restart of this medication.  He has both the 150 mg dose at home as well as a 300 mg dose of home.  Plan to start 150 mg for approximately 2 weeks and as tolerated would increase to 300 mg daily.  He does have a seasonal component to his depression and has some issues with the holiday season and the anniversary of his mother's death.  No acute depressive symptoms today, however patient does feel overwhelmed at work and believes it is time to restart his medication.

## 2024-11-14 DIAGNOSIS — E11.628 TYPE 2 DIABETES MELLITUS WITH OTHER SKIN COMPLICATIONS (H): ICD-10-CM

## 2024-11-14 RX ORDER — SEMAGLUTIDE 1.34 MG/ML
1 INJECTION, SOLUTION SUBCUTANEOUS
OUTPATIENT
Start: 2024-11-14

## 2024-11-21 ENCOUNTER — TELEPHONE (OUTPATIENT)
Dept: ORTHOPEDICS | Facility: CLINIC | Age: 59
End: 2024-11-21
Payer: COMMERCIAL

## 2024-11-21 DIAGNOSIS — M17.12 PRIMARY OSTEOARTHRITIS OF LEFT KNEE: Primary | ICD-10-CM

## 2024-11-21 NOTE — TELEPHONE ENCOUNTER
[facility] Call Center    Phone Message    May a detailed message be left on voicemail: yes     Reason for Call: Other: Pt is scheduled for a synvisc inj for both knees on 12/21/24. Please submit PA to ins     Action Taken: Other: [facility]    Travel Screening: Not Applicable     Date of Service:

## 2024-11-21 NOTE — TELEPHONE ENCOUNTER
EMERGENCY DEPARTMENT HISTORY AND PHYSICAL EXAM      Date: 7/1/2019  Patient Name: Arnav Boss    History of Presenting Illness     Chief Complaint   Patient presents with    Headache     pt has had a right side headache for a few days, for which she was placed on z-thai. This morning, she cannot close her right eye. History Provided By: Patient    HPI: Arnav Boss, 63 y.o. female, presents dilatory to the ED with cc of headache going on for the past 7 days. She was seen and treated by her primary care physician with azithromycin without relief. Today the patient says she woke up and had some right-sided facial droop and is concerned she may have had a stroke. There are no exacerbating or relieving factors and she has not treated it with anything. He specifically denies fever, chills, nausea, vomiting, chest pain, shortness of breath, rash, diarrhea, headache, night sweats, weight loss. There are no other complaints, changes, or physical findings at this time. PCP: Karen Cohn MD    No current facility-administered medications on file prior to encounter. Current Outpatient Medications on File Prior to Encounter   Medication Sig Dispense Refill    azithromycin (ZITHROMAX) 250 mg tablet Take 2 tablets today, then take 1 tablet daily 6 Tab 0    sertraline (ZOLOFT) 50 mg tablet Take 1 Tab by mouth two (2) times a day. 180 Tab 3    diclofenac (VOLTAREN) 1 % gel Apply 2 g to affected area four (4) times daily. 100 g 2    hydroCHLOROthiazide (HYDRODIURIL) 25 mg tablet Take 1 Tab by mouth daily as needed (swelling). 30 Tab 1    clotrimazole (LOTRIMIN) 1 % topical cream Apply  to affected area two (2) times a day. 45 g 2    betamethasone dipropionate (DIPROSONE) 0.05 % topical cream Apply  to affected area two (2) times a day. 45 g 2    clotrimazole-betamethasone (LOTRISONE) topical cream Apply  to affected area two (2) times a day.  30 g 2       Past History     Past Medical Order signed.    History:  No past medical history on file. Past Surgical History:  No past surgical history on file. Family History:  No family history on file. Social History:  Social History     Tobacco Use    Smoking status: Former Smoker     Last attempt to quit: 03/2016     Years since quitting: 3.3    Smokeless tobacco: Never Used   Substance Use Topics    Alcohol use: Yes     Alcohol/week: 1.2 oz     Types: 2 Cans of beer per week     Comment: occasionally    Drug use: No       Allergies: Allergies   Allergen Reactions    Codeine Nausea Only         Review of Systems   Review of Systems   Constitutional: Negative. Negative for activity change, appetite change, chills, fatigue, fever and unexpected weight change. HENT: Negative. Negative for congestion, hearing loss, rhinorrhea, sneezing and voice change. Eyes: Negative. Negative for pain and visual disturbance. Respiratory: Negative. Negative for apnea, cough, choking, chest tightness and shortness of breath. Cardiovascular: Negative. Negative for chest pain and palpitations. Gastrointestinal: Negative. Negative for abdominal distention, abdominal pain, blood in stool, diarrhea, nausea and vomiting. Genitourinary: Negative. Negative for difficulty urinating, flank pain, frequency and urgency. No discharge   Musculoskeletal: Negative. Negative for arthralgias, back pain, myalgias and neck stiffness. Skin: Negative. Negative for color change and rash. Neurological: Positive for facial asymmetry, numbness and headaches. Negative for dizziness, seizures, syncope, speech difficulty and weakness. Hematological: Negative for adenopathy. Psychiatric/Behavioral: Negative. Negative for agitation, behavioral problems, dysphoric mood and suicidal ideas. The patient is not nervous/anxious. Physical Exam   Physical Exam   Constitutional: She is oriented to person, place, and time. She appears well-developed and well-nourished. No distress. HENT:   Head: Normocephalic and atraumatic. Mouth/Throat: Oropharynx is clear and moist. No oropharyngeal exudate. Eyes: Pupils are equal, round, and reactive to light. Conjunctivae and EOM are normal. Right eye exhibits no discharge. Left eye exhibits no discharge. Neck: Normal range of motion. Neck supple. Cardiovascular: Normal rate, regular rhythm and intact distal pulses. Exam reveals no gallop and no friction rub. No murmur heard. Pulmonary/Chest: Effort normal and breath sounds normal. No respiratory distress. She has no wheezes. She has no rales. She exhibits no tenderness. Abdominal: Soft. Bowel sounds are normal. She exhibits no distension and no mass. There is no tenderness. There is no rebound and no guarding. Musculoskeletal: Normal range of motion. She exhibits no edema. Lymphadenopathy:     She has no cervical adenopathy. Neurological: She is alert and oriented to person, place, and time. A cranial nerve deficit is present. Coordination normal.   Right-sided facial droop, mild, involving the forehead   Skin: Skin is warm and dry. No rash noted. No erythema. Psychiatric: She has a normal mood and affect. Nursing note and vitals reviewed. Diagnostic Study Results     Labs -   No results found for this or any previous visit (from the past 12 hour(s)). Radiologic Studies -   CT HEAD WO CONT   Final Result   IMPRESSION: No acute intracranial abnormality. CT Results  (Last 48 hours)               07/01/19 1005  CT HEAD WO CONT Final result    Impression:  IMPRESSION: No acute intracranial abnormality. Narrative:  EXAM:  CT HEAD WITHOUT CONTRAST   INDICATION: Headache. COMPARISON: None. CONTRAST: None. TECHNIQUE: Unenhanced CT of the head was performed using 5 mm images. Brain and   bone windows were generated. Sagittal and coronal reformations were generated.    CT dose reduction was achieved through use of a standardized protocol tailored   for this examination and automatic exposure control for dose modulation. FINDINGS:   There is some motion artifact on the images. The ventricles and sulci are normal in size, shape and configuration and   midline. There is no significant white matter disease. There is no intracranial hemorrhage. There is no extra-axial collection, mass, mass effect or midline shift. The basilar cisterns are open. No acute infarct is identified. The bone windows demonstrate no abnormalities. The visualized portions of the paranasal sinuses and mastoid air cells are   clear. CXR Results  (Last 48 hours)    None            Medical Decision Making   I am the first provider for this patient. I reviewed the vital signs, available nursing notes, past medical history, past surgical history, family history and social history. Vital Signs-Reviewed the patient's vital signs. Patient Vitals for the past 12 hrs:   Temp Pulse Resp BP SpO2   07/01/19 1210     100 %   07/01/19 1200    (!) 85/48 100 %   07/01/19 1159     100 %   07/01/19 1152     100 %   07/01/19 1142    (!) 84/53 98 %   07/01/19 1117    (!) 87/51 100 %   07/01/19 1115    (!) 83/53 99 %   07/01/19 1107     99 %   07/01/19 1057     100 %   07/01/19 1043     99 %   07/01/19 1030    100/50 98 %   07/01/19 1021     98 %   07/01/19 1015    94/48    07/01/19 0955     98 %   07/01/19 0943     99 %   07/01/19 0940     98 %   07/01/19 0938    93/60    07/01/19 0914 98.2 °F (36.8 °C) 70 14 123/81 99 %       Records Reviewed: Nursing Notes and Old Medical Records    Provider Notes (Medical Decision Making): The patient signs and symptoms of Bell's palsy, will continue to treat symptomatically. Given the fact that she has had headaches for a week we will assess with CT of the head.       11:28 AM  Patient is having some lower than normal blood pressures and she attributes this to her recent use of hydrochlorothiazide. She is not symptomatic at this time. We will rehydrate and reevaluate the patient  ED Course:   Initial assessment performed. The patients presenting problems have been discussed, and they are in agreement with the care plan formulated and outlined with them. I have encouraged them to ask questions as they arise throughout their visit. 12:28 PM  Tena maguire up feels much better and ambulates without difficulty will plan for discharge. PLAN:  1. Current Discharge Medication List        2. Follow-up Information    None       Return to ED if worse     Diagnosis     Clinical Impression: No diagnosis found.

## 2024-11-25 NOTE — TELEPHONE ENCOUNTER
Notes state patient would like Synvisc, however PA was placed for Durolane.  Patient has previously had Synvisc and Durolane.  Synvisc injected on 11/4/2023, Durolane done on 6/14/2024.  Call went out to patient for more information.  He would like to try Synvisc one again since he had better results.  I explained to him that the reason why we opted for Durolane last time was his insurance.  Also explained the 2nd injection (Durolane) may not have been as effective due to progression of knee arthritis.      Patient would like us to try a PA for Synvisc, order pended in chart.    Buddy Lazo, LAT

## 2024-11-26 NOTE — PROGRESS NOTES
[facility]  Sports Medicine Clinic  [facility]  PROCEDURE NOTE    Reason for visit: Bilateral Synvisc one injections     History: Chronic bilateral knee pain, history of menscial surgery on both knees, mild OA       Last injection: 6/14/2024 - Durolane, did not provide much relief at all.    Large Joint Injection/Arthocentesis: bilateral knee    Date/Time: 12/21/2024 9:42 AM    Performed by: Nu Condon MD  Authorized by: Nu Condon MD    Indications:  Osteoarthritis and pain  Needle Size:  21 G  Guidance: landmark guided    Approach:  Superolateral  Location:  Knee  Laterality:  Bilateral      Medications (Right):  48 mg hylan 48 MG/6ML  Medications (Left):  48 mg hylan 48 MG/6ML  Outcome:  Tolerated well, no immediate complications  Procedure discussed: discussed risks, benefits, and alternatives    Consent Given by:  Patient  Timeout: timeout called immediately prior to procedure    Prep: patient was prepped and draped in usual sterile fashion

## 2024-12-19 ENCOUNTER — OFFICE VISIT (OUTPATIENT)
Dept: FAMILY MEDICINE | Facility: CLINIC | Age: 59
End: 2024-12-19
Attending: NURSE PRACTITIONER
Payer: COMMERCIAL

## 2024-12-19 VITALS
HEIGHT: 68 IN | HEART RATE: 84 BPM | TEMPERATURE: 98.6 F | OXYGEN SATURATION: 97 % | DIASTOLIC BLOOD PRESSURE: 68 MMHG | BODY MASS INDEX: 34.72 KG/M2 | RESPIRATION RATE: 16 BRPM | SYSTOLIC BLOOD PRESSURE: 116 MMHG | WEIGHT: 229.1 LBS

## 2024-12-19 DIAGNOSIS — F41.1 GAD (GENERALIZED ANXIETY DISORDER): ICD-10-CM

## 2024-12-19 DIAGNOSIS — E11.628 TYPE 2 DIABETES MELLITUS WITH OTHER SKIN COMPLICATIONS (H): Primary | ICD-10-CM

## 2024-12-19 DIAGNOSIS — F33.2 SEVERE EPISODE OF RECURRENT MAJOR DEPRESSIVE DISORDER, WITHOUT PSYCHOTIC FEATURES (H): ICD-10-CM

## 2024-12-19 DIAGNOSIS — E78.5 HYPERLIPIDEMIA LDL GOAL <70: ICD-10-CM

## 2024-12-19 PROCEDURE — G2211 COMPLEX E/M VISIT ADD ON: HCPCS | Performed by: NURSE PRACTITIONER

## 2024-12-19 PROCEDURE — 99214 OFFICE O/P EST MOD 30 MIN: CPT | Performed by: NURSE PRACTITIONER

## 2024-12-19 RX ORDER — TRIAMCINOLONE ACETONIDE 1 MG/G
CREAM TOPICAL
COMMUNITY
Start: 2024-12-04

## 2024-12-19 RX ORDER — BUSPIRONE HYDROCHLORIDE 5 MG/1
5 TABLET ORAL 2 TIMES DAILY
Qty: 120 TABLET | Refills: 0 | Status: SHIPPED | OUTPATIENT
Start: 2024-12-19

## 2024-12-19 ASSESSMENT — PATIENT HEALTH QUESTIONNAIRE - PHQ9
SUM OF ALL RESPONSES TO PHQ QUESTIONS 1-9: 12
5. POOR APPETITE OR OVEREATING: NEARLY EVERY DAY

## 2024-12-19 ASSESSMENT — ANXIETY QUESTIONNAIRES
6. BECOMING EASILY ANNOYED OR IRRITABLE: SEVERAL DAYS
7. FEELING AFRAID AS IF SOMETHING AWFUL MIGHT HAPPEN: NEARLY EVERY DAY
2. NOT BEING ABLE TO STOP OR CONTROL WORRYING: MORE THAN HALF THE DAYS
5. BEING SO RESTLESS THAT IT IS HARD TO SIT STILL: MORE THAN HALF THE DAYS
IF YOU CHECKED OFF ANY PROBLEMS ON THIS QUESTIONNAIRE, HOW DIFFICULT HAVE THESE PROBLEMS MADE IT FOR YOU TO DO YOUR WORK, TAKE CARE OF THINGS AT HOME, OR GET ALONG WITH OTHER PEOPLE: SOMEWHAT DIFFICULT
3. WORRYING TOO MUCH ABOUT DIFFERENT THINGS: SEVERAL DAYS
GAD7 TOTAL SCORE: INCOMPLETE
GAD7 TOTAL SCORE: 14
GAD7 TOTAL SCORE: 14
1. FEELING NERVOUS, ANXIOUS, OR ON EDGE: MORE THAN HALF THE DAYS

## 2024-12-19 ASSESSMENT — PAIN SCALES - GENERAL: PAINLEVEL_OUTOF10: NO PAIN (0)

## 2024-12-19 NOTE — PROGRESS NOTES
Assessment & Plan   Type 2 diabetes mellitus with other skin complications (H)  Currently controlled.  Increase Ozempic from 1 mg weekly to 2 mg weekly.  Manage depression and anxiety more aggressively.  Patient has gym membership he is planning to take advantage of in the next month    Severe episode of recurrent major depressive disorder, without psychotic features (H)  Shared decision making discussion with patient and will add buspirone.  Follow-up if worsening plan recheck in 8 weeks    CHINMAY (generalized anxiety disorder)  Patient does have seasonal recurrence of depressive symptoms, but this year his anxiety component is quite high  - busPIRone (BUSPAR) 5 MG tablet  Dispense: 120 tablet; Refill: 0    Hyperlipidemia LDL goal <70  Recheck fasting lipid panel when patient can come back  - Lipid Profile (Chol, Trig, HDL, LDL calc)      The longitudinal plan of care for the diagnosis(es)/condition(s) as documented were addressed during this visit. Due to the added complexity in care, I will continue to support Juan J in the subsequent management and with ongoing continuity of care.    Return in about 2 months (around 2/19/2025).  There are no Patient Instructions on file for this visit.  VALDEZ Cowart [facility] [place]  ============================================  Subjective  Type 2 diabetes mellitus with other skin complications (H)  Remains on glipizide 10 mg daily.  Metformin 1000 mg twice daily.  Has been on semaglutide 1 mg weekly.  No significant side effects.    Wt Readings from Last 4 Encounters:   12/21/24 103.9 kg (229 lb)   12/19/24 103.9 kg (229 lb 1.6 oz)   06/14/24 100.7 kg (222 lb)   12/11/23 100.9 kg (222 lb 6.4 oz)     No significant weight loss at this time on Ozempic.  Plans to start at [facility] in January.  Diet and exercise currently affected by anxiety and depression seasonal flare.    Lab Results   Component Value Date    A1C 8.5 12/06/2024    A1C 7.9 10/11/2024    A1C  7.7 03/07/2024    A1C 6.6 10/05/2023    A1C 9.3 06/06/2023    A1C 8.3 02/25/2021    A1C 8.0 08/28/2020    A1C 7.5 02/28/2020    A1C 6.4 08/02/2019    A1C 6.7 01/25/2019     A1c rising.    Severe episode of recurrent major depressive disorder (H)      12/11/2023     3:59 PM 12/19/2024     3:43 PM 12/19/2024     4:35 PM   CHINMAY-7 SCORE   Total Score  Incomplete    Total Score 4  14         3/11/2024     4:07 PM 10/17/2024     3:34 PM 12/19/2024     4:35 PM   PHQ   PHQ-9 Total Score 2 6 12   Q9: Thoughts of better off dead/self-harm past 2 weeks Not at all Not at all Not at all     Increased anxiety and increased depressive symptoms at this time.  Has not tolerated SSRIs well in the past and would prefer to avoid them at this time.  Feels currently that his anxiety is the biggest component of his mood issue.  Discussed options for augmentation.  And will begin buspirone and titrate dose up.  Discussed risks and benefits and potential side effects of BuSpar as well.  History of Present Illness       Mental Health Follow-up:  Patient presents to follow-up on Depression & Anxiety.Patient's depression since last visit has been:  Medium  The patient is not having other symptoms associated with depression.  Patient's anxiety since last visit has been:  Bad  The patient is having other symptoms associated with anxiety.  Any significant life events: No  Patient is feeling anxious or having panic attacks.  Patient has no concerns about alcohol or drug use.    Diabetes:   He presents for follow up of diabetes.    He is not checking blood glucose.        He is concerned about other.    He is not experiencing numbness or burning in feet, excessive thirst, blurry vision, weight changes or redness, sores or blisters on feet.           He eats 0-1 servings of fruits and vegetables daily.He consumes 2 sweetened beverage(s) daily.He exercises with enough effort to increase his heart rate 30 to 60 minutes per day.  He exercises with  "enough effort to increase his heart rate 6 days per week.   He is taking medications regularly.    Reviewed and updated as needed this visit by Provider   Tobacco  Allergies  Meds  Problems  Med Hx  Surg Hx  Fam Hx         Review of Systems   Constitutional: Negative.    Respiratory: Negative.     Cardiovascular: Negative.    Gastrointestinal: Negative.    Psychiatric/Behavioral:  Positive for dysphoric mood. Negative for suicidal ideas. The patient is nervous/anxious.      ============================================  Objective    /68 (BP Location: Right arm, Patient Position: Sitting, Cuff Size: Adult Large)   Pulse 84   Temp 98.6  F (37  C) (Oral)   Resp 16   Ht 1.727 m (5' 8\")   Wt 103.9 kg (229 lb 1.6 oz)   SpO2 97%   BMI 34.83 kg/m    Physical Exam  Constitutional:       Appearance: Normal appearance.   Cardiovascular:      Rate and Rhythm: Normal rate.   Pulmonary:      Effort: Pulmonary effort is normal.   Neurological:      Mental Status: He is alert.   Psychiatric:         Mood and Affect: Mood normal.        ============================================  VALDEZ Cowart CNP  Signed Electronically by: VALDEZ Cowart CNP          "

## 2024-12-21 ENCOUNTER — OFFICE VISIT (OUTPATIENT)
Dept: ORTHOPEDICS | Facility: CLINIC | Age: 59
End: 2024-12-21
Payer: COMMERCIAL

## 2024-12-21 VITALS
DIASTOLIC BLOOD PRESSURE: 78 MMHG | HEIGHT: 68 IN | WEIGHT: 229 LBS | BODY MASS INDEX: 34.71 KG/M2 | SYSTOLIC BLOOD PRESSURE: 106 MMHG

## 2024-12-21 DIAGNOSIS — M17.0 BILATERAL PRIMARY OSTEOARTHRITIS OF KNEE: Primary | ICD-10-CM

## 2024-12-21 PROCEDURE — 20610 DRAIN/INJ JOINT/BURSA W/O US: CPT | Mod: 50 | Performed by: STUDENT IN AN ORGANIZED HEALTH CARE EDUCATION/TRAINING PROGRAM

## 2024-12-21 NOTE — LETTER
12/21/2024      Everton Linn  [address]  [place] [address]      Dear Colleague,    Thank you for referring your patient, Everton Linn, to the [facility] [place]. Please see a copy of my visit note below.    [facility]  Sports Medicine Clinic  Clinics and Surgery Center  PROCEDURE NOTE    Reason for visit: Bilateral Synvisc one injections     History: Chronic bilateral knee pain, history of menscial surgery on both knees, mild OA       Last injection: 6/14/2024 - Durolane, did not provide much relief at all.    Large Joint Injection/Arthocentesis: bilateral knee    Date/Time: 12/21/2024 9:42 AM    Performed by: Nu Condon MD  Authorized by: Nu Condon MD    Indications:  Osteoarthritis and pain  Needle Size:  21 G  Guidance: landmark guided    Approach:  Superolateral  Location:  Knee  Laterality:  Bilateral      Medications (Right):  48 mg hylan 48 MG/6ML  Medications (Left):  48 mg hylan 48 MG/6ML  Outcome:  Tolerated well, no immediate complications  Procedure discussed: discussed risks, benefits, and alternatives    Consent Given by:  Patient  Timeout: timeout called immediately prior to procedure    Prep: patient was prepped and draped in usual sterile fashion        Again, thank you for allowing me to participate in the care of your patient.        Sincerely,        Nu Condon MD

## 2024-12-30 ASSESSMENT — ENCOUNTER SYMPTOMS
NERVOUS/ANXIOUS: 1
GASTROINTESTINAL NEGATIVE: 1
DYSPHORIC MOOD: 1
CONSTITUTIONAL NEGATIVE: 1
RESPIRATORY NEGATIVE: 1
CARDIOVASCULAR NEGATIVE: 1

## 2024-12-30 NOTE — ASSESSMENT & PLAN NOTE
Remains on glipizide 10 mg daily.  Metformin 1000 mg twice daily.  Has been on semaglutide 1 mg weekly.  No significant side effects.    Wt Readings from Last 4 Encounters:   12/21/24 103.9 kg (229 lb)   12/19/24 103.9 kg (229 lb 1.6 oz)   06/14/24 100.7 kg (222 lb)   12/11/23 100.9 kg (222 lb 6.4 oz)     No significant weight loss at this time on Ozempic.  Plans to start at [facility] in January.  Diet and exercise currently affected by anxiety and depression seasonal flare.    Lab Results   Component Value Date    A1C 8.5 12/06/2024    A1C 7.9 10/11/2024    A1C 7.7 03/07/2024    A1C 6.6 10/05/2023    A1C 9.3 06/06/2023    A1C 8.3 02/25/2021    A1C 8.0 08/28/2020    A1C 7.5 02/28/2020    A1C 6.4 08/02/2019    A1C 6.7 01/25/2019     A1c rising.

## 2024-12-30 NOTE — ASSESSMENT & PLAN NOTE
12/11/2023     3:59 PM 12/19/2024     3:43 PM 12/19/2024     4:35 PM   CHINMAY-7 SCORE   Total Score  Incomplete    Total Score 4  14         3/11/2024     4:07 PM 10/17/2024     3:34 PM 12/19/2024     4:35 PM   PHQ   PHQ-9 Total Score 2 6 12   Q9: Thoughts of better off dead/self-harm past 2 weeks Not at all Not at all Not at all     Increased anxiety and increased depressive symptoms at this time.  Has not tolerated SSRIs well in the past and would prefer to avoid them at this time.  Feels currently that his anxiety is the biggest component of his mood issue.  Discussed options for augmentation.  And will begin buspirone and titrate dose up.  Discussed risks and benefits and potential side effects of BuSpar as well.

## 2024-12-30 NOTE — ASSESSMENT & PLAN NOTE
Remains on simvastatin 40 mg daily.  Plan for future fasting lipid panel    Lab Results   Component Value Date    CHOL 126 12/11/2023    CHOL 148 02/25/2021    HDL 43 12/11/2023    HDL 44 02/25/2021    LDL 50 12/11/2023    LDL 62 02/25/2021    TRIG 163 12/11/2023    TRIG 208 02/25/2021    CHOLHDLRATIO 4.6 07/17/2015

## 2025-01-13 DIAGNOSIS — F33.2 SEVERE EPISODE OF RECURRENT MAJOR DEPRESSIVE DISORDER, WITHOUT PSYCHOTIC FEATURES (H): ICD-10-CM

## 2025-01-13 RX ORDER — BUPROPION HYDROCHLORIDE 300 MG/1
300 TABLET ORAL EVERY MORNING
Qty: 90 TABLET | Refills: 1 | OUTPATIENT
Start: 2025-01-13

## 2025-01-25 DIAGNOSIS — E11.9 TYPE 2 DIABETES MELLITUS WITHOUT COMPLICATION, WITHOUT LONG-TERM CURRENT USE OF INSULIN (H): ICD-10-CM

## 2025-01-28 RX ORDER — GLIPIZIDE 10 MG/1
20 TABLET, FILM COATED, EXTENDED RELEASE ORAL DAILY
Qty: 180 TABLET | Refills: 1 | Status: SHIPPED | OUTPATIENT
Start: 2025-01-28

## 2025-04-07 ENCOUNTER — TELEPHONE (OUTPATIENT)
Dept: FAMILY MEDICINE | Facility: CLINIC | Age: 60
End: 2025-04-07

## 2025-04-07 NOTE — TELEPHONE ENCOUNTER
Does hse have enough to last until her appointment next week?    Patient Quality Outreach    Patient is due for the following:   Diabetes -  Diabetic Follow-Up Visit    Action(s) Taken:   Schedule a office visit for Diabetic follow - up    Type of outreach:    Sent [employer] message.    Questions for provider review:    None         Rola Major MA  Chart routed to .

## 2025-04-07 NOTE — LETTER
[facility]  [address] [facility] [address] [phone]       April 14, 2025    Everton MATHIS Kaveh  [address]  [place] [address]    Dear Juan J,    We care about your health and have reviewed your health plan and are making recommendations based on this review, to optimize your health.    You are in particular need of attention regarding:  -Diabetes    We are recommending that you:  -schedule a FOLLOWUP OFFICE APPOINTMENT with us.  I will recheck your: A1c tests.    In addition, here is a list of due or overdue Health Maintenance reminders.    Health Maintenance Due   Topic Date Due    Yearly Preventive Visit  01/28/2011    Basic Metabolic Panel  12/11/2024    Cholesterol Lab  12/11/2024    Kidney Microalbumin Urine Test  03/07/2025    Diabetic Foot Exam  03/15/2025       To address the above recommendations, we encourage you to contact us at [phone], via [employer] or by contacting Central Scheduling toll free at [phone] 24 hours a day. They will assist you with finding the most convenient time and location.    Thank you for trusting [facility] and we appreciate the opportunity to serve you.  We look forward to supporting your healthcare needs in the future.    Healthy Regards,    Your [facility] Team

## 2025-04-14 NOTE — TELEPHONE ENCOUNTER
Patient Quality Outreach    Patient is due for the following:   Diabetes -  Diabetic Follow-Up Visit    Action(s) Taken:   Schedule a office visit for Diabetic follow-up visit    Type of outreach:    Sent letter.    Questions for provider review:    None         Rola Major MA  Chart routed to .

## 2025-04-22 ENCOUNTER — PATIENT OUTREACH (OUTPATIENT)
Dept: CARE COORDINATION | Facility: CLINIC | Age: 60
End: 2025-04-22
Payer: COMMERCIAL

## 2025-05-01 ENCOUNTER — DOCUMENTATION ONLY (OUTPATIENT)
Dept: FAMILY MEDICINE | Facility: CLINIC | Age: 60
End: 2025-05-01
Payer: COMMERCIAL

## 2025-05-01 DIAGNOSIS — E11.628 TYPE 2 DIABETES MELLITUS WITH OTHER SKIN COMPLICATIONS (H): Primary | ICD-10-CM

## 2025-05-01 NOTE — PROGRESS NOTES
Patient has lab only appt 5/29/25, patient wants an A1c but there is no order in the chart for that..  Please place FUTURE orders in Epic if appropriate.    Thanks,   lab

## 2025-05-29 ENCOUNTER — LAB (OUTPATIENT)
Dept: LAB | Facility: CLINIC | Age: 60
End: 2025-05-29
Payer: COMMERCIAL

## 2025-05-29 ENCOUNTER — RESULTS FOLLOW-UP (OUTPATIENT)
Dept: FAMILY MEDICINE | Facility: CLINIC | Age: 60
End: 2025-05-29

## 2025-05-29 DIAGNOSIS — E78.5 HYPERLIPIDEMIA LDL GOAL <70: ICD-10-CM

## 2025-05-29 DIAGNOSIS — E11.628 TYPE 2 DIABETES MELLITUS WITH OTHER SKIN COMPLICATIONS (H): Primary | ICD-10-CM

## 2025-05-29 LAB
ANION GAP SERPL CALCULATED.3IONS-SCNC: 11 MMOL/L (ref 7–15)
BUN SERPL-MCNC: 7.5 MG/DL (ref 6–20)
CALCIUM SERPL-MCNC: 9.6 MG/DL (ref 8.8–10.4)
CHLORIDE SERPL-SCNC: 100 MMOL/L (ref 98–107)
CHOLEST SERPL-MCNC: 101 MG/DL
CREAT SERPL-MCNC: 0.99 MG/DL (ref 0.67–1.17)
CREAT UR-MCNC: 210 MG/DL
EGFRCR SERPLBLD CKD-EPI 2021: 88 ML/MIN/1.73M2
EST. AVERAGE GLUCOSE BLD GHB EST-MCNC: 114 MG/DL
FASTING STATUS PATIENT QL REPORTED: YES
FASTING STATUS PATIENT QL REPORTED: YES
GLUCOSE SERPL-MCNC: 89 MG/DL (ref 70–99)
HBA1C MFR BLD: 5.6 % (ref 0–5.6)
HCO3 SERPL-SCNC: 29 MMOL/L (ref 22–29)
HDLC SERPL-MCNC: 39 MG/DL
LDLC SERPL CALC-MCNC: 38 MG/DL
MICROALBUMIN UR-MCNC: <12 MG/L
MICROALBUMIN/CREAT UR: NORMAL MG/G{CREAT}
NONHDLC SERPL-MCNC: 62 MG/DL
POTASSIUM SERPL-SCNC: 4.1 MMOL/L (ref 3.4–5.3)
SODIUM SERPL-SCNC: 140 MMOL/L (ref 135–145)
TRIGL SERPL-MCNC: 118 MG/DL

## 2025-06-04 ENCOUNTER — OFFICE VISIT (OUTPATIENT)
Dept: FAMILY MEDICINE | Facility: CLINIC | Age: 60
End: 2025-06-04
Attending: NURSE PRACTITIONER
Payer: COMMERCIAL

## 2025-06-04 VITALS
HEART RATE: 98 BPM | TEMPERATURE: 98.5 F | HEIGHT: 68 IN | SYSTOLIC BLOOD PRESSURE: 108 MMHG | WEIGHT: 198 LBS | OXYGEN SATURATION: 96 % | BODY MASS INDEX: 30.01 KG/M2 | RESPIRATION RATE: 11 BRPM | DIASTOLIC BLOOD PRESSURE: 69 MMHG

## 2025-06-04 DIAGNOSIS — E11.628 TYPE 2 DIABETES MELLITUS WITH OTHER SKIN COMPLICATIONS (H): Primary | ICD-10-CM

## 2025-06-04 DIAGNOSIS — F33.2 SEVERE EPISODE OF RECURRENT MAJOR DEPRESSIVE DISORDER, WITHOUT PSYCHOTIC FEATURES (H): ICD-10-CM

## 2025-06-04 DIAGNOSIS — L71.9 ROSACEA: ICD-10-CM

## 2025-06-04 DIAGNOSIS — E78.5 HYPERLIPIDEMIA LDL GOAL <70: ICD-10-CM

## 2025-06-04 PROCEDURE — 3078F DIAST BP <80 MM HG: CPT | Performed by: NURSE PRACTITIONER

## 2025-06-04 PROCEDURE — 99214 OFFICE O/P EST MOD 30 MIN: CPT | Performed by: NURSE PRACTITIONER

## 2025-06-04 PROCEDURE — 3074F SYST BP LT 130 MM HG: CPT | Performed by: NURSE PRACTITIONER

## 2025-06-04 PROCEDURE — G2211 COMPLEX E/M VISIT ADD ON: HCPCS | Performed by: NURSE PRACTITIONER

## 2025-06-04 PROCEDURE — 1126F AMNT PAIN NOTED NONE PRSNT: CPT | Performed by: NURSE PRACTITIONER

## 2025-06-04 PROCEDURE — 99207 PR FOOT EXAM NO CHARGE: CPT | Performed by: NURSE PRACTITIONER

## 2025-06-04 RX ORDER — BUPROPION HYDROCHLORIDE 150 MG/1
150 TABLET ORAL EVERY MORNING
Qty: 90 TABLET | Refills: 1 | Status: SHIPPED | OUTPATIENT
Start: 2025-06-04

## 2025-06-04 ASSESSMENT — PATIENT HEALTH QUESTIONNAIRE - PHQ9
10. IF YOU CHECKED OFF ANY PROBLEMS, HOW DIFFICULT HAVE THESE PROBLEMS MADE IT FOR YOU TO DO YOUR WORK, TAKE CARE OF THINGS AT HOME, OR GET ALONG WITH OTHER PEOPLE: NOT DIFFICULT AT ALL
SUM OF ALL RESPONSES TO PHQ QUESTIONS 1-9: 2
SUM OF ALL RESPONSES TO PHQ QUESTIONS 1-9: 2

## 2025-06-04 ASSESSMENT — ENCOUNTER SYMPTOMS
ENDOCRINE NEGATIVE: 1
CONSTITUTIONAL NEGATIVE: 1
PSYCHIATRIC NEGATIVE: 1

## 2025-06-04 ASSESSMENT — PAIN SCALES - GENERAL: PAINLEVEL_OUTOF10: NO PAIN (0)

## 2025-06-04 NOTE — ASSESSMENT & PLAN NOTE
Working with dermatology on rosacea.  Overall a bit improved, but still with telangiectasias on nose/mild rhinophyma

## 2025-06-04 NOTE — PATIENT INSTRUCTIONS
Stop your glipizide and then recheck a fasting glucose in 3 weeks.    Lac Hydrin/ Amlactin/ 40% Urea cream (cheap)  Pumice stone

## 2025-06-04 NOTE — ASSESSMENT & PLAN NOTE
Diabetes is improving:    Lab Results   Component Value Date    A1C 5.6 05/29/2025    A1C 8.5 12/06/2024    A1C 7.9 10/11/2024    A1C 7.7 03/07/2024     Is on semaglutide 2 mg weekly and metformin 100 mg bid, and glipizide 20 mg bid    Working out daily and weight is down:  Wt Readings from Last 4 Encounters:   06/04/25 89.8 kg (198 lb)   12/21/24 103.9 kg (229 lb)   12/19/24 103.9 kg (229 lb 1.6 oz)   06/14/24 100.7 kg (222 lb)

## 2025-06-04 NOTE — PROGRESS NOTES
Assessment & Plan   Type 2 diabetes mellitus with other skin complications (H)   Excellent control.  Plan to stop glipizide and recheck glucose in 3 weeks.  Continue ozempic at 2 mg weekly and metformin 1000 mg bid for now.  Plan recheck in 3 months and consider reduction in metformin dosing  - Glucose    Hyperlipidemia LDL goal <70   Excellent control on simvastatin 40 mg per day    Severe episode of recurrent major depressive disorder, without psychotic features (H)   Mood is much improved.  Would like to wean medications somewhat with the understanding he has a seasonal component to his mood changes.  Will leave buspirone at current dose and wean bupropion to 150 mg daily which is a previous dose he had been using in summertime.  - buPROPion (WELLBUTRIN XL) 150 MG 24 hr tablet  Dispense: 90 tablet; Refill: 1    Follow-up    Follow-up Visit   Expected date:  Sep 04, 2025 (Approximate)      Follow Up Appointment Details:     Follow-up with whom?: Me    Follow-Up for what?: Chronic Disease f/u    Chronic Disease f/u: General (Other)    How?: In Person    Is this an as-needed follow-up?: No               Patient Instructions   Stop your glipizide and then recheck a fasting glucose in 3 weeks.    Lac Hydrin/ Amlactin/ 40% Urea cream (cheap)  VALDEZ Barbosa [facility]  ============================================  Subjective  Type 2 diabetes mellitus with other skin complications (H)  Diabetes is improving:    Lab Results   Component Value Date    A1C 5.6 05/29/2025    A1C 8.5 12/06/2024    A1C 7.9 10/11/2024    A1C 7.7 03/07/2024     Is on semaglutide 2 mg weekly and metformin 100 mg bid, and glipizide 20 mg bid    Working out daily and weight is down:  Wt Readings from Last 4 Encounters:   06/04/25 89.8 kg (198 lb)   12/21/24 103.9 kg (229 lb)   12/19/24 103.9 kg (229 lb 1.6 oz)   06/14/24 100.7 kg (222 lb)       Hyperlipidemia LDL goal <70  On simvastatin 40 mg per  "day--moderate intensity.  Tolerating well.  Last LDL was 38.      Severe episode of recurrent major depressive disorder (H)  On bupropion 300 mg per day and buspirone 10 mg daily (pt to validate 10 mg or 5 mg daily).    History of Present Illness       Diabetes:   He presents for follow up of diabetes.    He is not checking blood glucose.         He has no concerns regarding his diabetes at this time.  He is having weight loss.            He eats 0-1 servings of fruits and vegetables daily.He consumes 3 sweetened beverage(s) daily.He exercises with enough effort to increase his heart rate 60 or more minutes per day.  He exercises with enough effort to increase his heart rate 7 days per week.   He is taking medications regularly.    Reviewed by Provider at this visit   Tobacco  Allergies  Meds  Problems  Med Hx  Surg Hx  Fam Hx         Review of Systems   Constitutional: Negative.    Endocrine: Negative.    Psychiatric/Behavioral: Negative.       ============================================  Objective    /69 (BP Location: Right arm, Patient Position: Sitting, Cuff Size: Adult Large)   Pulse 98   Temp 98.5  F (36.9  C) (Oral)   Resp 11   Ht 1.727 m (5' 8\")   Wt 89.8 kg (198 lb)   SpO2 96%   BMI 30.11 kg/m    Physical Exam  Musculoskeletal:      Comments: Foot exam - both sides normal; no swelling, tenderness or  vascular lesions. Several mild callouses, discussed callous management. Color and temperature is normal. Sensation is intact. Peripheral pulses are palpable. Toenails are thickened consistent with onychomycosis on all ten toes.          ============================================  VALDEZ Cowart CNP  Signed Electronically by: VALDEZ Cowart CNP          "

## 2025-06-14 ENCOUNTER — HEALTH MAINTENANCE LETTER (OUTPATIENT)
Age: 60
End: 2025-06-14

## 2025-07-02 ENCOUNTER — LAB (OUTPATIENT)
Dept: LAB | Facility: CLINIC | Age: 60
End: 2025-07-02
Payer: COMMERCIAL

## 2025-07-02 DIAGNOSIS — E11.628 TYPE 2 DIABETES MELLITUS WITH OTHER SKIN COMPLICATIONS (H): ICD-10-CM

## 2025-07-02 LAB
FASTING STATUS PATIENT QL REPORTED: YES
GLUCOSE SERPL-MCNC: 154 MG/DL (ref 70–99)

## 2025-07-02 PROCEDURE — 82947 ASSAY GLUCOSE BLOOD QUANT: CPT

## 2025-07-02 PROCEDURE — 36415 COLL VENOUS BLD VENIPUNCTURE: CPT

## 2025-07-04 DIAGNOSIS — F41.1 GAD (GENERALIZED ANXIETY DISORDER): ICD-10-CM

## 2025-07-08 RX ORDER — BUSPIRONE HYDROCHLORIDE 10 MG/1
10 TABLET ORAL 2 TIMES DAILY
Qty: 180 TABLET | Refills: 1 | Status: SHIPPED | OUTPATIENT
Start: 2025-07-08

## 2025-07-14 NOTE — TELEPHONE ENCOUNTER
Patient Quality Outreach    Patient is due for the following:   Diabetes -  Diabetic Follow-Up Visit    Action(s) Taken:   No follow up needed at this time.    Type of outreach:    Chart review performed, no outreach needed.    Questions for provider review:    None         Rola Major CMA  Chart routed to .

## 2025-07-19 DIAGNOSIS — E11.9 TYPE 2 DIABETES MELLITUS WITHOUT COMPLICATION, WITHOUT LONG-TERM CURRENT USE OF INSULIN (H): ICD-10-CM

## 2025-07-21 RX ORDER — GLIPIZIDE 10 MG/1
20 TABLET, FILM COATED, EXTENDED RELEASE ORAL DAILY
Qty: 180 TABLET | Refills: 1 | Status: SHIPPED | OUTPATIENT
Start: 2025-07-21

## 2025-08-05 ENCOUNTER — PATIENT OUTREACH (OUTPATIENT)
Dept: CARE COORDINATION | Facility: CLINIC | Age: 60
End: 2025-08-05
Payer: COMMERCIAL

## (undated) DEVICE — LINEN TOWEL PACK X5 5464

## (undated) DEVICE — SU VICRYL 3-0 SH 27" UND J416H

## (undated) DEVICE — DRAPE CV SPLIT TIBURON 87"X136.5" 9155

## (undated) DEVICE — SU VICRYL 2-0 CT-2 CR 8X18" J726D

## (undated) DEVICE — SUCTION MANIFOLD NEPTUNE 2 SYS 4 PORT 0702-020-000

## (undated) DEVICE — NDL 22GA 1.5"

## (undated) DEVICE — GLOVE PROTEXIS W/NEU-THERA 8.0  2D73TE80

## (undated) DEVICE — PACK SMALL SPINE RIDGES

## (undated) DEVICE — SOL NACL 0.9% IRRIG 1000ML BOTTLE 2F7124

## (undated) DEVICE — GLOVE PROTEXIS BLUE W/NEU-THERA 6.5  2D73EB65

## (undated) DEVICE — RX SURGIFLO HEMOSTATIC MATRIX 8ML 2991

## (undated) DEVICE — GLOVE PROTEXIS MICRO 7.5  2D73PM75

## (undated) DEVICE — ADH SKIN CLOSURE PREMIERPRO EXOFIN MICOR HV 0.5ML 3471

## (undated) DEVICE — GOWN XXL 9575

## (undated) DEVICE — ESU GROUND PAD ADULT W/CORD E7507

## (undated) DEVICE — PREP CHLORAPREP 26ML TINTED HI-LITE ORANGE 930815

## (undated) DEVICE — CLEANSER JET LAVAGE IRRISEPT 0.05% CHG IRRISEPT45USA

## (undated) DEVICE — PACK MINOR CUSTOM RIDGES SBA32RMRMA

## (undated) DEVICE — SU VICRYL 0 UR-6 27" J603H

## (undated) DEVICE — SU VICRYL 3-0 TIE 12X18" J904T

## (undated) DEVICE — GLOVE PROTEXIS BLUE W/NEU-THERA 8.5  2D73EB85

## (undated) DEVICE — DRAPE STERI TOWEL LG 1010

## (undated) DEVICE — PAD FOAM WILSON FRAME KIT

## (undated) DEVICE — Device

## (undated) DEVICE — CUSHION INSERT LG PRONE VIEW JACKSON TABLE

## (undated) DEVICE — SYR 30ML LL W/O NDL 302832

## (undated) DEVICE — DRAIN PENROSE 0.50"X18" LATEX FREE GR203

## (undated) DEVICE — GLOVE PROTEXIS BLUE W/NEU-THERA 8.0  2D73EB80

## (undated) DEVICE — PEN MARKING SKIN FINE 31145942

## (undated) DEVICE — SU PDS II 2-0 CT-2 27"  Z333H

## (undated) DEVICE — LINEN TOWEL PACK X10 5473

## (undated) DEVICE — DRAPE MAYO STAND 23X54 8337

## (undated) DEVICE — TOOL DISSECT MIDAS MR8 14CM TELESC MATCH 2.5 MR8-T14MH25

## (undated) DEVICE — SU PROLENE 2-0 CT-2 30" 8411H

## (undated) DEVICE — GLOVE PROTEXIS POWDER FREE 6.5 ORTHOPEDIC 2D73ET65

## (undated) DEVICE — SPONGE LAP 4X18" X8415

## (undated) DEVICE — LINEN ORTHO ACL PACK 5447

## (undated) DEVICE — LINEN HALF SHEET 5512

## (undated) DEVICE — GLOVE PROTEXIS POWDER FREE 7.5 ORTHOPEDIC 2D73ET75

## (undated) DEVICE — DRAPE TIBURON TOP SHEET 100X60" 29352

## (undated) DEVICE — SPONGE COTTONOID 1/2X1/2" 80-1400

## (undated) DEVICE — SPONGE KITTNER 30-101

## (undated) DEVICE — SU PDS II 0 CT-2 27" Z334H

## (undated) DEVICE — SU VICRYL 4-0 PS-2 18" UND J496H

## (undated) DEVICE — ESU ELEC BLADE 6" COATED/INSULATED E1455-6

## (undated) DEVICE — ADH SKIN CLOSURE PREMIERPRO EXOFIN 1.0ML 3470

## (undated) DEVICE — DRAPE X-RAY TUBE 00-901169-01-OEC

## (undated) DEVICE — LINEN FULL SHEET 5511

## (undated) DEVICE — BAG CLEAR TRASH 1.3M 39X33" P4040C

## (undated) DEVICE — DRAPE LAP W/ARMBOARD 29410

## (undated) DEVICE — SU MONOCRYL 4-0 PS-2 27" UND Y426H

## (undated) DEVICE — TUBING SUCTION 12"X1/4" N612

## (undated) RX ORDER — FENTANYL CITRATE 50 UG/ML
INJECTION, SOLUTION INTRAMUSCULAR; INTRAVENOUS
Status: DISPENSED
Start: 2021-08-13

## (undated) RX ORDER — OXYCODONE HYDROCHLORIDE 5 MG/1
TABLET ORAL
Status: DISPENSED
Start: 2022-03-23

## (undated) RX ORDER — FENTANYL CITRATE 50 UG/ML
INJECTION, SOLUTION INTRAMUSCULAR; INTRAVENOUS
Status: DISPENSED
Start: 2022-03-23

## (undated) RX ORDER — HYDROCODONE BITARTRATE AND ACETAMINOPHEN 5; 325 MG/1; MG/1
TABLET ORAL
Status: DISPENSED
Start: 2021-08-13

## (undated) RX ORDER — PROPOFOL 10 MG/ML
INJECTION, EMULSION INTRAVENOUS
Status: DISPENSED
Start: 2022-03-23

## (undated) RX ORDER — DEXAMETHASONE SODIUM PHOSPHATE 4 MG/ML
INJECTION, SOLUTION INTRA-ARTICULAR; INTRALESIONAL; INTRAMUSCULAR; INTRAVENOUS; SOFT TISSUE
Status: DISPENSED
Start: 2022-03-23

## (undated) RX ORDER — HYDROMORPHONE HCL IN WATER/PF 6 MG/30 ML
PATIENT CONTROLLED ANALGESIA SYRINGE INTRAVENOUS
Status: DISPENSED
Start: 2021-08-13

## (undated) RX ORDER — ONDANSETRON 2 MG/ML
INJECTION INTRAMUSCULAR; INTRAVENOUS
Status: DISPENSED
Start: 2021-08-13

## (undated) RX ORDER — LIDOCAINE HYDROCHLORIDE 10 MG/ML
INJECTION, SOLUTION EPIDURAL; INFILTRATION; INTRACAUDAL; PERINEURAL
Status: DISPENSED
Start: 2021-08-13

## (undated) RX ORDER — BUPIVACAINE HYDROCHLORIDE 5 MG/ML
INJECTION, SOLUTION EPIDURAL; INTRACAUDAL
Status: DISPENSED
Start: 2021-08-13

## (undated) RX ORDER — ONDANSETRON 2 MG/ML
INJECTION INTRAMUSCULAR; INTRAVENOUS
Status: DISPENSED
Start: 2022-03-23

## (undated) RX ORDER — PROPOFOL 10 MG/ML
INJECTION, EMULSION INTRAVENOUS
Status: DISPENSED
Start: 2021-08-13

## (undated) RX ORDER — METHOCARBAMOL 750 MG/1
TABLET, FILM COATED ORAL
Status: DISPENSED
Start: 2022-03-23

## (undated) RX ORDER — CEFAZOLIN SODIUM 2 G/100ML
INJECTION, SOLUTION INTRAVENOUS
Status: DISPENSED
Start: 2021-08-13

## (undated) RX ORDER — CEFAZOLIN SODIUM/WATER 2 G/20 ML
SYRINGE (ML) INTRAVENOUS
Status: DISPENSED
Start: 2022-03-23

## (undated) RX ORDER — LIDOCAINE HYDROCHLORIDE 10 MG/ML
INJECTION, SOLUTION EPIDURAL; INFILTRATION; INTRACAUDAL; PERINEURAL
Status: DISPENSED
Start: 2022-03-23

## (undated) RX ORDER — KETOROLAC TROMETHAMINE 30 MG/ML
INJECTION, SOLUTION INTRAMUSCULAR; INTRAVENOUS
Status: DISPENSED
Start: 2021-08-13

## (undated) RX ORDER — DEXAMETHASONE SODIUM PHOSPHATE 4 MG/ML
INJECTION, SOLUTION INTRA-ARTICULAR; INTRALESIONAL; INTRAMUSCULAR; INTRAVENOUS; SOFT TISSUE
Status: DISPENSED
Start: 2021-08-13

## (undated) RX ORDER — IBUPROFEN 800 MG/1
TABLET, FILM COATED ORAL
Status: DISPENSED
Start: 2022-03-23

## (undated) RX ORDER — BUPIVACAINE HYDROCHLORIDE AND EPINEPHRINE 2.5; 5 MG/ML; UG/ML
INJECTION, SOLUTION EPIDURAL; INFILTRATION; INTRACAUDAL; PERINEURAL
Status: DISPENSED
Start: 2022-03-23

## (undated) RX ORDER — GLYCOPYRROLATE 0.2 MG/ML
INJECTION INTRAMUSCULAR; INTRAVENOUS
Status: DISPENSED
Start: 2022-03-23